# Patient Record
Sex: MALE | Race: WHITE | NOT HISPANIC OR LATINO | Employment: FULL TIME | URBAN - METROPOLITAN AREA
[De-identification: names, ages, dates, MRNs, and addresses within clinical notes are randomized per-mention and may not be internally consistent; named-entity substitution may affect disease eponyms.]

---

## 2024-07-10 ENCOUNTER — APPOINTMENT (OUTPATIENT)
Dept: NEUROLOGY | Facility: HOSPITAL | Age: 44
DRG: 215 | End: 2024-07-10
Payer: COMMERCIAL

## 2024-07-10 ENCOUNTER — HOSPITAL ENCOUNTER (INPATIENT)
Facility: HOSPITAL | Age: 44
LOS: 1 days | Discharge: HOSPICE | DRG: 215 | End: 2024-07-10
Attending: GENERAL PRACTICE | Admitting: ANESTHESIOLOGY
Payer: COMMERCIAL

## 2024-07-10 ENCOUNTER — APPOINTMENT (OUTPATIENT)
Dept: RADIOLOGY | Facility: HOSPITAL | Age: 44
DRG: 215 | End: 2024-07-10
Payer: COMMERCIAL

## 2024-07-10 ENCOUNTER — APPOINTMENT (OUTPATIENT)
Dept: CARDIOLOGY | Facility: HOSPITAL | Age: 44
DRG: 215 | End: 2024-07-10
Payer: COMMERCIAL

## 2024-07-10 ENCOUNTER — HOSPITAL ENCOUNTER (INPATIENT)
Facility: HOSPITAL | Age: 44
DRG: 276 | End: 2024-07-10
Attending: INTERNAL MEDICINE | Admitting: INTERNAL MEDICINE
Payer: COMMERCIAL

## 2024-07-10 ENCOUNTER — APPOINTMENT (OUTPATIENT)
Dept: RADIOLOGY | Facility: HOSPITAL | Age: 44
DRG: 276 | End: 2024-07-10
Payer: COMMERCIAL

## 2024-07-10 VITALS
HEIGHT: 70 IN | WEIGHT: 257.06 LBS | DIASTOLIC BLOOD PRESSURE: 54 MMHG | TEMPERATURE: 97.7 F | RESPIRATION RATE: 14 BRPM | BODY MASS INDEX: 36.8 KG/M2 | OXYGEN SATURATION: 92 % | HEART RATE: 62 BPM | SYSTOLIC BLOOD PRESSURE: 81 MMHG

## 2024-07-10 DIAGNOSIS — Z99.2 ESRD (END STAGE RENAL DISEASE) ON DIALYSIS (MULTI): ICD-10-CM

## 2024-07-10 DIAGNOSIS — N18.6 ESRD (END STAGE RENAL DISEASE) ON DIALYSIS (MULTI): ICD-10-CM

## 2024-07-10 DIAGNOSIS — I46.9 CARDIAC ARREST (MULTI): Primary | ICD-10-CM

## 2024-07-10 DIAGNOSIS — N17.9 AKI (ACUTE KIDNEY INJURY) (CMS-HCC): ICD-10-CM

## 2024-07-10 DIAGNOSIS — R40.20: ICD-10-CM

## 2024-07-10 DIAGNOSIS — I47.20 VT (VENTRICULAR TACHYCARDIA) (MULTI): ICD-10-CM

## 2024-07-10 DIAGNOSIS — I46.9 CARDIAC ARREST: Primary | ICD-10-CM

## 2024-07-10 DIAGNOSIS — I21.3 STEMI (ST ELEVATION MYOCARDIAL INFARCTION) (MULTI): ICD-10-CM

## 2024-07-10 PROBLEM — F41.9 ANXIETY: Status: ACTIVE | Noted: 2018-04-09

## 2024-07-10 PROBLEM — G47.33 OBSTRUCTIVE SLEEP APNEA SYNDROME: Status: ACTIVE | Noted: 2017-09-25

## 2024-07-10 PROBLEM — Z98.84 GASTRIC BYPASS STATUS FOR OBESITY: Status: ACTIVE | Noted: 2022-02-22

## 2024-07-10 PROBLEM — I10 ESSENTIAL HYPERTENSION: Status: ACTIVE | Noted: 2018-03-16

## 2024-07-10 PROBLEM — E78.2 MIXED HYPERLIPIDEMIA: Status: ACTIVE | Noted: 2018-03-16

## 2024-07-10 LAB
ACT BLD: 184 SEC (ref 83–199)
ALBUMIN SERPL BCP-MCNC: 3.4 G/DL (ref 3.4–5)
ALBUMIN SERPL BCP-MCNC: 4.2 G/DL (ref 3.4–5)
ALP SERPL-CCNC: 71 U/L (ref 33–120)
ALT SERPL W P-5'-P-CCNC: 116 U/L (ref 10–52)
ANION GAP BLDA CALCULATED.4IONS-SCNC: 17 MMO/L (ref 10–25)
ANION GAP BLDA CALCULATED.4IONS-SCNC: 19 MMO/L (ref 10–25)
ANION GAP BLDA CALCULATED.4IONS-SCNC: 21 MMO/L (ref 10–25)
ANION GAP SERPL CALC-SCNC: 19 MMOL/L (ref 10–20)
ANION GAP SERPL CALC-SCNC: 20 MMOL/L (ref 10–20)
AORTIC VALVE MEAN GRADIENT: 0.6 MMHG
AORTIC VALVE PEAK VELOCITY: 0.53 M/S
AST SERPL W P-5'-P-CCNC: 127 U/L (ref 9–39)
AV PEAK GRADIENT: 1.1 MMHG
BASE EXCESS BLDA CALC-SCNC: -10 MMOL/L (ref -2–3)
BASE EXCESS BLDA CALC-SCNC: -11.4 MMOL/L (ref -2–3)
BASE EXCESS BLDA CALC-SCNC: -5.9 MMOL/L (ref -2–3)
BASOPHILS # BLD AUTO: 0.07 X10*3/UL (ref 0–0.1)
BASOPHILS NFR BLD AUTO: 0.3 %
BILIRUB SERPL-MCNC: 2 MG/DL (ref 0–1.2)
BODY TEMPERATURE: 37 DEGREES CELSIUS
BUN SERPL-MCNC: 15 MG/DL (ref 6–23)
BUN SERPL-MCNC: 28 MG/DL (ref 6–23)
CA-I BLDA-SCNC: 1.05 MMOL/L (ref 1.1–1.33)
CA-I BLDA-SCNC: 1.17 MMOL/L (ref 1.1–1.33)
CA-I BLDA-SCNC: 1.17 MMOL/L (ref 1.1–1.33)
CALCIUM SERPL-MCNC: 7.9 MG/DL (ref 8.6–10.6)
CALCIUM SERPL-MCNC: 8.5 MG/DL (ref 8.6–10.3)
CARDIAC TROPONIN I PNL SERPL HS: 2496 NG/L (ref 0–20)
CHLORIDE BLDA-SCNC: 100 MMOL/L (ref 98–107)
CHLORIDE BLDA-SCNC: 103 MMOL/L (ref 98–107)
CHLORIDE BLDA-SCNC: 103 MMOL/L (ref 98–107)
CHLORIDE SERPL-SCNC: 103 MMOL/L (ref 98–107)
CHLORIDE SERPL-SCNC: 106 MMOL/L (ref 98–107)
CO2 SERPL-SCNC: 17 MMOL/L (ref 21–32)
CO2 SERPL-SCNC: 23 MMOL/L (ref 21–32)
CREAT SERPL-MCNC: 1.19 MG/DL (ref 0.5–1.3)
CREAT SERPL-MCNC: 2.09 MG/DL (ref 0.5–1.3)
EGFRCR SERPLBLD CKD-EPI 2021: 39 ML/MIN/1.73M*2
EGFRCR SERPLBLD CKD-EPI 2021: 77 ML/MIN/1.73M*2
EJECTION FRACTION APICAL 4 CHAMBER: 6.9
EJECTION FRACTION: 10 %
EOSINOPHIL # BLD AUTO: 0 X10*3/UL (ref 0–0.7)
EOSINOPHIL NFR BLD AUTO: 0 %
ERYTHROCYTE [DISTWIDTH] IN BLOOD BY AUTOMATED COUNT: 13.9 % (ref 11.5–14.5)
GLUCOSE BLD MANUAL STRIP-MCNC: 184 MG/DL (ref 74–99)
GLUCOSE BLDA-MCNC: 172 MG/DL (ref 74–99)
GLUCOSE BLDA-MCNC: 203 MG/DL (ref 74–99)
GLUCOSE BLDA-MCNC: 264 MG/DL (ref 74–99)
GLUCOSE SERPL-MCNC: 228 MG/DL (ref 74–99)
GLUCOSE SERPL-MCNC: 243 MG/DL (ref 74–99)
HCO3 BLDA-SCNC: 16.8 MMOL/L (ref 22–26)
HCO3 BLDA-SCNC: 19.5 MMOL/L (ref 22–26)
HCO3 BLDA-SCNC: 23.7 MMOL/L (ref 22–26)
HCT VFR BLD AUTO: 45.1 % (ref 41–52)
HCT VFR BLD EST: 44 % (ref 41–52)
HCT VFR BLD EST: 48 % (ref 41–52)
HCT VFR BLD EST: 49 % (ref 41–52)
HGB BLD-MCNC: 15.3 G/DL (ref 13.5–17.5)
HGB BLDA-MCNC: 14.5 G/DL (ref 13.5–17.5)
HGB BLDA-MCNC: 16 G/DL (ref 13.5–17.5)
HGB BLDA-MCNC: 16.2 G/DL (ref 13.5–17.5)
HOLD SPECIMEN: NORMAL
IMM GRANULOCYTES # BLD AUTO: 0.14 X10*3/UL (ref 0–0.7)
IMM GRANULOCYTES NFR BLD AUTO: 0.5 % (ref 0–0.9)
INHALED O2 CONCENTRATION: 100 %
LACTATE BLDA-SCNC: 4.7 MMOL/L (ref 0.4–2)
LACTATE BLDA-SCNC: 5.2 MMOL/L (ref 0.4–2)
LACTATE BLDA-SCNC: 7.1 MMOL/L (ref 0.4–2)
LACTATE SERPL-SCNC: 7.1 MMOL/L (ref 0.4–2)
LEFT VENTRICLE INTERNAL DIMENSION DIASTOLE: 4.77 CM (ref 3.5–6)
LYMPHOCYTES # BLD AUTO: 1.19 X10*3/UL (ref 1.2–4.8)
LYMPHOCYTES NFR BLD AUTO: 4.4 %
MAGNESIUM SERPL-MCNC: 1.64 MG/DL (ref 1.6–2.4)
MCH RBC QN AUTO: 29.8 PG (ref 26–34)
MCHC RBC AUTO-ENTMCNC: 33.9 G/DL (ref 32–36)
MCV RBC AUTO: 88 FL (ref 80–100)
MITRAL VALVE E/A RATIO: 0.99
MONOCYTES # BLD AUTO: 1.29 X10*3/UL (ref 0.1–1)
MONOCYTES NFR BLD AUTO: 4.7 %
NEUTROPHILS # BLD AUTO: 24.63 X10*3/UL (ref 1.2–7.7)
NEUTROPHILS NFR BLD AUTO: 90.1 %
NRBC BLD-RTO: 0 /100 WBCS (ref 0–0)
OXYHGB MFR BLDA: 90.9 % (ref 94–98)
OXYHGB MFR BLDA: 91.8 % (ref 94–98)
OXYHGB MFR BLDA: 95.5 % (ref 94–98)
PCO2 BLDA: 45 MM HG (ref 38–42)
PCO2 BLDA: 56 MM HG (ref 38–42)
PCO2 BLDA: 62 MM HG (ref 38–42)
PH BLDA: 7.15 PH (ref 7.38–7.42)
PH BLDA: 7.18 PH (ref 7.38–7.42)
PH BLDA: 7.19 PH (ref 7.38–7.42)
PHOSPHATE SERPL-MCNC: 4 MG/DL (ref 2.5–4.9)
PLATELET # BLD AUTO: 295 X10*3/UL (ref 150–450)
PO2 BLDA: 123 MM HG (ref 85–95)
PO2 BLDA: 73 MM HG (ref 85–95)
PO2 BLDA: 76 MM HG (ref 85–95)
POTASSIUM BLDA-SCNC: 3.1 MMOL/L (ref 3.5–5.3)
POTASSIUM BLDA-SCNC: 3.5 MMOL/L (ref 3.5–5.3)
POTASSIUM BLDA-SCNC: 3.5 MMOL/L (ref 3.5–5.3)
POTASSIUM SERPL-SCNC: 2.6 MMOL/L (ref 3.5–5.3)
POTASSIUM SERPL-SCNC: 3.1 MMOL/L (ref 3.5–5.3)
PROT SERPL-MCNC: 5.5 G/DL (ref 6.4–8.2)
RBC # BLD AUTO: 5.14 X10*6/UL (ref 4.5–5.9)
SAO2 % BLDA: 92 % (ref 94–100)
SAO2 % BLDA: 92 % (ref 94–100)
SAO2 % BLDA: 96 % (ref 94–100)
SODIUM BLDA-SCNC: 137 MMOL/L (ref 136–145)
SODIUM BLDA-SCNC: 138 MMOL/L (ref 136–145)
SODIUM BLDA-SCNC: 138 MMOL/L (ref 136–145)
SODIUM SERPL-SCNC: 140 MMOL/L (ref 136–145)
SODIUM SERPL-SCNC: 142 MMOL/L (ref 136–145)
SPECIMEN DRAWN FROM PATIENT: ABNORMAL
WBC # BLD AUTO: 27.3 X10*3/UL (ref 4.4–11.3)

## 2024-07-10 PROCEDURE — 83605 ASSAY OF LACTIC ACID: CPT | Performed by: ANESTHESIOLOGY

## 2024-07-10 PROCEDURE — C1889 IMPLANT/INSERT DEVICE, NOC: HCPCS | Performed by: INTERNAL MEDICINE

## 2024-07-10 PROCEDURE — B2111ZZ FLUOROSCOPY OF MULTIPLE CORONARY ARTERIES USING LOW OSMOLAR CONTRAST: ICD-10-PCS | Performed by: INTERNAL MEDICINE

## 2024-07-10 PROCEDURE — 84132 ASSAY OF SERUM POTASSIUM: CPT

## 2024-07-10 PROCEDURE — 2500000004 HC RX 250 GENERAL PHARMACY W/ HCPCS (ALT 636 FOR OP/ED): Performed by: INTERNAL MEDICINE

## 2024-07-10 PROCEDURE — 71045 X-RAY EXAM CHEST 1 VIEW: CPT | Performed by: RADIOLOGY

## 2024-07-10 PROCEDURE — 2780000003 HC OR 278 NO HCPCS: Performed by: INTERNAL MEDICINE

## 2024-07-10 PROCEDURE — 85347 COAGULATION TIME ACTIVATED: CPT

## 2024-07-10 PROCEDURE — 83010 ASSAY OF HAPTOGLOBIN QUANT: CPT | Performed by: STUDENT IN AN ORGANIZED HEALTH CARE EDUCATION/TRAINING PROGRAM

## 2024-07-10 PROCEDURE — 71275 CT ANGIOGRAPHY CHEST: CPT

## 2024-07-10 PROCEDURE — 2500000004 HC RX 250 GENERAL PHARMACY W/ HCPCS (ALT 636 FOR OP/ED)

## 2024-07-10 PROCEDURE — 71045 X-RAY EXAM CHEST 1 VIEW: CPT

## 2024-07-10 PROCEDURE — 2500000004 HC RX 250 GENERAL PHARMACY W/ HCPCS (ALT 636 FOR OP/ED): Performed by: GENERAL PRACTICE

## 2024-07-10 PROCEDURE — 82947 ASSAY GLUCOSE BLOOD QUANT: CPT

## 2024-07-10 PROCEDURE — 99153 MOD SED SAME PHYS/QHP EA: CPT | Performed by: INTERNAL MEDICINE

## 2024-07-10 PROCEDURE — 99223 1ST HOSP IP/OBS HIGH 75: CPT | Performed by: INTERNAL MEDICINE

## 2024-07-10 PROCEDURE — 94002 VENT MGMT INPAT INIT DAY: CPT

## 2024-07-10 PROCEDURE — 93460 R&L HRT ART/VENTRICLE ANGIO: CPT | Performed by: INTERNAL MEDICINE

## 2024-07-10 PROCEDURE — 99285 EMERGENCY DEPT VISIT HI MDM: CPT | Mod: 25

## 2024-07-10 PROCEDURE — C1751 CATH, INF, PER/CENT/MIDLINE: HCPCS | Performed by: INTERNAL MEDICINE

## 2024-07-10 PROCEDURE — 2550000001 HC RX 255 CONTRASTS: Performed by: INTERNAL MEDICINE

## 2024-07-10 PROCEDURE — C1769 GUIDE WIRE: HCPCS | Performed by: INTERNAL MEDICINE

## 2024-07-10 PROCEDURE — 70450 CT HEAD/BRAIN W/O DYE: CPT

## 2024-07-10 PROCEDURE — 2500000005 HC RX 250 GENERAL PHARMACY W/O HCPCS

## 2024-07-10 PROCEDURE — 31500 INSERT EMERGENCY AIRWAY: CPT | Performed by: GENERAL PRACTICE

## 2024-07-10 PROCEDURE — 84484 ASSAY OF TROPONIN QUANT: CPT | Performed by: INTERNAL MEDICINE

## 2024-07-10 PROCEDURE — 93306 TTE W/DOPPLER COMPLETE: CPT | Performed by: INTERNAL MEDICINE

## 2024-07-10 PROCEDURE — 33990 INSJ PERQ VAD L HRT ARTERIAL: CPT | Performed by: INTERNAL MEDICINE

## 2024-07-10 PROCEDURE — 2020000001 HC ICU ROOM DAILY

## 2024-07-10 PROCEDURE — 84295 ASSAY OF SERUM SODIUM: CPT | Performed by: ANESTHESIOLOGY

## 2024-07-10 PROCEDURE — 84132 ASSAY OF SERUM POTASSIUM: CPT | Performed by: STUDENT IN AN ORGANIZED HEALTH CARE EDUCATION/TRAINING PROGRAM

## 2024-07-10 PROCEDURE — 99152 MOD SED SAME PHYS/QHP 5/>YRS: CPT | Performed by: INTERNAL MEDICINE

## 2024-07-10 PROCEDURE — 2500000004 HC RX 250 GENERAL PHARMACY W/ HCPCS (ALT 636 FOR OP/ED): Performed by: NURSE PRACTITIONER

## 2024-07-10 PROCEDURE — 2550000001 HC RX 255 CONTRASTS: Performed by: ANESTHESIOLOGY

## 2024-07-10 PROCEDURE — 36556 INSERT NON-TUNNEL CV CATH: CPT | Performed by: NURSE PRACTITIONER

## 2024-07-10 PROCEDURE — 71275 CT ANGIOGRAPHY CHEST: CPT | Performed by: RADIOLOGY

## 2024-07-10 PROCEDURE — 2720000007 HC OR 272 NO HCPCS: Performed by: INTERNAL MEDICINE

## 2024-07-10 PROCEDURE — 84075 ASSAY ALKALINE PHOSPHATASE: CPT

## 2024-07-10 PROCEDURE — 36415 COLL VENOUS BLD VENIPUNCTURE: CPT | Performed by: ANESTHESIOLOGY

## 2024-07-10 PROCEDURE — 37799 UNLISTED PX VASCULAR SURGERY: CPT | Performed by: STUDENT IN AN ORGANIZED HEALTH CARE EDUCATION/TRAINING PROGRAM

## 2024-07-10 PROCEDURE — 93456 R HRT CORONARY ARTERY ANGIO: CPT | Performed by: INTERNAL MEDICINE

## 2024-07-10 PROCEDURE — 83615 LACTATE (LD) (LDH) ENZYME: CPT | Performed by: STUDENT IN AN ORGANIZED HEALTH CARE EDUCATION/TRAINING PROGRAM

## 2024-07-10 PROCEDURE — 99291 CRITICAL CARE FIRST HOUR: CPT | Performed by: GENERAL PRACTICE

## 2024-07-10 PROCEDURE — 33990 INSJ PERQ VAD L HRT ARTERIAL: CPT

## 2024-07-10 PROCEDURE — 85610 PROTHROMBIN TIME: CPT | Performed by: STUDENT IN AN ORGANIZED HEALTH CARE EDUCATION/TRAINING PROGRAM

## 2024-07-10 PROCEDURE — C1760 CLOSURE DEV, VASC: HCPCS | Performed by: INTERNAL MEDICINE

## 2024-07-10 PROCEDURE — 2500000004 HC RX 250 GENERAL PHARMACY W/ HCPCS (ALT 636 FOR OP/ED): Performed by: ANESTHESIOLOGY

## 2024-07-10 PROCEDURE — 02HV33Z INSERTION OF INFUSION DEVICE INTO SUPERIOR VENA CAVA, PERCUTANEOUS APPROACH: ICD-10-PCS | Performed by: ANESTHESIOLOGY

## 2024-07-10 PROCEDURE — 5A0221D ASSISTANCE WITH CARDIAC OUTPUT USING IMPELLER PUMP, CONTINUOUS: ICD-10-PCS | Performed by: INTERNAL MEDICINE

## 2024-07-10 PROCEDURE — 99223 1ST HOSP IP/OBS HIGH 75: CPT | Performed by: PSYCHIATRY & NEUROLOGY

## 2024-07-10 PROCEDURE — 4A023N8 MEASUREMENT OF CARDIAC SAMPLING AND PRESSURE, BILATERAL, PERCUTANEOUS APPROACH: ICD-10-PCS | Performed by: INTERNAL MEDICINE

## 2024-07-10 PROCEDURE — 99291 CRITICAL CARE FIRST HOUR: CPT | Performed by: ANESTHESIOLOGY

## 2024-07-10 PROCEDURE — 83735 ASSAY OF MAGNESIUM: CPT

## 2024-07-10 PROCEDURE — 83605 ASSAY OF LACTIC ACID: CPT | Performed by: STUDENT IN AN ORGANIZED HEALTH CARE EDUCATION/TRAINING PROGRAM

## 2024-07-10 PROCEDURE — 93010 ELECTROCARDIOGRAM REPORT: CPT | Performed by: INTERNAL MEDICINE

## 2024-07-10 PROCEDURE — 1210000006 HC SEMI PRIVATE ROOM - IP ONLY PROCEDURE WITH INTENT

## 2024-07-10 PROCEDURE — 02HQ32Z INSERTION OF MONITORING DEVICE INTO RIGHT PULMONARY ARTERY, PERCUTANEOUS APPROACH: ICD-10-PCS | Performed by: INTERNAL MEDICINE

## 2024-07-10 PROCEDURE — 02HA3RZ INSERTION OF SHORT-TERM EXTERNAL HEART ASSIST SYSTEM INTO HEART, PERCUTANEOUS APPROACH: ICD-10-PCS | Performed by: INTERNAL MEDICINE

## 2024-07-10 PROCEDURE — 85025 COMPLETE CBC W/AUTO DIFF WBC: CPT

## 2024-07-10 PROCEDURE — 82330 ASSAY OF CALCIUM: CPT | Performed by: ANESTHESIOLOGY

## 2024-07-10 PROCEDURE — C1894 INTRO/SHEATH, NON-LASER: HCPCS | Performed by: INTERNAL MEDICINE

## 2024-07-10 PROCEDURE — 99292 CRITICAL CARE ADDL 30 MIN: CPT | Performed by: ANESTHESIOLOGY

## 2024-07-10 PROCEDURE — 70450 CT HEAD/BRAIN W/O DYE: CPT | Performed by: RADIOLOGY

## 2024-07-10 PROCEDURE — 96374 THER/PROPH/DIAG INJ IV PUSH: CPT

## 2024-07-10 PROCEDURE — 93306 TTE W/DOPPLER COMPLETE: CPT

## 2024-07-10 PROCEDURE — 4A1239Z MONITORING OF CARDIAC OUTPUT, PERCUTANEOUS APPROACH: ICD-10-PCS | Performed by: INTERNAL MEDICINE

## 2024-07-10 PROCEDURE — 36600 WITHDRAWAL OF ARTERIAL BLOOD: CPT

## 2024-07-10 PROCEDURE — 4A133B3 MONITORING OF ARTERIAL PRESSURE, PULMONARY, PERCUTANEOUS APPROACH: ICD-10-PCS | Performed by: INTERNAL MEDICINE

## 2024-07-10 DEVICE — IMPELLA CP PUMP 371 SET, US ( IMPELLA CP WITH SMART ASSIST)
Type: IMPLANTABLE DEVICE | Site: AORTA | Status: FUNCTIONAL
Brand: IMPELLA

## 2024-07-10 RX ORDER — PROPOFOL 10 MG/ML
INJECTION, EMULSION INTRAVENOUS
Status: COMPLETED
Start: 2024-07-10 | End: 2024-07-10

## 2024-07-10 RX ORDER — MEPERIDINE HYDROCHLORIDE 25 MG/ML
12.5 INJECTION INTRAMUSCULAR; INTRAVENOUS; SUBCUTANEOUS
Status: DISCONTINUED | OUTPATIENT
Start: 2024-07-10 | End: 2024-07-10 | Stop reason: HOSPADM

## 2024-07-10 RX ORDER — LEVETIRACETAM 10 MG/ML
1000 INJECTION INTRAVASCULAR ONCE
Status: DISCONTINUED | OUTPATIENT
Start: 2024-07-10 | End: 2024-07-10 | Stop reason: HOSPADM

## 2024-07-10 RX ORDER — BISMUTH SUBSALICYLATE 262 MG
1 TABLET,CHEWABLE ORAL DAILY
COMMUNITY

## 2024-07-10 RX ORDER — PROPOFOL 10 MG/ML
5-50 INJECTION, EMULSION INTRAVENOUS CONTINUOUS
Status: DISCONTINUED | OUTPATIENT
Start: 2024-07-10 | End: 2024-07-14

## 2024-07-10 RX ORDER — NOREPINEPHRINE BITARTRATE/D5W 8 MG/250ML
0-.5 PLASTIC BAG, INJECTION (ML) INTRAVENOUS CONTINUOUS
Status: DISCONTINUED | OUTPATIENT
Start: 2024-07-10 | End: 2024-07-10 | Stop reason: HOSPADM

## 2024-07-10 RX ORDER — POTASSIUM CHLORIDE 14.9 MG/ML
INJECTION INTRAVENOUS
Status: COMPLETED
Start: 2024-07-10 | End: 2024-07-10

## 2024-07-10 RX ORDER — POLYETHYLENE GLYCOL 3350 17 G/17G
17 POWDER, FOR SOLUTION ORAL DAILY
Status: DISCONTINUED | OUTPATIENT
Start: 2024-07-11 | End: 2024-07-27 | Stop reason: HOSPADM

## 2024-07-10 RX ORDER — POTASSIUM CHLORIDE 14.9 MG/ML
20 INJECTION INTRAVENOUS
Status: COMPLETED | OUTPATIENT
Start: 2024-07-10 | End: 2024-07-10

## 2024-07-10 RX ORDER — POTASSIUM CHLORIDE 14.9 MG/ML
20 INJECTION INTRAVENOUS ONCE
Status: COMPLETED | OUTPATIENT
Start: 2024-07-10 | End: 2024-07-10

## 2024-07-10 RX ORDER — FENTANYL CITRATE 50 UG/ML
100 INJECTION, SOLUTION INTRAMUSCULAR; INTRAVENOUS ONCE
Status: COMPLETED | OUTPATIENT
Start: 2024-07-10 | End: 2024-07-10

## 2024-07-10 RX ORDER — NOREPINEPHRINE BITARTRATE/D5W 8 MG/250ML
PLASTIC BAG, INJECTION (ML) INTRAVENOUS
Status: COMPLETED
Start: 2024-07-10 | End: 2024-07-10

## 2024-07-10 RX ORDER — HEPARIN SODIUM 1000 [USP'U]/ML
INJECTION, SOLUTION INTRAVENOUS; SUBCUTANEOUS AS NEEDED
Status: DISCONTINUED | OUTPATIENT
Start: 2024-07-10 | End: 2024-07-10 | Stop reason: HOSPADM

## 2024-07-10 RX ORDER — MAGNESIUM SULFATE HEPTAHYDRATE 40 MG/ML
INJECTION, SOLUTION INTRAVENOUS
Status: COMPLETED
Start: 2024-07-10 | End: 2024-07-11

## 2024-07-10 RX ORDER — AMLODIPINE BESYLATE 2.5 MG/1
2.5 TABLET ORAL DAILY
COMMUNITY
End: 2024-07-27 | Stop reason: HOSPADM

## 2024-07-10 RX ORDER — POTASSIUM CHLORIDE 14.9 MG/ML
20 INJECTION INTRAVENOUS
Status: DISCONTINUED | OUTPATIENT
Start: 2024-07-10 | End: 2024-07-10 | Stop reason: HOSPADM

## 2024-07-10 RX ORDER — MIDAZOLAM HYDROCHLORIDE 1 MG/ML
INJECTION INTRAMUSCULAR; INTRAVENOUS
Status: COMPLETED
Start: 2024-07-10 | End: 2024-07-10

## 2024-07-10 RX ORDER — LEVETIRACETAM 5 MG/ML
500 INJECTION INTRAVASCULAR EVERY 12 HOURS
Status: DISCONTINUED | OUTPATIENT
Start: 2024-07-10 | End: 2024-07-10 | Stop reason: HOSPADM

## 2024-07-10 RX ORDER — PROPOFOL 10 MG/ML
5-50 INJECTION, EMULSION INTRAVENOUS CONTINUOUS
Status: DISCONTINUED | OUTPATIENT
Start: 2024-07-10 | End: 2024-07-10 | Stop reason: CLARIF

## 2024-07-10 RX ORDER — MIDAZOLAM HYDROCHLORIDE 1 MG/ML
2 INJECTION, SOLUTION INTRAMUSCULAR; INTRAVENOUS ONCE
Status: COMPLETED | OUTPATIENT
Start: 2024-07-10 | End: 2024-07-10

## 2024-07-10 RX ORDER — NOREPINEPHRINE BITARTRATE/D5W 8 MG/250ML
0-.2 PLASTIC BAG, INJECTION (ML) INTRAVENOUS CONTINUOUS
Status: DISCONTINUED | OUTPATIENT
Start: 2024-07-10 | End: 2024-07-12

## 2024-07-10 RX ORDER — VITAMIN A 3000 MCG
10000 CAPSULE ORAL DAILY
COMMUNITY

## 2024-07-10 RX ORDER — MIDAZOLAM HYDROCHLORIDE 1 MG/ML
INJECTION, SOLUTION INTRAMUSCULAR; INTRAVENOUS
Status: COMPLETED
Start: 2024-07-10 | End: 2024-07-10

## 2024-07-10 RX ORDER — ERGOCALCIFEROL 1.25 MG/1
1.25 CAPSULE ORAL
COMMUNITY
End: 2024-12-02 | Stop reason: SDUPTHER

## 2024-07-10 RX ORDER — FENTANYL CITRATE 50 UG/ML
INJECTION, SOLUTION INTRAMUSCULAR; INTRAVENOUS
Status: COMPLETED
Start: 2024-07-10 | End: 2024-07-10

## 2024-07-10 RX ORDER — HEPARIN SODIUM 10000 [USP'U]/100ML
0-2000 INJECTION, SOLUTION INTRAVENOUS CONTINUOUS
Status: DISCONTINUED | OUTPATIENT
Start: 2024-07-10 | End: 2024-07-10 | Stop reason: HOSPADM

## 2024-07-10 RX ORDER — FUROSEMIDE 10 MG/ML
40 INJECTION INTRAMUSCULAR; INTRAVENOUS ONCE
Status: COMPLETED | OUTPATIENT
Start: 2024-07-10 | End: 2024-07-10

## 2024-07-10 RX ORDER — POLYETHYLENE GLYCOL 3350 17 G/17G
17 POWDER, FOR SOLUTION ORAL DAILY
Status: DISCONTINUED | OUTPATIENT
Start: 2024-07-10 | End: 2024-07-10 | Stop reason: HOSPADM

## 2024-07-10 RX ORDER — PROPOFOL 10 MG/ML
5-50 INJECTION, EMULSION INTRAVENOUS CONTINUOUS
Status: DISCONTINUED | OUTPATIENT
Start: 2024-07-10 | End: 2024-07-10 | Stop reason: HOSPADM

## 2024-07-10 RX ORDER — LIDOCAINE HYDROCHLORIDE ANHYDROUS AND DEXTROSE MONOHYDRATE .8; 5 G/100ML; G/100ML
INJECTION, SOLUTION INTRAVENOUS
Status: COMPLETED
Start: 2024-07-10 | End: 2024-07-11

## 2024-07-10 RX ORDER — EPINEPHRINE HCL IN DEXTROSE 5% 4MG/250ML
0-.2 PLASTIC BAG, INJECTION (ML) INTRAVENOUS CONTINUOUS
Status: DISCONTINUED | OUTPATIENT
Start: 2024-07-10 | End: 2024-07-12

## 2024-07-10 RX ORDER — EPINEPHRINE HCL IN DEXTROSE 5% 4MG/250ML
0-1 PLASTIC BAG, INJECTION (ML) INTRAVENOUS CONTINUOUS
Status: DISCONTINUED | OUTPATIENT
Start: 2024-07-10 | End: 2024-07-10 | Stop reason: HOSPADM

## 2024-07-10 RX ORDER — MAGNESIUM SULFATE HEPTAHYDRATE 40 MG/ML
4 INJECTION, SOLUTION INTRAVENOUS ONCE
Status: COMPLETED | OUTPATIENT
Start: 2024-07-11 | End: 2024-07-11

## 2024-07-10 RX ADMIN — POTASSIUM CHLORIDE 20 MEQ: 14.9 INJECTION, SOLUTION INTRAVENOUS at 20:00

## 2024-07-10 RX ADMIN — FUROSEMIDE 40 MG: 10 INJECTION, SOLUTION INTRAMUSCULAR; INTRAVENOUS at 15:58

## 2024-07-10 RX ADMIN — Medication 0.1 MCG/KG/MIN: at 15:40

## 2024-07-10 RX ADMIN — PROPOFOL 50 MCG/KG/MIN: 10 INJECTION, EMULSION INTRAVENOUS at 15:28

## 2024-07-10 RX ADMIN — MIDAZOLAM HYDROCHLORIDE 2 MG: 1 INJECTION, SOLUTION INTRAMUSCULAR; INTRAVENOUS at 17:39

## 2024-07-10 RX ADMIN — NOREPINEPHRINE BITARTRATE 32 MCG: 1 INJECTION, SOLUTION, CONCENTRATE INTRAVENOUS at 17:15

## 2024-07-10 RX ADMIN — MIDAZOLAM HYDROCHLORIDE 2 MG: 1 INJECTION INTRAMUSCULAR; INTRAVENOUS at 18:00

## 2024-07-10 RX ADMIN — AMIODARONE HYDROCHLORIDE 150 MG: 1.5 INJECTION, SOLUTION INTRAVENOUS at 15:13

## 2024-07-10 RX ADMIN — FENTANYL CITRATE 100 MCG: 50 INJECTION, SOLUTION INTRAMUSCULAR; INTRAVENOUS at 15:41

## 2024-07-10 RX ADMIN — POTASSIUM CHLORIDE 20 MEQ: 14.9 INJECTION, SOLUTION INTRAVENOUS at 15:13

## 2024-07-10 RX ADMIN — MIDAZOLAM 2 MG: 1 INJECTION INTRAMUSCULAR; INTRAVENOUS at 15:25

## 2024-07-10 RX ADMIN — FENTANYL CITRATE 100 MCG: 50 INJECTION, SOLUTION INTRAMUSCULAR; INTRAVENOUS at 17:39

## 2024-07-10 RX ADMIN — POTASSIUM CHLORIDE 20 MEQ: 14.9 INJECTION, SOLUTION INTRAVENOUS at 17:40

## 2024-07-10 RX ADMIN — PROPOFOL 20 MG: 10 INJECTION, EMULSION INTRAVENOUS at 12:41

## 2024-07-10 RX ADMIN — AMIODARONE HYDROCHLORIDE 1 MG/MIN: 1.8 INJECTION, SOLUTION INTRAVENOUS at 15:09

## 2024-07-10 RX ADMIN — Medication 0.07 MCG/KG/MIN: at 16:00

## 2024-07-10 RX ADMIN — POTASSIUM CHLORIDE 20 MEQ: 14.9 INJECTION, SOLUTION INTRAVENOUS at 11:09

## 2024-07-10 RX ADMIN — IOHEXOL 75 ML: 350 INJECTION, SOLUTION INTRAVENOUS at 14:23

## 2024-07-10 RX ADMIN — MEPERIDINE HYDROCHLORIDE 12.5 MG: 25 INJECTION INTRAMUSCULAR; INTRAVENOUS; SUBCUTANEOUS at 16:20

## 2024-07-10 RX ADMIN — PERFLUTREN 10 ML OF DILUTION: 6.52 INJECTION, SUSPENSION INTRAVENOUS at 15:58

## 2024-07-10 RX ADMIN — MIDAZOLAM HYDROCHLORIDE 2 MG: 1 INJECTION, SOLUTION INTRAMUSCULAR; INTRAVENOUS at 15:25

## 2024-07-10 RX ADMIN — PROPOFOL 50 MCG/KG/MIN: 10 INJECTION, EMULSION INTRAVENOUS at 17:42

## 2024-07-10 RX ADMIN — POTASSIUM CHLORIDE 20 MEQ: 200 INJECTION, SOLUTION INTRAVENOUS at 11:09

## 2024-07-10 SDOH — SOCIAL STABILITY: SOCIAL NETWORK
DO YOU BELONG TO ANY CLUBS OR ORGANIZATIONS SUCH AS CHURCH GROUPS, UNIONS, FRATERNAL OR ATHLETIC GROUPS, OR SCHOOL GROUPS?: PATIENT UNABLE TO ANSWER

## 2024-07-10 SDOH — HEALTH STABILITY: MENTAL HEALTH
HOW OFTEN DO YOU NEED TO HAVE SOMEONE HELP YOU WHEN YOU READ INSTRUCTIONS, PAMPHLETS, OR OTHER WRITTEN MATERIAL FROM YOUR DOCTOR OR PHARMACY?: PATIENT UNABLE TO RESPOND

## 2024-07-10 SDOH — ECONOMIC STABILITY: HOUSING INSECURITY: AT ANY TIME IN THE PAST 12 MONTHS, WERE YOU HOMELESS OR LIVING IN A SHELTER (INCLUDING NOW)?: PATIENT UNABLE TO ANSWER

## 2024-07-10 SDOH — SOCIAL STABILITY: SOCIAL NETWORK: IN A TYPICAL WEEK, HOW MANY TIMES DO YOU TALK ON THE PHONE WITH FAMILY, FRIENDS, OR NEIGHBORS?: PATIENT UNABLE TO ANSWER

## 2024-07-10 SDOH — HEALTH STABILITY: MENTAL HEALTH: HOW OFTEN DO YOU HAVE A DRINK CONTAINING ALCOHOL?: PATIENT UNABLE TO ANSWER

## 2024-07-10 SDOH — HEALTH STABILITY: MENTAL HEALTH: HOW MANY DRINKS CONTAINING ALCOHOL DO YOU HAVE ON A TYPICAL DAY WHEN YOU ARE DRINKING?: PATIENT UNABLE TO ANSWER

## 2024-07-10 SDOH — SOCIAL STABILITY: SOCIAL INSECURITY: HAS ANYONE EVER THREATENED TO HURT YOUR FAMILY OR YOUR PETS?: UNABLE TO ASSESS

## 2024-07-10 SDOH — SOCIAL STABILITY: SOCIAL INSECURITY: DO YOU FEEL ANYONE HAS EXPLOITED OR TAKEN ADVANTAGE OF YOU FINANCIALLY OR OF YOUR PERSONAL PROPERTY?: UNABLE TO ASSESS

## 2024-07-10 SDOH — SOCIAL STABILITY: SOCIAL INSECURITY: ARE YOU OR HAVE YOU BEEN THREATENED OR ABUSED PHYSICALLY, EMOTIONALLY, OR SEXUALLY BY ANYONE?: UNABLE TO ASSESS

## 2024-07-10 SDOH — SOCIAL STABILITY: SOCIAL NETWORK: HOW OFTEN DO YOU ATTEND CHURCH OR RELIGIOUS SERVICES?: PATIENT UNABLE TO ANSWER

## 2024-07-10 SDOH — ECONOMIC STABILITY: HOUSING INSECURITY
IN THE LAST 12 MONTHS, WAS THERE A TIME WHEN YOU WERE NOT ABLE TO PAY THE MORTGAGE OR RENT ON TIME?: PATIENT UNABLE TO ANSWER

## 2024-07-10 SDOH — SOCIAL STABILITY: SOCIAL INSECURITY: ARE THERE ANY APPARENT SIGNS OF INJURIES/BEHAVIORS THAT COULD BE RELATED TO ABUSE/NEGLECT?: UNABLE TO ASSESS

## 2024-07-10 SDOH — ECONOMIC STABILITY: TRANSPORTATION INSECURITY
IN THE PAST 12 MONTHS, HAS LACK OF TRANSPORTATION KEPT YOU FROM MEDICAL APPOINTMENTS OR FROM GETTING MEDICATIONS?: PATIENT UNABLE TO ANSWER

## 2024-07-10 SDOH — SOCIAL STABILITY: SOCIAL INSECURITY: DO YOU FEEL UNSAFE GOING BACK TO THE PLACE WHERE YOU ARE LIVING?: UNABLE TO ASSESS

## 2024-07-10 SDOH — ECONOMIC STABILITY: TRANSPORTATION INSECURITY
IN THE PAST 12 MONTHS, HAS THE LACK OF TRANSPORTATION KEPT YOU FROM MEDICAL APPOINTMENTS OR FROM GETTING MEDICATIONS?: PATIENT UNABLE TO ANSWER

## 2024-07-10 SDOH — SOCIAL STABILITY: SOCIAL NETWORK: HOW OFTEN DO YOU ATTENT MEETINGS OF THE CLUB OR ORGANIZATION YOU BELONG TO?: PATIENT UNABLE TO ANSWER

## 2024-07-10 SDOH — HEALTH STABILITY: MENTAL HEALTH: HOW OFTEN DO YOU HAVE 6 OR MORE DRINKS ON ONE OCCASION?: PATIENT UNABLE TO ANSWER

## 2024-07-10 SDOH — SOCIAL STABILITY: SOCIAL NETWORK: ARE YOU MARRIED, WIDOWED, DIVORCED, SEPARATED, NEVER MARRIED, OR LIVING WITH A PARTNER?: PATIENT UNABLE TO ANSWER

## 2024-07-10 SDOH — SOCIAL STABILITY: SOCIAL INSECURITY
WITHIN THE LAST YEAR, HAVE YOU BEEN RAPED OR FORCED TO HAVE ANY KIND OF SEXUAL ACTIVITY BY YOUR PARTNER OR EX-PARTNER?: PATIENT UNABLE TO ANSWER

## 2024-07-10 SDOH — SOCIAL STABILITY: SOCIAL INSECURITY: DOES ANYONE TRY TO KEEP YOU FROM HAVING/CONTACTING OTHER FRIENDS OR DOING THINGS OUTSIDE YOUR HOME?: UNABLE TO ASSESS

## 2024-07-10 SDOH — ECONOMIC STABILITY: INCOME INSECURITY
IN THE PAST 12 MONTHS HAS THE ELECTRIC, GAS, OIL, OR WATER COMPANY THREATENED TO SHUT OFF SERVICES IN YOUR HOME?: PATIENT UNABLE TO ANSWER

## 2024-07-10 SDOH — HEALTH STABILITY: PHYSICAL HEALTH
ON AVERAGE, HOW MANY DAYS PER WEEK DO YOU ENGAGE IN MODERATE TO STRENUOUS EXERCISE (LIKE A BRISK WALK)?: PATIENT UNABLE TO ANSWER

## 2024-07-10 SDOH — SOCIAL STABILITY: SOCIAL INSECURITY: WITHIN THE LAST YEAR, HAVE YOU BEEN AFRAID OF YOUR PARTNER OR EX-PARTNER?: PATIENT UNABLE TO ANSWER

## 2024-07-10 SDOH — SOCIAL STABILITY: SOCIAL INSECURITY: ABUSE: ADULT

## 2024-07-10 SDOH — SOCIAL STABILITY: SOCIAL NETWORK: HOW OFTEN DO YOU GET TOGETHER WITH FRIENDS OR RELATIVES?: PATIENT UNABLE TO ANSWER

## 2024-07-10 SDOH — SOCIAL STABILITY: SOCIAL INSECURITY
WITHIN THE LAST YEAR, HAVE YOU BEEN KICKED, HIT, SLAPPED, OR OTHERWISE PHYSICALLY HURT BY YOUR PARTNER OR EX-PARTNER?: PATIENT UNABLE TO ANSWER

## 2024-07-10 SDOH — SOCIAL STABILITY: SOCIAL INSECURITY
WITHIN THE LAST YEAR, HAVE YOU BEEN HUMILIATED OR EMOTIONALLY ABUSED IN OTHER WAYS BY YOUR PARTNER OR EX-PARTNER?: PATIENT UNABLE TO ANSWER

## 2024-07-10 SDOH — ECONOMIC STABILITY: FOOD INSECURITY
HOW HARD IS IT FOR YOU TO PAY FOR THE VERY BASICS LIKE FOOD, HOUSING, MEDICAL CARE, AND HEATING?: PATIENT UNABLE TO ANSWER

## 2024-07-10 SDOH — HEALTH STABILITY: PHYSICAL HEALTH: ON AVERAGE, HOW MANY MINUTES DO YOU ENGAGE IN EXERCISE AT THIS LEVEL?: PATIENT UNABLE TO ANSWER

## 2024-07-10 SDOH — ECONOMIC STABILITY: FOOD INSECURITY
WITHIN THE PAST 12 MONTHS, THE FOOD YOU BOUGHT JUST DIDN'T LAST AND YOU DIDN'T HAVE MONEY TO GET MORE.: PATIENT UNABLE TO ANSWER

## 2024-07-10 SDOH — ECONOMIC STABILITY: FOOD INSECURITY
WITHIN THE PAST 12 MONTHS, YOU WORRIED THAT YOUR FOOD WOULD RUN OUT BEFORE YOU GOT THE MONEY TO BUY MORE.: PATIENT UNABLE TO ANSWER

## 2024-07-10 SDOH — HEALTH STABILITY: MENTAL HEALTH
DO YOU FEEL STRESS - TENSE, RESTLESS, NERVOUS, OR ANXIOUS, OR UNABLE TO SLEEP AT NIGHT BECAUSE YOUR MIND IS TROUBLED ALL THE TIME - THESE DAYS?: PATIENT UNABLE TO ANSWER

## 2024-07-10 SDOH — ECONOMIC STABILITY: TRANSPORTATION INSECURITY
IN THE PAST 12 MONTHS, HAS LACK OF TRANSPORTATION KEPT YOU FROM MEETINGS, WORK, OR FROM GETTING THINGS NEEDED FOR DAILY LIVING?: PATIENT UNABLE TO ANSWER

## 2024-07-10 SDOH — SOCIAL STABILITY: SOCIAL INSECURITY: WERE YOU ABLE TO COMPLETE ALL THE BEHAVIORAL HEALTH SCREENINGS?: NO

## 2024-07-10 SDOH — SOCIAL STABILITY: SOCIAL INSECURITY: HAVE YOU HAD THOUGHTS OF HARMING ANYONE ELSE?: UNABLE TO ASSESS

## 2024-07-10 SDOH — ECONOMIC STABILITY: INCOME INSECURITY
IN THE PAST 12 MONTHS, HAS THE ELECTRIC, GAS, OIL, OR WATER COMPANY THREATENED TO SHUT OFF SERVICE IN YOUR HOME?: PATIENT UNABLE TO ANSWER

## 2024-07-10 SDOH — SOCIAL STABILITY: SOCIAL NETWORK: HOW OFTEN DO YOU ATTEND MEETINGS OF THE CLUBS OR ORGANIZATIONS YOU BELONG TO?: PATIENT UNABLE TO ANSWER

## 2024-07-10 SDOH — SOCIAL STABILITY: SOCIAL INSECURITY: HAVE YOU HAD ANY THOUGHTS OF HARMING ANYONE ELSE?: UNABLE TO ASSESS

## 2024-07-10 SDOH — ECONOMIC STABILITY: FOOD INSECURITY
WITHIN THE PAST 12 MONTHS, YOU WORRIED THAT YOUR FOOD WOULD RUN OUT BEFORE YOU GOT MONEY TO BUY MORE.: PATIENT UNABLE TO ANSWER

## 2024-07-10 SDOH — HEALTH STABILITY: MENTAL HEALTH: HOW OFTEN DO YOU HAVE SIX OR MORE DRINKS ON ONE OCCASION?: PATIENT UNABLE TO ANSWER

## 2024-07-10 SDOH — HEALTH STABILITY: MENTAL HEALTH
STRESS IS WHEN SOMEONE FEELS TENSE, NERVOUS, ANXIOUS, OR CAN'T SLEEP AT NIGHT BECAUSE THEIR MIND IS TROUBLED. HOW STRESSED ARE YOU?: PATIENT UNABLE TO ANSWER

## 2024-07-10 SDOH — HEALTH STABILITY: MENTAL HEALTH: HOW MANY STANDARD DRINKS CONTAINING ALCOHOL DO YOU HAVE ON A TYPICAL DAY?: PATIENT UNABLE TO ANSWER

## 2024-07-10 SDOH — SOCIAL STABILITY: SOCIAL INSECURITY: ARE YOU MARRIED, WIDOWED, DIVORCED, SEPARATED, NEVER MARRIED, OR LIVING WITH A PARTNER?: PATIENT UNABLE TO ANSWER

## 2024-07-10 SDOH — SOCIAL STABILITY: SOCIAL NETWORK
DO YOU BELONG TO ANY CLUBS OR ORGANIZATIONS SUCH AS CHURCH GROUPS UNIONS, FRATERNAL OR ATHLETIC GROUPS, OR SCHOOL GROUPS?: PATIENT UNABLE TO ANSWER

## 2024-07-10 SDOH — SOCIAL STABILITY: SOCIAL INSECURITY
WITHIN THE LAST YEAR, HAVE TO BEEN RAPED OR FORCED TO HAVE ANY KIND OF SEXUAL ACTIVITY BY YOUR PARTNER OR EX-PARTNER?: PATIENT UNABLE TO ANSWER

## 2024-07-10 ASSESSMENT — LIFESTYLE VARIABLES
SKIP TO QUESTIONS 9-10: 0
AUDIT-C TOTAL SCORE: -1
HOW MANY STANDARD DRINKS CONTAINING ALCOHOL DO YOU HAVE ON A TYPICAL DAY: PATIENT UNABLE TO ANSWER
HOW OFTEN DO YOU HAVE A DRINK CONTAINING ALCOHOL: PATIENT UNABLE TO ANSWER
SKIP TO QUESTIONS 9-10: 0
HOW OFTEN DO YOU HAVE 6 OR MORE DRINKS ON ONE OCCASION: PATIENT UNABLE TO ANSWER
HOW OFTEN DO YOU HAVE A DRINK CONTAINING ALCOHOL: PATIENT UNABLE TO ANSWER
HOW MANY STANDARD DRINKS CONTAINING ALCOHOL DO YOU HAVE ON A TYPICAL DAY: PATIENT UNABLE TO ANSWER
HOW OFTEN DO YOU HAVE 6 OR MORE DRINKS ON ONE OCCASION: PATIENT UNABLE TO ANSWER
AUDIT-C TOTAL SCORE: -1
SKIP TO QUESTIONS 9-10: 0

## 2024-07-10 ASSESSMENT — COGNITIVE AND FUNCTIONAL STATUS - GENERAL
MOVING TO AND FROM BED TO CHAIR: TOTAL
DAILY ACTIVITIY SCORE: 6
TURNING FROM BACK TO SIDE WHILE IN FLAT BAD: TOTAL
MOBILITY SCORE: 6
TOILETING: TOTAL
MOVING FROM LYING ON BACK TO SITTING ON SIDE OF FLAT BED WITH BEDRAILS: TOTAL
PERSONAL GROOMING: TOTAL
PATIENT BASELINE BEDBOUND: NO
TOILETING: TOTAL
STANDING UP FROM CHAIR USING ARMS: TOTAL
DRESSING REGULAR UPPER BODY CLOTHING: TOTAL
MOBILITY SCORE: 6
EATING MEALS: TOTAL
WALKING IN HOSPITAL ROOM: TOTAL
DRESSING REGULAR LOWER BODY CLOTHING: TOTAL
CLIMB 3 TO 5 STEPS WITH RAILING: TOTAL
MOVING FROM LYING ON BACK TO SITTING ON SIDE OF FLAT BED WITH BEDRAILS: TOTAL
PATIENT BASELINE BEDBOUND: NO
MOBILITY SCORE: 24
DRESSING REGULAR UPPER BODY CLOTHING: TOTAL
TURNING FROM BACK TO SIDE WHILE IN FLAT BAD: TOTAL
WALKING IN HOSPITAL ROOM: TOTAL
DRESSING REGULAR LOWER BODY CLOTHING: TOTAL
CLIMB 3 TO 5 STEPS WITH RAILING: TOTAL
DAILY ACTIVITIY SCORE: 24
DAILY ACTIVITIY SCORE: 6
MOVING TO AND FROM BED TO CHAIR: TOTAL
STANDING UP FROM CHAIR USING ARMS: TOTAL
PERSONAL GROOMING: TOTAL
HELP NEEDED FOR BATHING: TOTAL
EATING MEALS: TOTAL
HELP NEEDED FOR BATHING: TOTAL

## 2024-07-10 ASSESSMENT — ACTIVITIES OF DAILY LIVING (ADL)
GROOMING: UNABLE TO ASSESS
HEARING - LEFT EAR: UNABLE TO ASSESS
LACK_OF_TRANSPORTATION: PATIENT UNABLE TO ANSWER
PATIENT'S MEMORY ADEQUATE TO SAFELY COMPLETE DAILY ACTIVITIES?: UNABLE TO ASSESS
PATIENT'S MEMORY ADEQUATE TO SAFELY COMPLETE DAILY ACTIVITIES?: UNABLE TO ASSESS
HEARING - RIGHT EAR: UNABLE TO ASSESS
BATHING: DEPENDENT
LACK_OF_TRANSPORTATION: PATIENT UNABLE TO ANSWER
JUDGMENT_ADEQUATE_SAFELY_COMPLETE_DAILY_ACTIVITIES: UNABLE TO ASSESS
TOILETING: UNABLE TO ASSESS
ADEQUATE_TO_COMPLETE_ADL: UNABLE TO ASSESS
WALKS IN HOME: DEPENDENT
DRESSING YOURSELF: DEPENDENT
HEARING - RIGHT EAR: UNABLE TO ASSESS
BATHING: UNABLE TO ASSESS
ADEQUATE_TO_COMPLETE_ADL: UNABLE TO ASSESS
FEEDING YOURSELF: UNABLE TO ASSESS
GROOMING: DEPENDENT
DRESSING YOURSELF: UNABLE TO ASSESS
JUDGMENT_ADEQUATE_SAFELY_COMPLETE_DAILY_ACTIVITIES: UNABLE TO ASSESS
HEARING - LEFT EAR: UNABLE TO ASSESS
FEEDING YOURSELF: DEPENDENT
TOILETING: DEPENDENT
WALKS IN HOME: UNABLE TO ASSESS

## 2024-07-10 ASSESSMENT — PATIENT HEALTH QUESTIONNAIRE - PHQ9
1. LITTLE INTEREST OR PLEASURE IN DOING THINGS: NOT AT ALL
2. FEELING DOWN, DEPRESSED OR HOPELESS: NOT AT ALL
1. LITTLE INTEREST OR PLEASURE IN DOING THINGS: NOT AT ALL
2. FEELING DOWN, DEPRESSED OR HOPELESS: NOT AT ALL
SUM OF ALL RESPONSES TO PHQ9 QUESTIONS 1 & 2: 0
SUM OF ALL RESPONSES TO PHQ9 QUESTIONS 1 & 2: 0

## 2024-07-10 ASSESSMENT — COLUMBIA-SUICIDE SEVERITY RATING SCALE - C-SSRS
2. HAVE YOU ACTUALLY HAD ANY THOUGHTS OF KILLING YOURSELF?: NO
1. IN THE PAST MONTH, HAVE YOU WISHED YOU WERE DEAD OR WISHED YOU COULD GO TO SLEEP AND NOT WAKE UP?: NO
6. HAVE YOU EVER DONE ANYTHING, STARTED TO DO ANYTHING, OR PREPARED TO DO ANYTHING TO END YOUR LIFE?: NO

## 2024-07-10 ASSESSMENT — PAIN - FUNCTIONAL ASSESSMENT
PAIN_FUNCTIONAL_ASSESSMENT: CPOT (CRITICAL CARE PAIN OBSERVATION TOOL)
PAIN_FUNCTIONAL_ASSESSMENT: CPOT (CRITICAL CARE PAIN OBSERVATION TOOL)

## 2024-07-10 NOTE — PROCEDURES
Arterial Puncture/Cannulation    Date/Time: 7/10/2024 5:30 PM    Performed by: TERRY Beckford  Authorized by: TERRY Beckford  Consent: The procedure was performed in an emergent situation. Verbal consent not obtained. Written consent not obtained.  Patient identity confirmed: anonymous protocol, patient vented/unresponsive, hospital-assigned identification number and arm band  Preparation: Patient was prepped and draped in the usual sterile fashion.  Local anesthesia used: yes  Anesthesia: local infiltration    Anesthesia:  Local anesthesia used: yes  Local Anesthetic: lidocaine 1% without epinephrine  Anesthetic total: 5 mL    Sedation:  Patient sedated: no    Comments: The area was prepped with chlorhexidine and draped in the usual sterile fashion. Anesthesia was obtained with Lidocaine 1%.  Artery was visualized utilizing ultrasound guidance and the artery was cannulated with a 20 G arterial catheter under ultrasound. Catheter was exchanged over a guidewire and bright red pulsatile blood exited catheter.  Appropriate wave form was noted and the patient tolerated the procedure well.    This procedure was performed in addition to and exclusive to any other face to face care time/initial management.

## 2024-07-10 NOTE — Clinical Note
Procedural report called to Miriam in CICU.  Patient transferring to CICU 16 via critical care transport

## 2024-07-10 NOTE — ED PROVIDER NOTES
HPI   No chief complaint on file.      HPI: 43-year-old male with a history of obesity status post gastric bypass surgery 2 years ago presents in cardiac arrest.  Reportedly he had a witnessed arrest at his job approximately 15 to 20 minutes prior to arrival.  CPR was started by his coworkers who called EMS.  He was found to be in ventricular fibrillation.  By the time he arrived to the ED he has been defibrillated several times and received epinephrine and 300 mg of amiodarone.  He presents with an LMA in place      Limitations to history: None  Independent Historians: EMS  External Records Reviewed: HIE, outpatient notes, inpatient notes  ------------------------------------------------------------------------------------------------------------------------------------------  ROS: a ten point review of systems was performed and was negative except as per HPI.  ------------------------------------------------------------------------------------------------------------------------------------------  PMH / PSH: as per HPI, otherwise reviewed in EMR  MEDS: as per HPI, otherwise reviewed in EMR  ALLERGIES: as per HPI, otherwise reviewed in EMR  SocH:  as per HPI, otherwise reviewed in EMR  FH:  as per HPI, otherwise reviewed in EMR  ------------------------------------------------------------------------------------------------------------------------------------------  Physical Exam:  VS: As documented in the triage note and EMR flowsheet from this visit was reviewed  General: Unresponsive patient   eyes:  No scleral icterus. No discharge  HEENT:  Normocephalic.  Atraumatic  Neck: Moves neck freely. No gross masses  CV: No heart sounds noted.  Patient is in ventricular fibrillation   resp: Coarse breath sounds bilaterally.  Patient is being bagged via the LMA  GI: Soft, no masses  MSK: Symmetric muscle bulk. No deformities. No lower extremity edema.    Skin: Cool, dry, intact  Neuro: GCS 3 T  Psych: Unresponsive    ------------------------------------------------------------------------------------------------------------------------------------------  Hospital Course / Medical Decision Making:  Independent Interpretations: N/A  EKG as interpreted by me: N/A    MDM: 43-year-old male with a history of obesity status post gastric bypass surgery 2 years ago presents in cardiac arrest.  He is in ventricular fibrillation on arrival.  He was promptly defibrillated without success.  Resuscitation was commenced as per ACLS protocol.  The patient was given multiple doses of epinephrine and amiodarone.  He was originally given 300 mg of amiodarone by EMS and was given another 150 of amiodarone in the ED.  The patient was defibrillated 5 times in the ED and eventually return of spontaneous circulation was achieved.  The patient is not requiring vasopressor support in the ED but he was intubated by me without difficulty.  The patient was transferred to the ICU for further management    Discussion of Management with Other Providers:   I discussed the patient/results with: Emergency medicine team    Final diagnosis and disposition as below.    No results found for this or any previous visit.  No orders to display                            No data recorded                   Patient History   No past medical history on file.  No past surgical history on file.  No family history on file.  Social History     Tobacco Use    Smoking status: Not on file    Smokeless tobacco: Not on file   Substance Use Topics    Alcohol use: Not on file    Drug use: Not on file       Physical Exam   ED Triage Vitals   Temp Pulse Resp BP   -- -- -- --      SpO2 Temp src Heart Rate Source Patient Position   -- -- -- --      BP Location FiO2 (%)     -- --       Physical Exam    ED Course & MDM   Diagnoses as of 07/11/24 1311   Cardiac arrest (Multi)       Medical Decision Making      Procedure  Intubation    Performed by: Enmanuel Henderson DO  Authorized by: Enmanuel MANZO  DO Santiago    Consent:     Consent obtained:  Emergent situation  Universal protocol:     Patient identity confirmed:  Anonymous protocol, patient vented/unresponsive  Pre-procedure details:     Indications: cardio/pulmonary arrest      Patient status:  Unresponsive    Look externally: facial hair      Mouth opening - incisor distance:  2 finger widths    Mallampati score:  II    Obstruction comment:  Pulmonary edema    Pharmacologic strategy: none      Paralytics:  Rocuronium  Procedure details:     Preoxygenation:  Bag valve mask    CPR in progress: no      Number of attempts:  1  Successful intubation attempt details:     Intubation method:  Oral    Intubation technique: video assisted      Laryngoscope blade:  Hypercurved    Tube size (mm):  7.5    Tube type:  Cuffed    Tube visualized through cords: yes    Placement assessment:     ETT at teeth/gumline (cm):  24    Tube secured with:  Adhesive tape and ETT trejo    Breath sounds:  Equal    Placement verification: chest rise, colorimetric ETCO2, CXR verification and direct visualization      CXR findings:  Appropriate position  Post-procedure details:     Procedure completion:  Tolerated well, no immediate complications  Critical Care    Performed by: Enmanuel Henderson DO  Authorized by: Enmanuel Henderson DO    Critical care provider statement:     Critical care time (minutes):  45    Critical care time was exclusive of:  Separately billable procedures and treating other patients    Critical care was necessary to treat or prevent imminent or life-threatening deterioration of the following conditions:  Cardiac failure and respiratory failure    Critical care was time spent personally by me on the following activities:  Discussions with consultants, ordering and review of radiographic studies, re-evaluation of patient's condition, evaluation of patient's response to treatment and ventilator management    Care discussed with: admitting provider         Enmanuel Henderson  DO  07/16/24 0325

## 2024-07-10 NOTE — H&P
Assessment/Plan:    Neuro/MSK: Acute metabolic encephalopathy and noted myoclonic activity on arrival to the ICU, this disappeared when sedated. Sedation then held again and purposeful (I.e. gaze attendance to voice and pulling at lines/tubes noted)  A-F bundle  Minimize mind altering medicaments    CV: CHF Acute Systolic , cardiogenic shock, NSTEMI Type 2 demand, lactic acidosis, and cardiac arrest   -    cautious volume resuscitation   -    pressers for MAP>60   -    echo with ef acutely 10% --> d/w interventional cards and shock team activation --> cath lab for impella placement then txf to [facility] for advanced MCS    Pulm: Respiratory failure Acute with hypoxemia  BPH with IS/flutter/OOB  Vent Mode: Volume control/assist control  FiO2 (%):  [40 %-60 %] 40 %  S RR:  [18] 18  S VT:  [500 mL] 500 mL  PEEP/CPAP (cm H2O):  [8 cm H20] 8 cm H20  MAP (cm H2O):  [10-14] 13  No SBT for now    Renal: Hypokalemia and presumed ANNIE 2/2 cardiac arrest - oligo/anuric  Trend UOP and renal indices; replete lytes PRN     GI: Obesity   Proph with n/a    ID/rheum: n/a  Obs off abx  Follow cx data    Endo: Stress hyperglycemia  Start ISS  BG goal <180    Heme: ABL anemia  DVT proph with scd and subcutaneous heparin    Lines/tubes/restraints:   Central line: parenteral medications (I.e. chemo, vasoactive) and access  El: critically ill patient who need accurate urinary output measurements  Restraint: needed, pulling at lines/tubes and agitation  CHG: Yes    Dispo: ICU    Exam:    A&O x 0; intubated and sedated; when sedation held appears purposeful  NSR  Adequate air-exchange  Abd soft, NT  Extremities warm     REASON FOR ADMISSION    Miguel A Bender is a 43 y.o. male with PMH of Htn and obesity s/p bariatric sx 2 years PTA who [place] [facility] [place]Multi[place] 0 days [place]. He had a witness arrest at work with immediate bystander CPR. On EMS arrival noted to have VT/VF - DCC. Received several epi and 5 DCC before ROSC. Total  "downtime 15-20min. Decompensated shortly after arriving to ICU with cardiogenic shock.    TODAY'S EVENTS    7/10: admitted to ICU     This critically ill patient continues to be at-risk for clinically significant deterioration / failure due to the above mentioned dysfunctional, unstable organ systems.  I have personally identified and managed all complex critical care issues to prevent aforementioned clinical deterioration.  Critical care time is spent at bedside and/or the immediate area and has included, but is not limited to, the review of diagnostic tests, labs, radiographs, serial assessments of hemodynamics, respiratory status, ventilatory management, and family updates.  Time spent in procedures and teaching are reported separately. CF CRITICAL CARE TIME: 125 minutes    LABS:  CMP:      No lab exists for component: \"CA\"  CBC:    COAG:     ABO: No results found for: \"ABO\"  HEME/ENDO:     CARDIAC:       No lab exists for component: \"CK\", \"CKMBP\"    "

## 2024-07-10 NOTE — CONSULTS
INITIAL NEUROLOGY CONSULT NOTE    IMPRESSION:  Anoxic encephalopathy.  He has multiple brainstem signs and some signs suggestive of semipurposeful movement.  He also has myoclonus.    RECOMMENDATIONS:  Spot EEG off propofol (was held for my visit).  Continue levetiracetam 500 mg bid.  Will follow.    Gianluca Valverde Jr., M.D., FAAN       History Of Present Illness  Miguel A Bender is a 43 y.o. male presenting with cardiac arrest.  History provided by EMR review and discussion with the ICU team.    The patient had a witnessed cardiac arrest this morning.  CPR was started in the field.  He was transported to the [facility] ED, where he had five shocks and several doses of epinephrine with ROSC.  He was admitted to the ICU and there, he had a couple of episodes of spontaneous eye opening, pulling at restraints, episthotonos, and occasional myoclonic jerking.  He was loaded with levetiracetam and is about to start an EEG at the time of my visit.    Past Medical History  No past medical history on file.  Surgical History  No past surgical history on file.  Social History       Scheduled medications  amiodarone, 150 mg, intravenous, Once  levETIRAcetam, 1,000 mg, intravenous, Once  levETIRAcetam, 500 mg, intravenous, q12h  oxygen, , inhalation, Continuous - Inhalation  polyethylene glycol, 17 g, oral, Daily  potassium chloride, 20 mEq, intravenous, q2h  propofol, , ,       Continuous medications  amiodarone, 0.5-1 mg/min  propofol, 5-20 mcg/kg/min      PRN medications  PRN medications: propofol      No family history on file.  Allergies  Patient has no allergy information on record.  Medications Prior to Admission   Medication Sig Dispense Refill Last Dose    amLODIPine (Norvasc) 2.5 mg tablet Take 1 tablet (2.5 mg) by mouth once daily.       beta carotene (vitamin A) 3,000 mcg (10,000 unit) capsule Take 1 capsule (10,000 Units) by mouth once daily.       ergocalciferol (Vitamin D-2) 1.25 MG (36295 UT) capsule Take 1 capsule  (1,250 mcg) by mouth 1 (one) time per week.       multivitamin tablet Take 1 tablet by mouth once daily.          Scheduled medications  amiodarone, 150 mg, intravenous, Once  levETIRAcetam, 1,000 mg, intravenous, Once  levETIRAcetam, 500 mg, intravenous, q12h  oxygen, , inhalation, Continuous - Inhalation  polyethylene glycol, 17 g, oral, Daily  potassium chloride, 20 mEq, intravenous, q2h  propofol, , ,       Continuous medications  amiodarone, 0.5-1 mg/min  propofol, 5-20 mcg/kg/min      PRN medications  PRN medications: propofol    Review of Systems    Last Recorded Vitals  Blood pressure (!) 165/96, pulse 110, resp. rate 25, SpO2 97%.    CONSTITUTIONAL:  Appears critically ill    CARDIOVASCULAR:  Normal pulses in the distal extremities.  No edema in either arm or either leg.    MENTAL STATUS:  Sleeping, unresponsive, cannot assess orientation or memory because of current cognitive status    SPEECH AND LANGUAGE:  Unable to perform because of current cognitive status    FUNDOSCOPIC:  No papilledema    CRANIAL NERVES:  II-Unable to perform because of current cognitive status    III/IV/VI--EOMs are present in all directions with the oculocephalic maneuver.  Pupils are symmetrically reactive in dim light from 3 mm.  No ptosis.    V/VII--No facial asymmetry, corneals absent bilaterally.    VIII--No response to loud noise.    IX/X--No gag    XI--Unable to perform because of current cognitive status    XII--Unable to perform because of current cognitive status    MOTOR:  No spontaneous movement of either arm or either leg.  Normal bulk and tone in both arms and both legs.    SENSORY:  No induced movement of either arm or either leg with nailbed pressure.    COORDINATION:  Unable to perform because of current cognitive status    REFLEXES symmetrically absent in both arms and both legs, and plantar responses are equivocal.    GAIT unable to perform because of current cognitive status     Relevant Results  Results for  orders placed or performed during the hospital encounter of 07/10/24 (from the past 24 hour(s))   Renal Function Panel   Result Value Ref Range    Glucose 228 (H) 74 - 99 mg/dL    Sodium 142 136 - 145 mmol/L    Potassium 2.6 (LL) 3.5 - 5.3 mmol/L    Chloride 103 98 - 107 mmol/L    Bicarbonate 23 21 - 32 mmol/L    Anion Gap 19 10 - 20 mmol/L    Urea Nitrogen 15 6 - 23 mg/dL    Creatinine 1.19 0.50 - 1.30 mg/dL    eGFR 77 >60 mL/min/1.73m*2    Calcium 8.5 (L) 8.6 - 10.3 mg/dL    Phosphorus 4.0 2.5 - 4.9 mg/dL    Albumin 4.2 3.4 - 5.0 g/dL   Blood Gas Arterial Full Panel   Result Value Ref Range    POCT pH, Arterial 7.19 (LL) 7.38 - 7.42 pH    POCT pCO2, Arterial 62 (H) 38 - 42 mm Hg    POCT pO2, Arterial 76 (L) 85 - 95 mm Hg    POCT SO2, Arterial 92 (L) 94 - 100 %    POCT Oxy Hemoglobin, Arterial 91.8 (L) 94.0 - 98.0 %    POCT Hematocrit Calculated, Arterial 49.0 41.0 - 52.0 %    POCT Sodium, Arterial 138 136 - 145 mmol/L    POCT Potassium, Arterial 3.1 (L) 3.5 - 5.3 mmol/L    POCT Chloride, Arterial 100 98 - 107 mmol/L    POCT Ionized Calcium, Arterial 1.17 1.10 - 1.33 mmol/L    POCT Glucose, Arterial 264 (H) 74 - 99 mg/dL    POCT Lactate, Arterial 5.2 (HH) 0.4 - 2.0 mmol/L    POCT Base Excess, Arterial -5.9 (L) -2.0 - 3.0 mmol/L    POCT HCO3 Calculated, Arterial 23.7 22.0 - 26.0 mmol/L    POCT Hemoglobin, Arterial 16.2 13.5 - 17.5 g/dL    POCT Anion Gap, Arterial 17 10 - 25 mmo/L    Patient Temperature 37.0 degrees Celsius    FiO2 100 %    Site of Arterial Puncture Brachial Left          I have personally reviewed the following imaging results:     None available.      Gianluca Valverde Jr., M.D., FAAN

## 2024-07-10 NOTE — Clinical Note
PA catheter left in place and secured by suture and transparent dressing. Catheter attached to a pressure bag. At 49

## 2024-07-10 NOTE — NURSING NOTE
1134 Pt arrived to floor via cart form ED. Pt place in bed and monitor applied. Hypothermia pads placed on pt.   1340 Pt taken to CT scan with respiratory.   1400 Pt back to room and eeg and echo tech waiting to do tests.  1415 Dr. Valverde at bedside and sedation held for a neuro exam. Dr. Alford also at bedside. Pt sitting up in bed and seems to have purposeful movement but not following commands.   1430 Pt sitting up in bed and pink frothy sputum in tube. Dr. Alford to bedside and new orders received.

## 2024-07-10 NOTE — Clinical Note
Closure device placed in the artery. Site closed by Perclose. Perclose x2 placed around impella.  Dry dressing and tegaderm placed over fem sites

## 2024-07-10 NOTE — PROGRESS NOTES
Pharmacy Medication History Review    Per pharmacy. Asked family but wasn't sure of meds or allergies.     Miguel A Bender is a 43 y.o. male admitted for Cardiac arrest (Multi). Pharmacy reviewed the patient's [id number] medications and allergies for accuracy.    The list below reflectives the updated PTA list. Please review each medication in order reconciliation for additional clarification and justification.       The list below reflectives the updated allergy list. Please review each documented allergy for additional clarification and justification.  Allergies  Indicated as Unable to Assess by Ronni Camarena RN on 7/10/2024 (Patient unable to communicate)   Not on File         Below are additional concerns with the patient's PTA list.  Prior to Admission Medications   Prescriptions Last Dose Informant   amLODIPine (Norvasc) 2.5 mg tablet     Sig: Take 1 tablet (2.5 mg) by mouth once daily.   beta carotene (vitamin A) 3,000 mcg (10,000 unit) capsule     Sig: Take 1 capsule (10,000 Units) by mouth once daily.   ergocalciferol (Vitamin D-2) 1.25 MG (25215 UT) capsule     Sig: Take 1 capsule (1,250 mcg) by mouth 1 (one) time per week.   multivitamin tablet     Sig: Take 1 tablet by mouth once daily.      Facility-Administered Medications: None        Dee Malik

## 2024-07-10 NOTE — PROCEDURES
Central Line    Date/Time: 7/10/2024 5:34 PM    Performed by: TERRY Beckford  Authorized by: TERRY Beckford    Consent:     Consent obtained:  Emergent situation  Universal protocol:     Patient identity confirmed:  Hospital-assigned identification number, anonymous protocol, patient vented/unresponsive and arm band  Pre-procedure details:     Indication(s): central venous access      Hand hygiene: Hand hygiene performed prior to insertion      Sterile barrier technique: All elements of maximal sterile technique followed      Skin preparation:  Chlorhexidine    Skin preparation agent: Skin preparation agent completely dried prior to procedure    Sedation:     Sedation type:  None  Anesthesia:     Anesthesia method:  Local infiltration    Local anesthetic:  Lidocaine 1% w/o epi  Procedure details:     Location:  R internal jugular    Patient position:  Reverse Trendelenburg    Procedural supplies:  Triple lumen    Catheter size:  7 Fr    Ultrasound guidance: yes      Ultrasound guidance timing: real time      Sterile ultrasound techniques: Sterile gel and sterile probe covers were used      Number of attempts:  1    Successful placement: yes    Post-procedure details:     Post-procedure:  Dressing applied and line sutured    Assessment:  Blood return through all ports    Procedure completion:  Tolerated    Complications:  None

## 2024-07-10 NOTE — CONSULTS
Inpatient consult to Cardiology  Consult performed by: Modesto Uriostegui DO  Consult ordered by: David Alford MD  Reason for consult: Cardiac Arrest  Assessment/Recommendations: Supportive care as you are doing   Agree with Temperature Management   Load Amiodarone -- Give 1 mg/min X 6 hours, then continue drip at 0.5 mg / min. Ok to extend time of Gtt  Echo  ( Ordered and facilitated)   Agree CT Imaging - Suggest PE Protocol for Chest as well   Trend Troponin ( Expected to elevate due to Cardiac Arrest, CPR and poor Perfusion )   Keep K > 4, Mag > 2  No cath lab at this time         History Of Present Illness:    Miguel A Bender is a 43 y.o. male with reported medical history notable for obstructive sleep apnea, essential hypertension, history of gastric surgery who presented to the emergency room as a full arrest..    History is obtained from chart review due to the critical nature of the patient.    Patient was reportedly at his place of business.  He collapsed.  Bystander CPR was initiated.  On arrival to the emergency department, he was apparently in ventricular fibrillation.  He was defibrillated.  He received at least 450 mg of IV amiodarone.  He received multiple rounds of pressors as part of ACS protocol.  ROSC was obtained.  He was intubated.  He was subsequently transferred up to the ICU.  initial CBC showing white count 12.2, hemoglobin over hematocrit 14.9/45.1, platelet count 230.  Metabolic panel showed serum glucose 138 sodium 143 potassium 3.0 chloride 106 bicarbonate 24 BUN 12 creatinine 1.01.  Initial high-sensitivity cardiac troponin elevated at 22.  Lactic acid elevated at 8.1. Initial EKG Post ROSC shows NSR with RBBB     -Complete review of systems, confirmation medical history, surgical history, medications, allergies, social history cannot be obtained due to the critical nature of the patient.         Last Recorded Vitals:  Vitals:    07/10/24 1145 07/10/24 1150 07/10/24 1200 07/10/24 1245  "  BP:       BP Location:       Patient Position:       Pulse: 107  106 110   Resp:  22  25   SpO2: (!) 87% 93% 97%        Last Labs:  CBC - No results in last year.  _ _ _    _      CMP - 7/10/2024: 12:13 PM  8.5 _ _ --- _   4.0 4.2 _ _      PTT - No results in last year.  _   _ _     No results found for: \"TROPHS\", \"BNP\", \"HGBA1C\", \"LDLCALC\", \"VLDL\"   Last I/O:  No intake/output data recorded.    Past Cardiology Tests (Last 3 Years):  EKG: Likely NSR with RBBB --> POST ROSC      Echo:  No results found for this or any previous visit from the past 1095 days.    Ejection Fractions:  No results found for: \"EF\"    Cath:  Cath in 2018 due to \"Elevated Biomarkers\" perfomred in [place]   -- No angiographic coronary artery disease.  Elevated biomarkers felt related to poorly controlled hypertension at that time.    Stress Test:  No results found for this or any previous visit from the past 1095 days.    Cardiac Imaging:  No results found for this or any previous visit from the past 1095 days.      Past Medical History:  -- CHARLEEN / HTN   He has no past medical history on file.    Past Surgical History:  -- Gastric Surgery   He has no past surgical history on file.      Social History:  He has no history on file for tobacco use, alcohol use, and drug use.    Family History:  No family history on file.     Allergies:  Patient has no allergy information on record.    Inpatient Medications:  Scheduled medications   Medication Dose Route Frequency    oxygen   inhalation Continuous - Inhalation    polyethylene glycol  17 g oral Daily    potassium chloride  20 mEq intravenous q2h     PRN medications   Medication     Continuous Medications   Medication Dose Last Rate     Outpatient Medications:  Current Outpatient Medications   Medication Instructions    amLODIPine (NORVASC) 2.5 mg, oral, Daily    beta carotene (VITAMIN A) 10,000 Units, oral, Daily    ergocalciferol (VITAMIN D-2) 1.25 mg, oral, Once Weekly    multivitamin tablet " 1 tablet, oral, Daily       Physical Exam:  PHYSICAL EXAMINATION    GENERAL APPEARANCE:   Patient seen and examined initially in the ER post resuscitation, follow-up in the ICU.  He is intubated  VITAL SIGNS: reviewed and confirmed.   SKIN: Inspection of the skin reveals no rashes, ulcerations or petechiae. Well healed Tattoos.   HEENT: The sclerae were anicteric and conjunctivae were pink and moist.   NECK: Supple and symmetric. T  CHEST: Normal AP diameter and normal contour without any kyphoscoliosis.  Symmetrical chest rise and fall with ventilator  LUNGS: Auscultation of the lungs revealed zCrackles, ventilated breath sounds   CARDIOVASCULAR: There was a regular rate and rhythm without any murmurs, gallops, rubs. The carotid pulses were normal and 2+ bilaterally without bruits. Peripheral pulses were 2+ and symmetric.  ABDOMEN: Soft and nontender with normal bowel sounds.  EXTREMITIES: No cyanosis, clubbing or edema.  NEUROLOGIC: Sedated. No withdrawal to pain      Assessment/Plan   Miguel A Bender is a 43 y.o. male with reported medical history notable for obstructive sleep apnea, essential hypertension, history of gastric surgery who presented to the emergency room as a full arrest..    Unclear and preceding events that led to his cardiac arrest.  He is not known to have an obstructive cardiomyopathy nor any significant cardiomyopathy based on available records from [facility].  He had a protracted downtime with extensive resuscitation efforts and is currently in the ICU where he is sedated off pressors.    At this time, we will refrain from activating the Cath Lab due to hemodynamic stability, no STEMI criteria on post ROSC EKG.    Supportive care as you are doing   Agree with Temperature Management   Load Amiodarone -- Give 1 mg/min X 6 hours, then continue drip at 0.5 mg / min. Ok to extend time of Gtt  Echo  ( Ordered and facilitated)   Agree CT Imaging - Suggest PE Protocol for Chest as well    Trend Troponin ( Expected to elevate due to Cardiac Arrest, CPR and poor Perfusion )   Keep K > 4, Mag > 2    Will continue to follow        ETT  7.5 mm (Active)   Secured at (cm) 25 cm 07/10/24 1150   Measured from Lips 07/10/24 1150   Secured Location Center 07/10/24 1150   Secured by Commercial tube trejo 07/10/24 1150   Site Condition Dry 07/10/24 1150   Number of days: 0       Urethral Catheter 16 Fr. (Active)   Output (mL) 225 mL 07/10/24 1142   Number of days: 0       Code Status:  Full Code      Modesto Uriostegui DO   Division of Cardiovascular Medicine  [facility]

## 2024-07-10 NOTE — ED NOTES
ED Code Notes     Pt arrived at 945 in  cardiac arrest   Pulse check @ 9:46am no pulse v-fib   Continue mechanical compressions   1 of epi   @948 pulse check  no pulse v fib   Glucose 136   Shock@ 950 vfib   Resume compressions   Epi  150 sandra statred @951  952pulse check no pulse shock v-fib  resume compressions   Bicarb in @953  954 pulse check , v fib shock   Resume compressions   Epi1mg @956  Pulse check 957 no rhythm  resume compression   Pulse check at 959   Shock 1of epi   Rocuronium in at 1006 tubed 24@teeth 7 1/2 tube   Pulse check at @1010 pt has palpable pulse   Amio done @1014                     Sudha Pinto RN  07/10/24 1055

## 2024-07-10 NOTE — Clinical Note
Patient Clipped and Prepped: left chest. Prepped with ChloraPrep, a minimum of 3 minute dry time, longer if needed, no pooling noted, patient draped in sterile fashion and duraprep.

## 2024-07-11 ENCOUNTER — APPOINTMENT (OUTPATIENT)
Dept: CARDIOLOGY | Facility: HOSPITAL | Age: 44
DRG: 276 | End: 2024-07-11
Payer: COMMERCIAL

## 2024-07-11 ENCOUNTER — APPOINTMENT (OUTPATIENT)
Dept: NEUROLOGY | Facility: HOSPITAL | Age: 44
DRG: 276 | End: 2024-07-11
Payer: COMMERCIAL

## 2024-07-11 LAB
ACT BLD: 157 SEC (ref 83–199)
ACT BLD: 158 SEC (ref 83–199)
ACT BLD: 161 SEC (ref 83–199)
ACT BLD: 163 SEC (ref 83–199)
ACT BLD: 165 SEC (ref 83–199)
ACT BLD: 171 SEC (ref 83–199)
ACT BLD: 171 SEC (ref 83–199)
ACT BLD: 176 SEC (ref 83–199)
ACT BLD: 182 SEC (ref 83–199)
ALBUMIN SERPL BCP-MCNC: 3.2 G/DL (ref 3.4–5)
ALBUMIN SERPL BCP-MCNC: 3.3 G/DL (ref 3.4–5)
ALBUMIN SERPL BCP-MCNC: 3.3 G/DL (ref 3.4–5)
ANION GAP BLDA CALCULATED.4IONS-SCNC: 12 MMO/L (ref 10–25)
ANION GAP BLDA CALCULATED.4IONS-SCNC: 15 MMO/L (ref 10–25)
ANION GAP BLDA CALCULATED.4IONS-SCNC: 16 MMO/L (ref 10–25)
ANION GAP BLDA CALCULATED.4IONS-SCNC: 19 MMO/L (ref 10–25)
ANION GAP BLDA CALCULATED.4IONS-SCNC: 20 MMO/L (ref 10–25)
ANION GAP BLDMV CALCULATED.4IONS-SCNC: 14 MMO/L (ref 10–25)
ANION GAP BLDMV CALCULATED.4IONS-SCNC: 16 MMO/L (ref 10–25)
ANION GAP BLDMV CALCULATED.4IONS-SCNC: 16 MMO/L (ref 10–25)
ANION GAP BLDMV CALCULATED.4IONS-SCNC: 22 MMO/L (ref 10–25)
ANION GAP SERPL CALC-SCNC: 14 MMOL/L (ref 10–20)
ANION GAP SERPL CALC-SCNC: 17 MMOL/L (ref 10–20)
ANION GAP SERPL CALC-SCNC: 20 MMOL/L (ref 10–20)
APTT PPP: 37 SECONDS (ref 27–38)
APTT PPP: 88 SECONDS (ref 27–38)
ATRIAL RATE: 60 BPM
ATRIAL RATE: 62 BPM
ATRIAL RATE: 64 BPM
ATRIAL RATE: 65 BPM
ATRIAL RATE: 65 BPM
ATRIAL RATE: 68 BPM
ATRIAL RATE: 71 BPM
ATRIAL RATE: 75 BPM
ATRIAL RATE: 77 BPM
BACTERIA SPEC RESP CULT: ABNORMAL
BASE EXCESS BLDA CALC-SCNC: -10.3 MMOL/L (ref -2–3)
BASE EXCESS BLDA CALC-SCNC: -5.8 MMOL/L (ref -2–3)
BASE EXCESS BLDA CALC-SCNC: -6 MMOL/L (ref -2–3)
BASE EXCESS BLDA CALC-SCNC: -9 MMOL/L (ref -2–3)
BASE EXCESS BLDA CALC-SCNC: -9.4 MMOL/L (ref -2–3)
BASE EXCESS BLDMV CALC-SCNC: -10.5 MMOL/L (ref -2–3)
BASE EXCESS BLDMV CALC-SCNC: -5.8 MMOL/L (ref -2–3)
BASE EXCESS BLDMV CALC-SCNC: -6 MMOL/L (ref -2–3)
BASE EXCESS BLDMV CALC-SCNC: -9 MMOL/L (ref -2–3)
BASOPHILS # BLD AUTO: 0.05 X10*3/UL (ref 0–0.1)
BASOPHILS NFR BLD AUTO: 0.2 %
BODY TEMPERATURE: 37 DEGREES CELSIUS
BUN SERPL-MCNC: 31 MG/DL (ref 6–23)
BUN SERPL-MCNC: 39 MG/DL (ref 6–23)
BUN SERPL-MCNC: 43 MG/DL (ref 6–23)
CA-I BLDA-SCNC: 1.12 MMOL/L (ref 1.1–1.33)
CA-I BLDA-SCNC: 1.13 MMOL/L (ref 1.1–1.33)
CA-I BLDA-SCNC: 1.13 MMOL/L (ref 1.1–1.33)
CA-I BLDA-SCNC: 1.15 MMOL/L (ref 1.1–1.33)
CA-I BLDA-SCNC: 1.16 MMOL/L (ref 1.1–1.33)
CA-I BLDMV-SCNC: 1.11 MMOL/L (ref 1.1–1.33)
CA-I BLDMV-SCNC: 1.12 MMOL/L (ref 1.1–1.33)
CA-I BLDMV-SCNC: 1.14 MMOL/L (ref 1.1–1.33)
CA-I BLDMV-SCNC: 1.21 MMOL/L (ref 1.1–1.33)
CALCIUM SERPL-MCNC: 7.7 MG/DL (ref 8.6–10.6)
CALCIUM SERPL-MCNC: 7.9 MG/DL (ref 8.6–10.6)
CALCIUM SERPL-MCNC: 8.3 MG/DL (ref 8.6–10.6)
CHLORIDE BLD-SCNC: 100 MMOL/L (ref 98–107)
CHLORIDE BLD-SCNC: 101 MMOL/L (ref 98–107)
CHLORIDE BLD-SCNC: 101 MMOL/L (ref 98–107)
CHLORIDE BLD-SCNC: 103 MMOL/L (ref 98–107)
CHLORIDE BLDA-SCNC: 102 MMOL/L (ref 98–107)
CHLORIDE BLDA-SCNC: 103 MMOL/L (ref 98–107)
CHLORIDE BLDA-SCNC: 103 MMOL/L (ref 98–107)
CHLORIDE SERPL-SCNC: 103 MMOL/L (ref 98–107)
CHLORIDE SERPL-SCNC: 105 MMOL/L (ref 98–107)
CHLORIDE SERPL-SCNC: 105 MMOL/L (ref 98–107)
CO2 SERPL-SCNC: 15 MMOL/L (ref 21–32)
CO2 SERPL-SCNC: 18 MMOL/L (ref 21–32)
CO2 SERPL-SCNC: 20 MMOL/L (ref 21–32)
CREAT SERPL-MCNC: 2.45 MG/DL (ref 0.5–1.3)
CREAT SERPL-MCNC: 3 MG/DL (ref 0.5–1.3)
CREAT SERPL-MCNC: 3.58 MG/DL (ref 0.5–1.3)
EGFRCR SERPLBLD CKD-EPI 2021: 20 ML/MIN/1.73M*2
EGFRCR SERPLBLD CKD-EPI 2021: 25 ML/MIN/1.73M*2
EGFRCR SERPLBLD CKD-EPI 2021: 32 ML/MIN/1.73M*2
EOSINOPHIL # BLD AUTO: 0 X10*3/UL (ref 0–0.7)
EOSINOPHIL NFR BLD AUTO: 0 %
ERYTHROCYTE [DISTWIDTH] IN BLOOD BY AUTOMATED COUNT: 13.6 % (ref 11.5–14.5)
GLUCOSE BLD MANUAL STRIP-MCNC: 61 MG/DL (ref 74–99)
GLUCOSE BLD MANUAL STRIP-MCNC: 72 MG/DL (ref 74–99)
GLUCOSE BLD MANUAL STRIP-MCNC: 75 MG/DL (ref 74–99)
GLUCOSE BLD MANUAL STRIP-MCNC: 86 MG/DL (ref 74–99)
GLUCOSE BLD MANUAL STRIP-MCNC: 92 MG/DL (ref 74–99)
GLUCOSE BLD-MCNC: 201 MG/DL (ref 74–99)
GLUCOSE BLD-MCNC: 268 MG/DL (ref 74–99)
GLUCOSE BLD-MCNC: 71 MG/DL (ref 74–99)
GLUCOSE BLD-MCNC: 89 MG/DL (ref 74–99)
GLUCOSE BLDA-MCNC: 228 MG/DL (ref 74–99)
GLUCOSE BLDA-MCNC: 246 MG/DL (ref 74–99)
GLUCOSE BLDA-MCNC: 267 MG/DL (ref 74–99)
GLUCOSE BLDA-MCNC: 72 MG/DL (ref 74–99)
GLUCOSE BLDA-MCNC: 94 MG/DL (ref 74–99)
GLUCOSE SERPL-MCNC: 231 MG/DL (ref 74–99)
GLUCOSE SERPL-MCNC: 77 MG/DL (ref 74–99)
GLUCOSE SERPL-MCNC: 85 MG/DL (ref 74–99)
GRAM STN SPEC: ABNORMAL
GRAM STN SPEC: ABNORMAL
HAPTOGLOB SERPL-MCNC: <10 MG/DL (ref 37–246)
HCO3 BLDA-SCNC: 17.6 MMOL/L (ref 22–26)
HCO3 BLDA-SCNC: 17.6 MMOL/L (ref 22–26)
HCO3 BLDA-SCNC: 18.4 MMOL/L (ref 22–26)
HCO3 BLDA-SCNC: 18.8 MMOL/L (ref 22–26)
HCO3 BLDA-SCNC: 20.1 MMOL/L (ref 22–26)
HCO3 BLDMV-SCNC: 18.5 MMOL/L (ref 22–26)
HCO3 BLDMV-SCNC: 19.2 MMOL/L (ref 22–26)
HCO3 BLDMV-SCNC: 20.1 MMOL/L (ref 22–26)
HCO3 BLDMV-SCNC: 20.7 MMOL/L (ref 22–26)
HCT VFR BLD AUTO: 39.9 % (ref 41–52)
HCT VFR BLD EST: 40 % (ref 41–52)
HCT VFR BLD EST: 41 % (ref 41–52)
HCT VFR BLD EST: 42 % (ref 41–52)
HCT VFR BLD EST: 43 % (ref 41–52)
HCT VFR BLD EST: 44 % (ref 41–52)
HCT VFR BLD EST: 45 % (ref 41–52)
HCT VFR BLD EST: 46 % (ref 41–52)
HGB BLD-MCNC: 14 G/DL (ref 13.5–17.5)
HGB BLDA-MCNC: 13.6 G/DL (ref 13.5–17.5)
HGB BLDA-MCNC: 14.1 G/DL (ref 13.5–17.5)
HGB BLDA-MCNC: 14.2 G/DL (ref 13.5–17.5)
HGB BLDA-MCNC: 14.8 G/DL (ref 13.5–17.5)
HGB BLDA-MCNC: 15.3 G/DL (ref 13.5–17.5)
HGB BLDMV-MCNC: 13.4 G/DL (ref 13.5–17.5)
HGB BLDMV-MCNC: 14 G/DL (ref 13.5–17.5)
HGB BLDMV-MCNC: 14 G/DL (ref 13.5–17.5)
HGB BLDMV-MCNC: 15.1 G/DL (ref 13.5–17.5)
IMM GRANULOCYTES # BLD AUTO: 0.16 X10*3/UL (ref 0–0.7)
IMM GRANULOCYTES NFR BLD AUTO: 0.7 % (ref 0–0.9)
INHALED O2 CONCENTRATION: 100 %
INHALED O2 CONCENTRATION: 100 %
INHALED O2 CONCENTRATION: 40 %
INHALED O2 CONCENTRATION: 50 %
INHALED O2 CONCENTRATION: 70 %
INR PPP: 1.4 (ref 0.9–1.1)
INR PPP: 1.5 (ref 0.9–1.1)
LACTATE BLDA-SCNC: 0.8 MMOL/L (ref 0.4–2)
LACTATE BLDA-SCNC: 1 MMOL/L (ref 0.4–2)
LACTATE BLDA-SCNC: 3.9 MMOL/L (ref 0.4–2)
LACTATE BLDA-SCNC: 5 MMOL/L (ref 0.4–2)
LACTATE BLDA-SCNC: 7.3 MMOL/L (ref 0.4–2)
LACTATE BLDMV-SCNC: 0.8 MMOL/L (ref 0.4–2)
LACTATE BLDMV-SCNC: 0.9 MMOL/L (ref 0.4–2)
LACTATE BLDMV-SCNC: 4.7 MMOL/L (ref 0.4–2)
LACTATE BLDMV-SCNC: 6.9 MMOL/L (ref 0.4–2)
LACTATE SERPL-SCNC: 3.8 MMOL/L (ref 0.4–2)
LDH SERPL L TO P-CCNC: 606 U/L (ref 84–246)
LDH SERPL L TO P-CCNC: 699 U/L (ref 84–246)
LIDOCAIN SERPL-MCNC: 1.3 UG/ML (ref 1–5)
LYMPHOCYTES # BLD AUTO: 1.57 X10*3/UL (ref 1.2–4.8)
LYMPHOCYTES NFR BLD AUTO: 7.1 %
MAGNESIUM SERPL-MCNC: 2.3 MG/DL (ref 1.6–2.4)
MAGNESIUM SERPL-MCNC: 2.36 MG/DL (ref 1.6–2.4)
MCH RBC QN AUTO: 29.7 PG (ref 26–34)
MCHC RBC AUTO-ENTMCNC: 35.1 G/DL (ref 32–36)
MCV RBC AUTO: 85 FL (ref 80–100)
MONOCYTES # BLD AUTO: 0.96 X10*3/UL (ref 0.1–1)
MONOCYTES NFR BLD AUTO: 4.3 %
MRSA DNA SPEC QL NAA+PROBE: NOT DETECTED
NEUTROPHILS # BLD AUTO: 19.46 X10*3/UL (ref 1.2–7.7)
NEUTROPHILS NFR BLD AUTO: 87.7 %
NRBC BLD-RTO: 0 /100 WBCS (ref 0–0)
OXYHGB MFR BLDA: 94.7 % (ref 94–98)
OXYHGB MFR BLDA: 95.7 % (ref 94–98)
OXYHGB MFR BLDA: 95.7 % (ref 94–98)
OXYHGB MFR BLDA: 95.9 % (ref 94–98)
OXYHGB MFR BLDA: 96 % (ref 94–98)
OXYHGB MFR BLDMV: 64.7 % (ref 45–75)
OXYHGB MFR BLDMV: 67.1 % (ref 45–75)
OXYHGB MFR BLDMV: 77.4 % (ref 45–75)
OXYHGB MFR BLDMV: 79.7 % (ref 45–75)
P AXIS: 22 DEGREES
P AXIS: 23 DEGREES
P AXIS: 27 DEGREES
P AXIS: 28 DEGREES
P AXIS: 30 DEGREES
P AXIS: 32 DEGREES
P AXIS: 32 DEGREES
P AXIS: 33 DEGREES
P AXIS: 54 DEGREES
P OFFSET: 205 MS
P OFFSET: 206 MS
P OFFSET: 207 MS
P OFFSET: 208 MS
P OFFSET: 209 MS
P OFFSET: 209 MS
P OFFSET: 210 MS
P ONSET: 143 MS
P ONSET: 149 MS
P ONSET: 152 MS
P ONSET: 153 MS
P ONSET: 154 MS
P ONSET: 154 MS
P ONSET: 156 MS
PCO2 BLDA: 34 MM HG (ref 38–42)
PCO2 BLDA: 40 MM HG (ref 38–42)
PCO2 BLDA: 41 MM HG (ref 38–42)
PCO2 BLDA: 44 MM HG (ref 38–42)
PCO2 BLDA: 45 MM HG (ref 38–42)
PCO2 BLDMV: 40 MM HG (ref 41–51)
PCO2 BLDMV: 44 MM HG (ref 41–51)
PCO2 BLDMV: 49 MM HG (ref 41–51)
PCO2 BLDMV: 52 MM HG (ref 41–51)
PH BLDA: 7.2 PH (ref 7.38–7.42)
PH BLDA: 7.23 PH (ref 7.38–7.42)
PH BLDA: 7.24 PH (ref 7.38–7.42)
PH BLDA: 7.31 PH (ref 7.38–7.42)
PH BLDA: 7.35 PH (ref 7.38–7.42)
PH BLDMV: 7.16 PH (ref 7.33–7.43)
PH BLDMV: 7.2 PH (ref 7.33–7.43)
PH BLDMV: 7.28 PH (ref 7.33–7.43)
PH BLDMV: 7.31 PH (ref 7.33–7.43)
PHOSPHATE SERPL-MCNC: 3.4 MG/DL (ref 2.5–4.9)
PHOSPHATE SERPL-MCNC: 4.1 MG/DL (ref 2.5–4.9)
PHOSPHATE SERPL-MCNC: 4.3 MG/DL (ref 2.5–4.9)
PLATELET # BLD AUTO: 229 X10*3/UL (ref 150–450)
PO2 BLDA: 105 MM HG (ref 85–95)
PO2 BLDA: 114 MM HG (ref 85–95)
PO2 BLDA: 116 MM HG (ref 85–95)
PO2 BLDA: 121 MM HG (ref 85–95)
PO2 BLDA: 79 MM HG (ref 85–95)
PO2 BLDMV: 36 MM HG (ref 35–45)
PO2 BLDMV: 39 MM HG (ref 35–45)
PO2 BLDMV: 46 MM HG (ref 35–45)
PO2 BLDMV: 49 MM HG (ref 35–45)
POTASSIUM BLDA-SCNC: 3.1 MMOL/L (ref 3.5–5.3)
POTASSIUM BLDA-SCNC: 3.9 MMOL/L (ref 3.5–5.3)
POTASSIUM BLDA-SCNC: 4.2 MMOL/L (ref 3.5–5.3)
POTASSIUM BLDA-SCNC: 5.2 MMOL/L (ref 3.5–5.3)
POTASSIUM BLDA-SCNC: 5.5 MMOL/L (ref 3.5–5.3)
POTASSIUM BLDMV-SCNC: 3.1 MMOL/L (ref 3.5–5.3)
POTASSIUM BLDMV-SCNC: 4.2 MMOL/L (ref 3.5–5.3)
POTASSIUM BLDMV-SCNC: 5.2 MMOL/L (ref 3.5–5.3)
POTASSIUM BLDMV-SCNC: 5.3 MMOL/L (ref 3.5–5.3)
POTASSIUM SERPL-SCNC: 4.3 MMOL/L (ref 3.5–5.3)
POTASSIUM SERPL-SCNC: 5.1 MMOL/L (ref 3.5–5.3)
POTASSIUM SERPL-SCNC: 5.2 MMOL/L (ref 3.5–5.3)
PR INTERVAL: 122 MS
PR INTERVAL: 126 MS
PR INTERVAL: 128 MS
PR INTERVAL: 128 MS
PR INTERVAL: 130 MS
PR INTERVAL: 134 MS
PR INTERVAL: 150 MS
PROTHROMBIN TIME: 15.7 SECONDS (ref 9.8–12.8)
PROTHROMBIN TIME: 16.4 SECONDS (ref 9.8–12.8)
Q ONSET: 216 MS
Q ONSET: 217 MS
Q ONSET: 218 MS
Q ONSET: 218 MS
QRS COUNT: 10 BEATS
QRS COUNT: 11 BEATS
QRS COUNT: 12 BEATS
QRS COUNT: 13 BEATS
QRS DURATION: 102 MS
QRS DURATION: 104 MS
QRS DURATION: 92 MS
QRS DURATION: 94 MS
QRS DURATION: 94 MS
QRS DURATION: 96 MS
QRS DURATION: 98 MS
QT INTERVAL: 370 MS
QT INTERVAL: 370 MS
QT INTERVAL: 382 MS
QT INTERVAL: 394 MS
QT INTERVAL: 402 MS
QT INTERVAL: 410 MS
QT INTERVAL: 424 MS
QT INTERVAL: 486 MS
QT INTERVAL: 498 MS
QTC CALCULATION(BAZETT): 393 MS
QTC CALCULATION(BAZETT): 394 MS
QTC CALCULATION(BAZETT): 413 MS
QTC CALCULATION(BAZETT): 426 MS
QTC CALCULATION(BAZETT): 430 MS
QTC CALCULATION(BAZETT): 432 MS
QTC CALCULATION(BAZETT): 436 MS
QTC CALCULATION(BAZETT): 505 MS
QTC CALCULATION(BAZETT): 513 MS
QTC FREDERICIA: 386 MS
QTC FREDERICIA: 394 MS
QTC FREDERICIA: 398 MS
QTC FREDERICIA: 415 MS
QTC FREDERICIA: 420 MS
QTC FREDERICIA: 425 MS
QTC FREDERICIA: 428 MS
QTC FREDERICIA: 498 MS
QTC FREDERICIA: 508 MS
R AXIS: 37 DEGREES
R AXIS: 37 DEGREES
R AXIS: 42 DEGREES
R AXIS: 43 DEGREES
R AXIS: 50 DEGREES
R AXIS: 52 DEGREES
R AXIS: 52 DEGREES
R AXIS: 54 DEGREES
R AXIS: 57 DEGREES
RBC # BLD AUTO: 4.71 X10*6/UL (ref 4.5–5.9)
SAO2 % BLDA: 95 % (ref 94–100)
SAO2 % BLDA: 96 % (ref 94–100)
SAO2 % BLDMV: 65 % (ref 45–75)
SAO2 % BLDMV: 68 % (ref 45–75)
SAO2 % BLDMV: 78 % (ref 45–75)
SAO2 % BLDMV: 80 % (ref 45–75)
SODIUM BLDA-SCNC: 130 MMOL/L (ref 136–145)
SODIUM BLDA-SCNC: 130 MMOL/L (ref 136–145)
SODIUM BLDA-SCNC: 131 MMOL/L (ref 136–145)
SODIUM BLDA-SCNC: 136 MMOL/L (ref 136–145)
SODIUM BLDA-SCNC: 136 MMOL/L (ref 136–145)
SODIUM BLDMV-SCNC: 129 MMOL/L (ref 136–145)
SODIUM BLDMV-SCNC: 132 MMOL/L (ref 136–145)
SODIUM BLDMV-SCNC: 134 MMOL/L (ref 136–145)
SODIUM BLDMV-SCNC: 138 MMOL/L (ref 136–145)
SODIUM SERPL-SCNC: 133 MMOL/L (ref 136–145)
SODIUM SERPL-SCNC: 134 MMOL/L (ref 136–145)
SODIUM SERPL-SCNC: 136 MMOL/L (ref 136–145)
T AXIS: 18 DEGREES
T AXIS: 18 DEGREES
T AXIS: 21 DEGREES
T AXIS: 22 DEGREES
T AXIS: 26 DEGREES
T AXIS: 28 DEGREES
T AXIS: 3 DEGREES
T AXIS: 39 DEGREES
T AXIS: 42 DEGREES
T OFFSET: 402 MS
T OFFSET: 403 MS
T OFFSET: 408 MS
T OFFSET: 413 MS
T OFFSET: 419 MS
T OFFSET: 422 MS
T OFFSET: 428 MS
T OFFSET: 460 MS
T OFFSET: 465 MS
VENTRICULAR RATE: 60 BPM
VENTRICULAR RATE: 62 BPM
VENTRICULAR RATE: 64 BPM
VENTRICULAR RATE: 65 BPM
VENTRICULAR RATE: 65 BPM
VENTRICULAR RATE: 68 BPM
VENTRICULAR RATE: 71 BPM
VENTRICULAR RATE: 75 BPM
VENTRICULAR RATE: 77 BPM
WBC # BLD AUTO: 22.2 X10*3/UL (ref 4.4–11.3)

## 2024-07-11 PROCEDURE — 93005 ELECTROCARDIOGRAM TRACING: CPT

## 2024-07-11 PROCEDURE — 95700 EEG CONT REC W/VID EEG TECH: CPT

## 2024-07-11 PROCEDURE — 95720 EEG PHY/QHP EA INCR W/VEEG: CPT | Performed by: STUDENT IN AN ORGANIZED HEALTH CARE EDUCATION/TRAINING PROGRAM

## 2024-07-11 PROCEDURE — 2500000005 HC RX 250 GENERAL PHARMACY W/O HCPCS

## 2024-07-11 PROCEDURE — 2500000004 HC RX 250 GENERAL PHARMACY W/ HCPCS (ALT 636 FOR OP/ED)

## 2024-07-11 PROCEDURE — 95715 VEEG EA 12-26HR INTMT MNTR: CPT

## 2024-07-11 PROCEDURE — C9113 INJ PANTOPRAZOLE SODIUM, VIA: HCPCS

## 2024-07-11 PROCEDURE — 2020000001 HC ICU ROOM DAILY

## 2024-07-11 PROCEDURE — 83735 ASSAY OF MAGNESIUM: CPT

## 2024-07-11 PROCEDURE — 85347 COAGULATION TIME ACTIVATED: CPT

## 2024-07-11 PROCEDURE — 99291 CRITICAL CARE FIRST HOUR: CPT

## 2024-07-11 PROCEDURE — 83735 ASSAY OF MAGNESIUM: CPT | Performed by: INTERNAL MEDICINE

## 2024-07-11 PROCEDURE — 85610 PROTHROMBIN TIME: CPT | Performed by: STUDENT IN AN ORGANIZED HEALTH CARE EDUCATION/TRAINING PROGRAM

## 2024-07-11 PROCEDURE — 82947 ASSAY GLUCOSE BLOOD QUANT: CPT

## 2024-07-11 PROCEDURE — 93010 ELECTROCARDIOGRAM REPORT: CPT | Performed by: INTERNAL MEDICINE

## 2024-07-11 PROCEDURE — 84132 ASSAY OF SERUM POTASSIUM: CPT

## 2024-07-11 PROCEDURE — 80176 ASSAY OF LIDOCAINE: CPT

## 2024-07-11 PROCEDURE — 99221 1ST HOSP IP/OBS SF/LOW 40: CPT | Performed by: INTERNAL MEDICINE

## 2024-07-11 PROCEDURE — 4A00X4Z MEASUREMENT OF CENTRAL NERVOUS ELECTRICAL ACTIVITY, EXTERNAL APPROACH: ICD-10-PCS | Performed by: STUDENT IN AN ORGANIZED HEALTH CARE EDUCATION/TRAINING PROGRAM

## 2024-07-11 PROCEDURE — 87205 SMEAR GRAM STAIN: CPT

## 2024-07-11 PROCEDURE — 87641 MR-STAPH DNA AMP PROBE: CPT

## 2024-07-11 PROCEDURE — 84132 ASSAY OF SERUM POTASSIUM: CPT | Performed by: INTERNAL MEDICINE

## 2024-07-11 PROCEDURE — 94003 VENT MGMT INPAT SUBQ DAY: CPT

## 2024-07-11 PROCEDURE — 83615 LACTATE (LD) (LDH) ENZYME: CPT | Performed by: STUDENT IN AN ORGANIZED HEALTH CARE EDUCATION/TRAINING PROGRAM

## 2024-07-11 PROCEDURE — 84132 ASSAY OF SERUM POTASSIUM: CPT | Performed by: STUDENT IN AN ORGANIZED HEALTH CARE EDUCATION/TRAINING PROGRAM

## 2024-07-11 PROCEDURE — 37799 UNLISTED PX VASCULAR SURGERY: CPT | Performed by: STUDENT IN AN ORGANIZED HEALTH CARE EDUCATION/TRAINING PROGRAM

## 2024-07-11 PROCEDURE — 87040 BLOOD CULTURE FOR BACTERIA: CPT

## 2024-07-11 PROCEDURE — 83605 ASSAY OF LACTIC ACID: CPT | Performed by: STUDENT IN AN ORGANIZED HEALTH CARE EDUCATION/TRAINING PROGRAM

## 2024-07-11 PROCEDURE — 36415 COLL VENOUS BLD VENIPUNCTURE: CPT

## 2024-07-11 PROCEDURE — 2500000001 HC RX 250 WO HCPCS SELF ADMINISTERED DRUGS (ALT 637 FOR MEDICARE OP): Performed by: STUDENT IN AN ORGANIZED HEALTH CARE EDUCATION/TRAINING PROGRAM

## 2024-07-11 PROCEDURE — 85025 COMPLETE CBC W/AUTO DIFF WBC: CPT | Performed by: STUDENT IN AN ORGANIZED HEALTH CARE EDUCATION/TRAINING PROGRAM

## 2024-07-11 RX ORDER — LIDOCAINE HYDROCHLORIDE ANHYDROUS AND DEXTROSE MONOHYDRATE .4; 5 G/100ML; G/100ML
1 INJECTION, SOLUTION INTRAVENOUS ONCE
Status: COMPLETED | OUTPATIENT
Start: 2024-07-11 | End: 2024-07-11

## 2024-07-11 RX ORDER — POTASSIUM CHLORIDE 1.5 G/1.58G
40 POWDER, FOR SOLUTION ORAL ONCE
Status: COMPLETED | OUTPATIENT
Start: 2024-07-11 | End: 2024-07-11

## 2024-07-11 RX ORDER — LEVETIRACETAM 5 MG/ML
500 INJECTION INTRAVASCULAR EVERY 12 HOURS
Status: DISCONTINUED | OUTPATIENT
Start: 2024-07-11 | End: 2024-07-15

## 2024-07-11 RX ORDER — DEXTROSE 50 % IN WATER (D50W) INTRAVENOUS SYRINGE
25
Status: DISCONTINUED | OUTPATIENT
Start: 2024-07-11 | End: 2024-07-27 | Stop reason: HOSPADM

## 2024-07-11 RX ORDER — FENTANYL CITRATE-0.9 % NACL/PF 10 MCG/ML
0-300 PLASTIC BAG, INJECTION (ML) INTRAVENOUS CONTINUOUS
Status: DISCONTINUED | OUTPATIENT
Start: 2024-07-11 | End: 2024-07-14

## 2024-07-11 RX ORDER — PANTOPRAZOLE SODIUM 40 MG/10ML
40 INJECTION, POWDER, LYOPHILIZED, FOR SOLUTION INTRAVENOUS 2 TIMES DAILY
Status: DISCONTINUED | OUTPATIENT
Start: 2024-07-11 | End: 2024-07-15

## 2024-07-11 RX ORDER — LEVETIRACETAM 500 MG/1
500 TABLET ORAL 2 TIMES DAILY
Status: DISCONTINUED | OUTPATIENT
Start: 2024-07-11 | End: 2024-07-11

## 2024-07-11 RX ORDER — POTASSIUM CHLORIDE 20 MEQ/1
40 TABLET, EXTENDED RELEASE ORAL ONCE
Status: DISCONTINUED | OUTPATIENT
Start: 2024-07-11 | End: 2024-07-11

## 2024-07-11 RX ORDER — POTASSIUM CHLORIDE 29.8 MG/ML
40 INJECTION INTRAVENOUS ONCE
Status: COMPLETED | OUTPATIENT
Start: 2024-07-11 | End: 2024-07-11

## 2024-07-11 RX ORDER — VANCOMYCIN HYDROCHLORIDE 1 G/20ML
INJECTION, POWDER, LYOPHILIZED, FOR SOLUTION INTRAVENOUS DAILY PRN
Status: DISCONTINUED | OUTPATIENT
Start: 2024-07-11 | End: 2024-07-13

## 2024-07-11 RX ORDER — LIDOCAINE HYDROCHLORIDE ANHYDROUS AND DEXTROSE MONOHYDRATE .8; 5 G/100ML; G/100ML
1 INJECTION, SOLUTION INTRAVENOUS CONTINUOUS
Status: DISCONTINUED | OUTPATIENT
Start: 2024-07-11 | End: 2024-07-19

## 2024-07-11 RX ORDER — DEXTROSE 50 % IN WATER (D50W) INTRAVENOUS SYRINGE
12.5
Status: DISCONTINUED | OUTPATIENT
Start: 2024-07-11 | End: 2024-07-27 | Stop reason: HOSPADM

## 2024-07-11 SDOH — SOCIAL STABILITY: SOCIAL NETWORK: HOW OFTEN DO YOU ATTENT MEETINGS OF THE CLUB OR ORGANIZATION YOU BELONG TO?: PATIENT UNABLE TO ANSWER

## 2024-07-11 SDOH — SOCIAL STABILITY: SOCIAL NETWORK: ARE YOU MARRIED, WIDOWED, DIVORCED, SEPARATED, NEVER MARRIED, OR LIVING WITH A PARTNER?: MARRIED

## 2024-07-11 SDOH — HEALTH STABILITY: MENTAL HEALTH: HOW OFTEN DO YOU HAVE 6 OR MORE DRINKS ON ONE OCCASION?: PATIENT UNABLE TO ANSWER

## 2024-07-11 SDOH — HEALTH STABILITY: MENTAL HEALTH: HOW OFTEN DO YOU HAVE A DRINK CONTAINING ALCOHOL?: PATIENT UNABLE TO ANSWER

## 2024-07-11 SDOH — ECONOMIC STABILITY: HOUSING INSECURITY: AT ANY TIME IN THE PAST 12 MONTHS, WERE YOU HOMELESS OR LIVING IN A SHELTER (INCLUDING NOW)?: PATIENT UNABLE TO ANSWER

## 2024-07-11 SDOH — HEALTH STABILITY: MENTAL HEALTH: HOW MANY STANDARD DRINKS CONTAINING ALCOHOL DO YOU HAVE ON A TYPICAL DAY?: PATIENT UNABLE TO ANSWER

## 2024-07-11 SDOH — SOCIAL STABILITY: SOCIAL INSECURITY: WITHIN THE LAST YEAR, HAVE YOU BEEN AFRAID OF YOUR PARTNER OR EX-PARTNER?: PATIENT UNABLE TO ANSWER

## 2024-07-11 SDOH — SOCIAL STABILITY: SOCIAL NETWORK: IN A TYPICAL WEEK, HOW MANY TIMES DO YOU TALK ON THE PHONE WITH FAMILY, FRIENDS, OR NEIGHBORS?: PATIENT UNABLE TO ANSWER

## 2024-07-11 SDOH — SOCIAL STABILITY: SOCIAL NETWORK: HOW OFTEN DO YOU ATTEND CHURCH OR RELIGIOUS SERVICES?: PATIENT UNABLE TO ANSWER

## 2024-07-11 SDOH — ECONOMIC STABILITY: HOUSING INSECURITY: IN THE PAST 12 MONTHS, HOW MANY TIMES HAVE YOU MOVED WHERE YOU WERE LIVING?: 1

## 2024-07-11 SDOH — SOCIAL STABILITY: SOCIAL NETWORK: ARE YOU MARRIED, WIDOWED, DIVORCED, SEPARATED, NEVER MARRIED, OR LIVING WITH A PARTNER?: PATIENT UNABLE TO ANSWER

## 2024-07-11 SDOH — SOCIAL STABILITY: SOCIAL NETWORK: HOW OFTEN DO YOU GET TOGETHER WITH FRIENDS OR RELATIVES?: PATIENT UNABLE TO ANSWER

## 2024-07-11 ASSESSMENT — PAIN - FUNCTIONAL ASSESSMENT
PAIN_FUNCTIONAL_ASSESSMENT: CPOT (CRITICAL CARE PAIN OBSERVATION TOOL)

## 2024-07-11 ASSESSMENT — PAIN SCALES - GENERAL
PAINLEVEL_OUTOF10: 0 - NO PAIN

## 2024-07-11 ASSESSMENT — LIFESTYLE VARIABLES
AUDIT-C TOTAL SCORE: -1
SKIP TO QUESTIONS 9-10: 0

## 2024-07-11 NOTE — CONSULTS
"Inpatient consult to Electrophysiology  Consult performed by: Bria Malhotra MD  Consult ordered by: Bhavya Restrepo MD        History Of Present Illness:    Miguel A Bender is a 43 y.o. male with a PMHx of obesity status post gastric bypass surgery 2 years ago, CHARLEEN, HTN transferred for cardiogenic shock s/p Impella after witnessed sudden cardiac arrest.    History was taken from the wife, she reports that the patient was in his usual state of health and went to work and she was told as he was talking on the phone he stopped responding.  His coworkers noticed that he collapsed and proceeded to provide CPR for him.  She denies any prior history of syncope or collapse.  She also denies any prior history of cardiac diseases or family history of sudden cardiac death.    As per notes:  \"Patient presented after a witnessed cardiac arrest, reportedly witnessed arrest at work approximately 15-20 minutes prior to arrival. CPR started by coworkers. Was found to be in ventricular fibrillation on EMS arrival, defibrillated several times and received epinephrine and amio bolus and intubated by the time he arrived to ED. nitial CBC showing white count 12.2, hemoglobin over hematocrit 14.9/45.1, platelet count 230. Metabolic panel showed serum glucose 138 sodium 143 potassium 3.0 chloride 106 bicarbonate 24 BUN 12 creatinine 1.01. Initial high-sensitivity cardiac troponin elevated at 22. Lactic acid elevated at 8.1. Initial EKG Post ROSC shows NSR with RBBB. Cardiology was consulted and loaded with Amiodarone, [facility] with normal coronary arteries, CI 1.9, Impella placed. Neurology was consulted, patient having episodes of spontaneous eye opening, pulling at restraints, episthotonos, and occasional myoclonic jerking. He was loaded with levetiracetam and started on EEG. Prior to transfer, lactate still high at 7.1, pH 7.18.\"    Upon admission to the ICU, the patient was on Impella at P8 along with Nor epi 0.07 and epi 0.03 along with " amnio drip.  Telemetry showed multiple episodes of polymorphic VT for 1 to 2 seconds, these episodes were preceded by PVCs and the calculated QTc was above 500. (592 on the first episode), repeat labs showed potassium of 3.1 there was apparently hemolyzed along with magnesium of 1.6.  Electrolytes were repleted and amiodarone was weaned off along with starting lidocaine bolus and infusion.  Currently the patient is without further episodes while on lidocaine.    Neurology has been consulted, the patient has brainstem reflexes and semipurposeful movements.        Last Recorded Vitals:  Vitals:    07/11/24 1110 07/11/24 1200 07/11/24 1300 07/11/24 1400   BP:       Pulse: 66 65 65 61   Resp:       Temp:  35.9 °C (96.6 °F)     TempSrc:  Esophageal     SpO2:  99% 97% 98%   Weight:       Height:           Last Labs:  CBC - 7/11/2024:  4:17 AM  22.2 14.0 229    39.9      CMP - 7/11/2024: 11:01 AM  7.9 5.5 127 --- 2.0   4.1 3.3 116 71      PTT - 7/11/2024:  4:17 AM  1.4   15.7 37     Troponin I, High Sensitivity   Date/Time Value Ref Range Status   07/10/2024 12:13 PM 2,496 (HH) 0 - 20 ng/L Final      Last I/O:  I/O last 3 completed shifts:  In: 2491.9 (21.3 mL/kg) [I.V.:691.9 (5.9 mL/kg); NG/GT:150; IV Piggyback:1650]  Out: 675 (5.8 mL/kg) [Urine:675 (0.2 mL/kg/hr)]  Weight: 117 kg     Past Cardiology Tests (Last 3 Years):  EKG:  ECG 12 lead 07/11/2024 (Preliminary)      ECG 12 lead 07/11/2024 (Preliminary)      ECG 12 lead 07/11/2024 (Preliminary)      ECG 12 lead 07/11/2024 (Preliminary)      ECG 12 lead 07/11/2024 (Preliminary)      ECG 12 lead 07/11/2024 (Preliminary)      ECG 12 lead 07/11/2024 (Preliminary)      ECG 12 lead 07/11/2024 (Preliminary)      ECG 12 lead 07/11/2024 (Preliminary)    Echo:  Transthoracic Echo (TTE) Complete 07/10/2024    Ejection Fractions:  EF   Date/Time Value Ref Range Status   07/10/2024 04:44 PM 10 %      Cath:  Cardiac Catheterization Procedure 07/10/2024    Stress Test:  No results  found for this or any previous visit from the past 1095 days.    Cardiac Imaging:  No results found for this or any previous visit from the past 1095 days.      Past Medical History:  He has no past medical history on file.    Past Surgical History:  He has a past surgical history that includes Cardiac catheterization (N/A, 7/10/2024) and Cardiac catheterization (N/A, 7/10/2024).      Social History:  He has no history on file for tobacco use, alcohol use, and drug use.    Family History:  No family history on file.     Allergies:  Iodine and Shellfish containing products    Inpatient Medications:  Scheduled medications   Medication Dose Route Frequency    levETIRAcetam  500 mg intravenous q12h    oxygen   inhalation Continuous - Inhalation    pantoprazole  40 mg intravenous BID    piperacillin-tazobactam  3.375 g intravenous q6h    polyethylene glycol  17 g oral Daily     PRN medications   Medication    dextrose    dextrose    glucagon    glucagon    vancomycin     Continuous Medications   Medication Dose Last Rate    EPINEPHrine  0-0.2 mcg/kg/min Stopped (07/11/24 0545)    heparin Impella Purge 50 units/mL in 500 mL D5W  10 mL/hr 10 mL/hr (07/11/24 0316)    lidocaine  1 mg/min 1 mg/min (07/11/24 1400)    norepinephrine  0-0.2 mcg/kg/min Stopped (07/11/24 1015)    propofol  5-50 mcg/kg/min 25 mcg/kg/min (07/11/24 1400)     Outpatient Medications:  Current Outpatient Medications   Medication Instructions    amLODIPine (NORVASC) 2.5 mg, oral, Daily    beta carotene (VITAMIN A) 10,000 Units, oral, Daily    ergocalciferol (VITAMIN D-2) 1.25 mg, oral, Once Weekly    multivitamin tablet 1 tablet, oral, Daily       Physical Exam:  General Appearance:  Intubated and sedated.  Lungs:   Mechanical vent sounds.  Heart:    Regular rate and rhythm, S1 and S2 normal.    Cardiac work up:   TTE 7/10/24:  CONCLUSIONS:   1. The left ventricular systolic function is severely decreased, with a Hartmann's biplane calculated ejection  fraction of 10%.   2. There is global hypokinesis of the left ventricle with minor regional variations.   3. No left ventricular thrombus visualized.   4. Left ventricular diastolic filling was indeterminate.   5. Unable to determine right ventricular systolic function.   6. Non-Invasive Hemodynamics show Cardiac Output 3.4L/min ; Cardiac Index 1.41 L/Min/M2. Findings are consistent with cardiogenic shock.    EKG 7/11/24: NSR with qtc of 436     Tele: multiple episodes of polymorphic VT, proceeded by PVCs and qtc of the underlying rhythm was 592.    Coronary angiogram 7/10/24: Normal coronaries.    Assessment/Plan   Miguel A Bender is a 43 y.o. male with a PMHx of obesity status post gastric bypass surgery 2 years ago, CHARLEEN, HTN transferred for cardiogenic shock s/p Impella after witnessed sudden V-fib arrest cardiac arrest.    #V-fib arrest  #C/F Torsades de pointes  The patient presented to the hospital after V-fib arrest, requiring multiple DCCV.  Coronary angiogram on 7/10/2024 showed normal coronaries.  During ICU stay, the patient developed multiple episodes of polymorphic VT, these episodes were preceded by PVCs along with underlying rhythm showing prolonged Qtc (590)  which raises concern for torsades de pointes however that was while the patient was on Amiodarone which can be a trigger for prolonged qtc. Other ddx includes ARVD, however on echo there was limited visualization of the right ventricle.   -Would recommend aggressive repletion of electrolytes (K >4, Mag >2.5).  -Daily EKG.  -The patient suffered from a V-fib arrest and was found to be nonischemic in origin with recent normal coronary angiogram on 7/10/24. Would benefit from CMR once off the impella.   -Will need an ICD before discharge.    CVC 07/10/24 Triple lumen Right Internal jugular (Active)   Line Necessity Intravenous medication therapy 07/11/24 0800   Line Necessity Reviewed With CICU team 07/11/24 0800   Site Assessment Clean;Dry;Intact  07/11/24 0800   Proximal Lumen Status Flushed 07/11/24 0800   Medial 1 Lumen Status Flushed 07/11/24 0800   Distal Lumen Status Flushed 07/11/24 0800   Dressing Type Antimicrobial patch;Transparent 07/11/24 0800   Dressing Status Clean;Dry;Occlusive 07/11/24 0800   Number of days: 1       Peripheral IV 07/10/24 16 G Distal;Right;Upper;Anterior Arm (Active)   Site Assessment Clean;Dry;Intact 07/11/24 0800   Dressing Type Transparent 07/11/24 0800   Line Status Flushed 07/11/24 0800   Dressing Status Clean;Dry;Occlusive 07/11/24 0800   Number of days: 1       Peripheral IV 07/10/24 18 G Anterior;Right Wrist (Active)   Site Assessment Clean;Dry;Intact 07/11/24 0800   Dressing Type Transparent 07/11/24 0800   Line Status Flushed 07/11/24 0800   Dressing Status Clean;Dry;Occlusive 07/11/24 0800   Number of days: 1       ETT  7.5 mm (Active)   Measured from Lips 07/11/24 1200   Secured by Commercial tube trejo 07/11/24 1200   Site Condition Cool;Dry 07/11/24 1200   Number of days: 1       Arterial Line 07/10/24 Left Radial (Active)   Site Assessment Clean;Dry;Intact 07/11/24 0800   Line Status Pulsatile blood flow 07/11/24 0800   Art Line Waveform Appropriate 07/11/24 0800   Art Line Interventions Zeroed and calibrated;Leveled;Connections checked and tightened;Flushed per protocol 07/11/24 0800   Color/Movement/Sensation Capillary refill less than 3 sec;Distal pulses palpable 07/11/24 0800   Dressing Type Antimicrobial patch;Transparent 07/11/24 0800   Dressing Status Clean;Dry;Occlusive 07/11/24 0800   Number of days: 1       Introducer 07/10/24 Internal jugular Left (Active)   Dressing Status Clean;Dry;Occlusive 07/11/24 0900   Number of days: 1       Arterial Sheath Other (Comment) Right Femoral (Active)   Site Assessment Clean;Dry;Intact 07/11/24 1200   Line Status Intact and in place 07/11/24 1200   Dressing Occlusive 07/11/24 1200   Dressing Status Clean;Dry;Intact 07/11/24 1200   Color/Movement/Sensation Capillary  refill less than 3 sec;Distal pulses palpable 07/11/24 1200   Number of days: 1       Venous Sheath Other (Comment) Right Femoral (Active)   Site Assessment Clean;Dry;Intact 07/11/24 1200   Line Status Intact and in place 07/11/24 1200   Dressing Occlusive 07/11/24 1200   Dressing Status Clean;Dry;Intact 07/11/24 1200   Color/Movement/Sensation Capillary refill less than 3 sec;Distal pulses palpable 07/11/24 1200   Number of days: 1       Pulmonary Artery Catheter Internal jugular (Active)   Line Necessity Central venous pressure monitoring 07/11/24 0800   Line Necessity Reviewed With CICU team 07/11/24 0800   Site Assessment Clean;Dry;Intact 07/11/24 0800   Proximal Lumen Status Blood return noted;Flushed 07/11/24 0800   Medial 1 Lumen Status Blood return noted;Flushed 07/11/24 0800   Distal Lumen Status Blood return noted;Flushed 07/11/24 0800   PAC Waveform Appropriate waveforms;Rezeroed 07/11/24 0800   PAC Interventions Zeroed and calibrated 07/11/24 0800   External Length (cm) - compare to insertion 49 cm 07/11/24 0800   Dressing Status Clean;Dry;Occlusive 07/11/24 0800   Number of days: 1       NG/OG/Feeding Tube NG - [place] sump 18 Fr Center mouth (Active)   Site Assessment Clean;Dry;Intact 07/11/24 0943   Number of days: 1       Urethral Catheter 16 Fr. (Active)   Output (mL) 5 mL 07/11/24 1300   Number of days: 1       Code Status:  Full Code    This case was discussed with Dr. Macario and > 30 minutes was spent in the professional and overall care of this patient.    Bria Malhotra MD  Cardiology Fellow PGY5

## 2024-07-11 NOTE — POST-PROCEDURE NOTE
- Dr. Restrepo  Fellow - Dr Fernandez    Out of hospital VF arrest at 9:45am    Previous history - HTN, obestiy, gastric bypass,   Coronary angiogram in 2018 - angiographically normal arteries    Procedure   Access internal jugular  (Left)  RHC- RA- 9/7mmHg  RV- 37/0, Mean 8mmHg  PA 42/11, mean 21mmHg  PCWP- 13/15, 8mmHg    CO - thermodilution ~11.32, Chase - 4.45  CI- Thermodilution 4.87, [place] 1.91    Coronary angiogram   - RCF - diagnostic catheters, JL4,JR4  -Angiographically normal arteries    Blood lactate still 7.1, Ph 7.18. Despite fluid loading and weaning of pressors    Decision to proceed with impella     Closure - Per close X2    Nil complications     Plan : transfer to  CICU

## 2024-07-11 NOTE — CARE PLAN
Problem: Arrythmia/Dysrhythmia  Goal: Lab values return to normal range  Outcome: Progressing  Goal: No evidence of post procedure complications  Outcome: Progressing  Goal: Promote self management  Outcome: Progressing  Goal: Serial ECG will return to baseline  Outcome: Progressing  Goal: Verbalize understanding of procedures/devices  Outcome: Progressing  Goal: Vital signs return to baseline  Outcome: Progressing  Goal: Care and maintenance of device (specify)  Outcome: Progressing     Problem: Cardiac catheterization  Goal: Free from dysrhythmias  Outcome: Progressing  Goal: Free from pain  Outcome: Progressing  Goal: No evidence of post procedure complications  Outcome: Progressing  Goal: Promote self management  Outcome: Progressing  Goal: Verbalize understanding of procedure  Outcome: Progressing  Goal: Care and maintenance of device (specify)  Outcome: Progressing     Problem: Skin  Goal: Decreased wound size/increased tissue granulation at next dressing change  Outcome: Progressing  Flowsheets (Taken 7/11/2024 0358)  Decreased wound size/increased tissue granulation at next dressing change: Protective dressings over bony prominences  Goal: Participates in plan/prevention/treatment measures  Outcome: Progressing  Flowsheets (Taken 7/11/2024 0358)  Participates in plan/prevention/treatment measures:   Elevate heels   Discuss with provider PT/OT consult  Goal: Prevent/manage excess moisture  Outcome: Progressing  Flowsheets (Taken 7/11/2024 0358)  Prevent/manage excess moisture: Monitor for/manage infection if present  Goal: Prevent/minimize sheer/friction injuries  Outcome: Progressing  Flowsheets (Taken 7/11/2024 0358)  Prevent/minimize sheer/friction injuries:   Turn/reposition every 2 hours/use positioning/transfer devices   Use pull sheet  Goal: Promote/optimize nutrition  Outcome: Progressing  Goal: Promote skin healing  Outcome: Progressing   The patient's goals for the shift include      The clinical  goals for the shift include Patient will remain hemodynamically stable throughout shift.

## 2024-07-11 NOTE — H&P
HPI  Miguel A Bender is a 43 y.o. male with PMHx of obesity status post gastric bypass surgery 2 years ago, CHARLEEN, HTN transferred for cardiogenic shock s/p Impella after witnessed sudden cardiac arrest.    Patient presented after a witnessed cardiac arrest, reportedly witnessed arrest at work approximately 15-20 minutes prior to arrival. CPR started by coworkers. Was found to be in ventricular fibrillation on EMS arrival, defibrillated several times and received epinephrine and amio bolus and intubated by the time he arrived to ED. nitial CBC showing white count 12.2, hemoglobin over hematocrit 14.9/45.1, platelet count 230.  Metabolic panel showed serum glucose 138 sodium 143 potassium 3.0 chloride 106 bicarbonate 24 BUN 12 creatinine 1.01.  Initial high-sensitivity cardiac troponin elevated at 22.  Lactic acid elevated at 8.1. Initial EKG Post ROSC shows NSR with RBBB. Cardiology was consulted and loaded with Amiodarone, [facility] with normal coronary arteries, CI 1.9, Impella placed. Neurology was consulted, patient having episodes of spontaneous eye opening, pulling at restraints, episthotonos, and occasional myoclonic jerking. He was loaded with levetiracetam and started on EEG. Prior to transfer, lactate still high at 7.1, pH 7.18.    Upon admission, patient was intubated with Impella on P8, on norepinephrine 0.07 and epi 0.03, on amio and heparin drip. On propofol 50. Telemetry showing multiple episodes of polymorphic VT episodes few seconds each and EKG concerning for prolonged QTc. Decision was to start lidocaine bolus and infusion with plans to eventually wean off amiodarone. Started patient on Vanc/Zosyn. Gave a liter of LR. Repleted electrolytes (K 3.1 hemolyzed and Mg 1.6). Lactate 7.3 on ABG on admission improved to 5.0. PA catheter numbers: CVP 9, PAP 39/20 (27), CO 7.7, CI 3.3, , SVO2 80% on P8 Impella.     No family history of sudden cardiac death or genetic cardiomyopathies per patients wife. Patient  "was at his normal state of health and active before this sudden arrest.     Current Vitals:  /68 (BP Location: Left arm, Patient Position: Lying)   Pulse 66   Temp 36.6 °C (97.9 °F) (Esophageal)   Resp 17   Ht 1.78 m (5' 10.08\")   Wt 117 kg (257 lb 15 oz)   SpO2 99%   BMI 36.93 kg/m²    Labs:   CBC: WBC 22.2 , HGB 14.0,   BMP: , K 3.1, Cl 106, HCO3 17, BUN 28, CR 2.09, Glu 243  LFTS:  , , ALKPHOS 71 , TBILI 2.0 , DBILI No results found for requested labs within last 365 days.  TROP: 2,496  BNP: No results found for requested labs within last 365 days.  COAGS: PT 15.7 , PTT 37  , INR 1.4  UA:     ABG:   Results from last 7 days   Lab Units 07/11/24  0417 07/11/24  0110 07/10/24  2303   POCT PH, ARTERIAL pH 7.24* 7.23* 7.20*   POCT PCO2, ARTERIAL mm Hg 41 44* 45*   POCT PO2, ARTERIAL mm Hg 105* 116* 114*   POCT HCO3 CALCULATED, ARTERIAL mmol/L 17.6* 18.4* 17.6*   POCT BASE EXCESS, ARTERIAL mmol/L -9.4* -9.0* -10.3*    CALCIUM 7.9 MAG No results found for requested labs within last 365 days. ALB 3.4 LACTATE 7.1 PHOS No results found for requested labs within last 365 days. COVIDNo results found for requested labs within last 365 days.    - Imaging:  CXR:  IMPRESSION:  1.  Prominent perihilar and interstitial lung markings with patchy  airspace opacities bilaterally. Findings consistent with pulmonary  edema. An infectious process can have a similar appearance.  2. Cardiomegaly versus pericardial effusion.  3. Medical devices/lines as noted above.    CT angio:  IMPRESSION:  No CT evidence of pulmonary embolism in the current exam.      Prominent areas of dense consolidation posteriorly throughout both  lungs. More mild areas of ground-glass infiltrative density more  anteriorly in the upper lobes and right middle lobe. Pneumonia versus  edema versus pulmonary hemorrhage.      Cardiomegaly.      ET tube and NG tube in place as described.      Mild nonspecific bilateral hilar " adenopathy. This could be reactive  or infectious/inflammatory.      Thoracic spine DJD as described.      Nonspecific gallbladder dilatation.      CT head:  IMPRESSION:  Negative exam.      - EKG:  Normal sinus rhythm     - TTE post arrest:  PHYSICIAN INTERPRETATION:  Left Ventricle: The left ventricular systolic function is severely decreased, with a Hartmann's biplane calculated ejection fraction of 10%. There is global hypokinesis of the left ventricle with minor regional variations. The left ventricular cavity size is normal. The left ventricular septal wall thickness is mildly increased. There is normal left ventricular posterior wall thickness. Left ventricular diastolic filling was indeterminate. There is no definite left ventricular thrombus visualized. Non-Invasive Hemodynamics show Cardiac Output 3.4L/min ; Cardiac Index 1.41 L/Min/M2. Findings are consistent with cardiogenic shock.  Left Atrium: The left atrium is normal in size.  Right Ventricle: The right ventricle was not well visualized. Unable to determine right ventricular systolic function.  Right Atrium: The right atrium is normal in size.  Aortic Valve: The aortic valve is trileaflet. The aortic valve dimensionless index is 0.82. There is no evidence of aortic valve regurgitation. The peak instantaneous gradient of the aortic valve is 1.1 mmHg. The mean gradient of the aortic valve is 0.6 mmHg.  Mitral Valve: The mitral valve is normal in structure. There is trace mitral valve regurgitation.  Tricuspid Valve: The tricuspid valve is structurally normal. No evidence of tricuspid regurgitation. The right ventricular systolic pressure is unable to be estimated.  Pulmonic Valve: The pulmonic valve is structurally normal. There is physiologic pulmonic valve regurgitation.  Pericardium: There is no pericardial effusion noted.  Aorta: The aortic root is normal.  Systemic Veins: The inferior vena cava appears to be of normal size.  In comparison to the  previous echocardiogram(s): There are no prior studies on this patient for comparison purposes.        CONCLUSIONS:   1. The left ventricular systolic function is severely decreased, with a Hartmann's biplane calculated ejection fraction of 10%.   2. There is global hypokinesis of the left ventricle with minor regional variations.   3. No left ventricular thrombus visualized.   4. Left ventricular diastolic filling was indeterminate.   5. Unable to determine right ventricular systolic function.   6. Non-Invasive Hemodynamics show Cardiac Output 3.4L/min ; Cardiac Index 1.41 L/Min/M2. Findings are consistent with cardiogenic shock.   7. Findings communicated to ICU team by [employer] Secure Chat with Conversation at 17:00.        Home Medications   Amlodipine 2.5 mg  Vitamin A  Vitamin D    Constitutional: intubated and sedated   CV: RRR, no murmurs noted  Pulm: intubated and sedated, CTAB  GI: abd soft, ND  Skin: warm and dry  Ext: bilateral LE without pitting edema, right groin Impella site intact, Right triple lumen intact, left PA catheter intact  Neuro: intubated and sedated, purposeful movements   Psych: intubated and sedated    Assessment & Plan  43-year-old male with a history of obesity status post gastric bypass surgery 2 years ago, CHARLEEN, HTN transferred for further management of cardiogenic shock s/p Impella placement. Patient cause of cardiac arrest in unclear at this time, however patient has normal coronary arteries and thus less likely ischemic. Non-ischemic causes could potentially be an arrhythmia such as long QT syndrome precipitating polymorphic VT. Myocarditis also possible with normal coronaries. No suggestive findings of obstructive cardiomyopathy on TTE. Negative for PE. CT head negative for intracranial etiology. Patient is now still in cardiogenic shock with lactates downtrending, leukocytosis elevated which could reactive to arrest and resuscitation but infectious workup is warranted.        NEUROLOGIC  #Acute encephalopathy  ::Concern for anoxic brain injury i/s/o cardiac arrest  ::Intuabted and sedated on propofol  ::Reported episthotonos at [facility]  -Video EEG  -Need to wean down propofol to assess mental status  -If not responding when off propofol, consider brain MRI  -Neurology consult   -Continue Keppra (started at [facility])       CARDIOVASCULAR  #Cardiogenic shock  #Cardiac arrest of unclear etiology  ::C/f arrhythmia such as prolonged QT  ::TTE post arrest EF 10%, global hypokinesis  ::Was on amdiodarone on admission  ::PA catheter numbers on admission: CVP 9, PAP 39/20 (27), CO 7.7, CI 3.3, , SVO2 80% on P8 Impella.  ::Repleted electrolytes for Mg 1.6 and K 3.1 (hemolyzed) on admission   -Transition to lidocaine infusion given prolonged QT to take off Amio  -EP consult in AM  -Wean Impella as tolerated  -Wean pressors as tolerated  -PA catheter guidance for afterload reduction  -Monitor QTc prolongation with EKG q2h until more stable  -Optimize electrolytes K > 4 and Mg > 2  -Monitor on telemetry  -Consider cardiac MRI when patient stable  -Continue to trend lactate on ABG   -Monitor for Impella complications (limb ischemia, bleeding, infection, hemolysis)    PULMONARY  #Acute hypoxic respiratory failure  ::Secondary to cardiac arrest  ::CT PE negative  ::CXR not concerning for pneumonia  ::Intubated and sedation of propofol  -Wean sedation as tolerated  -Daily SBT        RENAL/  #ANNIE  ::Likely pre-renal i/s/o cardiogenic shock   ::Making urine  -Continue to monitor  -Optimize volume status via PA catheter  -Avoid nephrotoxic medications  -Renally dose medications     GASTROINTESTINAL  #Elevated liver enzymes  ::Secondary to cardiogenic shock   ::At risk for shock liver   -Continue to monitor     ENDOCRINE  DUSTIN    HEME/ONC  #At risk for hemolysis and DIC  -Monitor with LDH, haptoglobin, fibrinogen     INFECTIOUS DISEASE  #Leukocytosis  ::More likely reactive to cardiac arrest  ::Less  likely infectious etiologies   -Will cover with broad spectrum antibiotics for now given critical illness  -Follow up blood cultures and sputum cultures    MSK/DERM  PT/OT      N: NPO  A: PIV, Right IJ triple lumen, Left PA catheter, Right Impella     DVT ppx: SCD  GI ppx: Pantoprazole 40 IV BID  Oxygen: Mechanical Ventilation   Drips Amio, Lido, Norepi, Epi  Abx Vanc/Zosyn  Code Status: Full Code (confirmed on Admission)  Surrogate Medical Decision-maker: Jazmin Bender (wife) [phone]        Alec Hall MD  Internal Medicine, PGY-3

## 2024-07-11 NOTE — PROGRESS NOTES
Subjective   Currently intubated and sedated. Not responsive to stimulation. He has had no other episodes of polymorphic VT overnight since he was switched to lidocaine.     In the evening, mental status has improved dramatically. Can nod his head appropriately in response to yes or no questions and follow simple and complex commands.     Meds  Scheduled medications  levETIRAcetam, 500 mg, intravenous, q12h  oxygen, , inhalation, Continuous - Inhalation  pantoprazole, 40 mg, intravenous, BID  piperacillin-tazobactam, 3.375 g, intravenous, q6h  polyethylene glycol, 17 g, oral, Daily      Continuous medications  EPINEPHrine, 0-0.2 mcg/kg/min, Last Rate: Stopped (07/11/24 0545)  heparin Impella Purge 50 units/mL in 500 mL D5W, 10 mL/hr, Last Rate: 10 mL/hr (07/11/24 0316)  lidocaine, 1 mg/min, Last Rate: 1 mg/min (07/11/24 1600)  norepinephrine, 0-0.2 mcg/kg/min, Last Rate: Stopped (07/11/24 1015)  propofol, 5-50 mcg/kg/min, Last Rate: 30 mcg/kg/min (07/11/24 1732)      PRN medications  PRN medications: dextrose, dextrose, glucagon, glucagon, vancomycin      Objective   Vital signs in last 24 hours:  Temp:  [34.5 °C (94.1 °F)-37.7 °C (99.9 °F)] 36 °C (96.8 °F)  Heart Rate:  [61-91] 66  Resp:  [14-28] 23  BP: ()/(54-68) 121/68  Arterial Line BP 1: ()/() 119/92  FiO2 (%):  [50 %-70 %] 50 %    Intake/Output this shift:    Intake/Output Summary (Last 24 hours) at 7/11/2024 1739  Last data filed at 7/11/2024 1600  Gross per 24 hour   Intake 3258.78 ml   Output 745 ml   Net 2513.78 ml       Physical  General: Patient is sedated, non-toxic appearing, normal body habitus  Pulm: Normal WOB at rest, no crackles or rhonchi  Cardiac: Regular rate and rhythm, normal S1/S2  Abdomen: Non-tender to palpation, non-distended  Extremities: No peripheral edema  Neuro: Pinpoint pupils, not responsive to voice or physical stimulation    Results  Results for orders placed or performed during the hospital encounter of  07/10/24 (from the past 24 hour(s))   Blood Gas Arterial Full Panel   Result Value Ref Range    POCT pH, Arterial 7.20 (LL) 7.38 - 7.42 pH    POCT pCO2, Arterial 45 (H) 38 - 42 mm Hg    POCT pO2, Arterial 114 (H) 85 - 95 mm Hg    POCT SO2, Arterial 96 94 - 100 %    POCT Oxy Hemoglobin, Arterial 96.0 94.0 - 98.0 %    POCT Hematocrit Calculated, Arterial 46.0 41.0 - 52.0 %    POCT Sodium, Arterial 136 136 - 145 mmol/L    POCT Potassium, Arterial 3.1 (L) 3.5 - 5.3 mmol/L    POCT Chloride, Arterial 102 98 - 107 mmol/L    POCT Ionized Calcium, Arterial 1.13 1.10 - 1.33 mmol/L    POCT Glucose, Arterial 267 (H) 74 - 99 mg/dL    POCT Lactate, Arterial 7.3 (HH) 0.4 - 2.0 mmol/L    POCT Base Excess, Arterial -10.3 (L) -2.0 - 3.0 mmol/L    POCT HCO3 Calculated, Arterial 17.6 (L) 22.0 - 26.0 mmol/L    POCT Hemoglobin, Arterial 15.3 13.5 - 17.5 g/dL    POCT Anion Gap, Arterial 20 10 - 25 mmo/L    Patient Temperature 37.0 degrees Celsius    FiO2 100 %   POCT GLUCOSE   Result Value Ref Range    POCT Glucose 184 (H) 74 - 99 mg/dL   CBC and Auto Differential   Result Value Ref Range    WBC 27.3 (H) 4.4 - 11.3 x10*3/uL    nRBC 0.0 0.0 - 0.0 /100 WBCs    RBC 5.14 4.50 - 5.90 x10*6/uL    Hemoglobin 15.3 13.5 - 17.5 g/dL    Hematocrit 45.1 41.0 - 52.0 %    MCV 88 80 - 100 fL    MCH 29.8 26.0 - 34.0 pg    MCHC 33.9 32.0 - 36.0 g/dL    RDW 13.9 11.5 - 14.5 %    Platelets 295 150 - 450 x10*3/uL    Neutrophils % 90.1 40.0 - 80.0 %    Immature Granulocytes %, Automated 0.5 0.0 - 0.9 %    Lymphocytes % 4.4 13.0 - 44.0 %    Monocytes % 4.7 2.0 - 10.0 %    Eosinophils % 0.0 0.0 - 6.0 %    Basophils % 0.3 0.0 - 2.0 %    Neutrophils Absolute 24.63 (H) 1.20 - 7.70 x10*3/uL    Immature Granulocytes Absolute, Automated 0.14 0.00 - 0.70 x10*3/uL    Lymphocytes Absolute 1.19 (L) 1.20 - 4.80 x10*3/uL    Monocytes Absolute 1.29 (H) 0.10 - 1.00 x10*3/uL    Eosinophils Absolute 0.00 0.00 - 0.70 x10*3/uL    Basophils Absolute 0.07 0.00 - 0.10 x10*3/uL    Comprehensive metabolic panel   Result Value Ref Range    Glucose 243 (H) 74 - 99 mg/dL    Sodium 140 136 - 145 mmol/L    Potassium 3.1 (L) 3.5 - 5.3 mmol/L    Chloride 106 98 - 107 mmol/L    Bicarbonate 17 (L) 21 - 32 mmol/L    Anion Gap 20 10 - 20 mmol/L    Urea Nitrogen 28 (H) 6 - 23 mg/dL    Creatinine 2.09 (H) 0.50 - 1.30 mg/dL    eGFR 39 (L) >60 mL/min/1.73m*2    Calcium 7.9 (L) 8.6 - 10.6 mg/dL    Albumin 3.4 3.4 - 5.0 g/dL    Alkaline Phosphatase 71 33 - 120 U/L    Total Protein 5.5 (L) 6.4 - 8.2 g/dL     (H) 9 - 39 U/L    Bilirubin, Total 2.0 (H) 0.0 - 1.2 mg/dL     (H) 10 - 52 U/L   Magnesium   Result Value Ref Range    Magnesium 1.64 1.60 - 2.40 mg/dL   Lactate   Result Value Ref Range    Lactate 7.1 (HH) 0.4 - 2.0 mmol/L   BLOOD GAS MIXED VENOUS FULL PANEL   Result Value Ref Range    POCT pH, Mixed 7.16 (LL) 7.33 - 7.43 pH    POCT pCO2, Mixed 52 (H) 41 - 51 mm Hg    POCT pO2, Mixed 49 (H) 35 - 45 mm Hg    POCT SO2, Mixed 80 (H) 45 - 75 %    POCT Oxy Hemoglobin, Mixed 79.7 (H) 45.0 - 75.0 %    POCT Hematocrit Calculated, Mixed 45.0 41.0 - 52.0 %    POCT Sodium, Mixed 138 136 - 145 mmol/L    POCT Potassium, Mixed 3.1 (L) 3.5 - 5.3 mmol/L    POCT Chloride, Mixed 101 98 - 107 mmol/L    POCT Ionized Calcium, Mixed 1.14 1.10 - 1.33 mmol/L    POCT Glucose, Mixed 268 (H) 74 - 99 mg/dL    POCT Lactate, Mixed 6.9 (HH) 0.4 - 2.0 mmol/L    POCT Base Excess, Mixed -10.5 (L) -2.0 - 3.0 mmol/L    POCT HCO3 Calculated, Mixed 18.5 (L) 22.0 - 26.0 mmol/L    POCT Hemoglobin, Mixed 15.1 13.5 - 17.5 g/dL    POCT Anion Gap, Mixed 22 10 - 25 mmo/L    Patient Temperature 37.0 degrees Celsius    FiO2 100 %   Lactate Dehydrogenase   Result Value Ref Range     (H) 84 - 246 U/L   Haptoglobin   Result Value Ref Range    Haptoglobin <10 (L) 37 - 246 mg/dL   Coagulation Screen   Result Value Ref Range    Protime 16.4 (H) 9.8 - 12.8 seconds    INR 1.5 (H) 0.9 - 1.1    aPTT 88 (H) 27 - 38 seconds   ACTIVATED  CLOTTING TIME LOW   Result Value Ref Range    POCT Activated Clotting Time Low Range 184 83 - 199 sec   Blood Culture    Specimen: Arterial Line; Blood culture   Result Value Ref Range    Blood Culture Loaded on Instrument - Culture in progress    Blood Culture    Specimen: Peripheral Venipuncture; Blood culture   Result Value Ref Range    Blood Culture Loaded on Instrument - Culture in progress    ECG 12 lead   Result Value Ref Range    Ventricular Rate 60 BPM    Atrial Rate 60 BPM    DE Interval 134 ms    QRS Duration 104 ms    QT Interval 394 ms    QTC Calculation(Bazett) 394 ms    P Axis 27 degrees    R Axis 54 degrees    T Axis 21 degrees    QRS Count 10 beats    Q Onset 216 ms    P Onset 149 ms    P Offset 207 ms    T Offset 413 ms    QTC Fredericia 394 ms   Respiratory Culture/Smear    Specimen: SPUTUM; Fluid   Result Value Ref Range    Respiratory Culture/Smear See stain comment     Gram Stain (A)      Gram stain indicates specimen contains significant salivary contamination.    Gram Stain (A)      A specimen of better quality should be submitted if clinically indicated.   ECG 12 lead   Result Value Ref Range    Ventricular Rate 64 BPM    Atrial Rate 64 BPM    DE Interval 134 ms    QRS Duration 98 ms    QT Interval 498 ms    QTC Calculation(Bazett) 513 ms    P Axis 32 degrees    R Axis 52 degrees    T Axis 26 degrees    QRS Count 10 beats    Q Onset 216 ms    P Onset 149 ms    P Offset 208 ms    T Offset 465 ms    QTC Fredericia 508 ms   MRSA Surveillance for Vancomycin De-escalation, PCR    Specimen: Anterior Nares; Swab   Result Value Ref Range    MRSA PCR Not Detected Not Detected   ECG 12 lead   Result Value Ref Range    Ventricular Rate 62 BPM    Atrial Rate 62 BPM    DE Interval 134 ms    QRS Duration 102 ms    QT Interval 424 ms    QTC Calculation(Bazett) 430 ms    P Axis 32 degrees    R Axis 52 degrees    T Axis 18 degrees    QRS Count 10 beats    Q Onset 216 ms    P Onset 149 ms    P Offset 205 ms     T Offset 428 ms    QTC Fredericia 428 ms   Blood Gas Arterial Full Panel   Result Value Ref Range    POCT pH, Arterial 7.23 (LL) 7.38 - 7.42 pH    POCT pCO2, Arterial 44 (H) 38 - 42 mm Hg    POCT pO2, Arterial 116 (H) 85 - 95 mm Hg    POCT SO2, Arterial 96 94 - 100 %    POCT Oxy Hemoglobin, Arterial 95.7 94.0 - 98.0 %    POCT Hematocrit Calculated, Arterial 44.0 41.0 - 52.0 %    POCT Sodium, Arterial 136 136 - 145 mmol/L    POCT Potassium, Arterial 3.9 3.5 - 5.3 mmol/L    POCT Chloride, Arterial 103 98 - 107 mmol/L    POCT Ionized Calcium, Arterial 1.12 1.10 - 1.33 mmol/L    POCT Glucose, Arterial 228 (H) 74 - 99 mg/dL    POCT Lactate, Arterial 5.0 (HH) 0.4 - 2.0 mmol/L    POCT Base Excess, Arterial -9.0 (L) -2.0 - 3.0 mmol/L    POCT HCO3 Calculated, Arterial 18.4 (L) 22.0 - 26.0 mmol/L    POCT Hemoglobin, Arterial 14.8 13.5 - 17.5 g/dL    POCT Anion Gap, Arterial 19 10 - 25 mmo/L    Patient Temperature 37.0 degrees Celsius    FiO2 70 %   ACTIVATED CLOTTING TIME LOW   Result Value Ref Range    POCT Activated Clotting Time Low Range 158 83 - 199 sec   BLOOD GAS MIXED VENOUS FULL PANEL   Result Value Ref Range    POCT pH, Mixed 7.20 (LL) 7.33 - 7.43 pH    POCT pCO2, Mixed 49 41 - 51 mm Hg    POCT pO2, Mixed 46 (H) 35 - 45 mm Hg    POCT SO2, Mixed 78 (H) 45 - 75 %    POCT Oxy Hemoglobin, Mixed 77.4 (H) 45.0 - 75.0 %    POCT Hematocrit Calculated, Mixed 42.0 41.0 - 52.0 %    POCT Sodium, Mixed 134 (L) 136 - 145 mmol/L    POCT Potassium, Mixed 4.2 3.5 - 5.3 mmol/L    POCT Chloride, Mixed 103 98 - 107 mmol/L    POCT Ionized Calcium, Mixed 1.12 1.10 - 1.33 mmol/L    POCT Glucose, Mixed 201 (H) 74 - 99 mg/dL    POCT Lactate, Mixed 4.7 (HH) 0.4 - 2.0 mmol/L    POCT Base Excess, Mixed -9.0 (L) -2.0 - 3.0 mmol/L    POCT HCO3 Calculated, Mixed 19.2 (L) 22.0 - 26.0 mmol/L    POCT Hemoglobin, Mixed 14.0 13.5 - 17.5 g/dL    POCT Anion Gap, Mixed 16 10 - 25 mmo/L    Patient Temperature 37.0 degrees Celsius    FiO2 50 %   ECG 12  lead   Result Value Ref Range    Ventricular Rate 65 BPM    Atrial Rate 65 BPM    VA Interval 130 ms    QRS Duration 98 ms    QT Interval 410 ms    QTC Calculation(Bazett) 426 ms    P Axis 28 degrees    R Axis 50 degrees    T Axis 39 degrees    QRS Count 11 beats    Q Onset 217 ms    P Onset 152 ms    P Offset 207 ms    T Offset 422 ms    QTC Fredericia 420 ms   ACTIVATED CLOTTING TIME LOW   Result Value Ref Range    POCT Activated Clotting Time Low Range 161 83 - 199 sec   ECG 12 lead   Result Value Ref Range    Ventricular Rate 65 BPM    Atrial Rate 65 BPM    VA Interval 128 ms    QRS Duration 98 ms    QT Interval 486 ms    QTC Calculation(Bazett) 505 ms    P Axis 33 degrees    R Axis 43 degrees    T Axis 28 degrees    QRS Count 11 beats    Q Onset 217 ms    P Onset 153 ms    P Offset 206 ms    T Offset 460 ms    QTC Fredericia 498 ms   Renal function panel   Result Value Ref Range    Glucose 231 (H) 74 - 99 mg/dL    Sodium 136 136 - 145 mmol/L    Potassium 4.3 3.5 - 5.3 mmol/L    Chloride 105 98 - 107 mmol/L    Bicarbonate 15 (L) 21 - 32 mmol/L    Anion Gap 20 10 - 20 mmol/L    Urea Nitrogen 31 (H) 6 - 23 mg/dL    Creatinine 2.45 (H) 0.50 - 1.30 mg/dL    eGFR 32 (L) >60 mL/min/1.73m*2    Calcium 7.7 (L) 8.6 - 10.6 mg/dL    Phosphorus 3.4 2.5 - 4.9 mg/dL    Albumin 3.2 (L) 3.4 - 5.0 g/dL   Magnesium   Result Value Ref Range    Magnesium 2.36 1.60 - 2.40 mg/dL   Blood Gas Arterial Full Panel   Result Value Ref Range    POCT pH, Arterial 7.24 (LL) 7.38 - 7.42 pH    POCT pCO2, Arterial 41 38 - 42 mm Hg    POCT pO2, Arterial 105 (H) 85 - 95 mm Hg    POCT SO2, Arterial 96 94 - 100 %    POCT Oxy Hemoglobin, Arterial 95.7 94.0 - 98.0 %    POCT Hematocrit Calculated, Arterial 42.0 41.0 - 52.0 %    POCT Sodium, Arterial 131 (L) 136 - 145 mmol/L    POCT Potassium, Arterial 4.2 3.5 - 5.3 mmol/L    POCT Chloride, Arterial 102 98 - 107 mmol/L    POCT Ionized Calcium, Arterial 1.15 1.10 - 1.33 mmol/L    POCT Glucose, Arterial  246 (H) 74 - 99 mg/dL    POCT Lactate, Arterial 3.9 (H) 0.4 - 2.0 mmol/L    POCT Base Excess, Arterial -9.4 (L) -2.0 - 3.0 mmol/L    POCT HCO3 Calculated, Arterial 17.6 (L) 22.0 - 26.0 mmol/L    POCT Hemoglobin, Arterial 14.1 13.5 - 17.5 g/dL    POCT Anion Gap, Arterial 16 10 - 25 mmo/L    Patient Temperature 37.0 degrees Celsius    FiO2 50 %   CBC and Auto Differential   Result Value Ref Range    WBC 22.2 (H) 4.4 - 11.3 x10*3/uL    nRBC 0.0 0.0 - 0.0 /100 WBCs    RBC 4.71 4.50 - 5.90 x10*6/uL    Hemoglobin 14.0 13.5 - 17.5 g/dL    Hematocrit 39.9 (L) 41.0 - 52.0 %    MCV 85 80 - 100 fL    MCH 29.7 26.0 - 34.0 pg    MCHC 35.1 32.0 - 36.0 g/dL    RDW 13.6 11.5 - 14.5 %    Platelets 229 150 - 450 x10*3/uL    Neutrophils % 87.7 40.0 - 80.0 %    Immature Granulocytes %, Automated 0.7 0.0 - 0.9 %    Lymphocytes % 7.1 13.0 - 44.0 %    Monocytes % 4.3 2.0 - 10.0 %    Eosinophils % 0.0 0.0 - 6.0 %    Basophils % 0.2 0.0 - 2.0 %    Neutrophils Absolute 19.46 (H) 1.20 - 7.70 x10*3/uL    Immature Granulocytes Absolute, Automated 0.16 0.00 - 0.70 x10*3/uL    Lymphocytes Absolute 1.57 1.20 - 4.80 x10*3/uL    Monocytes Absolute 0.96 0.10 - 1.00 x10*3/uL    Eosinophils Absolute 0.00 0.00 - 0.70 x10*3/uL    Basophils Absolute 0.05 0.00 - 0.10 x10*3/uL   Lactate Dehydrogenase   Result Value Ref Range     (H) 84 - 246 U/L   Lactate   Result Value Ref Range    Lactate 3.8 (H) 0.4 - 2.0 mmol/L   Coagulation Screen   Result Value Ref Range    Protime 15.7 (H) 9.8 - 12.8 seconds    INR 1.4 (H) 0.9 - 1.1    aPTT 37 27 - 38 seconds   Lidocaine   Result Value Ref Range    Lidocaine  1.3 1.0 - 5.0 ug/mL   ACTIVATED CLOTTING TIME LOW   Result Value Ref Range    POCT Activated Clotting Time Low Range 157 83 - 199 sec   ACTIVATED CLOTTING TIME LOW   Result Value Ref Range    POCT Activated Clotting Time Low Range 171 83 - 199 sec   ECG 12 lead   Result Value Ref Range    Ventricular Rate 77 BPM    Atrial Rate 77 BPM    HI Interval 126 ms     QRS Duration 94 ms    QT Interval 382 ms    QTC Calculation(Bazett) 432 ms    P Axis 30 degrees    R Axis 37 degrees    T Axis 18 degrees    QRS Count 13 beats    Q Onset 217 ms    P Onset 154 ms    P Offset 207 ms    T Offset 408 ms    QTC Fredericia 415 ms   ECG 12 lead   Result Value Ref Range    Ventricular Rate 75 BPM    Atrial Rate 75 BPM    AR Interval 122 ms    QRS Duration 94 ms    QT Interval 370 ms    QTC Calculation(Bazett) 413 ms    P Axis 22 degrees    R Axis 37 degrees    T Axis 22 degrees    QRS Count 12 beats    Q Onset 217 ms    P Onset 156 ms    P Offset 209 ms    T Offset 402 ms    QTC Fredericia 398 ms   ACTIVATED CLOTTING TIME LOW   Result Value Ref Range    POCT Activated Clotting Time Low Range 165 83 - 199 sec   POCT GLUCOSE   Result Value Ref Range    POCT Glucose 86 74 - 99 mg/dL   ECG 12 lead   Result Value Ref Range    Ventricular Rate 71 BPM    Atrial Rate 71 BPM    AR Interval 128 ms    QRS Duration 92 ms    QT Interval 402 ms    QTC Calculation(Bazett) 436 ms    P Axis 23 degrees    R Axis 42 degrees    T Axis 42 degrees    QRS Count 11 beats    Q Onset 218 ms    P Onset 154 ms    P Offset 209 ms    T Offset 419 ms    QTC Fredericia 425 ms   ECG 12 lead   Result Value Ref Range    Ventricular Rate 68 BPM    Atrial Rate 68 BPM    AR Interval 150 ms    QRS Duration 96 ms    QT Interval 370 ms    QTC Calculation(Bazett) 393 ms    P Axis 54 degrees    R Axis 57 degrees    T Axis 3 degrees    QRS Count 11 beats    Q Onset 218 ms    P Onset 143 ms    P Offset 210 ms    T Offset 403 ms    QTC Fredericia 386 ms   Blood Gas Arterial Full Panel   Result Value Ref Range    POCT pH, Arterial 7.31 (L) 7.38 - 7.42 pH    POCT pCO2, Arterial 40 38 - 42 mm Hg    POCT pO2, Arterial 121 (H) 85 - 95 mm Hg    POCT SO2, Arterial 96 94 - 100 %    POCT Oxy Hemoglobin, Arterial 95.9 94.0 - 98.0 %    POCT Hematocrit Calculated, Arterial 43.0 41.0 - 52.0 %    POCT Sodium, Arterial 130 (L) 136 - 145 mmol/L     POCT Potassium, Arterial 5.2 3.5 - 5.3 mmol/L    POCT Chloride, Arterial 103 98 - 107 mmol/L    POCT Ionized Calcium, Arterial 1.13 1.10 - 1.33 mmol/L    POCT Glucose, Arterial 72 (L) 74 - 99 mg/dL    POCT Lactate, Arterial 0.8 0.4 - 2.0 mmol/L    POCT Base Excess, Arterial -5.8 (L) -2.0 - 3.0 mmol/L    POCT HCO3 Calculated, Arterial 20.1 (L) 22.0 - 26.0 mmol/L    POCT Hemoglobin, Arterial 14.2 13.5 - 17.5 g/dL    POCT Anion Gap, Arterial 12 10 - 25 mmo/L    Patient Temperature 37.0 degrees Celsius    FiO2 50 %   BLOOD GAS MIXED VENOUS FULL PANEL   Result Value Ref Range    POCT pH, Mixed 7.28 (L) 7.33 - 7.43 pH    POCT pCO2, Mixed 44 41 - 51 mm Hg    POCT pO2, Mixed 36 35 - 45 mm Hg    POCT SO2, Mixed 65 45 - 75 %    POCT Oxy Hemoglobin, Mixed 64.7 45.0 - 75.0 %    POCT Hematocrit Calculated, Mixed 42.0 41.0 - 52.0 %    POCT Sodium, Mixed 132 (L) 136 - 145 mmol/L    POCT Potassium, Mixed 5.3 3.5 - 5.3 mmol/L    POCT Chloride, Mixed 101 98 - 107 mmol/L    POCT Ionized Calcium, Mixed 1.11 1.10 - 1.33 mmol/L    POCT Glucose, Mixed 71 (L) 74 - 99 mg/dL    POCT Lactate, Mixed 0.8 0.4 - 2.0 mmol/L    POCT Base Excess, Mixed -6.0 (L) -2.0 - 3.0 mmol/L    POCT HCO3 Calculated, Mixed 20.7 (L) 22.0 - 26.0 mmol/L    POCT Hemoglobin, Mixed 14.0 13.5 - 17.5 g/dL    POCT Anion Gap, Mixed 16 10 - 25 mmo/L    Patient Temperature 37.0 degrees Celsius    FiO2 40 %   Renal function panel   Result Value Ref Range    Glucose 77 74 - 99 mg/dL    Sodium 134 (L) 136 - 145 mmol/L    Potassium 5.2 3.5 - 5.3 mmol/L    Chloride 105 98 - 107 mmol/L    Bicarbonate 20 (L) 21 - 32 mmol/L    Anion Gap 14 10 - 20 mmol/L    Urea Nitrogen 39 (H) 6 - 23 mg/dL    Creatinine 3.00 (H) 0.50 - 1.30 mg/dL    eGFR 25 (L) >60 mL/min/1.73m*2    Calcium 7.9 (L) 8.6 - 10.6 mg/dL    Phosphorus 4.1 2.5 - 4.9 mg/dL    Albumin 3.3 (L) 3.4 - 5.0 g/dL   Magnesium   Result Value Ref Range    Magnesium 2.30 1.60 - 2.40 mg/dL   ACTIVATED CLOTTING TIME LOW   Result Value  Ref Range    POCT Activated Clotting Time Low Range 182 83 - 199 sec   POCT GLUCOSE   Result Value Ref Range    POCT Glucose 61 (L) 74 - 99 mg/dL   POCT GLUCOSE   Result Value Ref Range    POCT Glucose 92 74 - 99 mg/dL   ACTIVATED CLOTTING TIME LOW   Result Value Ref Range    POCT Activated Clotting Time Low Range 176 83 - 199 sec       Imaging  XR chest 1 view    Result Date: 7/11/2024  Interpreted By:  Pavan Leggett  and Maria Phillips STUDY: XR CHEST 1 VIEW;  7/10/2024 11:41 pm   INDICATION: Signs/Symptoms:post intubation and Impella.   COMPARISON: Chest radiograph 07/10/2024 5:31 p.m.   ACCESSION NUMBER(S): [id number]   ORDERING CLINICIAN: ANTOINE SINGER   FINDINGS: AP radiograph of the chest was provided.   Endotracheal tube with tip approximately 3.8 cm above the van. Enteric tube is visualized coursing beneath the diaphragm with the tip beyond the field of view. Right IJ central venous catheter with tip overlying the mid SVC. Right IJ Lund-Monty catheter with the tip overlying the main pulmonary artery. Impella device is visualized.   CARDIOMEDIASTINAL SILHOUETTE: Cardiomediastinal silhouette is enlarged but stable in size and configuration.   LUNGS: Prominent perihilar and interstitial lung markings. Patchy airspace opacities bilaterally. No pleural effusions or pneumothorax.   ABDOMEN: No remarkable upper abdominal findings.   BONES: No acute osseous changes.       1.  Prominent perihilar and interstitial lung markings with patchy airspace opacities bilaterally. Findings consistent with pulmonary edema. An infectious process can have a similar appearance. 2. Cardiomegaly versus pericardial effusion. 3. Medical devices/lines as noted above.   I personally reviewed the images/study and I agree with the findings as stated by Alan Sifuentes MD. This study was interpreted at [place], [place].   MACRO: None.   Signed by: Pavan Leggett 7/11/2024 12:09 PM  Dictation workstation:   [id number]    ECG 12 lead    Result Date: 7/11/2024  Normal sinus rhythm Normal ECG When compared with ECG of 10-JUL-2024 23:54, No significant change was found    ECG 12 lead    Result Date: 7/11/2024  Normal sinus rhythm Septal infarct , age undetermined Abnormal ECG No previous ECGs available    ECG 12 lead    Result Date: 7/11/2024  Normal sinus rhythm Prolonged QT Abnormal ECG When compared with ECG of 11-JUL-2024 00:16, QT has lengthened    ECG 12 lead    Result Date: 7/11/2024  Normal sinus rhythm Normal ECG When compared with ECG of 10-JUL-2024 23:55, No significant change was found    ECG 12 lead    Result Date: 7/11/2024  Normal sinus rhythm Nonspecific T wave abnormality Abnormal ECG When compared with ECG of 11-JUL-2024 00:59, QT has shortened    ECG 12 lead    Result Date: 7/11/2024  Normal sinus rhythm Prolonged QT Abnormal ECG When compared with ECG of 11-JUL-2024 03:41, QT has lengthened    ECG 12 lead    Result Date: 7/11/2024  Normal sinus rhythm Normal ECG When compared with ECG of 11-JUL-2024 04:13, QT has shortened    ECG 12 lead    Result Date: 7/11/2024  Normal sinus rhythm Nonspecific T wave abnormality Abnormal ECG When compared with ECG of 11-JUL-2024 06:14, No significant change was found    ECG 12 lead    Result Date: 7/11/2024  Normal sinus rhythm Normal ECG When compared with ECG of 11-JUL-2024 06:25, No significant change was found    Cardiac Catheterization Procedure    Result Date: 7/11/2024   [facility], Cath Lab, [address] [place] [address] Cardiovascular Catheterization Report Patient Name:      YAMIL FELICIANO         Performing Physician:  [id number] Bhavya Restrepo MD Study Date:        7/10/2024             Verifying Physician:   [id number] Restrepo MD MRN/PID:           [id number]               Cardiologist/Co-Scrub: Accession#:        [id number]          Ordering Provider:     [id number] ANTOINE SESAY                                                                 [place] Date of Birth/Age: 1980 / 43 years Cardiologist: Gender:            M                     Fellow:                [id number] Ervin Fernandez MD Encounter#:        [id number]            Surgeon:  Study:            Left Heart Cath Additional Study: Right Heart Cath Additional Study: Impella Insertion  Indications: YAMIL FELICIANO is a 44 year old male who presents with hypertension. Cardiomyopathy, resuscitated cardiac arrest and left ventricular dysfunction. Cardiomyopathy. Cardiogenic shock. Cardiac arrest. Medical History: Cardiac arrest: Yes. Patient unresponsive following cardiac arrest. Cardiac surgical consult: No. Cardiovascular instability: Yes. Cardiogenic shock.  Procedure Description: After infiltration with 2% Lidocaine, the right femoral artery was cannulated with a modified Seldinger technique. Subsequently a 7 British sheath was placed antegrade in the right femoral artery. The arterial sheath was sized up to 14. Selective coronary catheterization was performed using a 5 Fr catheter(s) exchanged over a guide wire to cannulate the coronary arteries. Additional catheter(s) used to visualize the coronary arteries were: pigtail. Multiple injections of contrast were made into the left and right coronary arteries with angiograms recorded in multiple projections. A balloon tipped catheter was advanced through the right heart to record pressures. Cardiac output was calculated via the Chase method and measured via thermodilution. After completion of the procedure, femoral artery angiography was performed. This demonstrated a common femoral artery puncture appropriate for closure. A Perclose ProGlide 6F (Abbott) vascular closure device was placed per protocol.  Procedure Description  Comments:  Mr Miguel A Bender is a 44 year old man with a history of hypertension, morbid obesity s/p gastric bypass with ~100Ib weight loss who had two witnessed out of hospital cardiac arrest, bystander CPR. Unclear down time. TTE shoiwed LVEF ~ 10-15%, global hypokinesis. He had worsening hemnodynamics and rising lactate in the ICU at [facility]. Accordingly he is here for ascertainmenet of hemodynamics, coronary angiograpohy and mechanical circulatory support. He was currently on norepinephrine gtt at 0.07mcg/kg/min and epinephrine at 0.1mcg/kg/min. Left internal jugular vein access was obtained and a 9F sheath placed. Jackson gracie catheter was inserted and secured at 49cm. RHC showed RA 5 PA 42/11 mean 21 PCWP 8 CO/CI (Chase) 4.5/1.9 on epi and norepi PA sat 59 Intravcenous normal saline was started RCFA access was obtained and 5F sheath placed. Coronary angiography was performed with 4F JR4 and 5F JL3.5. This showed no angiographic evidence of coronary artery disease. LVEDP 10mmHg. There ws no gradient on pullback. After IVF 1L, ABG demonstrated serum lactate of 7, given these we proceeded with Impella. The RCFA site was preclosed x 2 and 14F speel away sheath placed. A 5F pigtail was used to cross the aortic valve. The Impella CP was then inserted using standard techniques and secured 80cm at the hub after funtion and position was optimized. With this the PA sat increased to 67, the epinephrine was weaned to 0.05 and norepinephrine to 0.03 All catheters and wires were removed. The patient was to be transferred to [facility] Cardiac Intensive care unit.  Impella: There is good positioning of the Impella CP via the right femoral artery and the total flow is 3.2 L/min.  Coronary Angiography: The coronary circulation is co-dominant.  Left Main Coronary Artery: The left main coronary artery is a normal caliber vessel. The left main arises normally from the left coronary sinus of Valsalva. The left main  coronary artery showed a normal vessel.  Left Anterior Descending Coronary Artery Distribution: The left anterior descending coronary artery is a normal caliber vessel. The LAD arises normally from the left main coronary artery. The LAD demonstrated a normal vessel. The 1st diagonal branch is a normal caliber vessel.  Circumflex Coronary Artery Distribution: The circumflex coronary artery is a normal caliber vessel. The circumflex arises normally from the left main coronary artery. The circumflex revealed no significant disease or stenosis greater than 30%. The 1st obtuse marginal branch is a medium-sized caliber vessel. The 1st obtuse marginal branch showed no significant disease or stenosis greater than 30%. The left posterolateral branch is a medium-sized caliber vessel. The left posterolateral branch showed no significant disease or stenosis greater than 30%.  Right Heart Catheterization: A balloon tipped catheter was advanced through the right heart to record pressures. Cardiac output was calculated via the Chase method and measured via thermodilution. Normal filling pressures. Cardiac output is mildly decreased. Reduced cardiac output at rest. Elevated pulmonary vascular resistance. RHC- RA - mean 7mmHg RV- 37/ RVEDP 8 PA 42/11, mean 21mmHg PCWP- mean 8, v 15 CO/ CI (thermodilution) ~11.32/ 1.91, Chase - 4.45 CO/CI- Chase 4.87/ 1.91 Hgb 16 With rising lactate to 7, the thermodilution output was deemed inaccurate.  Right Coronary Artery Distribution: The right coronary artery is a normal caliber vessel. The RCA arises normally from the right sinus of Valsalva. The RCA showed no significant disease or stenosis greater than 30%. The acute marginal branch is a medium-sized caliber vessel. The acute marginal branch showed no significant disease or stenosis greater than 30%. The right posterolateral branch is a medium-sized caliber vessel. The right posterolateral branch showed no significant disease or stenosis  greater than 30%.  Hemo Personnel: +-------------------+---------+ Name               Duty      +-------------------+---------+ Bhavya Restrepo MD 1 +-------------------+---------+  Hemodynamic Pressures:  +----+----------+---------+-------------+-------------+---+----+-------+-------+ SiteDate Time   Phase  Systolic mmHg  Diastolic  ED MeanA-Wave V-Wave                  Name                    mmHg     mmHmmHg mmHg   mmHg                                                     g                    +----+----------+---------+-------------+-------------+---+----+-------+-------+   AO 7/10/2024 [facility] REST            2            0      1                   6:29:27 PM                                                         +----+----------+---------+-------------+-------------+---+----+-------+-------+  art 7/10/2024 [facility] REST          127           54     69                   6:29:27 PM                                                         +----+----------+---------+-------------+-------------+---+----+-------+-------+   RA 7/10/2024 [facility] REST                                5      9      7     6:38:38 PM                                                         +----+----------+---------+-------------+-------------+---+----+-------+-------+  art 7/10/2024 [facility] REST          125           60     77                   6:38:38 PM                                                         +----+----------+---------+-------------+-------------+---+----+-------+-------+   RV 7/10/2024 [facility]           37            0  8                       6:38:58 PM                                                         +----+----------+---------+-------------+-------------+---+----+-------+-------+  art 7/10/2024 [facility] REST          123           57     74                   6:38:58 PM                                                          +----+----------+---------+-------------+-------------+---+----+-------+-------+   PA 7/10/2024 [facility]           42           11     21                   6:39:35 PM                                                         +----+----------+---------+-------------+-------------+---+----+-------+-------+  art 7/10/2024 [facility]          118           55     71                   6:39:35 PM                                                         +----+----------+---------+-------------+-------------+---+----+-------+-------+   PW 7/10/2024 [facility]                                8     13     15     6:39:52 PM                                                         +----+----------+---------+-------------+-------------+---+----+-------+-------+  art 7/10/2024 [facility]          122           57     74                   6:39:52 PM                                                         +----+----------+---------+-------------+-------------+---+----+-------+-------+   PA 7/10/2024 [facility]           39            2     24                   6:50:12 PM                                                         +----+----------+---------+-------------+-------------+---+----+-------+-------+  art 7/10/2024 [facility]          112           54     69                   6:50:12 PM                                                         +----+----------+---------+-------------+-------------+---+----+-------+-------+   AO 7/10/2024 [facility]           95           62     75                   7:00:51 PM                                                         +----+----------+---------+-------------+-------------+---+----+-------+-------+  Art 7/10/2024 [facility]          121           60     79                   7:00:51 PM                                                          +----+----------+---------+-------------+-------------+---+----+-------+-------+   LV 7/10/2024 [facility]          119           -1 14                       7:07:51 PM                                                         +----+----------+---------+-------------+-------------+---+----+-------+-------+  Art 7/10/2024 [facility]            0           67     84                   7:07:51 PM                                                         +----+----------+---------+-------------+-------------+---+----+-------+-------+   LV 7/10/2024 [facility]          116           -2 10                       7:08:03 PM                                                         +----+----------+---------+-------------+-------------+---+----+-------+-------+  Art 7/10/2024 [facility]           60           46     53                   7:08:03 PM                                                         +----+----------+---------+-------------+-------------+---+----+-------+-------+   LV 7/10/2024 [facility]          115           -4  8                       7:08:38 PM                                                         +----+----------+---------+-------------+-------------+---+----+-------+-------+  Art 7/10/2024 [facility]          133           61     82                   7:08:38 PM                                                         +----+----------+---------+-------------+-------------+---+----+-------+-------+   LV 7/10/2024 [facility]          118           -4 11                       7:08:59 PM                                                         +----+----------+---------+-------------+-------------+---+----+-------+-------+  Art 7/10/2024 [facility]          134           61     82                   7:08:59 PM                                                          +----+----------+---------+-------------+-------------+---+----+-------+-------+   LV 7/10/2024 [facility]          114           -5 11                       7:09:11 PM                                                         +----+----------+---------+-------------+-------------+---+----+-------+-------+  Art 7/10/2024 [facility]          129           61     81                   7:09:11 PM                                                         +----+----------+---------+-------------+-------------+---+----+-------+-------+   LV 7/10/2024 [facility]          116           -1 10                       7:11:37 PM                                                         +----+----------+---------+-------------+-------------+---+----+-------+-------+  Art 7/10/2024 [facility]          130           63     84                   7:11:37 PM                                                         +----+----------+---------+-------------+-------------+---+----+-------+-------+  LVp 7/10/2024 [facility]          108            9 26                       7:11:50 PM                                                         +----+----------+---------+-------------+-------------+---+----+-------+-------+  Art 7/10/2024 [facility]          129           64     83                   7:11:50 PM                                                         +----+----------+---------+-------------+-------------+---+----+-------+-------+  AOp 7/10/2024 [facility] REST          113           58     78                   7:11:58 PM                                                         +----+----------+---------+-------------+-------------+---+----+-------+-------+  Art 7/10/2024 [facility]          125           60     79                   7:11:58 PM                                                          +----+----------+---------+-------------+-------------+---+----+-------+-------+  AOp 7/10/2024 [facility] REST          112           55     77                   7:12:01 PM                                                         +----+----------+---------+-------------+-------------+---+----+-------+-------+  Art 7/10/2024 [facility]          127           60     78                   7:12:01 PM                                                         +----+----------+---------+-------------+-------------+---+----+-------+-------+  Oxygen Saturation %: +-----------+----------+------------+ Sample SiteO2 Sat (%)HB (g/100ml) +-----------+----------+------------+          FA        91        16.0 +-----------+----------+------------+          PA        67        16.0 +-----------+----------+------------+          FA        91        16.0 +-----------+----------+------------+          PA        67        16.0 +-----------+----------+------------+  Cardiac Outputs: +----------------+---------------+--------+--------------+-------------+-------+        Thermo CO      Thermo CI  Thermo       SILVANA CO      SILVANA CIFICK SV          (l/min)     (l/min/m2)      SV       (l/min)   (l/min/m2)        +----------------+---------------+--------+--------------+-------------+-------+             11.3            4.9   157.3           5.9          2.6   82.3 +----------------+---------------+--------+--------------+-------------+-------+  Vascular Resistance Calculated Values (Wood Units): +-----+---+----+----+----+---+----+---+----+-------+ PhasePVRPVRISVR SVRITPRTPRITVRTVRITPR/TVR +-----+---+----+----+----+---+----+---+----+-------+ 0    2.25.1 -0.7-1.63.58.2 0.20.4 21      +-----+---+----+----+----+---+----+---+----+-------+  Complications: No in-lab complications observed.  Cardiac Cath Post Procedure Notes:  Post Procedure Diagnosis: Cardiogenic shock                           Out of hospital cardiac arrest                           Hypovolemia                           No angiographic evidence of coronary artery disease. Blood Loss:               Estimated blood loss during the procedure was 20cc                           mls. Specimens Removed:        Number of specimen(s) removed: none.  Recommendations: Maximize medical therapy. Tranfer to [facility]. Continue fluids. ____________________________________________________________________________________ CONCLUSIONS:  1. Normal coronary arteries. ICD 10 Codes: Cardiac arrest, cause unspecified-I46.9  CPT Codes: Insertion of ventricular assist device, percutaneous S&I left heart arterial access only-27575; Coronary Angiography S&I only (RHC)([facility])-31536; Ultrasound guidance for vascular access-58464; Complicated/Unusual Procedure-MOD22  [id number] Bhavya Restrepo MD Performing Physician Electronically signed by [id number] Bhavya Restrepo MD on 7/11/2024 at 8:13:27 AM  ** Final **     XR chest 1 view    Result Date: 7/10/2024  Interpreted By:  Clint Jacobo, STUDY: XR CHEST 1 VIEW; ;  7/10/2024 5:31 pm   INDICATION: Signs/Symptoms:CVC Placement Verification.   COMPARISON: 07/10/2024   ACCESSION NUMBER(S): [id number]   ORDERING CLINICIAN: CELIA PACKER   FINDINGS: ET tube and NG tube remain in satisfactory position. There is a right IJ central line with tip distal SVC.   Some patchy bilateral airspace opacities may reflect edema. No pneumothorax, significant effusion or consolidation.   AP radiograph of the chest was provided.       Satisfactory position right IJ central line.     MACRO: None   Signed by: Clint Jacobo 7/10/2024 6:20 PM Dictation workstation:   [id number]    Transthoracic Echo (TTE) Complete    Result Date: 7/10/2024   [facility]– [place], [address] [place] [address]              Tel [phone] and Fax [phone] TRANSTHORACIC ECHOCARDIOGRAM REPORT   Patient Name:      YAMIL FELICIANO        Kisha Physician:    [id number] [place] Wilburn DO Study Date:        7/10/2024            Ordering Provider:    [id number] MODESTO WILBURN MRN/PID:           [id number]             Fellow: Accession#:        [id number]         Nurse: Date of Birth/Age: 1980 / 43      Sonographer:          Edward Keane RDCS                    years Gender:            M                    Additional Staff: Height:            177.80 cm            Admit Date:           7/10/2024 Weight:            116.60 kg            Admission Status:     Inpatient - STAT BSA / BMI:         2.32 m2 / 36.88      Encounter#:           [id number]                    kg/m2 Blood Pressure:    81/70 mmHg           Department Location:  [facility] Study Type:    TRANSTHORACIC ECHO (TTE) COMPLETE Diagnosis/ICD: Cardiac arrest, cause unspecified-I46.9 Indication:    Cardiac arrest CPT Code:      Echo Complete w Full Doppler-85461 Patient History: BMI: Obese >30 Study Detail: The following Echo studies were performed: 2D, Doppler and M-Mode.               Technically challenging study due to body habitus, patient lying               in supine position, prominent lung artifact and intubated.               Definity used as a contrast agent for endocardial border               definition. Total contrast used for this procedure was 2 mL via IV               push.  PHYSICIAN INTERPRETATION: Left Ventricle: The left ventricular systolic function is severely decreased, with a Hartmann's biplane calculated ejection fraction of 10%. There is global hypokinesis of the left ventricle with minor regional variations. The left ventricular cavity size is normal. The left ventricular septal wall thickness is mildly increased. There is normal left ventricular posterior wall thickness. Left ventricular diastolic filling  was indeterminate. There is no definite left ventricular thrombus visualized. Non-Invasive Hemodynamics show Cardiac Output 3.4L/min ; Cardiac Index 1.41 L/Min/M2. Findings are consistent with cardiogenic shock. Left Atrium: The left atrium is normal in size. Right Ventricle: The right ventricle was not well visualized. Unable to determine right ventricular systolic function. Right Atrium: The right atrium is normal in size. Aortic Valve: The aortic valve is trileaflet. The aortic valve dimensionless index is 0.82. There is no evidence of aortic valve regurgitation. The peak instantaneous gradient of the aortic valve is 1.1 mmHg. The mean gradient of the aortic valve is 0.6 mmHg. Mitral Valve: The mitral valve is normal in structure. There is trace mitral valve regurgitation. Tricuspid Valve: The tricuspid valve is structurally normal. No evidence of tricuspid regurgitation. The right ventricular systolic pressure is unable to be estimated. Pulmonic Valve: The pulmonic valve is structurally normal. There is physiologic pulmonic valve regurgitation. Pericardium: There is no pericardial effusion noted. Aorta: The aortic root is normal. Systemic Veins: The inferior vena cava appears to be of normal size. In comparison to the previous echocardiogram(s): There are no prior studies on this patient for comparison purposes.  CONCLUSIONS:  1. The left ventricular systolic function is severely decreased, with a Hartmann's biplane calculated ejection fraction of 10%.  2. There is global hypokinesis of the left ventricle with minor regional variations.  3. No left ventricular thrombus visualized.  4. Left ventricular diastolic filling was indeterminate.  5. Unable to determine right ventricular systolic function.  6. Non-Invasive Hemodynamics show Cardiac Output 3.4L/min ; Cardiac Index 1.41 L/Min/M2. Findings are consistent with cardiogenic shock.  7. Findings communicated to ICU team by [employer] Secure Chat with Conversation at  17:00. QUANTITATIVE DATA SUMMARY: 2D MEASUREMENTS:                          Normal Ranges: IVSd:          1.36 cm   (0.6-1.1cm) LVPWd:         1.13 cm   (0.6-1.1cm) LVIDd:         4.77 cm   (3.9-5.9cm) LVIDs:         4.24 cm LV Mass Index: 98.4 g/m2 LV % FS        11.1 % LA VOLUME:                              Normal Ranges: LA Vol A4C:        31.6 ml   (22+/-6mL/m2) LA Vol Index A4C:  13.6ml/m2 LA Area A4C:       15.3 cm2 LA Major Axis A4C: 6.3 cm LA Vol A4C:        31.8 ml RA VOLUME BY A/L METHOD:                               Normal Ranges: RA Vol A4C:        25.2 ml    (8.3-19.5ml) RA Vol Index A4C:  10.9 ml/m2 RA Area A4C:       11.3 cm2 RA Major Axis A4C: 4.3 cm AORTA MEASUREMENTS:                      Normal Ranges: Ao Sinus, d: 3.10 cm (2.1-3.5cm) LV SYSTOLIC FUNCTION BY 2D PLANIMETRY (MOD):                      Normal Ranges: EF-A4C View:    7 %  (>=55%) EF-A2C View:    13 % EF-Biplane:     10 % LV EF Reported: 10 % LV DIASTOLIC FUNCTION:                         Normal Ranges: MV Peak E:    0.29 m/s  (0.7-1.2 m/s) MV Peak A:    0.30 m/s  (0.42-0.7 m/s) E/A Ratio:    0.99      (1.0-2.2) MV e'         0.025 m/s (>8.0) MV lateral e' 0.03 m/s MV medial e'  0.02 m/s E/e' Ratio:   11.74     (<8.0) MITRAL VALVE:                 Normal Ranges: MV DT: 224 msec (150-240msec) AORTIC VALVE:                                   Normal Ranges: AoV Vmax:                0.53 m/s (<=1.7m/s) AoV Peak P.1 mmHg (<20mmHg) AoV Mean P.6 mmHg (1.7-11.5mmHg) LVOT Max Stalin:            0.41 m/s (<=1.1m/s) AoV VTI:                 6.89 cm  (18-25cm) LVOT VTI:                5.63 cm AoV Dimensionless Index: 0.82 TRICUSPID VALVE/RVSP:                   Normal Ranges: IVC Diam: 2.20 cm  [id number] Modesto Uriostegui DO Electronically signed on 7/10/2024 at 5:00:53 PM  ** Final **     CT angio chest for pulmonary embolism    Result Date: 7/10/2024  Interpreted By:  Vaibhav Brunner, STUDY: CT ANGIO CHEST FOR PULMONARY  EMBOLISM;  7/10/2024 2:40 pm   INDICATION: 42 y/o   M with  Signs/Symptoms:cardiac arrest.   LIMITATIONS: None.   ACCESSION NUMBER(S): [id number]   ORDERING CLINICIAN: MELANI NERI   TECHNIQUE: After the administration of intravenous nonionic contrast, thin-section arterial phase images were obtained  from the thoracic inlet down through the iliac crest. Sagittal and coronal reconstruction images were generated. Axial and coronal MIP vascular reconstruction images were also generated.   Mediastinal, lung, bone, and liver windows were reviewed. 75 ML Omnipaque 350     COMPARISON: Chest x-ray from earlier today.   FINDINGS: CHEST WALL/BASE OF THE NECK: The chest wall was grossly intact. No thyromegaly or thyroid mass.   MEDIASTINUM/MARILIN: Mild bilateral hilar adenopathy. No mediastinal or axillary adenopathy. Mild cardiomegaly. No pericardial effusion. No thoracic aortic aneurysm or dissection. No pulmonary artery filling defect.   LUNGS/ PLEURA/ AND TRACHEA: There are prominent areas of dense consolidation posteriorly throughout both lungs, more pronounced inferiorly. There are also infiltrative alveolar ground-glass densities more anteriorly in both upper lobes, right middle lobe, and left lingula. No  pleural effusion or pneumothorax. There is an endotracheal tube in place with distal tip ending approximately 3 cm above the van. There is also an NG tube in place with distal tip in the distal gastric antrum.   BONES: No destructive lytic or blastic bone lesion. Mild-to-moderate endplate osteophytosis in the mid and distal thoracic spine.   UPPER ABDOMEN: Mild nonspecific gallbladder dilatation. The gallbladder measures approximately 49 mm in diameter. No adjacent edema. There is a surgical anastomosis in the region of the proximal duodenum. The remainder of the imaged upper abdomen was grossly intact.       No CT evidence of pulmonary embolism in the current exam.   Prominent areas of dense consolidation  posteriorly throughout both lungs. More mild areas of ground-glass infiltrative density more anteriorly in the upper lobes and right middle lobe. Pneumonia versus edema versus pulmonary hemorrhage.   Cardiomegaly.   ET tube and NG tube in place as described.   Mild nonspecific bilateral hilar adenopathy. This could be reactive or infectious/inflammatory.   Thoracic spine DJD as described.   Nonspecific gallbladder dilatation.   MACRO: None   Signed by: Vaibhav Brunner 7/10/2024 3:40 PM Dictation workstation:   [id number]    CT head wo IV contrast    Result Date: 7/10/2024  Interpreted By:  Vaibhav Brunner, STUDY: CT HEAD WO IV CONTRAST;  7/10/2024 2:40 pm   INDICATION: Signs/Symptoms:Altered mental status, s/p cardiac arrest.   COMPARISON: None.   ACCESSION NUMBER(S): [id number]   ORDERING CLINICIAN: ALFA GRIFFIN   TECHNIQUE: Routine axial images were obtained from the skull base through the vertex. Sagittal and coronal reconstruction images were generated. Brain, subdural, and bone windows were reviewed. N/A Unavailable   FINDINGS: INTRACRANIAL: The ventricles, sulci and basal cisterns were within normal limits. No acute intracranial bleed, midline shift, or focal mass effect. No destructive bone lesion. No depressed skull fracture. No abnormal skull base arterial calcifications.   EXTRACRANIAL: Visualized paranasal sinuses were clear. Visualized mastoid air cells were clear.       Negative exam.   MACRO: None   Signed by: Vaibhav Brunner 7/10/2024 3:33 PM Dictation workstation:   [id number]    XR chest 1 view    Result Date: 7/10/2024  Interpreted By:  Stefano Torrez, STUDY: XR CHEST 1 VIEW;  7/10/2024 12:31 pm   INDICATION: Signs/Symptoms:adjustment of ET tube.   COMPARISON: None.   ACCESSION NUMBER(S): [id number]   ORDERING CLINICIAN: MELANI NERI   FINDINGS: CARDIOMEDIASTINAL SILHOUETTE AND VASCULATURE:   Cardiac size:  Within normal limits. Aortic shadow:  Within normal limits.   Mediastinal contours: Within  normal limits.   Pulmonary vasculature:  The central vasculature is unremarkable   LUNGS: There is right central lung opacity, probably due to pneumonia or congestion. The lungs otherwise are clear.   ABDOMEN AND OTHER FINDINGS: An endotracheal tube is in satisfactory position, the tip approximately 3.7 cm above the van. An NG catheter descends beneath the hemidiaphragm.   BONES: No acute osseous changes.       1.  Central right lung infiltrate.   Signed by: Stefano Torrez 7/10/2024 1:15 PM Dictation workstation:   [id number]        Assessment/Plan   Principal Problem:    Cardiac arrest (Multi)    43-year-old male with a history of obesity status post gastric bypass surgery 2 years ago, CHARLEEN, HTN transferred for further management of cardiogenic shock s/p Impella placement.     Patient continues to improve hemodynamically and neurologically. Kidney function worsening, but nephrology on board. Will need a cardiac MRI for additional workup. Will continue lidocaine drip.       NEUROLOGIC  #Acute metabolic encephalopathy  ::Concern for anoxic brain injury i/s/o cardiac arrest  ::Intuabted and sedated on propofol  ::Reported episthotonos at [facility]  -Video EEG has preliminary result of severe diffuse encephalopathy with no epileptiform activity at baseline. Tomorrow will have final read.  -By evening, responding via nodding to yes or no questions, can follow complex commands    Plan  - Currently on propofol 25. Continue to wean as tolerated  - In maintenance phase of targeted temperature management. Can continue for 72 hrs total (Day 2/3)  - Brain MRI once off propofol if mental status changes persist  - Keppra 500 mg BID while awaiting result of EEG      CARDIOVASCULAR  #Cardiogenic shock  -On admission, was on norepi 0.07, epi 0.03 and impella P8 for pressure support  -PA catheter numbers on admission: CVP 9, PAP 39/20 (27), CO 7.7, CI 3.3, , SVO2 80% on P8 Impella.  -By morning rounds, was off drips: PA numbers  with a CVP of 14, PAP 49/28 (27), CO 4.85, CI of 2.08, SvO2 of 65%, SVRI of 3457  - By evening, PA numbers: CVP of 13, PAP of 44/26 (24), CO of 5.46, CI of 2.34, SvO2 of 68%, SVRI of 2027  - Morning lactate of 0.9    Plan  - Central venous pressure near normal (8-12) so not as concerned about elevated filling pressures (correlate of volume overload)  - Rising PA pressures, but improved cardiac index over day  - SvO2 is 68% which shows appropriate cardiac output  - Consider weaning Impella tomorrow    #Ventricular Fibrillation and Polymorphic Vtach  #Non-ischemic cardiomyopathy  #Cardiac arrest of unclear etiology  -::TTE post arrest EF 10%, global hypokinesis  ::C/f arrhythmia such as prolonged QT in 500s  ::Was on amdiodarone on admission. Had polymorphic SVT with sinus pauses on night of 7/10, so amiodarone weaned off and switched to lidocaine.  - EP consulted, appreciate recs  - Last Qtc around [phone]    Plan  -Optimize electrolytes K > 4 and Mg > 2  -Monitor on telemetry  - Cardiac MRI when stable  - Continue lidocaine drip  -Continue Impella P8  -Monitor for Impella complications (limb ischemia, bleeding, infection, hemolysis)     PULMONARY  #Acute hypoxic respiratory failure  #Concern for aspiration in setting of cardiac arrest  ::Secondary to cardiac arrest  ::CT PE negative  ::CXR not concerning for pneumonia. Diffuse pulmonary interstitial opacities more consistent with pulmonary edema or aspiration pneumonitis rather than pneumonia.    Plan  -Daily SBT   - Vancomycin and Zosyn (7/11-) to cover for potential aspiration pneumonia      RENAL/  #ANNIE Oliguric   #Concern for acute tubular necrosis  ::Likely pre-renal i/s/o cardiogenic shock   - Meager urine output of 90 mL in the past day  - Nephrology consulted, appreciate recs  -Worsening Hyponatremia, hyperkalemia (136 -> 134 -> 133, 4.3 ->5.2 ->5.1)  -Some acidosis: 15-> 20-?18  -Phos is stable at 4.3  -Creatinine of 2.45 -> 3.00 ->  3.58    Plan  -Continue to monitor urine output and BID RFPs  -Avoid nephrotoxic medications  -Renally dose medications   - Hold off on dialysis for now    GASTROINTESTINAL  #Elevated liver enzymes  ::Secondary to cardiogenic shock   ::At risk for shock liver   -Continue to monitor with daily hepatic function panel    #GI Prophylaxis  -Pantoprazole BID     ENDOCRINE  DUSTIN     HEME/ONC  #At risk for hemolysis and DIC  -Monitor with LDH, haptoglobin, fibrinogen        MSK/DERM  PT/OT        N: NPO  A: PIV, Right IJ triple lumen, Left PA catheter, Right Impella      DVT ppx: SCD  GI ppx: Pantoprazole 40 IV BID  Oxygen: Mechanical Ventilation   Drips Lido 1  Abx Vanc/Zosyn (7/11-)  Code Status: Full Code (confirmed on Admission)  Surrogate Medical Decision-maker: Jazmin Bender (wife) [phone]       LOS: 1 day     Wing Barker MD/PhD   PGY-2

## 2024-07-11 NOTE — PROGRESS NOTES
Child Life Assessment:   Reason for Consult  Discipline: Child Life Specialist  Reason for Consult: Family support (support for children of adult patient)  Total Time Spent (min): 15 minutes    Support Provided to Family  Support Provided to Family: Individual family session (without patient's presence) (patient intubated and sedated)  Family Present for Individual Family Session: Other(s)  Other(s) Relationship: wife and son, Guillermo  Other(s) Intervention(s): Healing environment interventions  Healing Environment Intervention(s) for Other(s): Assessment, Resources provided, Orientation to services    Evaluation  Evaluation/Plan of Care: Patient/family receptive, Provide ongoing support    Session Details: Certified Child Life Specialist (CCLS) introduced child life services to patient's wife and 16-year-old son, Guillermo, to provide support to children of adult patient regarding patient's hospitalization and health condition.  Patient's wife shared that they also have a 3-year-old daughter, Sheila.  CCLS and Guillermo discussed how son is processing and coping with patient's health condition.  Patient's wife shared that both children are maintaining routines.  CCLS provided resources on how to talk with daughter about patient's admission and gave a Children and Teens Worry too booklet with information on common behaviors and reactions when a loved one is hospitalized.  Patient's wife appreciative of support.  Child life will continue to provide support to children of patient as needed throughout admission.    BREANNE Ramos, CCLS  Epic Secure Chat

## 2024-07-11 NOTE — PROGRESS NOTES
1/1 CICU (DAY-1)    TRANSFER  F:  Griselda 7/10 to ED for cardiogenic shock     SIGNIFICANT EVENTS  07/10 C: Neuro - c/f anoxic brain injury i/s/o cardiac arrest  07/10 cardiogenic shock s/p Impella after witnessed sudden cardiac arrest. Intubated. Pressors    NAIFOR   Dorothy LOVEP     PT/OT   ( ) awaiting when medically able for intervention/assessments    COMPLETED  (X) 07/11 - (X) Daily ongoing review of patient via chart and/or (M-F) IDT rounds  (X) 07/11 - No family bedside   (X) 07/11 - EPIC reviewed.      Sylvia Bailey (LSW, MSW)

## 2024-07-11 NOTE — CONSULTS
"NEPHROLOGY NEW CONSULT NOTE   Miguel A Bender   43 y.o.    @WT@  MRN/Room: [id number]/16/16-A    Reason for consult: acute kidney injury, anuric    HPI:  Miguel A Bender is a 43 y.o. male with a past medical hx of HTN, CHARLEEN, NSTEMI, and gastric bypass surgery admitted to [facility] on 7/10 with cardiac arrest. Pt was found down at work, in V fib in ED. ROSC obtained after 450 mg IV amio, protracted downtime. Intubated on arrival to the ED. Transferred to the CICU. Post ROSC ECG showed NSR with RBBB. Underwent left heart cath with Impella placement. Pt started on vanc/zosyn. Given 1L LR and K/Mg repletion.   Lactate 7.3 on admission, improved to 5.0.    Neurology was consulted for spontaneous eye opening and myoclonic jerking. Pt started on keppra and placed on video EEG monitoring.   Patient on cooling protocol, EEG video monitor. Intubated with Fi40/PEEP 8. Minimal urine output in wright. On epi, lidocaine, levo drips. Sedated on propofol.    Nephrology consulted for ANNIE, possible HD.    Baseline creatinine is 0.87 (11/23).  Creatinine at the time of admission was 1.19 and started trending up from 2.09 and now up to 3.0.  Blood pressure labile with SBP . On epi and levo drips. Currently HD stable with pressors and Impella.   Urine output was 675 mL last 24hrs. 55 mL since 0700 on 7/11.    By chart review, pt had NSTEMI and underwent cardiac cath in 2018 w/o evidence of CAD.       In The ER: /68 (BP Location: Left arm, Patient Position: Lying)   Pulse 61   Temp 35.9 °C (96.6 °F) (Esophageal)   Resp 18   Ht 1.78 m (5' 10.08\")   Wt 117 kg (257 lb 15 oz)   SpO2 98%   BMI 36.93 kg/m²      No past medical history on file.   Past Surgical History:   Procedure Laterality Date    CARDIAC CATHETERIZATION N/A 7/10/2024    Procedure: Left Heart Cath, No LV;  Surgeon: Bhavya Restrepo MD;  Location: [facility] Cardiac Cath Lab;  Service: Cardiovascular;  Laterality: N/A;    CARDIAC CATHETERIZATION N/A 7/10/2024    Procedure: " PCI;  Surgeon: Bhavya Restrepo MD;  Location: [facility] Cardiac Cath Lab;  Service: Cardiovascular;  Laterality: N/A;      No family history on file.  Social History     Socioeconomic History    Marital status:      Spouse name: Not on file    Number of children: Not on file    Years of education: Not on file    Highest education level: Not on file   Occupational History    Not on file   Tobacco Use    Smoking status: Not on file    Smokeless tobacco: Not on file   Substance and Sexual Activity    Alcohol use: Not on file    Drug use: Not on file    Sexual activity: Not on file   Other Topics Concern    Not on file   Social History Narrative    Not on file     Social Determinants of Health     Financial Resource Strain: Patient Unable To Answer (7/11/2024)    Overall Financial Resource Strain (CARDIA)     Difficulty of Paying Living Expenses: Patient unable to answer   Food Insecurity: Patient Unable To Answer (7/11/2024)    Hunger Vital Sign     Worried About Running Out of Food in the Last Year: Patient unable to answer     Ran Out of Food in the Last Year: Patient unable to answer   Transportation Needs: Patient Unable To Answer (7/11/2024)    PRAPARE - Transportation     Lack of Transportation (Medical): Patient unable to answer     Lack of Transportation (Non-Medical): Patient unable to answer   Physical Activity: Patient Unable To Answer (7/11/2024)    Exercise Vital Sign     Days of Exercise per Week: Patient unable to answer     Minutes of Exercise per Session: Patient unable to answer   Stress: Patient Unable To Answer (7/11/2024)    [employer] [employer] - Occupational Stress Questionnaire     Feeling of Stress : Patient unable to answer   Social Connections: Unknown (7/11/2024)    Social Connection and Isolation Panel [NHANES]     Frequency of Communication with Friends and Family: Patient unable to answer     Frequency of Social Gatherings with Friends and Family: Patient unable to  answer     Attends Nondenominational Services: Patient unable to answer     Active Member of Clubs or Organizations: Patient unable to answer     Attends Club or Organization Meetings: Patient unable to answer     Marital Status:    Intimate Partner Violence: Patient Unable To Answer (7/11/2024)    Humiliation, Afraid, Rape, and Kick questionnaire     Fear of Current or Ex-Partner: Patient unable to answer     Emotionally Abused: Patient unable to answer     Physically Abused: Patient unable to answer     Sexually Abused: Patient unable to answer   Housing Stability: Patient Unable To Answer (7/11/2024)    Housing Stability Vital Sign     Unable to Pay for Housing in the Last Year: Patient unable to answer     Number of Times Moved in the Last Year: 1     Homeless in the Last Year: Patient unable to answer       Allergies   Allergen Reactions    Iodine Rash    Shellfish Containing Products Rash        Medications Prior to Admission   Medication Sig Dispense Refill Last Dose    amLODIPine (Norvasc) 2.5 mg tablet Take 1 tablet (2.5 mg) by mouth once daily.       beta carotene (vitamin A) 3,000 mcg (10,000 unit) capsule Take 1 capsule (10,000 Units) by mouth once daily.       ergocalciferol (Vitamin D-2) 1.25 MG (26671 UT) capsule Take 1 capsule (1,250 mcg) by mouth 1 (one) time per week.       multivitamin tablet Take 1 tablet by mouth once daily.           Meds:   levETIRAcetam, 500 mg, q12h  oxygen, , Continuous - Inhalation  pantoprazole, 40 mg, BID  piperacillin-tazobactam, 3.375 g, q6h  polyethylene glycol, 17 g, Daily      EPINEPHrine, Last Rate: Stopped (07/11/24 0545)  heparin Impella Purge 50 units/mL in 500 mL D5W, Last Rate: 10 mL/hr (07/11/24 0316)  lidocaine, Last Rate: 1 mg/min (07/11/24 1400)  norepinephrine, Last Rate: Stopped (07/11/24 1015)  propofol, Last Rate: 25 mcg/kg/min (07/11/24 1400)      dextrose, 12.5 g, q15 min PRN  dextrose, 25 g, q15 min PRN  glucagon, 1 mg, q15 min PRN  glucagon, 1 mg,  q15 min PRN  vancomycin, , Daily PRN        Vitals:    07/11/24 1400   BP:    Pulse: 61   Resp:    Temp:    SpO2: 98%        07/09 1900 - 07/11 0659  In: 2491.9 [I.V.:691.9]  Out: 675 [Urine:675]   Weight change:      General appearance: Pt sedated and intubated. On cooling. EEG leads in place.   Eyes: not observed  Skin: no apparent rash  Heart: rhythm regular. No murmur or rub  Lungs: CTA bilat.  no wheezing/crackles  Abdomen: soft, nt/nd  Extremities: 1+ edema bilat  :  Wright with minimal flow  Neuro: Pt sedated, unresponsive on exam  ACCESS: pIV, A line, CVC, swan gracie, wright      Blood Labs:  Results for orders placed or performed during the hospital encounter of 07/10/24 (from the past 24 hour(s))   Blood Gas Arterial Full Panel   Result Value Ref Range    POCT pH, Arterial 7.20 (LL) 7.38 - 7.42 pH    POCT pCO2, Arterial 45 (H) 38 - 42 mm Hg    POCT pO2, Arterial 114 (H) 85 - 95 mm Hg    POCT SO2, Arterial 96 94 - 100 %    POCT Oxy Hemoglobin, Arterial 96.0 94.0 - 98.0 %    POCT Hematocrit Calculated, Arterial 46.0 41.0 - 52.0 %    POCT Sodium, Arterial 136 136 - 145 mmol/L    POCT Potassium, Arterial 3.1 (L) 3.5 - 5.3 mmol/L    POCT Chloride, Arterial 102 98 - 107 mmol/L    POCT Ionized Calcium, Arterial 1.13 1.10 - 1.33 mmol/L    POCT Glucose, Arterial 267 (H) 74 - 99 mg/dL    POCT Lactate, Arterial 7.3 (HH) 0.4 - 2.0 mmol/L    POCT Base Excess, Arterial -10.3 (L) -2.0 - 3.0 mmol/L    POCT HCO3 Calculated, Arterial 17.6 (L) 22.0 - 26.0 mmol/L    POCT Hemoglobin, Arterial 15.3 13.5 - 17.5 g/dL    POCT Anion Gap, Arterial 20 10 - 25 mmo/L    Patient Temperature 37.0 degrees Celsius    FiO2 100 %   POCT GLUCOSE   Result Value Ref Range    POCT Glucose 184 (H) 74 - 99 mg/dL   CBC and Auto Differential   Result Value Ref Range    WBC 27.3 (H) 4.4 - 11.3 x10*3/uL    nRBC 0.0 0.0 - 0.0 /100 WBCs    RBC 5.14 4.50 - 5.90 x10*6/uL    Hemoglobin 15.3 13.5 - 17.5 g/dL    Hematocrit 45.1 41.0 - 52.0 %    MCV 88 80 -  100 fL    MCH 29.8 26.0 - 34.0 pg    MCHC 33.9 32.0 - 36.0 g/dL    RDW 13.9 11.5 - 14.5 %    Platelets 295 150 - 450 x10*3/uL    Neutrophils % 90.1 40.0 - 80.0 %    Immature Granulocytes %, Automated 0.5 0.0 - 0.9 %    Lymphocytes % 4.4 13.0 - 44.0 %    Monocytes % 4.7 2.0 - 10.0 %    Eosinophils % 0.0 0.0 - 6.0 %    Basophils % 0.3 0.0 - 2.0 %    Neutrophils Absolute 24.63 (H) 1.20 - 7.70 x10*3/uL    Immature Granulocytes Absolute, Automated 0.14 0.00 - 0.70 x10*3/uL    Lymphocytes Absolute 1.19 (L) 1.20 - 4.80 x10*3/uL    Monocytes Absolute 1.29 (H) 0.10 - 1.00 x10*3/uL    Eosinophils Absolute 0.00 0.00 - 0.70 x10*3/uL    Basophils Absolute 0.07 0.00 - 0.10 x10*3/uL   Comprehensive metabolic panel   Result Value Ref Range    Glucose 243 (H) 74 - 99 mg/dL    Sodium 140 136 - 145 mmol/L    Potassium 3.1 (L) 3.5 - 5.3 mmol/L    Chloride 106 98 - 107 mmol/L    Bicarbonate 17 (L) 21 - 32 mmol/L    Anion Gap 20 10 - 20 mmol/L    Urea Nitrogen 28 (H) 6 - 23 mg/dL    Creatinine 2.09 (H) 0.50 - 1.30 mg/dL    eGFR 39 (L) >60 mL/min/1.73m*2    Calcium 7.9 (L) 8.6 - 10.6 mg/dL    Albumin 3.4 3.4 - 5.0 g/dL    Alkaline Phosphatase 71 33 - 120 U/L    Total Protein 5.5 (L) 6.4 - 8.2 g/dL     (H) 9 - 39 U/L    Bilirubin, Total 2.0 (H) 0.0 - 1.2 mg/dL     (H) 10 - 52 U/L   Magnesium   Result Value Ref Range    Magnesium 1.64 1.60 - 2.40 mg/dL   Lactate   Result Value Ref Range    Lactate 7.1 (HH) 0.4 - 2.0 mmol/L   BLOOD GAS MIXED VENOUS FULL PANEL   Result Value Ref Range    POCT pH, Mixed 7.16 (LL) 7.33 - 7.43 pH    POCT pCO2, Mixed 52 (H) 41 - 51 mm Hg    POCT pO2, Mixed 49 (H) 35 - 45 mm Hg    POCT SO2, Mixed 80 (H) 45 - 75 %    POCT Oxy Hemoglobin, Mixed 79.7 (H) 45.0 - 75.0 %    POCT Hematocrit Calculated, Mixed 45.0 41.0 - 52.0 %    POCT Sodium, Mixed 138 136 - 145 mmol/L    POCT Potassium, Mixed 3.1 (L) 3.5 - 5.3 mmol/L    POCT Chloride, Mixed 101 98 - 107 mmol/L    POCT Ionized Calcium, Mixed 1.14 1.10 -  1.33 mmol/L    POCT Glucose, Mixed 268 (H) 74 - 99 mg/dL    POCT Lactate, Mixed 6.9 (HH) 0.4 - 2.0 mmol/L    POCT Base Excess, Mixed -10.5 (L) -2.0 - 3.0 mmol/L    POCT HCO3 Calculated, Mixed 18.5 (L) 22.0 - 26.0 mmol/L    POCT Hemoglobin, Mixed 15.1 13.5 - 17.5 g/dL    POCT Anion Gap, Mixed 22 10 - 25 mmo/L    Patient Temperature 37.0 degrees Celsius    FiO2 100 %   Lactate Dehydrogenase   Result Value Ref Range     (H) 84 - 246 U/L   Haptoglobin   Result Value Ref Range    Haptoglobin <10 (L) 37 - 246 mg/dL   Coagulation Screen   Result Value Ref Range    Protime 16.4 (H) 9.8 - 12.8 seconds    INR 1.5 (H) 0.9 - 1.1    aPTT 88 (H) 27 - 38 seconds   ACTIVATED CLOTTING TIME LOW   Result Value Ref Range    POCT Activated Clotting Time Low Range 184 83 - 199 sec   Blood Culture    Specimen: Arterial Line; Blood culture   Result Value Ref Range    Blood Culture Loaded on Instrument - Culture in progress    Blood Culture    Specimen: Peripheral Venipuncture; Blood culture   Result Value Ref Range    Blood Culture Loaded on Instrument - Culture in progress    ECG 12 lead   Result Value Ref Range    Ventricular Rate 60 BPM    Atrial Rate 60 BPM    NC Interval 134 ms    QRS Duration 104 ms    QT Interval 394 ms    QTC Calculation(Bazett) 394 ms    P Axis 27 degrees    R Axis 54 degrees    T Axis 21 degrees    QRS Count 10 beats    Q Onset 216 ms    P Onset 149 ms    P Offset 207 ms    T Offset 413 ms    QTC Fredericia 394 ms   ECG 12 lead   Result Value Ref Range    Ventricular Rate 64 BPM    Atrial Rate 64 BPM    NC Interval 134 ms    QRS Duration 98 ms    QT Interval 498 ms    QTC Calculation(Bazett) 513 ms    P Axis 32 degrees    R Axis 52 degrees    T Axis 26 degrees    QRS Count 10 beats    Q Onset 216 ms    P Onset 149 ms    P Offset 208 ms    T Offset 465 ms    QTC Fredericia 508 ms   MRSA Surveillance for Vancomycin De-escalation, PCR    Specimen: Anterior Nares; Swab   Result Value Ref Range    MRSA PCR Not  Detected Not Detected   ECG 12 lead   Result Value Ref Range    Ventricular Rate 62 BPM    Atrial Rate 62 BPM    NJ Interval 134 ms    QRS Duration 102 ms    QT Interval 424 ms    QTC Calculation(Bazett) 430 ms    P Axis 32 degrees    R Axis 52 degrees    T Axis 18 degrees    QRS Count 10 beats    Q Onset 216 ms    P Onset 149 ms    P Offset 205 ms    T Offset 428 ms    QTC Fredericia 428 ms   Blood Gas Arterial Full Panel   Result Value Ref Range    POCT pH, Arterial 7.23 (LL) 7.38 - 7.42 pH    POCT pCO2, Arterial 44 (H) 38 - 42 mm Hg    POCT pO2, Arterial 116 (H) 85 - 95 mm Hg    POCT SO2, Arterial 96 94 - 100 %    POCT Oxy Hemoglobin, Arterial 95.7 94.0 - 98.0 %    POCT Hematocrit Calculated, Arterial 44.0 41.0 - 52.0 %    POCT Sodium, Arterial 136 136 - 145 mmol/L    POCT Potassium, Arterial 3.9 3.5 - 5.3 mmol/L    POCT Chloride, Arterial 103 98 - 107 mmol/L    POCT Ionized Calcium, Arterial 1.12 1.10 - 1.33 mmol/L    POCT Glucose, Arterial 228 (H) 74 - 99 mg/dL    POCT Lactate, Arterial 5.0 (HH) 0.4 - 2.0 mmol/L    POCT Base Excess, Arterial -9.0 (L) -2.0 - 3.0 mmol/L    POCT HCO3 Calculated, Arterial 18.4 (L) 22.0 - 26.0 mmol/L    POCT Hemoglobin, Arterial 14.8 13.5 - 17.5 g/dL    POCT Anion Gap, Arterial 19 10 - 25 mmo/L    Patient Temperature 37.0 degrees Celsius    FiO2 70 %   ACTIVATED CLOTTING TIME LOW   Result Value Ref Range    POCT Activated Clotting Time Low Range 158 83 - 199 sec   BLOOD GAS MIXED VENOUS FULL PANEL   Result Value Ref Range    POCT pH, Mixed 7.20 (LL) 7.33 - 7.43 pH    POCT pCO2, Mixed 49 41 - 51 mm Hg    POCT pO2, Mixed 46 (H) 35 - 45 mm Hg    POCT SO2, Mixed 78 (H) 45 - 75 %    POCT Oxy Hemoglobin, Mixed 77.4 (H) 45.0 - 75.0 %    POCT Hematocrit Calculated, Mixed 42.0 41.0 - 52.0 %    POCT Sodium, Mixed 134 (L) 136 - 145 mmol/L    POCT Potassium, Mixed 4.2 3.5 - 5.3 mmol/L    POCT Chloride, Mixed 103 98 - 107 mmol/L    POCT Ionized Calcium, Mixed 1.12 1.10 - 1.33 mmol/L    POCT  Glucose, Mixed 201 (H) 74 - 99 mg/dL    POCT Lactate, Mixed 4.7 (HH) 0.4 - 2.0 mmol/L    POCT Base Excess, Mixed -9.0 (L) -2.0 - 3.0 mmol/L    POCT HCO3 Calculated, Mixed 19.2 (L) 22.0 - 26.0 mmol/L    POCT Hemoglobin, Mixed 14.0 13.5 - 17.5 g/dL    POCT Anion Gap, Mixed 16 10 - 25 mmo/L    Patient Temperature 37.0 degrees Celsius    FiO2 50 %   ECG 12 lead   Result Value Ref Range    Ventricular Rate 65 BPM    Atrial Rate 65 BPM    NH Interval 130 ms    QRS Duration 98 ms    QT Interval 410 ms    QTC Calculation(Bazett) 426 ms    P Axis 28 degrees    R Axis 50 degrees    T Axis 39 degrees    QRS Count 11 beats    Q Onset 217 ms    P Onset 152 ms    P Offset 207 ms    T Offset 422 ms    QTC Fredericia 420 ms   ACTIVATED CLOTTING TIME LOW   Result Value Ref Range    POCT Activated Clotting Time Low Range 161 83 - 199 sec   ECG 12 lead   Result Value Ref Range    Ventricular Rate 65 BPM    Atrial Rate 65 BPM    NH Interval 128 ms    QRS Duration 98 ms    QT Interval 486 ms    QTC Calculation(Bazett) 505 ms    P Axis 33 degrees    R Axis 43 degrees    T Axis 28 degrees    QRS Count 11 beats    Q Onset 217 ms    P Onset 153 ms    P Offset 206 ms    T Offset 460 ms    QTC Fredericia 498 ms   Renal function panel   Result Value Ref Range    Glucose 231 (H) 74 - 99 mg/dL    Sodium 136 136 - 145 mmol/L    Potassium 4.3 3.5 - 5.3 mmol/L    Chloride 105 98 - 107 mmol/L    Bicarbonate 15 (L) 21 - 32 mmol/L    Anion Gap 20 10 - 20 mmol/L    Urea Nitrogen 31 (H) 6 - 23 mg/dL    Creatinine 2.45 (H) 0.50 - 1.30 mg/dL    eGFR 32 (L) >60 mL/min/1.73m*2    Calcium 7.7 (L) 8.6 - 10.6 mg/dL    Phosphorus 3.4 2.5 - 4.9 mg/dL    Albumin 3.2 (L) 3.4 - 5.0 g/dL   Magnesium   Result Value Ref Range    Magnesium 2.36 1.60 - 2.40 mg/dL   Blood Gas Arterial Full Panel   Result Value Ref Range    POCT pH, Arterial 7.24 (LL) 7.38 - 7.42 pH    POCT pCO2, Arterial 41 38 - 42 mm Hg    POCT pO2, Arterial 105 (H) 85 - 95 mm Hg    POCT SO2, Arterial  96 94 - 100 %    POCT Oxy Hemoglobin, Arterial 95.7 94.0 - 98.0 %    POCT Hematocrit Calculated, Arterial 42.0 41.0 - 52.0 %    POCT Sodium, Arterial 131 (L) 136 - 145 mmol/L    POCT Potassium, Arterial 4.2 3.5 - 5.3 mmol/L    POCT Chloride, Arterial 102 98 - 107 mmol/L    POCT Ionized Calcium, Arterial 1.15 1.10 - 1.33 mmol/L    POCT Glucose, Arterial 246 (H) 74 - 99 mg/dL    POCT Lactate, Arterial 3.9 (H) 0.4 - 2.0 mmol/L    POCT Base Excess, Arterial -9.4 (L) -2.0 - 3.0 mmol/L    POCT HCO3 Calculated, Arterial 17.6 (L) 22.0 - 26.0 mmol/L    POCT Hemoglobin, Arterial 14.1 13.5 - 17.5 g/dL    POCT Anion Gap, Arterial 16 10 - 25 mmo/L    Patient Temperature 37.0 degrees Celsius    FiO2 50 %   CBC and Auto Differential   Result Value Ref Range    WBC 22.2 (H) 4.4 - 11.3 x10*3/uL    nRBC 0.0 0.0 - 0.0 /100 WBCs    RBC 4.71 4.50 - 5.90 x10*6/uL    Hemoglobin 14.0 13.5 - 17.5 g/dL    Hematocrit 39.9 (L) 41.0 - 52.0 %    MCV 85 80 - 100 fL    MCH 29.7 26.0 - 34.0 pg    MCHC 35.1 32.0 - 36.0 g/dL    RDW 13.6 11.5 - 14.5 %    Platelets 229 150 - 450 x10*3/uL    Neutrophils % 87.7 40.0 - 80.0 %    Immature Granulocytes %, Automated 0.7 0.0 - 0.9 %    Lymphocytes % 7.1 13.0 - 44.0 %    Monocytes % 4.3 2.0 - 10.0 %    Eosinophils % 0.0 0.0 - 6.0 %    Basophils % 0.2 0.0 - 2.0 %    Neutrophils Absolute 19.46 (H) 1.20 - 7.70 x10*3/uL    Immature Granulocytes Absolute, Automated 0.16 0.00 - 0.70 x10*3/uL    Lymphocytes Absolute 1.57 1.20 - 4.80 x10*3/uL    Monocytes Absolute 0.96 0.10 - 1.00 x10*3/uL    Eosinophils Absolute 0.00 0.00 - 0.70 x10*3/uL    Basophils Absolute 0.05 0.00 - 0.10 x10*3/uL   Lactate Dehydrogenase   Result Value Ref Range     (H) 84 - 246 U/L   Lactate   Result Value Ref Range    Lactate 3.8 (H) 0.4 - 2.0 mmol/L   Coagulation Screen   Result Value Ref Range    Protime 15.7 (H) 9.8 - 12.8 seconds    INR 1.4 (H) 0.9 - 1.1    aPTT 37 27 - 38 seconds   Lidocaine   Result Value Ref Range    Lidocaine   1.3 1.0 - 5.0 ug/mL   ACTIVATED CLOTTING TIME LOW   Result Value Ref Range    POCT Activated Clotting Time Low Range 157 83 - 199 sec   ACTIVATED CLOTTING TIME LOW   Result Value Ref Range    POCT Activated Clotting Time Low Range 171 83 - 199 sec   ECG 12 lead   Result Value Ref Range    Ventricular Rate 77 BPM    Atrial Rate 77 BPM    AR Interval 126 ms    QRS Duration 94 ms    QT Interval 382 ms    QTC Calculation(Bazett) 432 ms    P Axis 30 degrees    R Axis 37 degrees    T Axis 18 degrees    QRS Count 13 beats    Q Onset 217 ms    P Onset 154 ms    P Offset 207 ms    T Offset 408 ms    QTC Fredericia 415 ms   ECG 12 lead   Result Value Ref Range    Ventricular Rate 75 BPM    Atrial Rate 75 BPM    AR Interval 122 ms    QRS Duration 94 ms    QT Interval 370 ms    QTC Calculation(Bazett) 413 ms    P Axis 22 degrees    R Axis 37 degrees    T Axis 22 degrees    QRS Count 12 beats    Q Onset 217 ms    P Onset 156 ms    P Offset 209 ms    T Offset 402 ms    QTC Fredericia 398 ms   ACTIVATED CLOTTING TIME LOW   Result Value Ref Range    POCT Activated Clotting Time Low Range 165 83 - 199 sec   POCT GLUCOSE   Result Value Ref Range    POCT Glucose 86 74 - 99 mg/dL   ECG 12 lead   Result Value Ref Range    Ventricular Rate 71 BPM    Atrial Rate 71 BPM    AR Interval 128 ms    QRS Duration 92 ms    QT Interval 402 ms    QTC Calculation(Bazett) 436 ms    P Axis 23 degrees    R Axis 42 degrees    T Axis 42 degrees    QRS Count 11 beats    Q Onset 218 ms    P Onset 154 ms    P Offset 209 ms    T Offset 419 ms    QTC Fredericia 425 ms   ECG 12 lead   Result Value Ref Range    Ventricular Rate 68 BPM    Atrial Rate 68 BPM    AR Interval 150 ms    QRS Duration 96 ms    QT Interval 370 ms    QTC Calculation(Bazett) 393 ms    P Axis 54 degrees    R Axis 57 degrees    T Axis 3 degrees    QRS Count 11 beats    Q Onset 218 ms    P Onset 143 ms    P Offset 210 ms    T Offset 403 ms    QTC Fredericia 386 ms   Blood Gas Arterial Full  Panel   Result Value Ref Range    POCT pH, Arterial 7.31 (L) 7.38 - 7.42 pH    POCT pCO2, Arterial 40 38 - 42 mm Hg    POCT pO2, Arterial 121 (H) 85 - 95 mm Hg    POCT SO2, Arterial 96 94 - 100 %    POCT Oxy Hemoglobin, Arterial 95.9 94.0 - 98.0 %    POCT Hematocrit Calculated, Arterial 43.0 41.0 - 52.0 %    POCT Sodium, Arterial 130 (L) 136 - 145 mmol/L    POCT Potassium, Arterial 5.2 3.5 - 5.3 mmol/L    POCT Chloride, Arterial 103 98 - 107 mmol/L    POCT Ionized Calcium, Arterial 1.13 1.10 - 1.33 mmol/L    POCT Glucose, Arterial 72 (L) 74 - 99 mg/dL    POCT Lactate, Arterial 0.8 0.4 - 2.0 mmol/L    POCT Base Excess, Arterial -5.8 (L) -2.0 - 3.0 mmol/L    POCT HCO3 Calculated, Arterial 20.1 (L) 22.0 - 26.0 mmol/L    POCT Hemoglobin, Arterial 14.2 13.5 - 17.5 g/dL    POCT Anion Gap, Arterial 12 10 - 25 mmo/L    Patient Temperature 37.0 degrees Celsius    FiO2 50 %   BLOOD GAS MIXED VENOUS FULL PANEL   Result Value Ref Range    POCT pH, Mixed 7.28 (L) 7.33 - 7.43 pH    POCT pCO2, Mixed 44 41 - 51 mm Hg    POCT pO2, Mixed 36 35 - 45 mm Hg    POCT SO2, Mixed 65 45 - 75 %    POCT Oxy Hemoglobin, Mixed 64.7 45.0 - 75.0 %    POCT Hematocrit Calculated, Mixed 42.0 41.0 - 52.0 %    POCT Sodium, Mixed 132 (L) 136 - 145 mmol/L    POCT Potassium, Mixed 5.3 3.5 - 5.3 mmol/L    POCT Chloride, Mixed 101 98 - 107 mmol/L    POCT Ionized Calcium, Mixed 1.11 1.10 - 1.33 mmol/L    POCT Glucose, Mixed 71 (L) 74 - 99 mg/dL    POCT Lactate, Mixed 0.8 0.4 - 2.0 mmol/L    POCT Base Excess, Mixed -6.0 (L) -2.0 - 3.0 mmol/L    POCT HCO3 Calculated, Mixed 20.7 (L) 22.0 - 26.0 mmol/L    POCT Hemoglobin, Mixed 14.0 13.5 - 17.5 g/dL    POCT Anion Gap, Mixed 16 10 - 25 mmo/L    Patient Temperature 37.0 degrees Celsius    FiO2 40 %   Renal function panel   Result Value Ref Range    Glucose 77 74 - 99 mg/dL    Sodium 134 (L) 136 - 145 mmol/L    Potassium 5.2 3.5 - 5.3 mmol/L    Chloride 105 98 - 107 mmol/L    Bicarbonate 20 (L) 21 - 32 mmol/L     Anion Gap 14 10 - 20 mmol/L    Urea Nitrogen 39 (H) 6 - 23 mg/dL    Creatinine 3.00 (H) 0.50 - 1.30 mg/dL    eGFR 25 (L) >60 mL/min/1.73m*2    Calcium 7.9 (L) 8.6 - 10.6 mg/dL    Phosphorus 4.1 2.5 - 4.9 mg/dL    Albumin 3.3 (L) 3.4 - 5.0 g/dL   Magnesium   Result Value Ref Range    Magnesium 2.30 1.60 - 2.40 mg/dL   ACTIVATED CLOTTING TIME LOW   Result Value Ref Range    POCT Activated Clotting Time Low Range 182 83 - 199 sec   POCT GLUCOSE   Result Value Ref Range    POCT Glucose 61 (L) 74 - 99 mg/dL         ASSESSMENT:  Miguel A Bender is a  43 y.o.  Year old male a past medical hx of HTN, CHARLEEN, NSTEMI, and gastric bypass surgery admitted to [facility] on 7/10 with cardiac arrest. Pt had witness arrest at work, CPR on scene and intubated en route to ED. V fib in ED s/p protracted resuscitation. Transferred to the CICU. S/p left heart cath with Impella placement. Pressors weaned off today. Nephrology consulted for ANNIE/possible HD iso worsening UOP and renal function.    #Acute kidney injury  - ANNIE , oliguric borderline anuric, Stage III  - Baseline creatinine: 0.87  - 55 mL UOP today. Cr 3.0 (2.45)  - Etiology:  acute tubular injury s/t cardiogenic shock  - Clinical volume status: hypervolemic  - Electrolytes: hyperkalemia, hypocalcemia  - Acid base status: NAGMA      Recommendations:  - Would limit daily volume intake due to pulmonary edema  - No current indication for hemodialysis, however given tenuous hemodynamics and anuria, will monitor HD and metabolic parameters closely.  - Okay to try diuretics  - renal US  - Would hold further K repletion given pt is anuric  - strict Is/Os  - Renal dosing for medications for latest eGFR, follow medication trough as appropriate  - Avoid hypotension/rapid fluctuations in BPs    Zhao Dhaliwal MD PGY-1  Nephrology Resident  24 hour Renal Pager - [phone]

## 2024-07-11 NOTE — SIGNIFICANT EVENT
"Preliminary EEG Report     This vEEG is consistent with severe diffuse encephalopathy. No epileptiform activity is seen in this baseline file.     This EEG was read from 10:09 AM to 10:33 AM on 07/11/24 .     The final impression will be available tomorrow under Chart Review in the Media tab. If the plan is to discontinue the video EEG, please place a \"Discontinue Continuous VEEG\" order in the EMR; otherwise the EEG tech will not receive the notification.      Roque Faust, DO  Adult Epilepsy Fellow  [facility] [place]     "

## 2024-07-11 NOTE — CONSULTS
Vancomycin Dosing by Pharmacy- INITIAL    Miguel A Bender is a 43 y.o. year old male who Pharmacy has been consulted for vancomycin dosing for pneumonia. Based on the patient's indication and renal status this patient will be dosed based on a goal trough/random level of 15-20.     Renal function is currently declining-- Scr doubled, will dose vancomycin per level    Visit Vitals  Pulse 68   Temp 36.1 °C (97 °F) (Temporal)   Resp 22        Lab Results   Component Value Date    CREATININE 2.09 (H) 07/10/2024    CREATININE 1.19 07/10/2024        Patient weight is as follows: There were no vitals filed for this visit.    Cultures:  No results found for the encounter in last 14 days.        No intake/output data recorded.  I/O during current shift:  No intake/output data recorded.    Temp (24hrs), Av.8 °C (96.4 °F), Min:34.5 °C (94.1 °F), Max:36.5 °C (97.7 °F)         Assessment/Plan     Patient will be given a loading dose of 2000 mg x1    Will initiate vancomycin maintenance, dose per level.    Follow-up level will be ordered on --24 hours after the LD is administered, unless clinically indicated sooner.  Will continue to monitor renal function daily while on vancomycin and order serum creatinine at least every 48 hours if not already ordered.  Follow for continued vancomycin needs, clinical response, and signs/symptoms of toxicity.       Ceci Gayle, PharmD

## 2024-07-12 ENCOUNTER — APPOINTMENT (OUTPATIENT)
Dept: CARDIOLOGY | Facility: HOSPITAL | Age: 44
DRG: 276 | End: 2024-07-12
Payer: COMMERCIAL

## 2024-07-12 LAB
ACT BLD: 153 SEC (ref 83–199)
ACT BLD: 170 SEC (ref 83–199)
ACT BLD: 173 SEC (ref 83–199)
ACT BLD: 173 SEC (ref 83–199)
ACT BLD: 179 SEC (ref 83–199)
ALBUMIN SERPL BCP-MCNC: 2.7 G/DL (ref 3.4–5)
ALBUMIN SERPL BCP-MCNC: 2.7 G/DL (ref 3.4–5)
ALBUMIN SERPL BCP-MCNC: 2.8 G/DL (ref 3.4–5)
ALBUMIN SERPL BCP-MCNC: 2.9 G/DL (ref 3.4–5)
ALBUMIN SERPL BCP-MCNC: 3 G/DL (ref 3.4–5)
ALBUMIN SERPL BCP-MCNC: 3.1 G/DL (ref 3.4–5)
ALBUMIN SERPL BCP-MCNC: 3.2 G/DL (ref 3.4–5)
ALP SERPL-CCNC: 49 U/L (ref 33–120)
ALT SERPL W P-5'-P-CCNC: 70 U/L (ref 10–52)
ANION GAP BLDA CALCULATED.4IONS-SCNC: 15 MMO/L (ref 10–25)
ANION GAP BLDA CALCULATED.4IONS-SCNC: 17 MMO/L (ref 10–25)
ANION GAP BLDA CALCULATED.4IONS-SCNC: 18 MMO/L (ref 10–25)
ANION GAP BLDMV CALCULATED.4IONS-SCNC: 14 MMO/L (ref 10–25)
ANION GAP BLDMV CALCULATED.4IONS-SCNC: 15 MMO/L (ref 10–25)
ANION GAP BLDMV CALCULATED.4IONS-SCNC: 16 MMO/L (ref 10–25)
ANION GAP BLDMV CALCULATED.4IONS-SCNC: 16 MMO/L (ref 10–25)
ANION GAP BLDV CALCULATED.4IONS-SCNC: 14 MMOL/L (ref 10–25)
ANION GAP SERPL CALC-SCNC: 16 MMOL/L (ref 10–20)
ANION GAP SERPL CALC-SCNC: 17 MMOL/L (ref 10–20)
ANION GAP SERPL CALC-SCNC: 17 MMOL/L (ref 10–20)
ANION GAP SERPL CALC-SCNC: 18 MMOL/L (ref 10–20)
ANION GAP SERPL CALC-SCNC: 18 MMOL/L (ref 10–20)
ANION GAP SERPL CALC-SCNC: 20 MMOL/L (ref 10–20)
AST SERPL W P-5'-P-CCNC: 84 U/L (ref 9–39)
ATRIAL RATE: 83 BPM
BASE EXCESS BLDA CALC-SCNC: -6.7 MMOL/L (ref -2–3)
BASE EXCESS BLDA CALC-SCNC: -7.1 MMOL/L (ref -2–3)
BASE EXCESS BLDA CALC-SCNC: -7.9 MMOL/L (ref -2–3)
BASE EXCESS BLDMV CALC-SCNC: -5.4 MMOL/L (ref -2–3)
BASE EXCESS BLDMV CALC-SCNC: -5.8 MMOL/L (ref -2–3)
BASE EXCESS BLDMV CALC-SCNC: -8.2 MMOL/L (ref -2–3)
BASE EXCESS BLDMV CALC-SCNC: -9.7 MMOL/L (ref -2–3)
BASE EXCESS BLDV CALC-SCNC: -7.5 MMOL/L (ref -2–3)
BASOPHILS # BLD AUTO: 0.07 X10*3/UL (ref 0–0.1)
BASOPHILS NFR BLD AUTO: 0.5 %
BILIRUB DIRECT SERPL-MCNC: 0.5 MG/DL (ref 0–0.3)
BILIRUB SERPL-MCNC: 2.1 MG/DL (ref 0–1.2)
BODY TEMPERATURE: 37 DEGREES CELSIUS
BUN SERPL-MCNC: 48 MG/DL (ref 6–23)
BUN SERPL-MCNC: 51 MG/DL (ref 6–23)
BUN SERPL-MCNC: 55 MG/DL (ref 6–23)
BUN SERPL-MCNC: 59 MG/DL (ref 6–23)
BUN SERPL-MCNC: 61 MG/DL (ref 6–23)
BUN SERPL-MCNC: 63 MG/DL (ref 6–23)
CA-I BLDA-SCNC: 1.13 MMOL/L (ref 1.1–1.33)
CA-I BLDA-SCNC: 1.14 MMOL/L (ref 1.1–1.33)
CA-I BLDA-SCNC: 1.19 MMOL/L (ref 1.1–1.33)
CA-I BLDMV-SCNC: 1.14 MMOL/L (ref 1.1–1.33)
CA-I BLDMV-SCNC: 1.16 MMOL/L (ref 1.1–1.33)
CA-I BLDMV-SCNC: 1.16 MMOL/L (ref 1.1–1.33)
CA-I BLDMV-SCNC: 1.17 MMOL/L (ref 1.1–1.33)
CA-I BLDV-SCNC: 1.15 MMOL/L (ref 1.1–1.33)
CALCIUM SERPL-MCNC: 5.4 MG/DL (ref 8.6–10.6)
CALCIUM SERPL-MCNC: 7.6 MG/DL (ref 8.6–10.6)
CALCIUM SERPL-MCNC: 7.7 MG/DL (ref 8.6–10.6)
CALCIUM SERPL-MCNC: 7.8 MG/DL (ref 8.6–10.6)
CALCIUM SERPL-MCNC: 7.9 MG/DL (ref 8.6–10.6)
CALCIUM SERPL-MCNC: 8.3 MG/DL (ref 8.6–10.6)
CHLORIDE BLD-SCNC: 100 MMOL/L (ref 98–107)
CHLORIDE BLD-SCNC: 100 MMOL/L (ref 98–107)
CHLORIDE BLD-SCNC: 101 MMOL/L (ref 98–107)
CHLORIDE BLD-SCNC: 101 MMOL/L (ref 98–107)
CHLORIDE BLDA-SCNC: 100 MMOL/L (ref 98–107)
CHLORIDE BLDA-SCNC: 101 MMOL/L (ref 98–107)
CHLORIDE BLDA-SCNC: 101 MMOL/L (ref 98–107)
CHLORIDE BLDV-SCNC: 100 MMOL/L (ref 98–107)
CHLORIDE SERPL-SCNC: 101 MMOL/L (ref 98–107)
CHLORIDE SERPL-SCNC: 102 MMOL/L (ref 98–107)
CHLORIDE SERPL-SCNC: 103 MMOL/L (ref 98–107)
CHLORIDE SERPL-SCNC: 99 MMOL/L (ref 98–107)
CK SERPL-CCNC: 4553 U/L (ref 0–325)
CO2 SERPL-SCNC: 18 MMOL/L (ref 21–32)
CO2 SERPL-SCNC: 19 MMOL/L (ref 21–32)
CO2 SERPL-SCNC: 19 MMOL/L (ref 21–32)
CO2 SERPL-SCNC: 20 MMOL/L (ref 21–32)
CREAT SERPL-MCNC: 4.32 MG/DL (ref 0.5–1.3)
CREAT SERPL-MCNC: 4.4 MG/DL (ref 0.5–1.3)
CREAT SERPL-MCNC: 4.94 MG/DL (ref 0.5–1.3)
CREAT SERPL-MCNC: 5.61 MG/DL (ref 0.5–1.3)
CREAT SERPL-MCNC: 5.84 MG/DL (ref 0.5–1.3)
CREAT SERPL-MCNC: 6.21 MG/DL (ref 0.5–1.3)
EGFRCR SERPLBLD CKD-EPI 2021: 11 ML/MIN/1.73M*2
EGFRCR SERPLBLD CKD-EPI 2021: 11 ML/MIN/1.73M*2
EGFRCR SERPLBLD CKD-EPI 2021: 12 ML/MIN/1.73M*2
EGFRCR SERPLBLD CKD-EPI 2021: 14 ML/MIN/1.73M*2
EGFRCR SERPLBLD CKD-EPI 2021: 16 ML/MIN/1.73M*2
EGFRCR SERPLBLD CKD-EPI 2021: 16 ML/MIN/1.73M*2
EJECTION FRACTION: 30 %
EOSINOPHIL # BLD AUTO: 0.01 X10*3/UL (ref 0–0.7)
EOSINOPHIL NFR BLD AUTO: 0.1 %
ERYTHROCYTE [DISTWIDTH] IN BLOOD BY AUTOMATED COUNT: 13.9 % (ref 11.5–14.5)
GLUCOSE BLD MANUAL STRIP-MCNC: 123 MG/DL (ref 74–99)
GLUCOSE BLD MANUAL STRIP-MCNC: 74 MG/DL (ref 74–99)
GLUCOSE BLD MANUAL STRIP-MCNC: 77 MG/DL (ref 74–99)
GLUCOSE BLD MANUAL STRIP-MCNC: 85 MG/DL (ref 74–99)
GLUCOSE BLD MANUAL STRIP-MCNC: 85 MG/DL (ref 74–99)
GLUCOSE BLD MANUAL STRIP-MCNC: 92 MG/DL (ref 74–99)
GLUCOSE BLD-MCNC: 101 MG/DL (ref 74–99)
GLUCOSE BLD-MCNC: 104 MG/DL (ref 74–99)
GLUCOSE BLD-MCNC: 94 MG/DL (ref 74–99)
GLUCOSE BLD-MCNC: 95 MG/DL (ref 74–99)
GLUCOSE BLDA-MCNC: 86 MG/DL (ref 74–99)
GLUCOSE BLDA-MCNC: 93 MG/DL (ref 74–99)
GLUCOSE BLDA-MCNC: 94 MG/DL (ref 74–99)
GLUCOSE BLDV-MCNC: 122 MG/DL (ref 74–99)
GLUCOSE SERPL-MCNC: 82 MG/DL (ref 74–99)
GLUCOSE SERPL-MCNC: 88 MG/DL (ref 74–99)
GLUCOSE SERPL-MCNC: 91 MG/DL (ref 74–99)
GLUCOSE SERPL-MCNC: 93 MG/DL (ref 74–99)
GLUCOSE SERPL-MCNC: 93 MG/DL (ref 74–99)
GLUCOSE SERPL-MCNC: 96 MG/DL (ref 74–99)
HCO3 BLDA-SCNC: 17.6 MMOL/L (ref 22–26)
HCO3 BLDA-SCNC: 18.5 MMOL/L (ref 22–26)
HCO3 BLDA-SCNC: 18.7 MMOL/L (ref 22–26)
HCO3 BLDMV-SCNC: 17.6 MMOL/L (ref 22–26)
HCO3 BLDMV-SCNC: 18.4 MMOL/L (ref 22–26)
HCO3 BLDMV-SCNC: 20.1 MMOL/L (ref 22–26)
HCO3 BLDMV-SCNC: 20.6 MMOL/L (ref 22–26)
HCO3 BLDV-SCNC: 19.3 MMOL/L (ref 22–26)
HCT VFR BLD AUTO: 36.6 % (ref 41–52)
HCT VFR BLD EST: 34 % (ref 41–52)
HCT VFR BLD EST: 35 % (ref 41–52)
HCT VFR BLD EST: 35 % (ref 41–52)
HCT VFR BLD EST: 36 % (ref 41–52)
HCT VFR BLD EST: 37 % (ref 41–52)
HCT VFR BLD EST: 37 % (ref 41–52)
HCT VFR BLD EST: 38 % (ref 41–52)
HCT VFR BLD EST: 38 % (ref 41–52)
HGB BLD-MCNC: 12.6 G/DL (ref 13.5–17.5)
HGB BLDA-MCNC: 11.7 G/DL (ref 13.5–17.5)
HGB BLDA-MCNC: 12.4 G/DL (ref 13.5–17.5)
HGB BLDA-MCNC: 12.8 G/DL (ref 13.5–17.5)
HGB BLDMV-MCNC: 11.2 G/DL (ref 13.5–17.5)
HGB BLDMV-MCNC: 12.1 G/DL (ref 13.5–17.5)
HGB BLDMV-MCNC: 12.2 G/DL (ref 13.5–17.5)
HGB BLDMV-MCNC: 12.7 G/DL (ref 13.5–17.5)
HGB BLDV-MCNC: 11.6 G/DL (ref 13.5–17.5)
IMM GRANULOCYTES # BLD AUTO: 0.14 X10*3/UL (ref 0–0.7)
IMM GRANULOCYTES NFR BLD AUTO: 1 % (ref 0–0.9)
INHALED O2 CONCENTRATION: 40 %
INHALED O2 CONCENTRATION: 50 %
INHALED O2 CONCENTRATION: 60 %
INHALED O2 CONCENTRATION: 60 %
LACTATE BLDA-SCNC: 0.7 MMOL/L (ref 0.4–2)
LACTATE BLDA-SCNC: 0.7 MMOL/L (ref 0.4–2)
LACTATE BLDA-SCNC: 0.9 MMOL/L (ref 0.4–2)
LACTATE BLDMV-SCNC: 0.7 MMOL/L (ref 0.4–2)
LACTATE BLDMV-SCNC: 1 MMOL/L (ref 0.4–2)
LACTATE BLDMV-SCNC: 1.2 MMOL/L (ref 0.4–2)
LACTATE BLDMV-SCNC: 1.3 MMOL/L (ref 0.4–2)
LACTATE BLDV-SCNC: 0.8 MMOL/L (ref 0.4–2)
LEFT VENTRICLE INTERNAL DIMENSION DIASTOLE: 5.07 CM (ref 3.5–6)
LIDOCAIN SERPL-MCNC: 3.4 UG/ML (ref 1–5)
LYMPHOCYTES # BLD AUTO: 1.47 X10*3/UL (ref 1.2–4.8)
LYMPHOCYTES NFR BLD AUTO: 10.5 %
MAGNESIUM SERPL-MCNC: 1.87 MG/DL (ref 1.6–2.4)
MCH RBC QN AUTO: 29.4 PG (ref 26–34)
MCHC RBC AUTO-ENTMCNC: 34.4 G/DL (ref 32–36)
MCV RBC AUTO: 86 FL (ref 80–100)
MONOCYTES # BLD AUTO: 0.81 X10*3/UL (ref 0.1–1)
MONOCYTES NFR BLD AUTO: 5.8 %
NEUTROPHILS # BLD AUTO: 11.5 X10*3/UL (ref 1.2–7.7)
NEUTROPHILS NFR BLD AUTO: 82.1 %
NRBC BLD-RTO: 0 /100 WBCS (ref 0–0)
OXYHGB MFR BLDA: 95.3 % (ref 94–98)
OXYHGB MFR BLDA: 95.8 % (ref 94–98)
OXYHGB MFR BLDA: 95.9 % (ref 94–98)
OXYHGB MFR BLDMV: 73.9 % (ref 45–75)
OXYHGB MFR BLDMV: 78.4 % (ref 45–75)
OXYHGB MFR BLDMV: 79.4 % (ref 45–75)
OXYHGB MFR BLDMV: 80 % (ref 45–75)
OXYHGB MFR BLDV: 84.2 % (ref 45–75)
P AXIS: 31 DEGREES
P OFFSET: 216 MS
P ONSET: 163 MS
PCO2 BLDA: 35 MM HG (ref 38–42)
PCO2 BLDA: 35 MM HG (ref 38–42)
PCO2 BLDA: 38 MM HG (ref 38–42)
PCO2 BLDMV: 40 MM HG (ref 41–51)
PCO2 BLDMV: 41 MM HG (ref 41–51)
PCO2 BLDMV: 41 MM HG (ref 41–51)
PCO2 BLDMV: 43 MM HG (ref 41–51)
PCO2 BLDV: 43 MM HG (ref 41–51)
PH BLDA: 7.3 PH (ref 7.38–7.42)
PH BLDA: 7.31 PH (ref 7.38–7.42)
PH BLDA: 7.33 PH (ref 7.38–7.42)
PH BLDMV: 7.22 PH (ref 7.33–7.43)
PH BLDMV: 7.26 PH (ref 7.33–7.43)
PH BLDMV: 7.31 PH (ref 7.33–7.43)
PH BLDMV: 7.31 PH (ref 7.33–7.43)
PH BLDV: 7.26 PH (ref 7.33–7.43)
PHOSPHATE SERPL-MCNC: 5 MG/DL (ref 2.5–4.9)
PHOSPHATE SERPL-MCNC: 5.2 MG/DL (ref 2.5–4.9)
PHOSPHATE SERPL-MCNC: 5.8 MG/DL (ref 2.5–4.9)
PHOSPHATE SERPL-MCNC: 6.6 MG/DL (ref 2.5–4.9)
PHOSPHATE SERPL-MCNC: 6.8 MG/DL (ref 2.5–4.9)
PHOSPHATE SERPL-MCNC: 7 MG/DL (ref 2.5–4.9)
PLATELET # BLD AUTO: 133 X10*3/UL (ref 150–450)
PO2 BLDA: 118 MM HG (ref 85–95)
PO2 BLDA: 143 MM HG (ref 85–95)
PO2 BLDA: 87 MM HG (ref 85–95)
PO2 BLDMV: 45 MM HG (ref 35–45)
PO2 BLDMV: 46 MM HG (ref 35–45)
PO2 BLDMV: 48 MM HG (ref 35–45)
PO2 BLDMV: 49 MM HG (ref 35–45)
PO2 BLDV: 52 MM HG (ref 35–45)
POTASSIUM BLDA-SCNC: 5.2 MMOL/L (ref 3.5–5.3)
POTASSIUM BLDA-SCNC: 5.3 MMOL/L (ref 3.5–5.3)
POTASSIUM BLDA-SCNC: 5.4 MMOL/L (ref 3.5–5.3)
POTASSIUM BLDMV-SCNC: 5.3 MMOL/L (ref 3.5–5.3)
POTASSIUM BLDMV-SCNC: 5.5 MMOL/L (ref 3.5–5.3)
POTASSIUM BLDMV-SCNC: 5.6 MMOL/L (ref 3.5–5.3)
POTASSIUM BLDMV-SCNC: 6 MMOL/L (ref 3.5–5.3)
POTASSIUM BLDV-SCNC: 5.5 MMOL/L (ref 3.5–5.3)
POTASSIUM SERPL-SCNC: 5.2 MMOL/L (ref 3.5–5.3)
POTASSIUM SERPL-SCNC: 5.3 MMOL/L (ref 3.5–5.3)
POTASSIUM SERPL-SCNC: 5.3 MMOL/L (ref 3.5–5.3)
POTASSIUM SERPL-SCNC: 5.4 MMOL/L (ref 3.5–5.3)
POTASSIUM SERPL-SCNC: 5.9 MMOL/L (ref 3.5–5.3)
POTASSIUM SERPL-SCNC: 7.1 MMOL/L (ref 3.5–5.3)
PR INTERVAL: 126 MS
PROT SERPL-MCNC: 4.6 G/DL (ref 6.4–8.2)
Q ONSET: 226 MS
QRS COUNT: 14 BEATS
QRS DURATION: 86 MS
QT INTERVAL: 390 MS
QTC CALCULATION(BAZETT): 458 MS
QTC FREDERICIA: 434 MS
R AXIS: 74 DEGREES
RBC # BLD AUTO: 4.28 X10*6/UL (ref 4.5–5.9)
SAO2 % BLDA: 95 % (ref 94–100)
SAO2 % BLDA: 96 % (ref 94–100)
SAO2 % BLDA: 96 % (ref 94–100)
SAO2 % BLDMV: 75 % (ref 45–75)
SAO2 % BLDMV: 79 % (ref 45–75)
SAO2 % BLDMV: 80 % (ref 45–75)
SAO2 % BLDMV: 80 % (ref 45–75)
SAO2 % BLDV: 85 % (ref 45–75)
SODIUM BLDA-SCNC: 129 MMOL/L (ref 136–145)
SODIUM BLDA-SCNC: 130 MMOL/L (ref 136–145)
SODIUM BLDA-SCNC: 131 MMOL/L (ref 136–145)
SODIUM BLDMV-SCNC: 129 MMOL/L (ref 136–145)
SODIUM BLDMV-SCNC: 131 MMOL/L (ref 136–145)
SODIUM BLDV-SCNC: 128 MMOL/L (ref 136–145)
SODIUM SERPL-SCNC: 131 MMOL/L (ref 136–145)
SODIUM SERPL-SCNC: 132 MMOL/L (ref 136–145)
SODIUM SERPL-SCNC: 133 MMOL/L (ref 136–145)
T AXIS: 9 DEGREES
T OFFSET: 421 MS
VANCOMYCIN TROUGH SERPL-MCNC: 21.6 UG/ML (ref 5–20)
VENTRICULAR RATE: 83 BPM
WBC # BLD AUTO: 14 X10*3/UL (ref 4.4–11.3)

## 2024-07-12 PROCEDURE — 82550 ASSAY OF CK (CPK): CPT

## 2024-07-12 PROCEDURE — 82040 ASSAY OF SERUM ALBUMIN: CPT

## 2024-07-12 PROCEDURE — 80176 ASSAY OF LIDOCAINE: CPT | Performed by: INTERNAL MEDICINE

## 2024-07-12 PROCEDURE — 95720 EEG PHY/QHP EA INCR W/VEEG: CPT | Performed by: STUDENT IN AN ORGANIZED HEALTH CARE EDUCATION/TRAINING PROGRAM

## 2024-07-12 PROCEDURE — 94003 VENT MGMT INPAT SUBQ DAY: CPT

## 2024-07-12 PROCEDURE — 80069 RENAL FUNCTION PANEL: CPT

## 2024-07-12 PROCEDURE — 84132 ASSAY OF SERUM POTASSIUM: CPT

## 2024-07-12 PROCEDURE — 2500000004 HC RX 250 GENERAL PHARMACY W/ HCPCS (ALT 636 FOR OP/ED)

## 2024-07-12 PROCEDURE — 37799 UNLISTED PX VASCULAR SURGERY: CPT

## 2024-07-12 PROCEDURE — 93005 ELECTROCARDIOGRAM TRACING: CPT

## 2024-07-12 PROCEDURE — 99291 CRITICAL CARE FIRST HOUR: CPT | Performed by: INTERNAL MEDICINE

## 2024-07-12 PROCEDURE — 2500000004 HC RX 250 GENERAL PHARMACY W/ HCPCS (ALT 636 FOR OP/ED): Performed by: STUDENT IN AN ORGANIZED HEALTH CARE EDUCATION/TRAINING PROGRAM

## 2024-07-12 PROCEDURE — 2500000004 HC RX 250 GENERAL PHARMACY W/ HCPCS (ALT 636 FOR OP/ED): Mod: JZ

## 2024-07-12 PROCEDURE — 2500000005 HC RX 250 GENERAL PHARMACY W/O HCPCS

## 2024-07-12 PROCEDURE — 93010 ELECTROCARDIOGRAM REPORT: CPT | Performed by: INTERNAL MEDICINE

## 2024-07-12 PROCEDURE — 84132 ASSAY OF SERUM POTASSIUM: CPT | Performed by: INTERNAL MEDICINE

## 2024-07-12 PROCEDURE — 84520 ASSAY OF UREA NITROGEN: CPT | Performed by: INTERNAL MEDICINE

## 2024-07-12 PROCEDURE — 83605 ASSAY OF LACTIC ACID: CPT | Performed by: INTERNAL MEDICINE

## 2024-07-12 PROCEDURE — 99222 1ST HOSP IP/OBS MODERATE 55: CPT

## 2024-07-12 PROCEDURE — 85025 COMPLETE CBC W/AUTO DIFF WBC: CPT

## 2024-07-12 PROCEDURE — 82947 ASSAY GLUCOSE BLOOD QUANT: CPT

## 2024-07-12 PROCEDURE — 2500000002 HC RX 250 W HCPCS SELF ADMINISTERED DRUGS (ALT 637 FOR MEDICARE OP, ALT 636 FOR OP/ED)

## 2024-07-12 PROCEDURE — 80202 ASSAY OF VANCOMYCIN: CPT

## 2024-07-12 PROCEDURE — 83735 ASSAY OF MAGNESIUM: CPT

## 2024-07-12 PROCEDURE — 37799 UNLISTED PX VASCULAR SURGERY: CPT | Performed by: INTERNAL MEDICINE

## 2024-07-12 PROCEDURE — 95715 VEEG EA 12-26HR INTMT MNTR: CPT

## 2024-07-12 PROCEDURE — 93325 DOPPLER ECHO COLOR FLOW MAPG: CPT | Performed by: STUDENT IN AN ORGANIZED HEALTH CARE EDUCATION/TRAINING PROGRAM

## 2024-07-12 PROCEDURE — 2020000001 HC ICU ROOM DAILY

## 2024-07-12 PROCEDURE — 93325 DOPPLER ECHO COLOR FLOW MAPG: CPT

## 2024-07-12 PROCEDURE — C9113 INJ PANTOPRAZOLE SODIUM, VIA: HCPCS

## 2024-07-12 PROCEDURE — 93308 TTE F-UP OR LMTD: CPT | Performed by: STUDENT IN AN ORGANIZED HEALTH CARE EDUCATION/TRAINING PROGRAM

## 2024-07-12 PROCEDURE — 85347 COAGULATION TIME ACTIVATED: CPT

## 2024-07-12 RX ORDER — CALCIUM GLUCONATE 20 MG/ML
1 INJECTION, SOLUTION INTRAVENOUS EVERY 4 HOURS
Status: COMPLETED | OUTPATIENT
Start: 2024-07-12 | End: 2024-07-12

## 2024-07-12 RX ORDER — DEXTROSE MONOHYDRATE 100 MG/ML
50 INJECTION, SOLUTION INTRAVENOUS CONTINUOUS
Status: DISPENSED | OUTPATIENT
Start: 2024-07-12 | End: 2024-07-12

## 2024-07-12 RX ORDER — DEXTROSE 50 % IN WATER (D50W) INTRAVENOUS SYRINGE
25 ONCE
Status: COMPLETED | OUTPATIENT
Start: 2024-07-12 | End: 2024-07-12

## 2024-07-12 RX ORDER — ATROPINE SULFATE 0.1 MG/ML
INJECTION INTRAVENOUS
Status: COMPLETED
Start: 2024-07-12 | End: 2024-07-12

## 2024-07-12 RX ORDER — PHENYLEPHRINE HCL IN 0.9% NACL 0.4MG/10ML
SYRINGE (ML) INTRAVENOUS
Status: COMPLETED
Start: 2024-07-12 | End: 2024-07-12

## 2024-07-12 RX ORDER — DEXTROSE MONOHYDRATE 100 MG/ML
50 INJECTION, SOLUTION INTRAVENOUS CONTINUOUS
Status: DISPENSED | OUTPATIENT
Start: 2024-07-12 | End: 2024-07-13

## 2024-07-12 RX ORDER — SODIUM BICARBONATE 650 MG/1
1300 TABLET ORAL 2 TIMES DAILY
Status: DISCONTINUED | OUTPATIENT
Start: 2024-07-12 | End: 2024-07-16

## 2024-07-12 SDOH — HEALTH STABILITY: MENTAL HEALTH
STRESS IS WHEN SOMEONE FEELS TENSE, NERVOUS, ANXIOUS, OR CAN'T SLEEP AT NIGHT BECAUSE THEIR MIND IS TROUBLED. HOW STRESSED ARE YOU?: TO SOME EXTENT

## 2024-07-12 SDOH — HEALTH STABILITY: MENTAL HEALTH: HOW MANY STANDARD DRINKS CONTAINING ALCOHOL DO YOU HAVE ON A TYPICAL DAY?: PATIENT DOES NOT DRINK

## 2024-07-12 SDOH — HEALTH STABILITY: PHYSICAL HEALTH: ON AVERAGE, HOW MANY MINUTES DO YOU ENGAGE IN EXERCISE AT THIS LEVEL?: 50 MIN

## 2024-07-12 SDOH — SOCIAL STABILITY: SOCIAL INSECURITY: WITHIN THE LAST YEAR, HAVE YOU BEEN AFRAID OF YOUR PARTNER OR EX-PARTNER?: NO

## 2024-07-12 SDOH — SOCIAL STABILITY: SOCIAL INSECURITY: WITHIN THE LAST YEAR, HAVE YOU BEEN HUMILIATED OR EMOTIONALLY ABUSED IN OTHER WAYS BY YOUR PARTNER OR EX-PARTNER?: NO

## 2024-07-12 SDOH — SOCIAL STABILITY: SOCIAL INSECURITY
WITHIN THE LAST YEAR, HAVE TO BEEN RAPED OR FORCED TO HAVE ANY KIND OF SEXUAL ACTIVITY BY YOUR PARTNER OR EX-PARTNER?: NO

## 2024-07-12 SDOH — HEALTH STABILITY: MENTAL HEALTH
HOW OFTEN DO YOU NEED TO HAVE SOMEONE HELP YOU WHEN YOU READ INSTRUCTIONS, PAMPHLETS, OR OTHER WRITTEN MATERIAL FROM YOUR DOCTOR OR PHARMACY?: NEVER

## 2024-07-12 SDOH — SOCIAL STABILITY: SOCIAL NETWORK: HOW OFTEN DO YOU ATTENT MEETINGS OF THE CLUB OR ORGANIZATION YOU BELONG TO?: NEVER

## 2024-07-12 SDOH — SOCIAL STABILITY: SOCIAL NETWORK
IN A TYPICAL WEEK, HOW MANY TIMES DO YOU TALK ON THE PHONE WITH FAMILY, FRIENDS, OR NEIGHBORS?: MORE THAN THREE TIMES A WEEK

## 2024-07-12 SDOH — SOCIAL STABILITY: SOCIAL NETWORK
DO YOU BELONG TO ANY CLUBS OR ORGANIZATIONS SUCH AS CHURCH GROUPS UNIONS, FRATERNAL OR ATHLETIC GROUPS, OR SCHOOL GROUPS?: NO

## 2024-07-12 SDOH — SOCIAL STABILITY: SOCIAL NETWORK: HOW OFTEN DO YOU ATTEND CHURCH OR RELIGIOUS SERVICES?: NEVER

## 2024-07-12 SDOH — ECONOMIC STABILITY: HOUSING INSECURITY: AT ANY TIME IN THE PAST 12 MONTHS, WERE YOU HOMELESS OR LIVING IN A SHELTER (INCLUDING NOW)?: NO

## 2024-07-12 SDOH — ECONOMIC STABILITY: FOOD INSECURITY: WITHIN THE PAST 12 MONTHS, YOU WORRIED THAT YOUR FOOD WOULD RUN OUT BEFORE YOU GOT MONEY TO BUY MORE.: NEVER TRUE

## 2024-07-12 SDOH — ECONOMIC STABILITY: INCOME INSECURITY: IN THE PAST 12 MONTHS, HAS THE ELECTRIC, GAS, OIL, OR WATER COMPANY THREATENED TO SHUT OFF SERVICE IN YOUR HOME?: NO

## 2024-07-12 SDOH — ECONOMIC STABILITY: FOOD INSECURITY: WITHIN THE PAST 12 MONTHS, THE FOOD YOU BOUGHT JUST DIDN'T LAST AND YOU DIDN'T HAVE MONEY TO GET MORE.: NEVER TRUE

## 2024-07-12 SDOH — HEALTH STABILITY: MENTAL HEALTH: HOW OFTEN DO YOU HAVE 6 OR MORE DRINKS ON ONE OCCASION?: NEVER

## 2024-07-12 SDOH — ECONOMIC STABILITY: INCOME INSECURITY: HOW HARD IS IT FOR YOU TO PAY FOR THE VERY BASICS LIKE FOOD, HOUSING, MEDICAL CARE, AND HEATING?: VERY HARD

## 2024-07-12 SDOH — SOCIAL STABILITY: SOCIAL INSECURITY
WITHIN THE LAST YEAR, HAVE YOU BEEN KICKED, HIT, SLAPPED, OR OTHERWISE PHYSICALLY HURT BY YOUR PARTNER OR EX-PARTNER?: NO

## 2024-07-12 SDOH — HEALTH STABILITY: MENTAL HEALTH: HOW OFTEN DO YOU HAVE A DRINK CONTAINING ALCOHOL?: NEVER

## 2024-07-12 SDOH — SOCIAL STABILITY: SOCIAL NETWORK: HOW OFTEN DO YOU GET TOGETHER WITH FRIENDS OR RELATIVES?: MORE THAN THREE TIMES A WEEK

## 2024-07-12 SDOH — ECONOMIC STABILITY: INCOME INSECURITY: IN THE LAST 12 MONTHS, WAS THERE A TIME WHEN YOU WERE NOT ABLE TO PAY THE MORTGAGE OR RENT ON TIME?: NO

## 2024-07-12 SDOH — HEALTH STABILITY: PHYSICAL HEALTH: ON AVERAGE, HOW MANY DAYS PER WEEK DO YOU ENGAGE IN MODERATE TO STRENUOUS EXERCISE (LIKE A BRISK WALK)?: 3 DAYS

## 2024-07-12 SDOH — SOCIAL STABILITY: SOCIAL NETWORK: ARE YOU MARRIED, WIDOWED, DIVORCED, SEPARATED, NEVER MARRIED, OR LIVING WITH A PARTNER?: MARRIED

## 2024-07-12 SDOH — ECONOMIC STABILITY: HOUSING INSECURITY: IN THE PAST 12 MONTHS, HOW MANY TIMES HAVE YOU MOVED WHERE YOU WERE LIVING?: 1

## 2024-07-12 SDOH — ECONOMIC STABILITY: TRANSPORTATION INSECURITY
IN THE PAST 12 MONTHS, HAS THE LACK OF TRANSPORTATION KEPT YOU FROM MEDICAL APPOINTMENTS OR FROM GETTING MEDICATIONS?: NO

## 2024-07-12 SDOH — ECONOMIC STABILITY: TRANSPORTATION INSECURITY
IN THE PAST 12 MONTHS, HAS LACK OF TRANSPORTATION KEPT YOU FROM MEETINGS, WORK, OR FROM GETTING THINGS NEEDED FOR DAILY LIVING?: NO

## 2024-07-12 ASSESSMENT — PAIN SCALES - GENERAL
PAINLEVEL_OUTOF10: 0 - NO PAIN
PAINLEVEL_OUTOF10: 0 - NO PAIN

## 2024-07-12 ASSESSMENT — PAIN - FUNCTIONAL ASSESSMENT
PAIN_FUNCTIONAL_ASSESSMENT: CPOT (CRITICAL CARE PAIN OBSERVATION TOOL)

## 2024-07-12 ASSESSMENT — LIFESTYLE VARIABLES
AUDIT-C TOTAL SCORE: 0
SKIP TO QUESTIONS 9-10: 1

## 2024-07-12 NOTE — PROGRESS NOTES
Miguel A Bender   43 yRacheleoRachele    @@  N/Room: [id number]/16/16-A    Subjective: Currently intubated and sedated. Not responsive to stimulation. He is on lidocaine for polymorphic VT.     Objective:     Meds:   fentaNYL, 25 mcg, Once  levETIRAcetam, 500 mg, q12h  oxygen, , Continuous - Inhalation  pantoprazole, 40 mg, BID  piperacillin-tazobactam, 2.25 g, q6h  polyethylene glycol, 17 g, Daily  sodium zirconium cyclosilicate, 10 g, Once      EPINEPHrine, Last Rate: Stopped (07/11/24 0545)  fentaNYL, Last Rate: 50 mcg/hr (07/12/24 1100)  heparin Impella Purge 50 units/mL in 500 mL D5W, Last Rate: 10 mL/hr (07/11/24 0316)  lidocaine, Last Rate: 1 mg/min (07/12/24 1100)  norepinephrine, Last Rate: Stopped (07/11/24 1015)  propofol, Last Rate: 30 mcg/kg/min (07/12/24 1100)      dextrose, 12.5 g, q15 min PRN  dextrose, 25 g, q15 min PRN  fentaNYL, 25 mcg, q10 min PRN  glucagon, 1 mg, q15 min PRN  glucagon, 1 mg, q15 min PRN  vancomycin, , Daily PRN        Vitals:    07/12/24 1119   BP:    Pulse:    Resp:    Temp: 36 °C (96.8 °F)   SpO2:           Intake/Output Summary (Last 24 hours) at 7/12/2024 1215  Last data filed at 7/12/2024 1100  Gross per 24 hour   Intake 2260.29 ml   Output 190 ml   Net 2070.29 ml       General appearance: no distress  Eyes: non-icteric  Skin: no apparent rash  Heart: HR regular  Lungs: CTA bilat no wheezing/crackles  Abdomen: soft, nt/nd  Extremities: mild edema bilat  El  Neuro: sedated  Access :no HD access    Blood Labs:  Results for orders placed or performed during the hospital encounter of 07/10/24 (from the past 24 hour(s))   POCT GLUCOSE   Result Value Ref Range    POCT Glucose 92 74 - 99 mg/dL   ACTIVATED CLOTTING TIME LOW   Result Value Ref Range    POCT Activated Clotting Time Low Range 176 83 - 199 sec   Renal function panel   Result Value Ref Range    Glucose 85 74 - 99 mg/dL    Sodium 133 (L) 136 - 145 mmol/L    Potassium 5.1 3.5 - 5.3 mmol/L    Chloride 103 98 - 107 mmol/L     Bicarbonate 18 (L) 21 - 32 mmol/L    Anion Gap 17 10 - 20 mmol/L    Urea Nitrogen 43 (H) 6 - 23 mg/dL    Creatinine 3.58 (H) 0.50 - 1.30 mg/dL    eGFR 20 (L) >60 mL/min/1.73m*2    Calcium 8.3 (L) 8.6 - 10.6 mg/dL    Phosphorus 4.3 2.5 - 4.9 mg/dL    Albumin 3.3 (L) 3.4 - 5.0 g/dL   Blood Gas Arterial Full Panel   Result Value Ref Range    POCT pH, Arterial 7.35 (L) 7.38 - 7.42 pH    POCT pCO2, Arterial 34 (L) 38 - 42 mm Hg    POCT pO2, Arterial 79 (L) 85 - 95 mm Hg    POCT SO2, Arterial 95 94 - 100 %    POCT Oxy Hemoglobin, Arterial 94.7 94.0 - 98.0 %    POCT Hematocrit Calculated, Arterial 41.0 41.0 - 52.0 %    POCT Sodium, Arterial 130 (L) 136 - 145 mmol/L    POCT Potassium, Arterial 5.5 (H) 3.5 - 5.3 mmol/L    POCT Chloride, Arterial 102 98 - 107 mmol/L    POCT Ionized Calcium, Arterial 1.16 1.10 - 1.33 mmol/L    POCT Glucose, Arterial 94 74 - 99 mg/dL    POCT Lactate, Arterial 1.0 0.4 - 2.0 mmol/L    POCT Base Excess, Arterial -6.0 (L) -2.0 - 3.0 mmol/L    POCT HCO3 Calculated, Arterial 18.8 (L) 22.0 - 26.0 mmol/L    POCT Hemoglobin, Arterial 13.6 13.5 - 17.5 g/dL    POCT Anion Gap, Arterial 15 10 - 25 mmo/L    Patient Temperature 37.0 degrees Celsius    FiO2 40 %   BLOOD GAS MIXED VENOUS FULL PANEL   Result Value Ref Range    POCT pH, Mixed 7.31 (L) 7.33 - 7.43 pH    POCT pCO2, Mixed 40 (L) 41 - 51 mm Hg    POCT pO2, Mixed 39 35 - 45 mm Hg    POCT SO2, Mixed 68 45 - 75 %    POCT Oxy Hemoglobin, Mixed 67.1 45.0 - 75.0 %    POCT Hematocrit Calculated, Mixed 40.0 (L) 41.0 - 52.0 %    POCT Sodium, Mixed 129 (L) 136 - 145 mmol/L    POCT Potassium, Mixed 5.2 3.5 - 5.3 mmol/L    POCT Chloride, Mixed 100 98 - 107 mmol/L    POCT Ionized Calcium, Mixed 1.21 1.10 - 1.33 mmol/L    POCT Glucose, Mixed 89 74 - 99 mg/dL    POCT Lactate, Mixed 0.9 0.4 - 2.0 mmol/L    POCT Base Excess, Mixed -5.8 (L) -2.0 - 3.0 mmol/L    POCT HCO3 Calculated, Mixed 20.1 (L) 22.0 - 26.0 mmol/L    POCT Hemoglobin, Mixed 13.4 (L) 13.5 - 17.5 g/dL     POCT Anion Gap, Mixed 14 10 - 25 mmo/L    Patient Temperature 37.0 degrees Celsius    FiO2 40 %   POCT GLUCOSE   Result Value Ref Range    POCT Glucose 72 (L) 74 - 99 mg/dL   POCT GLUCOSE   Result Value Ref Range    POCT Glucose 75 74 - 99 mg/dL   ACTIVATED CLOTTING TIME LOW   Result Value Ref Range    POCT Activated Clotting Time Low Range 171 83 - 199 sec   BLOOD GAS MIXED VENOUS FULL PANEL   Result Value Ref Range    POCT pH, Mixed 7.31 (L) 7.33 - 7.43 pH    POCT pCO2, Mixed 40 (L) 41 - 51 mm Hg    POCT pO2, Mixed 45 35 - 45 mm Hg    POCT SO2, Mixed 75 45 - 75 %    POCT Oxy Hemoglobin, Mixed 73.9 45.0 - 75.0 %    POCT Hematocrit Calculated, Mixed 38.0 (L) 41.0 - 52.0 %    POCT Sodium, Mixed 131 (L) 136 - 145 mmol/L    POCT Potassium, Mixed 5.3 3.5 - 5.3 mmol/L    POCT Chloride, Mixed 101 98 - 107 mmol/L    POCT Ionized Calcium, Mixed 1.17 1.10 - 1.33 mmol/L    POCT Glucose, Mixed 94 74 - 99 mg/dL    POCT Lactate, Mixed 1.0 0.4 - 2.0 mmol/L    POCT Base Excess, Mixed -5.8 (L) -2.0 - 3.0 mmol/L    POCT HCO3 Calculated, Mixed 20.1 (L) 22.0 - 26.0 mmol/L    POCT Hemoglobin, Mixed 12.7 (L) 13.5 - 17.5 g/dL    POCT Anion Gap, Mixed 15 10 - 25 mmo/L    Patient Temperature 37.0 degrees Celsius    FiO2 60 %   ACTIVATED CLOTTING TIME LOW   Result Value Ref Range    POCT Activated Clotting Time Low Range 163 83 - 199 sec   Blood Gas Arterial Full Panel   Result Value Ref Range    POCT pH, Arterial 7.30 (L) 7.38 - 7.42 pH    POCT pCO2, Arterial 38 38 - 42 mm Hg    POCT pO2, Arterial 118 (H) 85 - 95 mm Hg    POCT SO2, Arterial 96 94 - 100 %    POCT Oxy Hemoglobin, Arterial 95.8 94.0 - 98.0 %    POCT Hematocrit Calculated, Arterial 38.0 (L) 41.0 - 52.0 %    POCT Sodium, Arterial 131 (L) 136 - 145 mmol/L    POCT Potassium, Arterial 5.4 (H) 3.5 - 5.3 mmol/L    POCT Chloride, Arterial 100 98 - 107 mmol/L    POCT Ionized Calcium, Arterial 1.19 1.10 - 1.33 mmol/L    POCT Glucose, Arterial 93 74 - 99 mg/dL    POCT Lactate,  Arterial 0.9 0.4 - 2.0 mmol/L    POCT Base Excess, Arterial -7.1 (L) -2.0 - 3.0 mmol/L    POCT HCO3 Calculated, Arterial 18.7 (L) 22.0 - 26.0 mmol/L    POCT Hemoglobin, Arterial 12.8 (L) 13.5 - 17.5 g/dL    POCT Anion Gap, Arterial 18 10 - 25 mmo/L    Patient Temperature 37.0 degrees Celsius    FiO2 60 %   Vancomycin, Trough   Result Value Ref Range    Vancomycin, Trough 21.6 (HH) 5.0 - 20.0 ug/mL   CBC and Auto Differential   Result Value Ref Range    WBC 14.0 (H) 4.4 - 11.3 x10*3/uL    nRBC 0.0 0.0 - 0.0 /100 WBCs    RBC 4.28 (L) 4.50 - 5.90 x10*6/uL    Hemoglobin 12.6 (L) 13.5 - 17.5 g/dL    Hematocrit 36.6 (L) 41.0 - 52.0 %    MCV 86 80 - 100 fL    MCH 29.4 26.0 - 34.0 pg    MCHC 34.4 32.0 - 36.0 g/dL    RDW 13.9 11.5 - 14.5 %    Platelets 133 (L) 150 - 450 x10*3/uL    Neutrophils % 82.1 40.0 - 80.0 %    Immature Granulocytes %, Automated 1.0 (H) 0.0 - 0.9 %    Lymphocytes % 10.5 13.0 - 44.0 %    Monocytes % 5.8 2.0 - 10.0 %    Eosinophils % 0.1 0.0 - 6.0 %    Basophils % 0.5 0.0 - 2.0 %    Neutrophils Absolute 11.50 (H) 1.20 - 7.70 x10*3/uL    Immature Granulocytes Absolute, Automated 0.14 0.00 - 0.70 x10*3/uL    Lymphocytes Absolute 1.47 1.20 - 4.80 x10*3/uL    Monocytes Absolute 0.81 0.10 - 1.00 x10*3/uL    Eosinophils Absolute 0.01 0.00 - 0.70 x10*3/uL    Basophils Absolute 0.07 0.00 - 0.10 x10*3/uL   Renal Function Panel   Result Value Ref Range    Glucose 82 74 - 99 mg/dL    Sodium 132 (L) 136 - 145 mmol/L    Potassium 7.1 (HH) 3.5 - 5.3 mmol/L    Chloride 101 98 - 107 mmol/L    Bicarbonate 18 (L) 21 - 32 mmol/L    Anion Gap 20 10 - 20 mmol/L    Urea Nitrogen 48 (H) 6 - 23 mg/dL    Creatinine 4.32 (H) 0.50 - 1.30 mg/dL    eGFR 16 (L) >60 mL/min/1.73m*2    Calcium 5.4 (LL) 8.6 - 10.6 mg/dL    Phosphorus 5.0 (H) 2.5 - 4.9 mg/dL    Albumin 3.2 (L) 3.4 - 5.0 g/dL   Magnesium   Result Value Ref Range    Magnesium 1.87 1.60 - 2.40 mg/dL   Creatine Kinase   Result Value Ref Range    Creatine Kinase 4,553 (H) 0 -  325 U/L   ECG 12 lead   Result Value Ref Range    Ventricular Rate 83 BPM    Atrial Rate 83 BPM    SD Interval 126 ms    QRS Duration 86 ms    QT Interval 390 ms    QTC Calculation(Bazett) 458 ms    P Axis 31 degrees    R Axis 74 degrees    T Axis 9 degrees    QRS Count 14 beats    Q Onset 226 ms    P Onset 163 ms    P Offset 216 ms    T Offset 421 ms    QTC Fredericia 434 ms   Blood Gas Arterial Full Panel   Result Value Ref Range    POCT pH, Arterial 7.33 (L) 7.38 - 7.42 pH    POCT pCO2, Arterial 35 (L) 38 - 42 mm Hg    POCT pO2, Arterial 143 (H) 85 - 95 mm Hg    POCT SO2, Arterial 96 94 - 100 %    POCT Oxy Hemoglobin, Arterial 95.9 94.0 - 98.0 %    POCT Hematocrit Calculated, Arterial 37.0 (L) 41.0 - 52.0 %    POCT Sodium, Arterial 129 (L) 136 - 145 mmol/L    POCT Potassium, Arterial 5.2 3.5 - 5.3 mmol/L    POCT Chloride, Arterial 101 98 - 107 mmol/L    POCT Ionized Calcium, Arterial 1.14 1.10 - 1.33 mmol/L    POCT Glucose, Arterial 94 74 - 99 mg/dL    POCT Lactate, Arterial 0.7 0.4 - 2.0 mmol/L    POCT Base Excess, Arterial -6.7 (L) -2.0 - 3.0 mmol/L    POCT HCO3 Calculated, Arterial 18.5 (L) 22.0 - 26.0 mmol/L    POCT Hemoglobin, Arterial 12.4 (L) 13.5 - 17.5 g/dL    POCT Anion Gap, Arterial 15 10 - 25 mmo/L    Patient Temperature 37.0 degrees Celsius    FiO2 50 %   Renal Function Panel   Result Value Ref Range    Glucose 91 74 - 99 mg/dL    Sodium 131 (L) 136 - 145 mmol/L    Potassium 5.2 3.5 - 5.3 mmol/L    Chloride 101 98 - 107 mmol/L    Bicarbonate 19 (L) 21 - 32 mmol/L    Anion Gap 16 10 - 20 mmol/L    Urea Nitrogen 51 (H) 6 - 23 mg/dL    Creatinine 4.40 (H) 0.50 - 1.30 mg/dL    eGFR 16 (L) >60 mL/min/1.73m*2    Calcium 8.3 (L) 8.6 - 10.6 mg/dL    Phosphorus 5.2 (H) 2.5 - 4.9 mg/dL    Albumin 3.1 (L) 3.4 - 5.0 g/dL   Lidocaine   Result Value Ref Range    Lidocaine  3.4 1.0 - 5.0 ug/mL   POCT GLUCOSE   Result Value Ref Range    POCT Glucose 92 74 - 99 mg/dL   BLOOD GAS MIXED VENOUS FULL PANEL   Result Value  Ref Range    POCT pH, Mixed 7.31 (L) 7.33 - 7.43 pH    POCT pCO2, Mixed 41 41 - 51 mm Hg    POCT pO2, Mixed 46 (H) 35 - 45 mm Hg    POCT SO2, Mixed 79 (H) 45 - 75 %    POCT Oxy Hemoglobin, Mixed 78.4 (H) 45.0 - 75.0 %    POCT Hematocrit Calculated, Mixed 36.0 (L) 41.0 - 52.0 %    POCT Sodium, Mixed 129 (L) 136 - 145 mmol/L    POCT Potassium, Mixed 5.6 (H) 3.5 - 5.3 mmol/L    POCT Chloride, Mixed 100 98 - 107 mmol/L    POCT Ionized Calcium, Mixed 1.16 1.10 - 1.33 mmol/L    POCT Glucose, Mixed 95 74 - 99 mg/dL    POCT Lactate, Mixed 0.7 0.4 - 2.0 mmol/L    POCT Base Excess, Mixed -5.4 (L) -2.0 - 3.0 mmol/L    POCT HCO3 Calculated, Mixed 20.6 (L) 22.0 - 26.0 mmol/L    POCT Hemoglobin, Mixed 12.1 (L) 13.5 - 17.5 g/dL    POCT Anion Gap, Mixed 14 10 - 25 mmo/L    Patient Temperature 37.0 degrees Celsius    FiO2 40 %   ACTIVATED CLOTTING TIME LOW   Result Value Ref Range    POCT Activated Clotting Time Low Range 153 83 - 199 sec   Blood Gas Venous Full Panel   Result Value Ref Range    POCT pH, Venous 7.26 (L) 7.33 - 7.43 pH    POCT pCO2, Venous 43 41 - 51 mm Hg    POCT pO2, Venous 52 (H) 35 - 45 mm Hg    POCT SO2, Venous 85 (H) 45 - 75 %    POCT Oxy Hemoglobin, Venous 84.2 (H) 45.0 - 75.0 %    POCT Hematocrit Calculated, Venous 35.0 (L) 41.0 - 52.0 %    POCT Sodium, Venous 128 (L) 136 - 145 mmol/L    POCT Potassium, Venous 5.5 (H) 3.5 - 5.3 mmol/L    POCT Chloride, Venous 100 98 - 107 mmol/L    POCT Ionized Calicum, Venous 1.15 1.10 - 1.33 mmol/L    POCT Glucose, Venous 122 (H) 74 - 99 mg/dL    POCT Lactate, Venous 0.8 0.4 - 2.0 mmol/L    POCT Base Excess, Venous -7.5 (L) -2.0 - 3.0 mmol/L    POCT HCO3 Calculated, Venous 19.3 (L) 22.0 - 26.0 mmol/L    POCT Hemoglobin, Venous 11.6 (L) 13.5 - 17.5 g/dL    POCT Anion Gap, Venous 14.0 10.0 - 25.0 mmol/L    Patient Temperature 37.0 degrees Celsius    FiO2 40 %   Transthoracic Echo (TTE) Limited   Result Value Ref Range    LV EF 30 %    LVIDd 5.07 cm   POCT GLUCOSE   Result  Value Ref Range    POCT Glucose 85 74 - 99 mg/dL   Blood Gas Arterial Full Panel   Result Value Ref Range    POCT pH, Arterial 7.31 (L) 7.38 - 7.42 pH    POCT pCO2, Arterial 35 (L) 38 - 42 mm Hg    POCT pO2, Arterial 87 85 - 95 mm Hg    POCT SO2, Arterial 95 94 - 100 %    POCT Oxy Hemoglobin, Arterial 95.3 94.0 - 98.0 %    POCT Hematocrit Calculated, Arterial 35.0 (L) 41.0 - 52.0 %    POCT Sodium, Arterial 130 (L) 136 - 145 mmol/L    POCT Potassium, Arterial 5.3 3.5 - 5.3 mmol/L    POCT Chloride, Arterial 101 98 - 107 mmol/L    POCT Ionized Calcium, Arterial 1.13 1.10 - 1.33 mmol/L    POCT Glucose, Arterial 86 74 - 99 mg/dL    POCT Lactate, Arterial 0.7 0.4 - 2.0 mmol/L    POCT Base Excess, Arterial -7.9 (L) -2.0 - 3.0 mmol/L    POCT HCO3 Calculated, Arterial 17.6 (L) 22.0 - 26.0 mmol/L    POCT Hemoglobin, Arterial 11.7 (L) 13.5 - 17.5 g/dL    POCT Anion Gap, Arterial 17 10 - 25 mmo/L    Patient Temperature 37.0 degrees Celsius    FiO2 40 %   ACTIVATED CLOTTING TIME LOW   Result Value Ref Range    POCT Activated Clotting Time Low Range 173 83 - 199 sec   Renal function panel   Result Value Ref Range    Glucose 88 74 - 99 mg/dL    Sodium 133 (L) 136 - 145 mmol/L    Potassium 5.3 3.5 - 5.3 mmol/L    Chloride 103 98 - 107 mmol/L    Bicarbonate 18 (L) 21 - 32 mmol/L    Anion Gap 17 10 - 20 mmol/L    Urea Nitrogen 55 (H) 6 - 23 mg/dL    Creatinine 4.94 (H) 0.50 - 1.30 mg/dL    eGFR 14 (L) >60 mL/min/1.73m*2    Calcium 7.6 (L) 8.6 - 10.6 mg/dL    Phosphorus 5.8 (H) 2.5 - 4.9 mg/dL    Albumin 2.7 (L) 3.4 - 5.0 g/dL   Hepatic function panel   Result Value Ref Range    Albumin 2.7 (L) 3.4 - 5.0 g/dL    Bilirubin, Total 2.1 (H) 0.0 - 1.2 mg/dL    Bilirubin, Direct 0.5 (H) 0.0 - 0.3 mg/dL    Alkaline Phosphatase 49 33 - 120 U/L    ALT 70 (H) 10 - 52 U/L    AST 84 (H) 9 - 39 U/L    Total Protein 4.6 (L) 6.4 - 8.2 g/dL   POCT GLUCOSE   Result Value Ref Range    POCT Glucose 74 74 - 99 mg/dL              ASSESSMENT:  Miguel A  Yulia is a  43 y.o.  Year old male a past medical hx of HTN, CHARLEEN, NSTEMI, and gastric bypass surgery admitted to [facility] on 7/10 with cardiac arrest. S/p left heart cath with Impella placement. Nephrology consulted for ANNIE/possible HD iso worsening UOP and renal function.     #ANNIE   - HE is oliguric  -Baseline Cr 1.19  -Etiology : Post cardiac arrest.  -Electrolytes stable , mild AGMA and NAGMA  -He is positive 2.1L  -His CVP was 13.  -Not on pressors anymore    RECOMMENDATIONS:  - Continue supportive management .  -No acute need RRT.  -Strict I/Os .  -Avoid IV contrast      Danna Nickerson MD  Nephrology Fellow   Daytime / Weekend Renal Pager [phone]  After 7 pm Emergencies [phone] Pager [phone]

## 2024-07-12 NOTE — CARE PLAN
Problem: Arrythmia/Dysrhythmia  Goal: Lab values return to normal range  Outcome: Progressing  Goal: No evidence of post procedure complications  Outcome: Progressing  Goal: Promote self management  Outcome: Progressing  Goal: Serial ECG will return to baseline  Outcome: Progressing  Goal: Verbalize understanding of procedures/devices  Outcome: Progressing  Goal: Vital signs return to baseline  Outcome: Progressing     Problem: Cardiac catheterization  Goal: Free from dysrhythmias  Flowsheets (Taken 7/11/2024 2233)  Free from dysrhythmias: Monitor vitals/rhythm/ECG  Goal: No evidence of post procedure complications  Outcome: Progressing     Problem: Skin  Goal: Decreased wound size/increased tissue granulation at next dressing change  Outcome: Progressing  Goal: Participates in plan/prevention/treatment measures  Outcome: Progressing  Goal: Prevent/manage excess moisture  Outcome: Progressing  Goal: Prevent/minimize sheer/friction injuries  Outcome: Progressing  Flowsheets (Taken 7/11/2024 2233)  Prevent/minimize sheer/friction injuries: Use pull sheet  Goal: Promote/optimize nutrition  Flowsheets (Taken 7/11/2024 2233)  Promote/optimize nutrition: Discuss with provider if NPO > 2 days  Goal: Promote skin healing  Flowsheets (Taken 7/11/2024 2233)  Promote skin healing:   Protective dressings over bony prominences   Assess skin/pad under line(s)/device(s)

## 2024-07-12 NOTE — CONSULTS
"Nutrition Initial Assessment:   Nutrition Assessment    The patient is a 43 y.o. male who is hospital day #2.  Pt admitted post cardiac arrest for management of cardiogenic shock s/p Impella placement. Pt intubated on 07/10. Currently pt improving hemodynamically and neurologically per progress notes. Nephro consulted for worsening kidney function.     PMHx:  gastric bypass surgery 2 years ago, CHARLEEN, HTN    Reason for Assessment: Admission nursing screening      Nutrition History:  Food and Nutrient History: Plan to start TF today. Currently has been NPO for about 2 days.  At time of visit pt with enteral access through OGT. Propofol running at 21.1 ml/hr = 557 kcal/d provided. Fent, lido, and IV fluids running as well. MVe = 8.4. Temp control at 36.4C. Family present and able to provide nutr hx. Reports pt had gastric bypass about 2-3 years ago and lost 170lb since then however, no recent significant wt changes. Pt eating at his baseline PTA. Allergic to shellfish.      Anthropometrics:  Start of admission anthropometrics:  Height: 178 cm (5' 10.08\")  Weight: 117 kg (257 lb 15 oz)  BMI (Calculated): 36.93    IBW/kg (Dietitian Calculated): 75.5 kg          Wt Readings from Last 10 Encounters:   07/12/24 128 kg (282 lb)   07/10/24 117 kg (257 lb 0.9 oz)       Weight         7/10/2024  2300 7/11/2024  0300 7/12/2024  0537       Weight: 117 kg (257 lb 15 oz) 117 kg (257 lb 15 oz) 128 kg (282 lb)             Weight Change %:  Weight History / % Weight Change: No wt hx available in EMR. Per family, pt's UBW is around 225-230#. Weights during admission higher likely d/t fluids. Family notes, pt with a total of 170# wt loss since gastric bypass 2-3 years ago but no recent wt changes.    Nutrition Focused Physical Exam Findings:  --> Hard to do full NFPE given pt's lines, ETT, and temp management device. Visually appears well nourished.     Muscle Wasting:  Temporalis: Well nourished (well-defined muscle)  Pectoralis " (Clavicular Region): Well nourished (clavicle not visible)  Interosseous: Well nourished (muscle bulges)  Edema:  Edema: +1 trace  Edema Location: generalized  Physical Findings:  Skin: Negative    Objective Data:    Last BM Date: 07/10/24    Nutrition Significant Labs:    Results from last 7 days   Lab Units 07/12/24  0913 07/12/24  0351 07/12/24  0203 07/11/24  1101 07/11/24  0417 07/10/24  2306   HEMOGLOBIN g/dL  --   --  12.6*  --  14.0 15.3   MCV fL  --   --  86  --  85 88   GLUCOSE mg/dL 88   < > 82   < > 231* 243*   POTASSIUM mmol/L 5.3   < > 7.1*   < > 4.3 3.1*   SODIUM mmol/L 133*   < > 132*   < > 136 140   PHOSPHORUS mg/dL 5.8*   < > 5.0*   < > 3.4  --    MAGNESIUM mg/dL  --   --  1.87   < > 2.36 1.64   CREATININE mg/dL 4.94*   < > 4.32*   < > 2.45* 2.09*   BUN mg/dL 55*   < > 48*   < > 31* 28*   ALT U/L 70*  --   --   --   --  116*   AST U/L 84*  --   --   --   --  127*   ALK PHOS U/L 49  --   --   --   --  71    < > = values in this interval not displayed.       Nutrition Specific Medications:  fentaNYL, 25 mcg, intravenous, Once  pantoprazole, 40 mg, intravenous, BID  piperacillin-tazobactam, 3.375 g, intravenous, q6h  polyethylene glycol, 17 g, oral, Daily  EPINEPHrine, 0-0.2 mcg/kg/min, Last Rate: Stopped (07/11/24 0545)  fentaNYL, 0-300 mcg/hr, Last Rate: 50 mcg/hr (07/12/24 1100)  heparin Impella Purge 50 units/mL in 500 mL D5W, 10 mL/hr, Last Rate: 10 mL/hr (07/11/24 0316)  lidocaine, 1 mg/min, Last Rate: 1 mg/min (07/12/24 1100)  norepinephrine, 0-0.2 mcg/kg/min, Last Rate: Stopped (07/11/24 1015)  propofol, 5-50 mcg/kg/min, Last Rate: 30 mcg/kg/min (07/12/24 1100)        I/O:   I/O last 2 completed shifts:  In: 2371.5 (18.5 mL/kg) [I.V.:781.5 (6.1 mL/kg); NG/GT:240; IV Piggyback:1350]  Out: 180 (1.4 mL/kg) [Urine:180 (0.1 mL/kg/hr)]  Weight: 127.9 kg     Dietary Orders (From admission, onward)       Start     Ordered    07/10/24 2258  NPO Diet; Effective now  Diet effective now         07/10/24  2256                     Estimated Needs:   Total Energy Estimated Needs (kCal):  (1300 - 1650)  Method for Estimating Needs: 11-14 kcal/kg * 117 kg    Total Protein Estimated Needs (g):  (110-150)  Method for Estimating Needs: 1.5 - 2 g/kg IBW    Method for Estimating Needs: per ICU team          Nutrition Diagnosis        Nutrition Diagnosis  Patient has Nutrition Diagnosis: Yes  Diagnosis Status (1): New  Nutrition Diagnosis 1: Increased energy expenditure  Related to (1): critical illness  As Evidenced by (1): cardiogenic shock, hypocaloric feeding while intubated       Nutrition Interventions/Recommendations   Individualized Nutrition Prescription Provided for : 1300 - 1650 kcal and 110-150g protein via TF    While pt remains intubated and on propofol:  Start Nepro at 15 ml/hr and advanced once, after 12 hrs,  to goal rate of 25 ml/hr  Order Prostat BID   OK to flush Prostat through OGT   FWF per team   This regimen provides: 600 ml, 1262 kcal , 82 g protein, 436 ml free water     Once pt is OFF propofol can:  Increase rate to 30 ml/hr  Adjust Prostat order to TID   Provides: 720 ml, 1574 kcal, 109g protein, 523 ml free water     Nutrition Education:   Discussed with family basics of EN      Nutrition Monitoring and Evaluation   Monitoring and Evaluation Plan: Enteral and parenteral nutrition intake  Enteral and Parenteral Nutrition Intake: Enteral nutrition formula/solution, Enteral nutrition intake  Criteria: Recommended regimen    Monitoring and Evaluation Plan: Weight  Weight: Weight change  Criteria: no wt change    Monitoring and Evaluation Plan: Electrolyte/renal panel  Electrolyte and Renal Panel: Phosphorus, Magnesium, Potassium, Sodium  Criteria: lytes wnl              Time Spent (min): 60 minutes

## 2024-07-12 NOTE — PROGRESS NOTES
Physical Therapy                 Therapy Communication Note    Patient Name: Miguel A Bender  MRN: [id number]  Today's Date: 7/12/2024     Discipline: Physical Therapy    Missed Visit Reason: Missed Visit Reason: Patient placed on medical hold (Currently intubated and with fem Impella. No PT eval at this time.)    Missed Time: Attempt    Comment:

## 2024-07-12 NOTE — PROGRESS NOTES
Subjective   Overnight, had ventilator dyssynchrony and was started on fentanyl drip. Had soft pressures of 90/60 so got 2 L overnight. Impella decreased to P6 overnight. This morning was sedated and not responsive. CI of 4.86 on P6 impella pump.    Meds  Scheduled medications  calcium gluconate, 1 g, intravenous, q4h  fentaNYL, 25 mcg, intravenous, Once  levETIRAcetam, 500 mg, intravenous, q12h  oxygen, , inhalation, Continuous - Inhalation  pantoprazole, 40 mg, intravenous, BID  piperacillin-tazobactam, 2.25 g, intravenous, q6h  polyethylene glycol, 17 g, oral, Daily  sodium bicarbonate, 1,300 mg, orogastric tube, BID  sodium zirconium cyclosilicate, 10 g, oral, Once  sodium zirconium cyclosilicate, 10 g, oral, q8h      Continuous medications  dextrose 10 % in water (D10W), 50 mL/hr, Last Rate: 50 mL/hr (07/12/24 1600)  dextrose 10 % in water (D10W), 50 mL/hr  fentaNYL, 0-300 mcg/hr, Last Rate: 50 mcg/hr (07/12/24 1800)  heparin Impella Purge 50 units/mL in 500 mL D5W, 10 mL/hr, Last Rate: 10 mL/hr (07/11/24 0316)  lidocaine, 1 mg/min, Last Rate: 1 mg/min (07/12/24 1800)  propofol, 5-50 mcg/kg/min, Last Rate: 30 mcg/kg/min (07/12/24 1800)      PRN medications  PRN medications: dextrose, dextrose, fentaNYL, glucagon, glucagon, vancomycin      Objective   Vital signs in last 24 hours:  Temp:  [36 °C (96.8 °F)-37.5 °C (99.5 °F)] 36.4 °C (97.5 °F)  Heart Rate:  [65-92] 83  Resp:  [0-24] 19  Arterial Line BP 1: ()/() 106/59  FiO2 (%):  [40 %-60 %] 40 %    [place] Numbers:  - CVP =12, PA of 44/23, CI of 4.86, SVRI of 937, SvO2 of 79    Intake/Output this shift:    Intake/Output Summary (Last 24 hours) at 7/12/2024 1844  Last data filed at 7/12/2024 1800  Gross per 24 hour   Intake 2804.37 ml   Output 235 ml   Net 2569.37 ml       Physical  General: Patient is sedated, large body habitus  Pulm: Normal WOB at rest and when sitting up, no crackles or rhonchi  Cardiac: Regular rate and rhythm, normal  S1/S2  Abdomen: Non-tender to palpation, non-distended  Extremities: No peripheral edema, peripheral extremities warm and well perfuses, distal pulses 2+  Neuro: Patient sedated, pinpoint pupils, not responsive    Results  Results for orders placed or performed during the hospital encounter of 07/10/24 (from the past 24 hour(s))   POCT GLUCOSE   Result Value Ref Range    POCT Glucose 72 (L) 74 - 99 mg/dL   POCT GLUCOSE   Result Value Ref Range    POCT Glucose 75 74 - 99 mg/dL   ACTIVATED CLOTTING TIME LOW   Result Value Ref Range    POCT Activated Clotting Time Low Range 171 83 - 199 sec   BLOOD GAS MIXED VENOUS FULL PANEL   Result Value Ref Range    POCT pH, Mixed 7.31 (L) 7.33 - 7.43 pH    POCT pCO2, Mixed 40 (L) 41 - 51 mm Hg    POCT pO2, Mixed 45 35 - 45 mm Hg    POCT SO2, Mixed 75 45 - 75 %    POCT Oxy Hemoglobin, Mixed 73.9 45.0 - 75.0 %    POCT Hematocrit Calculated, Mixed 38.0 (L) 41.0 - 52.0 %    POCT Sodium, Mixed 131 (L) 136 - 145 mmol/L    POCT Potassium, Mixed 5.3 3.5 - 5.3 mmol/L    POCT Chloride, Mixed 101 98 - 107 mmol/L    POCT Ionized Calcium, Mixed 1.17 1.10 - 1.33 mmol/L    POCT Glucose, Mixed 94 74 - 99 mg/dL    POCT Lactate, Mixed 1.0 0.4 - 2.0 mmol/L    POCT Base Excess, Mixed -5.8 (L) -2.0 - 3.0 mmol/L    POCT HCO3 Calculated, Mixed 20.1 (L) 22.0 - 26.0 mmol/L    POCT Hemoglobin, Mixed 12.7 (L) 13.5 - 17.5 g/dL    POCT Anion Gap, Mixed 15 10 - 25 mmo/L    Patient Temperature 37.0 degrees Celsius    FiO2 60 %   POCT GLUCOSE   Result Value Ref Range    POCT Glucose 77 74 - 99 mg/dL   ACTIVATED CLOTTING TIME LOW   Result Value Ref Range    POCT Activated Clotting Time Low Range 163 83 - 199 sec   Blood Gas Arterial Full Panel   Result Value Ref Range    POCT pH, Arterial 7.30 (L) 7.38 - 7.42 pH    POCT pCO2, Arterial 38 38 - 42 mm Hg    POCT pO2, Arterial 118 (H) 85 - 95 mm Hg    POCT SO2, Arterial 96 94 - 100 %    POCT Oxy Hemoglobin, Arterial 95.8 94.0 - 98.0 %    POCT Hematocrit Calculated,  Arterial 38.0 (L) 41.0 - 52.0 %    POCT Sodium, Arterial 131 (L) 136 - 145 mmol/L    POCT Potassium, Arterial 5.4 (H) 3.5 - 5.3 mmol/L    POCT Chloride, Arterial 100 98 - 107 mmol/L    POCT Ionized Calcium, Arterial 1.19 1.10 - 1.33 mmol/L    POCT Glucose, Arterial 93 74 - 99 mg/dL    POCT Lactate, Arterial 0.9 0.4 - 2.0 mmol/L    POCT Base Excess, Arterial -7.1 (L) -2.0 - 3.0 mmol/L    POCT HCO3 Calculated, Arterial 18.7 (L) 22.0 - 26.0 mmol/L    POCT Hemoglobin, Arterial 12.8 (L) 13.5 - 17.5 g/dL    POCT Anion Gap, Arterial 18 10 - 25 mmo/L    Patient Temperature 37.0 degrees Celsius    FiO2 60 %   Vancomycin, Trough   Result Value Ref Range    Vancomycin, Trough 21.6 (HH) 5.0 - 20.0 ug/mL   CBC and Auto Differential   Result Value Ref Range    WBC 14.0 (H) 4.4 - 11.3 x10*3/uL    nRBC 0.0 0.0 - 0.0 /100 WBCs    RBC 4.28 (L) 4.50 - 5.90 x10*6/uL    Hemoglobin 12.6 (L) 13.5 - 17.5 g/dL    Hematocrit 36.6 (L) 41.0 - 52.0 %    MCV 86 80 - 100 fL    MCH 29.4 26.0 - 34.0 pg    MCHC 34.4 32.0 - 36.0 g/dL    RDW 13.9 11.5 - 14.5 %    Platelets 133 (L) 150 - 450 x10*3/uL    Neutrophils % 82.1 40.0 - 80.0 %    Immature Granulocytes %, Automated 1.0 (H) 0.0 - 0.9 %    Lymphocytes % 10.5 13.0 - 44.0 %    Monocytes % 5.8 2.0 - 10.0 %    Eosinophils % 0.1 0.0 - 6.0 %    Basophils % 0.5 0.0 - 2.0 %    Neutrophils Absolute 11.50 (H) 1.20 - 7.70 x10*3/uL    Immature Granulocytes Absolute, Automated 0.14 0.00 - 0.70 x10*3/uL    Lymphocytes Absolute 1.47 1.20 - 4.80 x10*3/uL    Monocytes Absolute 0.81 0.10 - 1.00 x10*3/uL    Eosinophils Absolute 0.01 0.00 - 0.70 x10*3/uL    Basophils Absolute 0.07 0.00 - 0.10 x10*3/uL   Renal Function Panel   Result Value Ref Range    Glucose 82 74 - 99 mg/dL    Sodium 132 (L) 136 - 145 mmol/L    Potassium 7.1 (HH) 3.5 - 5.3 mmol/L    Chloride 101 98 - 107 mmol/L    Bicarbonate 18 (L) 21 - 32 mmol/L    Anion Gap 20 10 - 20 mmol/L    Urea Nitrogen 48 (H) 6 - 23 mg/dL    Creatinine 4.32 (H) 0.50 -  1.30 mg/dL    eGFR 16 (L) >60 mL/min/1.73m*2    Calcium 5.4 (LL) 8.6 - 10.6 mg/dL    Phosphorus 5.0 (H) 2.5 - 4.9 mg/dL    Albumin 3.2 (L) 3.4 - 5.0 g/dL   Magnesium   Result Value Ref Range    Magnesium 1.87 1.60 - 2.40 mg/dL   Creatine Kinase   Result Value Ref Range    Creatine Kinase 4,553 (H) 0 - 325 U/L   ECG 12 lead   Result Value Ref Range    Ventricular Rate 83 BPM    Atrial Rate 83 BPM    VA Interval 126 ms    QRS Duration 86 ms    QT Interval 390 ms    QTC Calculation(Bazett) 458 ms    P Axis 31 degrees    R Axis 74 degrees    T Axis 9 degrees    QRS Count 14 beats    Q Onset 226 ms    P Onset 163 ms    P Offset 216 ms    T Offset 421 ms    QTC Fredericia 434 ms   Blood Gas Arterial Full Panel   Result Value Ref Range    POCT pH, Arterial 7.33 (L) 7.38 - 7.42 pH    POCT pCO2, Arterial 35 (L) 38 - 42 mm Hg    POCT pO2, Arterial 143 (H) 85 - 95 mm Hg    POCT SO2, Arterial 96 94 - 100 %    POCT Oxy Hemoglobin, Arterial 95.9 94.0 - 98.0 %    POCT Hematocrit Calculated, Arterial 37.0 (L) 41.0 - 52.0 %    POCT Sodium, Arterial 129 (L) 136 - 145 mmol/L    POCT Potassium, Arterial 5.2 3.5 - 5.3 mmol/L    POCT Chloride, Arterial 101 98 - 107 mmol/L    POCT Ionized Calcium, Arterial 1.14 1.10 - 1.33 mmol/L    POCT Glucose, Arterial 94 74 - 99 mg/dL    POCT Lactate, Arterial 0.7 0.4 - 2.0 mmol/L    POCT Base Excess, Arterial -6.7 (L) -2.0 - 3.0 mmol/L    POCT HCO3 Calculated, Arterial 18.5 (L) 22.0 - 26.0 mmol/L    POCT Hemoglobin, Arterial 12.4 (L) 13.5 - 17.5 g/dL    POCT Anion Gap, Arterial 15 10 - 25 mmo/L    Patient Temperature 37.0 degrees Celsius    FiO2 50 %   Renal Function Panel   Result Value Ref Range    Glucose 91 74 - 99 mg/dL    Sodium 131 (L) 136 - 145 mmol/L    Potassium 5.2 3.5 - 5.3 mmol/L    Chloride 101 98 - 107 mmol/L    Bicarbonate 19 (L) 21 - 32 mmol/L    Anion Gap 16 10 - 20 mmol/L    Urea Nitrogen 51 (H) 6 - 23 mg/dL    Creatinine 4.40 (H) 0.50 - 1.30 mg/dL    eGFR 16 (L) >60 mL/min/1.73m*2     Calcium 8.3 (L) 8.6 - 10.6 mg/dL    Phosphorus 5.2 (H) 2.5 - 4.9 mg/dL    Albumin 3.1 (L) 3.4 - 5.0 g/dL   Lidocaine   Result Value Ref Range    Lidocaine  3.4 1.0 - 5.0 ug/mL   POCT GLUCOSE   Result Value Ref Range    POCT Glucose 92 74 - 99 mg/dL   BLOOD GAS MIXED VENOUS FULL PANEL   Result Value Ref Range    POCT pH, Mixed 7.31 (L) 7.33 - 7.43 pH    POCT pCO2, Mixed 41 41 - 51 mm Hg    POCT pO2, Mixed 46 (H) 35 - 45 mm Hg    POCT SO2, Mixed 79 (H) 45 - 75 %    POCT Oxy Hemoglobin, Mixed 78.4 (H) 45.0 - 75.0 %    POCT Hematocrit Calculated, Mixed 36.0 (L) 41.0 - 52.0 %    POCT Sodium, Mixed 129 (L) 136 - 145 mmol/L    POCT Potassium, Mixed 5.6 (H) 3.5 - 5.3 mmol/L    POCT Chloride, Mixed 100 98 - 107 mmol/L    POCT Ionized Calcium, Mixed 1.16 1.10 - 1.33 mmol/L    POCT Glucose, Mixed 95 74 - 99 mg/dL    POCT Lactate, Mixed 0.7 0.4 - 2.0 mmol/L    POCT Base Excess, Mixed -5.4 (L) -2.0 - 3.0 mmol/L    POCT HCO3 Calculated, Mixed 20.6 (L) 22.0 - 26.0 mmol/L    POCT Hemoglobin, Mixed 12.1 (L) 13.5 - 17.5 g/dL    POCT Anion Gap, Mixed 14 10 - 25 mmo/L    Patient Temperature 37.0 degrees Celsius    FiO2 40 %   ACTIVATED CLOTTING TIME LOW   Result Value Ref Range    POCT Activated Clotting Time Low Range 153 83 - 199 sec   Blood Gas Venous Full Panel   Result Value Ref Range    POCT pH, Venous 7.26 (L) 7.33 - 7.43 pH    POCT pCO2, Venous 43 41 - 51 mm Hg    POCT pO2, Venous 52 (H) 35 - 45 mm Hg    POCT SO2, Venous 85 (H) 45 - 75 %    POCT Oxy Hemoglobin, Venous 84.2 (H) 45.0 - 75.0 %    POCT Hematocrit Calculated, Venous 35.0 (L) 41.0 - 52.0 %    POCT Sodium, Venous 128 (L) 136 - 145 mmol/L    POCT Potassium, Venous 5.5 (H) 3.5 - 5.3 mmol/L    POCT Chloride, Venous 100 98 - 107 mmol/L    POCT Ionized Calicum, Venous 1.15 1.10 - 1.33 mmol/L    POCT Glucose, Venous 122 (H) 74 - 99 mg/dL    POCT Lactate, Venous 0.8 0.4 - 2.0 mmol/L    POCT Base Excess, Venous -7.5 (L) -2.0 - 3.0 mmol/L    POCT HCO3 Calculated, Venous  19.3 (L) 22.0 - 26.0 mmol/L    POCT Hemoglobin, Venous 11.6 (L) 13.5 - 17.5 g/dL    POCT Anion Gap, Venous 14.0 10.0 - 25.0 mmol/L    Patient Temperature 37.0 degrees Celsius    FiO2 40 %   Transthoracic Echo (TTE) Limited   Result Value Ref Range    LV EF 30 %    LVIDd 5.07 cm   POCT GLUCOSE   Result Value Ref Range    POCT Glucose 85 74 - 99 mg/dL   Blood Gas Arterial Full Panel   Result Value Ref Range    POCT pH, Arterial 7.31 (L) 7.38 - 7.42 pH    POCT pCO2, Arterial 35 (L) 38 - 42 mm Hg    POCT pO2, Arterial 87 85 - 95 mm Hg    POCT SO2, Arterial 95 94 - 100 %    POCT Oxy Hemoglobin, Arterial 95.3 94.0 - 98.0 %    POCT Hematocrit Calculated, Arterial 35.0 (L) 41.0 - 52.0 %    POCT Sodium, Arterial 130 (L) 136 - 145 mmol/L    POCT Potassium, Arterial 5.3 3.5 - 5.3 mmol/L    POCT Chloride, Arterial 101 98 - 107 mmol/L    POCT Ionized Calcium, Arterial 1.13 1.10 - 1.33 mmol/L    POCT Glucose, Arterial 86 74 - 99 mg/dL    POCT Lactate, Arterial 0.7 0.4 - 2.0 mmol/L    POCT Base Excess, Arterial -7.9 (L) -2.0 - 3.0 mmol/L    POCT HCO3 Calculated, Arterial 17.6 (L) 22.0 - 26.0 mmol/L    POCT Hemoglobin, Arterial 11.7 (L) 13.5 - 17.5 g/dL    POCT Anion Gap, Arterial 17 10 - 25 mmo/L    Patient Temperature 37.0 degrees Celsius    FiO2 40 %   ACTIVATED CLOTTING TIME LOW   Result Value Ref Range    POCT Activated Clotting Time Low Range 173 83 - 199 sec   Renal function panel   Result Value Ref Range    Glucose 88 74 - 99 mg/dL    Sodium 133 (L) 136 - 145 mmol/L    Potassium 5.3 3.5 - 5.3 mmol/L    Chloride 103 98 - 107 mmol/L    Bicarbonate 18 (L) 21 - 32 mmol/L    Anion Gap 17 10 - 20 mmol/L    Urea Nitrogen 55 (H) 6 - 23 mg/dL    Creatinine 4.94 (H) 0.50 - 1.30 mg/dL    eGFR 14 (L) >60 mL/min/1.73m*2    Calcium 7.6 (L) 8.6 - 10.6 mg/dL    Phosphorus 5.8 (H) 2.5 - 4.9 mg/dL    Albumin 2.7 (L) 3.4 - 5.0 g/dL   Hepatic function panel   Result Value Ref Range    Albumin 2.7 (L) 3.4 - 5.0 g/dL    Bilirubin, Total 2.1 (H)  0.0 - 1.2 mg/dL    Bilirubin, Direct 0.5 (H) 0.0 - 0.3 mg/dL    Alkaline Phosphatase 49 33 - 120 U/L    ALT 70 (H) 10 - 52 U/L    AST 84 (H) 9 - 39 U/L    Total Protein 4.6 (L) 6.4 - 8.2 g/dL   POCT GLUCOSE   Result Value Ref Range    POCT Glucose 74 74 - 99 mg/dL   ACTIVATED CLOTTING TIME LOW   Result Value Ref Range    POCT Activated Clotting Time Low Range 173 83 - 199 sec   BLOOD GAS MIXED VENOUS FULL PANEL   Result Value Ref Range    POCT pH, Mixed 7.22 (LL) 7.33 - 7.43 pH    POCT pCO2, Mixed 43 41 - 51 mm Hg    POCT pO2, Mixed 49 (H) 35 - 45 mm Hg    POCT SO2, Mixed 80 (H) 45 - 75 %    POCT Oxy Hemoglobin, Mixed 79.4 (H) 45.0 - 75.0 %    POCT Hematocrit Calculated, Mixed 37.0 (L) 41.0 - 52.0 %    POCT Sodium, Mixed 129 (L) 136 - 145 mmol/L    POCT Potassium, Mixed 6.0 (H) 3.5 - 5.3 mmol/L    POCT Chloride, Mixed 101 98 - 107 mmol/L    POCT Ionized Calcium, Mixed 1.16 1.10 - 1.33 mmol/L    POCT Glucose, Mixed 104 (H) 74 - 99 mg/dL    POCT Lactate, Mixed 1.2 0.4 - 2.0 mmol/L    POCT Base Excess, Mixed -9.7 (L) -2.0 - 3.0 mmol/L    POCT HCO3 Calculated, Mixed 17.6 (L) 22.0 - 26.0 mmol/L    POCT Hemoglobin, Mixed 12.2 (L) 13.5 - 17.5 g/dL    POCT Anion Gap, Mixed 16 10 - 25 mmo/L    Patient Temperature 37.0 degrees Celsius    FiO2 40 %   Renal function panel   Result Value Ref Range    Glucose 93 74 - 99 mg/dL    Sodium 132 (L) 136 - 145 mmol/L    Potassium 5.9 (H) 3.5 - 5.3 mmol/L    Chloride 101 98 - 107 mmol/L    Bicarbonate 19 (L) 21 - 32 mmol/L    Anion Gap 18 10 - 20 mmol/L    Urea Nitrogen 59 (H) 6 - 23 mg/dL    Creatinine 5.84 (H) 0.50 - 1.30 mg/dL    eGFR 11 (L) >60 mL/min/1.73m*2    Calcium 7.9 (L) 8.6 - 10.6 mg/dL    Phosphorus 6.6 (H) 2.5 - 4.9 mg/dL    Albumin 3.0 (L) 3.4 - 5.0 g/dL   POCT GLUCOSE   Result Value Ref Range    POCT Glucose 123 (H) 74 - 99 mg/dL   Renal function panel   Result Value Ref Range    Glucose 93 74 - 99 mg/dL    Sodium 132 (L) 136 - 145 mmol/L    Potassium 5.4 (H) 3.5 - 5.3  mmol/L    Chloride 102 98 - 107 mmol/L    Bicarbonate 18 (L) 21 - 32 mmol/L    Anion Gap 17 10 - 20 mmol/L    Urea Nitrogen 61 (H) 6 - 23 mg/dL    Creatinine 5.61 (H) 0.50 - 1.30 mg/dL    eGFR 12 (L) >60 mL/min/1.73m*2    Calcium 7.7 (L) 8.6 - 10.6 mg/dL    Phosphorus 6.8 (H) 2.5 - 4.9 mg/dL    Albumin 2.8 (L) 3.4 - 5.0 g/dL   BLOOD GAS MIXED VENOUS FULL PANEL   Result Value Ref Range    POCT pH, Mixed 7.26 (L) 7.33 - 7.43 pH    POCT pCO2, Mixed 41 41 - 51 mm Hg    POCT pO2, Mixed 48 (H) 35 - 45 mm Hg    POCT SO2, Mixed 80 (H) 45 - 75 %    POCT Oxy Hemoglobin, Mixed 80.0 (H) 45.0 - 75.0 %    POCT Hematocrit Calculated, Mixed 34.0 (L) 41.0 - 52.0 %    POCT Sodium, Mixed 129 (L) 136 - 145 mmol/L    POCT Potassium, Mixed 5.5 (H) 3.5 - 5.3 mmol/L    POCT Chloride, Mixed 100 98 - 107 mmol/L    POCT Ionized Calcium, Mixed 1.14 1.10 - 1.33 mmol/L    POCT Glucose, Mixed 101 (H) 74 - 99 mg/dL    POCT Lactate, Mixed 1.3 0.4 - 2.0 mmol/L    POCT Base Excess, Mixed -8.2 (L) -2.0 - 3.0 mmol/L    POCT HCO3 Calculated, Mixed 18.4 (L) 22.0 - 26.0 mmol/L    POCT Hemoglobin, Mixed 11.2 (L) 13.5 - 17.5 g/dL    POCT Anion Gap, Mixed 16 10 - 25 mmo/L    Patient Temperature 37.0 degrees Celsius    FiO2 40 %   ACTIVATED CLOTTING TIME LOW   Result Value Ref Range    POCT Activated Clotting Time Low Range 170 83 - 199 sec       Imaging  EEG    Result Date: 7/12/2024  IMPRESSION This vEEG is indicative of severe diffuse encephalopathy. No epileptiform discharges or lateralizing signs are seen. A full report will be scanned into the patient's chart at a later time. This report has been interpreted and electronically signed by    [facility] 12 lead    Result Date: 7/12/2024  Normal sinus rhythm Low voltage QRS Nonspecific T wave abnormality Abnormal ECG When compared with ECG of 11-JUL-2024 07:55, No significant change was found    Transthoracic Echo (TTE) Limited    Result Date: 7/12/2024   [facility], [address] [place] [address],  [place] [address]                Tel [phone] and Fax [phone] TRANSTHORACIC ECHOCARDIOGRAM REPORT  Patient Name:     YAMIL FELICIANO        Reading           [id number] Donovan Alexandra                                        Physician:        MD Study Date:       7/12/2024            Ordering          [id number] ANTOINE SESAY UKAIGWE                                        Provider: MRN/PID:          [id number]             Fellow:           [id number] Sheri Cherry MD Accession#:       [id number]         Nurse: Date of           1980 / 43      Sonographer:      Fellow Birth/Age:        years Gender:           M                    Additional Staff: Height:                                Admit Date:       7/10/2024 Weight:                                Admission Status: Inpatient - STAT BSA / BMI:        m2 / kg/m2           Encounter#:       [id number] Study Type:    TRANSTHORACIC ECHO (TTE) LIMITED Diagnosis/ICD: Cardiac arrest, cause unspecified-I46.9  Study Detail: The following Echo studies were performed: 2D and color flow.  PHYSICIAN INTERPRETATION: Left Ventricle: Left ventricular ejection fraction is moderately decreased, by visual estimate at 30%. The left ventricular cavity size is normal. Left ventricular diastolic filling was not assessed. Left Atrium: The left atrium was not well visualized. Right Ventricle: The right ventricle was not well visualized. There is normal right ventricular global systolic function. Right Atrium: The right atrium was not well visualized. Aortic Valve: The aortic valve was not well visualized. There is trivial aortic valve regurgitation. Mitral Valve: The mitral valve is normal in structure. There is trace to mild mitral valve regurgitation. Tricuspid Valve: The tricuspid valve is structurally normal. Tricuspid regurgitation was not assessed. Pulmonic Valve: The pulmonic valve was not assessed. Pulmonic valve regurgitation was not assessed. Pericardium: There is a trivial pericardial  effusion. Aorta: The aortic root was not assessed. Systemic Veins: The inferior vena cava appears dilated. The inspiratory change of the IVC cannot be assessed due to MV. In comparison to the previous echocardiogram(s): Compared with study dated 7/10/2024, the EF has improved from 10% to 30%. There is now an Impella device visualized in the LV.  LEFT VENTRICULAR ASSIST DEVICE: LVAD: The patient has a(n) Impella LVAD device present. LVAD Comments: The Impella inflow cannula tip is visualized approximately at 5.7 cm from the aortic valve plane.  CONCLUSIONS:  1. Left ventricular ejection fraction is moderately decreased, by visual estimate at 30%.  2. There is normal right ventricular global systolic function.  3. Compared with study dated 7/10/2024, the EF has improved from 10% to 30%. There is now an Impella device visualized in the LV. QUANTITATIVE DATA SUMMARY: 2D MEASUREMENTS:                Normal Ranges: IVSd:  0.95 cm (0.6-1.1cm) LVPWd: 0.95 cm (0.6-1.1cm) LVIDd: 5.07 cm (3.9-5.9cm) LV SYSTOLIC FUNCTION BY 2D PLANIMETRY (MOD):                      Normal Ranges: EF-Visual:      30 % LV EF Reported: 30 %  [id number] Donovan Alexandra MD Electronically signed on 7/12/2024 at 8:46:23 AM  ** Final **     XR chest 1 view    Result Date: 7/11/2024  Interpreted By:  Pavan Leggett and Tippareddy Charit STUDY: XR CHEST 1 VIEW;  7/10/2024 11:41 pm   INDICATION: Signs/Symptoms:post intubation and Impella.   COMPARISON: Chest radiograph 07/10/2024 5:31 p.m.   ACCESSION NUMBER(S): [id number]   ORDERING CLINICIAN: ANTOINE SINGER   FINDINGS: AP radiograph of the chest was provided.   Endotracheal tube with tip approximately 3.8 cm above the van. Enteric tube is visualized coursing beneath the diaphragm with the tip beyond the field of view. Right IJ central venous catheter with tip overlying the mid SVC. Right IJ Marion-Monty catheter with the tip overlying the main pulmonary artery. Impella device is visualized.    CARDIOMEDIASTINAL SILHOUETTE: Cardiomediastinal silhouette is enlarged but stable in size and configuration.   LUNGS: Prominent perihilar and interstitial lung markings. Patchy airspace opacities bilaterally. No pleural effusions or pneumothorax.   ABDOMEN: No remarkable upper abdominal findings.   BONES: No acute osseous changes.       1.  Prominent perihilar and interstitial lung markings with patchy airspace opacities bilaterally. Findings consistent with pulmonary edema. An infectious process can have a similar appearance. 2. Cardiomegaly versus pericardial effusion. 3. Medical devices/lines as noted above.   I personally reviewed the images/study and I agree with the findings as stated by Alan Sifuentes MD. This study was interpreted at [place], [place].   MACRO: None.   Signed by: Pavan Leggett 7/11/2024 12:09 PM Dictation workstation:   [id number]    ECG 12 lead    Result Date: 7/11/2024  Normal sinus rhythm Normal ECG When compared with ECG of 10-JUL-2024 23:54, No significant change was found    ECG 12 lead    Result Date: 7/11/2024  Normal sinus rhythm Septal infarct , age undetermined Abnormal ECG No previous ECGs available    ECG 12 lead    Result Date: 7/11/2024  Normal sinus rhythm Prolonged QT Abnormal ECG When compared with ECG of 11-JUL-2024 00:16, QT has lengthened    ECG 12 lead    Result Date: 7/11/2024  Normal sinus rhythm Normal ECG When compared with ECG of 10-JUL-2024 23:55, No significant change was found    ECG 12 lead    Result Date: 7/11/2024  Normal sinus rhythm Nonspecific T wave abnormality Abnormal ECG When compared with ECG of 11-JUL-2024 00:59, QT has shortened    ECG 12 lead    Result Date: 7/11/2024  Normal sinus rhythm Prolonged QT Abnormal ECG When compared with ECG of 11-JUL-2024 03:41, QT has lengthened    ECG 12 lead    Result Date: 7/11/2024  Normal sinus rhythm Normal ECG When compared with ECG of 11-JUL-2024 04:13, QT has  shortened    ECG 12 lead    Result Date: 7/11/2024  Normal sinus rhythm Nonspecific T wave abnormality Abnormal ECG When compared with ECG of 11-JUL-2024 06:14, No significant change was found    ECG 12 lead    Result Date: 7/11/2024  Normal sinus rhythm Normal ECG When compared with ECG of 11-JUL-2024 06:25, No significant change was found    Cardiac Catheterization Procedure    Result Date: 7/11/2024   [facility], Cath Lab, [address] [place] [address] Cardiovascular Catheterization Report Patient Name:      YAMIL FELICIANO         Performing Physician:  Rashawn Singer MD Study Date:        7/10/2024             Verifying Physician:   Rashawn Singer MD MRN/PID:           [id number]              Cardiologist/Co-Scrub: Accession#:        [id number]          Ordering Provider:     Rashawn SINGER Date of Birth/Age: 1980 / 43 years Cardiologist: Gender:            M                     Fellow:                [id number] Ervin Fernandez MD Encounter#:        [id number]            Surgeon:  Study:            Left Heart Cath Additional Study: Right Heart Cath Additional Study: Impella Insertion  Indications: YAMIL FELICIANO is a 44 year old male who presents with hypertension. Cardiomyopathy, resuscitated cardiac arrest and left ventricular dysfunction. Cardiomyopathy. Cardiogenic shock. Cardiac arrest. Medical History: Cardiac arrest: Yes. Patient unresponsive following cardiac arrest. Cardiac surgical consult: No. Cardiovascular instability: Yes. Cardiogenic shock.  Procedure Description: After infiltration with 2% Lidocaine, the right femoral artery was cannulated with a modified Seldinger technique. Subsequently a 7 Canadian sheath was  placed antegrade in the right femoral artery. The arterial sheath was sized up to 14. Selective coronary catheterization was performed using a 5 Fr catheter(s) exchanged over a guide wire to cannulate the coronary arteries. Additional catheter(s) used to visualize the coronary arteries were: pigtail. Multiple injections of contrast were made into the left and right coronary arteries with angiograms recorded in multiple projections. A balloon tipped catheter was advanced through the right heart to record pressures. Cardiac output was calculated via the Chase method and measured via thermodilution. After completion of the procedure, femoral artery angiography was performed. This demonstrated a common femoral artery puncture appropriate for closure. A Perclose ProGlide 6F (Abbott) vascular closure device was placed per protocol.  Procedure Description Comments:  Mr Miguel A Bender is a 44 year old man with a history of hypertension, morbid obesity s/p gastric bypass with ~100Ib weight loss who had two witnessed out of hospital cardiac arrest, bystander CPR. Unclear down time. TTE shoiwed LVEF ~ 10-15%, global hypokinesis. He had worsening hemnodynamics and rising lactate in the ICU at [facility]. Accordingly he is here for ascertainmenet of hemodynamics, coronary angiograpohy and mechanical circulatory support. He was currently on norepinephrine gtt at 0.07mcg/kg/min and epinephrine at 0.1mcg/kg/min. Left internal jugular vein access was obtained and a 9F sheath placed. [place] gracie catheter was inserted and secured at 49cm. RHC showed RA 5 PA 42/11 mean 21 PCWP 8 CO/CI (Chase) 4.5/1.9 on epi and norepi PA sat 59 Intravcenous normal saline was started RCFA access was obtained and 5F sheath placed. Coronary angiography was performed with 4F JR4 and 5F JL3.5. This showed no angiographic evidence of coronary artery disease. LVEDP 10mmHg. There ws no gradient on pullback. After IVF 1L, ABG demonstrated serum lactate of 7, given these  we proceeded with Impella. The [facility] site was preclosed x 2 and 14F speel away sheath placed. A 5F pigtail was used to cross the aortic valve. The Impella CP was then inserted using standard techniques and secured 80cm at the hub after funtion and position was optimized. With this the PA sat increased to 67, the epinephrine was weaned to 0.05 and norepinephrine to 0.03 All catheters and wires were removed. The patient was to be transferred to [facility] Cardiac Intensive care unit.  Impella: There is good positioning of the Impella CP via the right femoral artery and the total flow is 3.2 L/min.  Coronary Angiography: The coronary circulation is co-dominant.  Left Main Coronary Artery: The left main coronary artery is a normal caliber vessel. The left main arises normally from the left coronary sinus of Valsalva. The left main coronary artery showed a normal vessel.  Left Anterior Descending Coronary Artery Distribution: The left anterior descending coronary artery is a normal caliber vessel. The LAD arises normally from the left main coronary artery. The LAD demonstrated a normal vessel. The 1st diagonal branch is a normal caliber vessel.  Circumflex Coronary Artery Distribution: The circumflex coronary artery is a normal caliber vessel. The circumflex arises normally from the left main coronary artery. The circumflex revealed no significant disease or stenosis greater than 30%. The 1st obtuse marginal branch is a medium-sized caliber vessel. The 1st obtuse marginal branch showed no significant disease or stenosis greater than 30%. The left posterolateral branch is a medium-sized caliber vessel. The left posterolateral branch showed no significant disease or stenosis greater than 30%.  Right Heart Catheterization: A balloon tipped catheter was advanced through the right heart to record pressures. Cardiac output was calculated via the Chase method and measured via thermodilution. Normal filling  pressures. Cardiac output is mildly decreased. Reduced cardiac output at rest. Elevated pulmonary vascular resistance. RHC- RA - mean 7mmHg RV- 37/ RVEDP 8 PA 42/11, mean 21mmHg PCWP- mean 8, v 15 CO/ CI (thermodilution) ~11.32/ 1.91, Chase - 4.45 CO/CI- Chase 4.87/ 1.91 Hgb 16 With rising lactate to 7, the thermodilution output was deemed inaccurate.  Right Coronary Artery Distribution: The right coronary artery is a normal caliber vessel. The RCA arises normally from the right sinus of Valsalva. The RCA showed no significant disease or stenosis greater than 30%. The acute marginal branch is a medium-sized caliber vessel. The acute marginal branch showed no significant disease or stenosis greater than 30%. The right posterolateral branch is a medium-sized caliber vessel. The right posterolateral branch showed no significant disease or stenosis greater than 30%.  Hemo Personnel: +-------------------+---------+ Name               Duty      +-------------------+---------+ Bhavya Restrepo MDPOVIDIO SEGOVIA 1 +-------------------+---------+  Hemodynamic Pressures:  +----+----------+---------+-------------+-------------+---+----+-------+-------+ SiteDate Time   Phase  Systolic mmHg  Diastolic  ED MeanA-Wave V-Wave                  Name                    mmHg     mmHmmHg mmHg   mmHg                                                     g                    +----+----------+---------+-------------+-------------+---+----+-------+-------+   AO 7/10/2024 AIR REST            2            0      1                   6:29:27 PM                                                         +----+----------+---------+-------------+-------------+---+----+-------+-------+  art 7/10/2024 AIR REST          127           54     69                   6:29:27 PM                                                          +----+----------+---------+-------------+-------------+---+----+-------+-------+   RA 7/10/2024 AIR REST                                5      9      7     6:38:38 PM                                                         +----+----------+---------+-------------+-------------+---+----+-------+-------+  art 7/10/2024 AIR REST          125           60     77                   6:38:38 PM                                                         +----+----------+---------+-------------+-------------+---+----+-------+-------+   RV 7/10/2024 AIR REST           37            0  8                       6:38:58 PM                                                         +----+----------+---------+-------------+-------------+---+----+-------+-------+  art 7/10/2024 AIR REST          123           57     74                   6:38:58 PM                                                         +----+----------+---------+-------------+-------------+---+----+-------+-------+   PA 7/10/2024 AIR REST           42           11     21                   6:39:35 PM                                                         +----+----------+---------+-------------+-------------+---+----+-------+-------+  art 7/10/2024 AIR REST          118           55     71                   6:39:35 PM                                                         +----+----------+---------+-------------+-------------+---+----+-------+-------+   PW 7/10/2024 AIR REST                                8     13     15     6:39:52 PM                                                         +----+----------+---------+-------------+-------------+---+----+-------+-------+  art 7/10/2024 AIR REST          122           57     74                   6:39:52 PM                                                          +----+----------+---------+-------------+-------------+---+----+-------+-------+   PA 7/10/2024 AIR REST           39            2     24                   6:50:12 PM                                                         +----+----------+---------+-------------+-------------+---+----+-------+-------+  art 7/10/2024 AIR REST          112           54     69                   6:50:12 PM                                                         +----+----------+---------+-------------+-------------+---+----+-------+-------+   AO 7/10/2024 AIR REST           95           62     75                   7:00:51 PM                                                         +----+----------+---------+-------------+-------------+---+----+-------+-------+  Art 7/10/2024 AIR REST          121           60     79                   7:00:51 PM                                                         +----+----------+---------+-------------+-------------+---+----+-------+-------+   LV 7/10/2024 AIR REST          119           -1 14                       7:07:51 PM                                                         +----+----------+---------+-------------+-------------+---+----+-------+-------+  Art 7/10/2024 AIR REST            0           67     84                   7:07:51 PM                                                         +----+----------+---------+-------------+-------------+---+----+-------+-------+   LV 7/10/2024 AIR REST          116           -2 10                       7:08:03 PM                                                         +----+----------+---------+-------------+-------------+---+----+-------+-------+  Art 7/10/2024 AIR REST           60           46     53                   7:08:03 PM                                                          +----+----------+---------+-------------+-------------+---+----+-------+-------+   LV 7/10/2024 AIR REST          115           -4  8                       7:08:38 PM                                                         +----+----------+---------+-------------+-------------+---+----+-------+-------+  Art 7/10/2024 AIR REST          133           61     82                   7:08:38 PM                                                         +----+----------+---------+-------------+-------------+---+----+-------+-------+   LV 7/10/2024 AIR REST          118           -4 11                       7:08:59 PM                                                         +----+----------+---------+-------------+-------------+---+----+-------+-------+  Art 7/10/2024 AIR REST          134           61     82                   7:08:59 PM                                                         +----+----------+---------+-------------+-------------+---+----+-------+-------+   LV 7/10/2024 AIR REST          114           -5 11                       7:09:11 PM                                                         +----+----------+---------+-------------+-------------+---+----+-------+-------+  Art 7/10/2024 AIR REST          129           61     81                   7:09:11 PM                                                         +----+----------+---------+-------------+-------------+---+----+-------+-------+   LV 7/10/2024 AIR REST          116           -1 10                       7:11:37 PM                                                         +----+----------+---------+-------------+-------------+---+----+-------+-------+  Art 7/10/2024 AIR REST          130           63     84                   7:11:37 PM                                                          +----+----------+---------+-------------+-------------+---+----+-------+-------+  LVp 7/10/2024 AIR REST          108            9 26                       7:11:50 PM                                                         +----+----------+---------+-------------+-------------+---+----+-------+-------+  Art 7/10/2024 AIR REST          129           64     83                   7:11:50 PM                                                         +----+----------+---------+-------------+-------------+---+----+-------+-------+  AOp 7/10/2024 AIR REST          113           58     78                   7:11:58 PM                                                         +----+----------+---------+-------------+-------------+---+----+-------+-------+  Art 7/10/2024 AIR REST          125           60     79                   7:11:58 PM                                                         +----+----------+---------+-------------+-------------+---+----+-------+-------+  AOp 7/10/2024 AIR REST          112           55     77                   7:12:01 PM                                                         +----+----------+---------+-------------+-------------+---+----+-------+-------+  Art 7/10/2024 AIR REST          127           60     78                   7:12:01 PM                                                         +----+----------+---------+-------------+-------------+---+----+-------+-------+  Oxygen Saturation %: +-----------+----------+------------+ Sample SiteO2 Sat (%)HB (g/100ml) +-----------+----------+------------+          FA        91        16.0 +-----------+----------+------------+          PA        67        16.0 +-----------+----------+------------+          FA        91        16.0 +-----------+----------+------------+          PA        67        16.0  +-----------+----------+------------+  Cardiac Outputs: +----------------+---------------+--------+--------------+-------------+-------+        Thermo CO      Thermo CI  Thermo       SILVANA CO      SILVANA CIFICK SV          (l/min)     (l/min/m2)      SV       (l/min)   (l/min/m2)        +----------------+---------------+--------+--------------+-------------+-------+             11.3            4.9   157.3           5.9          2.6   82.3 +----------------+---------------+--------+--------------+-------------+-------+  Vascular Resistance Calculated Values (Wood Units): +-----+---+----+----+----+---+----+---+----+-------+ PhasePVRPVRISVR SVRITPRTPRITVRTVRITPR/TVR +-----+---+----+----+----+---+----+---+----+-------+ 0    2.25.1 -0.7-1.63.58.2 0.20.4 21      +-----+---+----+----+----+---+----+---+----+-------+  Complications: No in-lab complications observed.  Cardiac Cath Post Procedure Notes: Post Procedure Diagnosis: Cardiogenic shock                           Out of hospital cardiac arrest                           Hypovolemia                           No angiographic evidence of coronary artery disease. Blood Loss:               Estimated blood loss during the procedure was 20cc                           mls. Specimens Removed:        Number of specimen(s) removed: none.  Recommendations: Maximize medical therapy. Tranfer to [facility]. Continue fluids. ____________________________________________________________________________________ CONCLUSIONS:  1. Normal coronary arteries. ICD 10 Codes: Cardiac arrest, cause unspecified-I46.9  CPT Codes: Insertion of ventricular assist device, percutaneous S&I left heart arterial access only-27043; Coronary Angiography S&I only (RHC)([facility])-98179; Ultrasound guidance for vascular access-47373; Complicated/Unusual Procedure-MOD22  [id number] Bhavya Restrepo MD Performing Physician Electronically signed by [id number] Bhavya Restrepo MD on 7/11/2024 at  8:13:27 AM  ** Final **         Assessment/Plan   Principal Problem:    Cardiac arrest (Multi)    43-year-old male with a history of obesity status post gastric bypass surgery 2 years ago, CHARLEEN, HTN transferred for further management of cardiogenic shock s/p Impella placement.      Today patient weaned down to P2 on impella pump. Will consider pulling pump tomorrow. EEG showed severe diffuse encephalopathy but no epileptiform foci. Will consider stopping Keppra tomorrow. He has had no further episodes of polymorphic VT in the past day.      NEUROLOGIC  #Acute metabolic encephalopathy, improving  ::Concern for anoxic brain injury i/s/o cardiac arrest  ::Intuabted and sedated on propofol  ::Reported episthotonos at [facility]  -Video EEG has preliminary result of severe diffuse encephalopathy with no epileptiform activity  -By evening, responding via nodding to yes or no questions, can follow complex commands     Plan  - Currently propofol 30 mcg, Lidocaine and Fentanyl 50 for sedation  - In maintenance phase of targeted temperature management. Can continue for 72 hrs total (Day 3/3). Will stop TTM tomorrow  - Brain MRI once off propofol if mental status changes persist  - Keppra 500 mg BID. Will consider stopping tomorrow  - Will probably discontinue cvEEG tomorrow      CARDIOVASCULAR  #Cardiogenic shock  -On admission, was on norepi 0.07, epi 0.03 and impella P8 for pressure support  -PA catheter numbers on admission: CVP 9, PAP 39/20 (27), CO 7.7, CI 3.3, , SVO2 80% on P8 Impella.  -By morning rounds, on impella P6: PA numbers with a CVP of 12, PAP 44/23 (21),  CI of 4.86, SvO2 of 79%, SVRI of 937   - By evening, on impella P2 : CVP of 14, PAP of 47/26 (24),  CI of 3.84, SvO2 of 80%, SVRI of 1417     Plan  - Cardiac index improving with decreasing impella support  -Filling pressures appropriate for impella    #Ventricular Fibrillation and Polymorphic Vtach  #Non-ischemic cardiomyopathy  #Cardiac arrest of unclear  etiology  -::TTE post arrest EF 10%, global hypokinesis  ::C/f arrhythmia such as prolonged QT in 500s  ::Was on amdiodarone on admission. Had polymorphic SVT with sinus pauses on night of 7/10, so amiodarone weaned off and switched to lidocaine.  - EP consulted, appreciate recs  - Last Qtc around [phone]     Plan  -Optimize electrolytes K > 4 and Mg > 2  -Monitor on telemetry  - Cardiac MRI when stable  - Continue lidocaine drip     PULMONARY  #Acute hypoxic respiratory failure  #Concern for aspiration in setting of cardiac arrest  ::Secondary to cardiac arrest  ::CT PE negative  ::CXR not concerning for pneumonia. Diffuse pulmonary interstitial opacities more consistent with pulmonary edema or aspiration pneumonitis rather than pneumonia.     Plan  - Plan for SBT when off impella  - Vancomycin and Zosyn (-) to cover for potential aspiration pneumonia      RENAL/  #ANNIE Oliguric   #Concern for acute tubular necrosis  ::Likely pre-renal i/s/o cardiogenic shock   -Urine output of 145 mL over past day  - Nephrology consulted, appreciate recs  -Worsening Hyponatremia, hyperkalemia (136 -> 134 -> 133->132, 4.3 ->5.2 ->5.1 -> 5.9 -> 5.4). EKG with non-peaked T-waves  -Stable acidosis: 15-> 20->18  -Phosphorus increasin.3 -> 5.2 ->6.8  -Creatinine of 2.45 -> 3.00 -> 3.58 -> 5.84 -> 5.61     Plan  -Continue to monitor urine output and BID RFPs  - Treating with Lokelma and Insulin/Dextrose for hyperkalemia. Improving now as well as creatinine, but renal on board for dialysis if needed  -Avoid nephrotoxic medications  -Renally dose medications      GASTROINTESTINAL  #Elevated liver enzymes  ::Secondary to cardiogenic shock   ::At risk for shock liver   - AST of 127, ALT of 116, Tbili of 2 on 7/10  - AST of 84, ALT of 70, Tbili of 2.1 today  -Continue to monitor with daily hepatic function panel     #GI Prophylaxis  -Pantoprazole BID while on vent     ENDOCRINE  DUSTIN     HEME/ONC  #At risk for hemolysis and  DIC  -Monitor with LDH, haptoglobin, fibrinogen daily     MSK/DERM  PT/OT        N: NPO  A: PIV, Right IJ triple lumen, Left PA catheter, Right Impella      DVT ppx: SCD  GI ppx: Pantoprazole 40 IV BID  Oxygen: Mechanical Ventilation   Drips Lido 1  Abx Vanc/Zosyn (7/11-)  Code Status: Full Code (confirmed on Admission)  Surrogate Medical Decision-maker: Jazmin Bender (wife) [phone]       LOS: 2 days     Wing Barker MD/PhD   PGY-2

## 2024-07-12 NOTE — PROGRESS NOTES
1/1 CICU (DAY-1)     TRANSFER  F:  Griselda 7/10 to ED for cardiogenic shock      SIGNIFICANT EVENTS  07/10 C: Neuro - c/f anoxic brain injury i/s/o cardiac arrest  07/10 cardiogenic shock s/p Impella after witnessed sudden cardiac arrest. Intubated.     CHRISTINA LOVEP      PT/OT   ( ) awaiting when medically able for intervention/assessments     COMPLETED  (X) 07/12 - DC/SDOH assessments with wife/son  (X) 07/12 EPIC info verified for address, PCP, Pharmacy    NOTE  [facility]  has already seen wife. Son adolescence and 2yo at home. Explained that if need FMLA done, the team usually does not complete till dc when have a more comprehensive idea of how many days off from work required.  Prompted wife to contact patient's wife when able.      Sylvia Bailey (LSW, MSW)

## 2024-07-12 NOTE — PROGRESS NOTES
Occupational Therapy                 Therapy Communication Note    Patient Name: Miguel A Bender  MRN: [id number]  Today's Date: 7/12/2024     Discipline: Occupational Therapy    Missed Visit Reason: Missed Visit Reason: Patient placed on medical hold (Currently intubated and with fem Impella. No OT eval at this time.)    Missed Time: Attempt 0815

## 2024-07-12 NOTE — PROGRESS NOTES
Pharmacy Medication History Review    Miguel A Bender is a 43 y.o. male admitted for Cardiac arrest (Multi). Pharmacy reviewed the patient's hrwmq-[id number] medications and allergies for accuracy.    Medications ADDED:  N/A  Medications CHANGED:  N/A  Medications REMOVED:   N/A     The list below reflects the updated PTA list. Comments regarding how patient may be taking medications differently can be found in the Admit Orders Activity  Prior to Admission Medications   Prescriptions Last Dose Informant Patient Reported?   amLODIPine (Norvasc) 2.5 mg tablet Past Week Self Yes   Sig: Take 1 tablet (2.5 mg) by mouth once daily.   beta carotene (vitamin A) 3,000 mcg (10,000 unit) capsule Past Week Self Yes   Sig: Take 1 capsule (10,000 Units) by mouth once daily.   ergocalciferol (Vitamin D-2) 1.25 MG (11389 UT) capsule Past Week Self Yes   Sig: Take 1 capsule (1,250 mcg) by mouth 1 (one) time per week.   multivitamin tablet Past Week Self Yes   Sig: Take 1 tablet by mouth once daily.      Facility-Administered Medications: None        The list below reflects the updated allergy list. Please review each documented allergy for additional clarification and justification.  Allergies  Reviewed by Mirtha Tsang RN on 7/11/2024        Severity Reactions Comments    Iodine Low Rash     Shellfish Containing Products Low Rash             Patient accepts M2B at discharge.     Sources used to complete the med history include out patient fill history, OARRS, and patient interview with wife Jazmin along with 11/13/2023 office visit cardiology Dr. Nela Regalado.       Below are additional concerns with the patient's PTA list.    Daniel Li [facility]  Transitions of Care Clinical Pharmacist  Please reach out via Epic Chat for questions, if no response call  [employer] or [facility]

## 2024-07-13 ENCOUNTER — APPOINTMENT (OUTPATIENT)
Dept: CARDIOLOGY | Facility: HOSPITAL | Age: 44
DRG: 276 | End: 2024-07-13
Payer: COMMERCIAL

## 2024-07-13 PROBLEM — R40.20: Status: ACTIVE | Noted: 2024-07-13

## 2024-07-13 PROBLEM — R57.0 CARDIOGENIC SHOCK (MULTI): Status: ACTIVE | Noted: 2024-07-13

## 2024-07-13 PROBLEM — J96.01 ACUTE HYPOXEMIC RESPIRATORY FAILURE (MULTI): Status: ACTIVE | Noted: 2024-07-13

## 2024-07-13 PROBLEM — N17.9: Status: ACTIVE | Noted: 2024-07-13

## 2024-07-13 PROBLEM — R34: Status: ACTIVE | Noted: 2024-07-13

## 2024-07-13 LAB
ACT BLD: 145 SEC (ref 83–199)
ACT BLD: 160 SEC (ref 83–199)
ACT BLD: 166 SEC (ref 83–199)
ACT BLD: 182 SEC (ref 83–199)
ACT BLD: 207 SEC (ref 83–199)
ACT BLD: NORMAL S
ALBUMIN SERPL BCP-MCNC: 2.6 G/DL (ref 3.4–5)
ALBUMIN SERPL BCP-MCNC: 2.7 G/DL (ref 3.4–5)
ALBUMIN SERPL BCP-MCNC: 2.8 G/DL (ref 3.4–5)
ALP SERPL-CCNC: 52 U/L (ref 33–120)
ALT SERPL W P-5'-P-CCNC: 59 U/L (ref 10–52)
ANION GAP BLDMV CALCULATED.4IONS-SCNC: 16 MMO/L (ref 10–25)
ANION GAP SERPL CALC-SCNC: 17 MMOL/L
ANION GAP SERPL CALC-SCNC: 19 MMOL/L (ref 10–20)
ANION GAP SERPL CALC-SCNC: 20 MMOL/L (ref 10–20)
AST SERPL W P-5'-P-CCNC: 40 U/L (ref 9–39)
ATRIAL RATE: 67 BPM
ATRIAL RATE: 86 BPM
BASE EXCESS BLDMV CALC-SCNC: -8.6 MMOL/L (ref -2–3)
BASOPHILS # BLD AUTO: 0.09 X10*3/UL (ref 0–0.1)
BASOPHILS NFR BLD AUTO: 0.9 %
BILIRUB DIRECT SERPL-MCNC: 0.4 MG/DL (ref 0–0.3)
BILIRUB SERPL-MCNC: 1.1 MG/DL (ref 0–1.2)
BODY TEMPERATURE: 37 DEGREES CELSIUS
BUN SERPL-MCNC: 62 MG/DL (ref 6–23)
BUN SERPL-MCNC: 65 MG/DL (ref 6–23)
BUN SERPL-MCNC: 71 MG/DL (ref 6–23)
CA-I BLDMV-SCNC: 1.07 MMOL/L (ref 1.1–1.33)
CALCIUM SERPL-MCNC: 7.2 MG/DL (ref 8.6–10.6)
CALCIUM SERPL-MCNC: 7.5 MG/DL (ref 8.6–10.6)
CALCIUM SERPL-MCNC: 7.6 MG/DL (ref 8.6–10.6)
CHLORIDE BLD-SCNC: 98 MMOL/L (ref 98–107)
CHLORIDE SERPL-SCNC: 100 MMOL/L (ref 98–107)
CHLORIDE SERPL-SCNC: 96 MMOL/L (ref 98–107)
CHLORIDE SERPL-SCNC: 98 MMOL/L (ref 98–107)
CO2 SERPL-SCNC: 17 MMOL/L (ref 21–32)
CO2 SERPL-SCNC: 18 MMOL/L (ref 21–32)
CO2 SERPL-SCNC: 18 MMOL/L (ref 21–32)
CREAT SERPL-MCNC: 6.06 MG/DL (ref 0.5–1.3)
CREAT SERPL-MCNC: 6.43 MG/DL (ref 0.5–1.3)
CREAT SERPL-MCNC: 7.09 MG/DL (ref 0.5–1.3)
EGFRCR SERPLBLD CKD-EPI 2021: 10 ML/MIN/1.73M*2
EGFRCR SERPLBLD CKD-EPI 2021: 11 ML/MIN/1.73M*2
EGFRCR SERPLBLD CKD-EPI 2021: 9 ML/MIN/1.73M*2
EOSINOPHIL # BLD AUTO: 0.05 X10*3/UL (ref 0–0.7)
EOSINOPHIL NFR BLD AUTO: 0.5 %
ERYTHROCYTE [DISTWIDTH] IN BLOOD BY AUTOMATED COUNT: 14.2 % (ref 11.5–14.5)
FIBRINOGEN PPP-MCNC: 624 MG/DL (ref 200–400)
GLUCOSE BLD MANUAL STRIP-MCNC: 74 MG/DL (ref 74–99)
GLUCOSE BLD MANUAL STRIP-MCNC: 90 MG/DL (ref 74–99)
GLUCOSE BLD MANUAL STRIP-MCNC: 93 MG/DL (ref 74–99)
GLUCOSE BLD MANUAL STRIP-MCNC: 96 MG/DL (ref 74–99)
GLUCOSE BLD MANUAL STRIP-MCNC: 99 MG/DL (ref 74–99)
GLUCOSE BLD-MCNC: 91 MG/DL (ref 74–99)
GLUCOSE SERPL-MCNC: 89 MG/DL (ref 74–99)
GLUCOSE SERPL-MCNC: 90 MG/DL (ref 74–99)
GLUCOSE SERPL-MCNC: 91 MG/DL (ref 74–99)
HCO3 BLDMV-SCNC: 17.4 MMOL/L (ref 22–26)
HCT VFR BLD AUTO: 29.6 % (ref 41–52)
HCT VFR BLD EST: 30 % (ref 41–52)
HGB BLD-MCNC: 10.4 G/DL (ref 13.5–17.5)
HGB BLDMV-MCNC: 10.1 G/DL (ref 13.5–17.5)
IMM GRANULOCYTES # BLD AUTO: 0.04 X10*3/UL (ref 0–0.7)
IMM GRANULOCYTES NFR BLD AUTO: 0.4 % (ref 0–0.9)
INHALED O2 CONCENTRATION: 40 %
LACTATE BLDMV-SCNC: 0.4 MMOL/L (ref 0.4–2)
LDH SERPL L TO P-CCNC: 620 U/L (ref 84–246)
LYMPHOCYTES # BLD AUTO: 1.22 X10*3/UL (ref 1.2–4.8)
LYMPHOCYTES NFR BLD AUTO: 11.6 %
MAGNESIUM SERPL-MCNC: 1.87 MG/DL (ref 1.6–2.4)
MAGNESIUM SERPL-MCNC: 2.23 MG/DL (ref 1.6–2.4)
MCH RBC QN AUTO: 29.1 PG (ref 26–34)
MCHC RBC AUTO-ENTMCNC: 35.1 G/DL (ref 32–36)
MCV RBC AUTO: 83 FL (ref 80–100)
MONOCYTES # BLD AUTO: 0.76 X10*3/UL (ref 0.1–1)
MONOCYTES NFR BLD AUTO: 7.2 %
NEUTROPHILS # BLD AUTO: 8.33 X10*3/UL (ref 1.2–7.7)
NEUTROPHILS NFR BLD AUTO: 79.4 %
NRBC BLD-RTO: 0 /100 WBCS (ref 0–0)
OXYHGB MFR BLDMV: 79.3 % (ref 45–75)
P AXIS: 27 DEGREES
P AXIS: 40 DEGREES
P OFFSET: 211 MS
P OFFSET: 215 MS
P ONSET: 154 MS
P ONSET: 159 MS
PCO2 BLDMV: 37 MM HG (ref 41–51)
PH BLDMV: 7.28 PH (ref 7.33–7.43)
PHOSPHATE SERPL-MCNC: 6.3 MG/DL (ref 2.5–4.9)
PHOSPHATE SERPL-MCNC: 6.3 MG/DL (ref 2.5–4.9)
PHOSPHATE SERPL-MCNC: 6.7 MG/DL (ref 2.5–4.9)
PLATELET # BLD AUTO: 110 X10*3/UL (ref 150–450)
PO2 BLDMV: 48 MM HG (ref 35–45)
POTASSIUM BLDMV-SCNC: 5.1 MMOL/L (ref 3.5–5.3)
POTASSIUM SERPL-SCNC: 5.1 MMOL/L (ref 3.5–5.3)
POTASSIUM SERPL-SCNC: 5.2 MMOL/L (ref 3.5–5.3)
POTASSIUM SERPL-SCNC: 5.3 MMOL/L (ref 3.5–5.3)
PR INTERVAL: 126 MS
PR INTERVAL: 134 MS
PROT SERPL-MCNC: 4.6 G/DL (ref 6.4–8.2)
Q ONSET: 221 MS
Q ONSET: 222 MS
QRS COUNT: 11 BEATS
QRS COUNT: 14 BEATS
QRS DURATION: 94 MS
QRS DURATION: 96 MS
QT INTERVAL: 422 MS
QT INTERVAL: 444 MS
QTC CALCULATION(BAZETT): 469 MS
QTC CALCULATION(BAZETT): 504 MS
QTC FREDERICIA: 461 MS
QTC FREDERICIA: 476 MS
R AXIS: 46 DEGREES
R AXIS: 78 DEGREES
RBC # BLD AUTO: 3.57 X10*6/UL (ref 4.5–5.9)
SAO2 % BLDMV: 80 % (ref 45–75)
SODIUM BLDMV-SCNC: 126 MMOL/L (ref 136–145)
SODIUM SERPL-SCNC: 128 MMOL/L (ref 136–145)
SODIUM SERPL-SCNC: 130 MMOL/L (ref 136–145)
SODIUM SERPL-SCNC: 130 MMOL/L (ref 136–145)
T AXIS: 12 DEGREES
T AXIS: 23 DEGREES
T OFFSET: 433 MS
T OFFSET: 443 MS
VANCOMYCIN SERPL-MCNC: 16.6 UG/ML (ref 5–20)
VENTRICULAR RATE: 67 BPM
VENTRICULAR RATE: 86 BPM
WBC # BLD AUTO: 10.5 X10*3/UL (ref 4.4–11.3)

## 2024-07-13 PROCEDURE — 2500000004 HC RX 250 GENERAL PHARMACY W/ HCPCS (ALT 636 FOR OP/ED)

## 2024-07-13 PROCEDURE — 83735 ASSAY OF MAGNESIUM: CPT | Performed by: INTERNAL MEDICINE

## 2024-07-13 PROCEDURE — 99291 CRITICAL CARE FIRST HOUR: CPT

## 2024-07-13 PROCEDURE — 84132 ASSAY OF SERUM POTASSIUM: CPT | Performed by: STUDENT IN AN ORGANIZED HEALTH CARE EDUCATION/TRAINING PROGRAM

## 2024-07-13 PROCEDURE — 84132 ASSAY OF SERUM POTASSIUM: CPT | Performed by: INTERNAL MEDICINE

## 2024-07-13 PROCEDURE — 83615 LACTATE (LD) (LDH) ENZYME: CPT

## 2024-07-13 PROCEDURE — 95715 VEEG EA 12-26HR INTMT MNTR: CPT

## 2024-07-13 PROCEDURE — 85347 COAGULATION TIME ACTIVATED: CPT

## 2024-07-13 PROCEDURE — 95720 EEG PHY/QHP EA INCR W/VEEG: CPT | Performed by: STUDENT IN AN ORGANIZED HEALTH CARE EDUCATION/TRAINING PROGRAM

## 2024-07-13 PROCEDURE — 37799 UNLISTED PX VASCULAR SURGERY: CPT | Performed by: INTERNAL MEDICINE

## 2024-07-13 PROCEDURE — 93010 ELECTROCARDIOGRAM REPORT: CPT | Performed by: INTERNAL MEDICINE

## 2024-07-13 PROCEDURE — 80048 BASIC METABOLIC PNL TOTAL CA: CPT

## 2024-07-13 PROCEDURE — 2020000001 HC ICU ROOM DAILY

## 2024-07-13 PROCEDURE — 93005 ELECTROCARDIOGRAM TRACING: CPT

## 2024-07-13 PROCEDURE — 80202 ASSAY OF VANCOMYCIN: CPT

## 2024-07-13 PROCEDURE — 82248 BILIRUBIN DIRECT: CPT

## 2024-07-13 PROCEDURE — C9113 INJ PANTOPRAZOLE SODIUM, VIA: HCPCS

## 2024-07-13 PROCEDURE — 02PA3RZ REMOVAL OF SHORT-TERM EXTERNAL HEART ASSIST SYSTEM FROM HEART, PERCUTANEOUS APPROACH: ICD-10-PCS | Performed by: INTERNAL MEDICINE

## 2024-07-13 PROCEDURE — 2500000002 HC RX 250 W HCPCS SELF ADMINISTERED DRUGS (ALT 637 FOR MEDICARE OP, ALT 636 FOR OP/ED)

## 2024-07-13 PROCEDURE — 99232 SBSQ HOSP IP/OBS MODERATE 35: CPT

## 2024-07-13 PROCEDURE — 82947 ASSAY GLUCOSE BLOOD QUANT: CPT

## 2024-07-13 PROCEDURE — 85025 COMPLETE CBC W/AUTO DIFF WBC: CPT

## 2024-07-13 PROCEDURE — 83735 ASSAY OF MAGNESIUM: CPT

## 2024-07-13 PROCEDURE — 85384 FIBRINOGEN ACTIVITY: CPT

## 2024-07-13 PROCEDURE — 80069 RENAL FUNCTION PANEL: CPT | Mod: CCI | Performed by: INTERNAL MEDICINE

## 2024-07-13 PROCEDURE — 2500000005 HC RX 250 GENERAL PHARMACY W/O HCPCS

## 2024-07-13 PROCEDURE — 94003 VENT MGMT INPAT SUBQ DAY: CPT

## 2024-07-13 PROCEDURE — 84100 ASSAY OF PHOSPHORUS: CPT

## 2024-07-13 PROCEDURE — 37799 UNLISTED PX VASCULAR SURGERY: CPT

## 2024-07-13 RX ORDER — MAGNESIUM SULFATE HEPTAHYDRATE 40 MG/ML
2 INJECTION, SOLUTION INTRAVENOUS ONCE
Status: COMPLETED | OUTPATIENT
Start: 2024-07-13 | End: 2024-07-13

## 2024-07-13 ASSESSMENT — PAIN - FUNCTIONAL ASSESSMENT
PAIN_FUNCTIONAL_ASSESSMENT: CPOT (CRITICAL CARE PAIN OBSERVATION TOOL)

## 2024-07-13 ASSESSMENT — PAIN SCALES - GENERAL: PAINLEVEL_OUTOF10: 0 - NO PAIN

## 2024-07-13 NOTE — PROGRESS NOTES
Subjective   Had low central venous pressures overnight (8) so got two boluses of 500 mL saline. This morning is sedated and not responsive. Later on rounds, able to give a thumbs up, open eyes to voice and follow simple commands.  No ventilator dyssynchrony observed.  No arrhythmias on telemetry overnight.      Meds  Scheduled medications  fentaNYL, 25 mcg, intravenous, Once  levETIRAcetam, 500 mg, intravenous, q12h  magnesium sulfate, 2 g, intravenous, Once  oxygen, , inhalation, Continuous - Inhalation  pantoprazole, 40 mg, intravenous, BID  piperacillin-tazobactam, 2.25 g, intravenous, q6h  polyethylene glycol, 17 g, oral, Daily  sodium bicarbonate, 1,300 mg, orogastric tube, BID  sodium zirconium cyclosilicate, 10 g, oral, Once  sodium zirconium cyclosilicate, 10 g, oral, q8h      Continuous medications  fentaNYL, 0-300 mcg/hr, Last Rate: 50 mcg/hr (07/13/24 0700)  heparin Impella Purge 50 units/mL in 500 mL D5W, 10 mL/hr, Last Rate: 10 mL/hr (07/13/24 0640)  lidocaine, 1 mg/min, Last Rate: 1 mg/min (07/13/24 0700)  propofol, 5-50 mcg/kg/min, Last Rate: 30 mcg/kg/min (07/13/24 0700)      PRN medications  PRN medications: dextrose, dextrose, fentaNYL, glucagon, glucagon      Objective   Vital signs in last 24 hours:  Temp:  [36 °C (96.8 °F)-37.6 °C (99.7 °F)] 37 °C (98.6 °F)  Heart Rate:  [65-92] 82  Resp:  [0-19] 16  Arterial Line BP 1: ()/(59-77) 132/70  FiO2 (%):  [40 %] 40 %    Intake/Output this shift:    Intake/Output Summary (Last 24 hours) at 7/13/2024 0942  Last data filed at 7/13/2024 0900  Gross per 24 hour   Intake 4303.23 ml   Output 377.5 ml   Net 3925.73 ml       Physical  General: Patient is sedated, large body habitus  Pulm: Normal WOB at rest, no crackles or rhonchi  Cardiac: Regular rate and rhythm, normal S1/S2  Abdomen: Non-tender to palpation, non-distended  Extremities: No peripheral edema  Neuro: Patient sedated, pinpoint pupils    CI of 3.84, PAP : 47/26,     Results  Results for  orders placed or performed during the hospital encounter of 07/10/24 (from the past 24 hour(s))   POCT GLUCOSE   Result Value Ref Range    POCT Glucose 74 74 - 99 mg/dL   ACTIVATED CLOTTING TIME LOW   Result Value Ref Range    POCT Activated Clotting Time Low Range 173 83 - 199 sec   BLOOD GAS MIXED VENOUS FULL PANEL   Result Value Ref Range    POCT pH, Mixed 7.22 (LL) 7.33 - 7.43 pH    POCT pCO2, Mixed 43 41 - 51 mm Hg    POCT pO2, Mixed 49 (H) 35 - 45 mm Hg    POCT SO2, Mixed 80 (H) 45 - 75 %    POCT Oxy Hemoglobin, Mixed 79.4 (H) 45.0 - 75.0 %    POCT Hematocrit Calculated, Mixed 37.0 (L) 41.0 - 52.0 %    POCT Sodium, Mixed 129 (L) 136 - 145 mmol/L    POCT Potassium, Mixed 6.0 (H) 3.5 - 5.3 mmol/L    POCT Chloride, Mixed 101 98 - 107 mmol/L    POCT Ionized Calcium, Mixed 1.16 1.10 - 1.33 mmol/L    POCT Glucose, Mixed 104 (H) 74 - 99 mg/dL    POCT Lactate, Mixed 1.2 0.4 - 2.0 mmol/L    POCT Base Excess, Mixed -9.7 (L) -2.0 - 3.0 mmol/L    POCT HCO3 Calculated, Mixed 17.6 (L) 22.0 - 26.0 mmol/L    POCT Hemoglobin, Mixed 12.2 (L) 13.5 - 17.5 g/dL    POCT Anion Gap, Mixed 16 10 - 25 mmo/L    Patient Temperature 37.0 degrees Celsius    FiO2 40 %   Renal function panel   Result Value Ref Range    Glucose 93 74 - 99 mg/dL    Sodium 132 (L) 136 - 145 mmol/L    Potassium 5.9 (H) 3.5 - 5.3 mmol/L    Chloride 101 98 - 107 mmol/L    Bicarbonate 19 (L) 21 - 32 mmol/L    Anion Gap 18 10 - 20 mmol/L    Urea Nitrogen 59 (H) 6 - 23 mg/dL    Creatinine 5.84 (H) 0.50 - 1.30 mg/dL    eGFR 11 (L) >60 mL/min/1.73m*2    Calcium 7.9 (L) 8.6 - 10.6 mg/dL    Phosphorus 6.6 (H) 2.5 - 4.9 mg/dL    Albumin 3.0 (L) 3.4 - 5.0 g/dL   POCT GLUCOSE   Result Value Ref Range    POCT Glucose 123 (H) 74 - 99 mg/dL   Renal function panel   Result Value Ref Range    Glucose 93 74 - 99 mg/dL    Sodium 132 (L) 136 - 145 mmol/L    Potassium 5.4 (H) 3.5 - 5.3 mmol/L    Chloride 102 98 - 107 mmol/L    Bicarbonate 18 (L) 21 - 32 mmol/L    Anion Gap 17 10 -  20 mmol/L    Urea Nitrogen 61 (H) 6 - 23 mg/dL    Creatinine 5.61 (H) 0.50 - 1.30 mg/dL    eGFR 12 (L) >60 mL/min/1.73m*2    Calcium 7.7 (L) 8.6 - 10.6 mg/dL    Phosphorus 6.8 (H) 2.5 - 4.9 mg/dL    Albumin 2.8 (L) 3.4 - 5.0 g/dL   BLOOD GAS MIXED VENOUS FULL PANEL   Result Value Ref Range    POCT pH, Mixed 7.26 (L) 7.33 - 7.43 pH    POCT pCO2, Mixed 41 41 - 51 mm Hg    POCT pO2, Mixed 48 (H) 35 - 45 mm Hg    POCT SO2, Mixed 80 (H) 45 - 75 %    POCT Oxy Hemoglobin, Mixed 80.0 (H) 45.0 - 75.0 %    POCT Hematocrit Calculated, Mixed 34.0 (L) 41.0 - 52.0 %    POCT Sodium, Mixed 129 (L) 136 - 145 mmol/L    POCT Potassium, Mixed 5.5 (H) 3.5 - 5.3 mmol/L    POCT Chloride, Mixed 100 98 - 107 mmol/L    POCT Ionized Calcium, Mixed 1.14 1.10 - 1.33 mmol/L    POCT Glucose, Mixed 101 (H) 74 - 99 mg/dL    POCT Lactate, Mixed 1.3 0.4 - 2.0 mmol/L    POCT Base Excess, Mixed -8.2 (L) -2.0 - 3.0 mmol/L    POCT HCO3 Calculated, Mixed 18.4 (L) 22.0 - 26.0 mmol/L    POCT Hemoglobin, Mixed 11.2 (L) 13.5 - 17.5 g/dL    POCT Anion Gap, Mixed 16 10 - 25 mmo/L    Patient Temperature 37.0 degrees Celsius    FiO2 40 %   ACTIVATED CLOTTING TIME LOW   Result Value Ref Range    POCT Activated Clotting Time Low Range 170 83 - 199 sec   POCT GLUCOSE   Result Value Ref Range    POCT Glucose 85 74 - 99 mg/dL   ACTIVATED CLOTTING TIME LOW   Result Value Ref Range    POCT Activated Clotting Time Low Range 179 83 - 199 sec   Renal function panel   Result Value Ref Range    Glucose 96 74 - 99 mg/dL    Sodium 132 (L) 136 - 145 mmol/L    Potassium 5.3 3.5 - 5.3 mmol/L    Chloride 99 98 - 107 mmol/L    Bicarbonate 20 (L) 21 - 32 mmol/L    Anion Gap 18 10 - 20 mmol/L    Urea Nitrogen 63 (H) 6 - 23 mg/dL    Creatinine 6.21 (H) 0.50 - 1.30 mg/dL    eGFR 11 (L) >60 mL/min/1.73m*2    Calcium 7.8 (L) 8.6 - 10.6 mg/dL    Phosphorus 7.0 (H) 2.5 - 4.9 mg/dL    Albumin 2.9 (L) 3.4 - 5.0 g/dL   Vancomycin   Result Value Ref Range    Vancomycin 16.6 5.0 - 20.0 ug/mL    CBC and Auto Differential   Result Value Ref Range    WBC 10.5 4.4 - 11.3 x10*3/uL    nRBC 0.0 0.0 - 0.0 /100 WBCs    RBC 3.57 (L) 4.50 - 5.90 x10*6/uL    Hemoglobin 10.4 (L) 13.5 - 17.5 g/dL    Hematocrit 29.6 (L) 41.0 - 52.0 %    MCV 83 80 - 100 fL    MCH 29.1 26.0 - 34.0 pg    MCHC 35.1 32.0 - 36.0 g/dL    RDW 14.2 11.5 - 14.5 %    Platelets 110 (L) 150 - 450 x10*3/uL    Neutrophils % 79.4 40.0 - 80.0 %    Immature Granulocytes %, Automated 0.4 0.0 - 0.9 %    Lymphocytes % 11.6 13.0 - 44.0 %    Monocytes % 7.2 2.0 - 10.0 %    Eosinophils % 0.5 0.0 - 6.0 %    Basophils % 0.9 0.0 - 2.0 %    Neutrophils Absolute 8.33 (H) 1.20 - 7.70 x10*3/uL    Immature Granulocytes Absolute, Automated 0.04 0.00 - 0.70 x10*3/uL    Lymphocytes Absolute 1.22 1.20 - 4.80 x10*3/uL    Monocytes Absolute 0.76 0.10 - 1.00 x10*3/uL    Eosinophils Absolute 0.05 0.00 - 0.70 x10*3/uL    Basophils Absolute 0.09 0.00 - 0.10 x10*3/uL   Magnesium   Result Value Ref Range    Magnesium 1.87 1.60 - 2.40 mg/dL   Fibrinogen   Result Value Ref Range    Fibrinogen 624 (H) 200 - 400 mg/dL   Lactate dehydrogenase   Result Value Ref Range     (H) 84 - 246 U/L   Hepatic function panel   Result Value Ref Range    Albumin 2.7 (L) 3.4 - 5.0 g/dL    Bilirubin, Total 1.1 0.0 - 1.2 mg/dL    Bilirubin, Direct 0.4 (H) 0.0 - 0.3 mg/dL    Alkaline Phosphatase 52 33 - 120 U/L    ALT 59 (H) 10 - 52 U/L    AST 40 (H) 9 - 39 U/L    Total Protein 4.6 (L) 6.4 - 8.2 g/dL   Phosphorus   Result Value Ref Range    Phosphorus 6.3 (H) 2.5 - 4.9 mg/dL   Basic Metabolic Panel   Result Value Ref Range    Glucose 89 74 - 99 mg/dL    Sodium 130 (L) 136 - 145 mmol/L    Potassium 5.3 3.5 - 5.3 mmol/L    Chloride 98 98 - 107 mmol/L    Bicarbonate 18 (L) 21 - 32 mmol/L    Anion Gap 19 10 - 20 mmol/L    Urea Nitrogen 62 (H) 6 - 23 mg/dL    Creatinine 6.06 (H) 0.50 - 1.30 mg/dL    eGFR 11 (L) >60 mL/min/1.73m*2    Calcium 7.5 (L) 8.6 - 10.6 mg/dL   ACTIVATED CLOTTING TIME LOW    Result Value Ref Range    POCT Activated Clotting Time Low Range 166 83 - 199 sec   POCT GLUCOSE   Result Value Ref Range    POCT Glucose 99 74 - 99 mg/dL   ACTIVATED CLOTTING TIME LOW   Result Value Ref Range    POCT Activated Clotting Time Low Range 207 (H) 83 - 199 sec   POCT GLUCOSE   Result Value Ref Range    POCT Glucose 93 74 - 99 mg/dL   ACTIVATED CLOTTING TIME LOW   Result Value Ref Range    POCT Activated Clotting Time Low Range     ACTIVATED CLOTTING TIME LOW   Result Value Ref Range    POCT Activated Clotting Time Low Range 160 83 - 199 sec   Renal function panel   Result Value Ref Range    Glucose 91 74 - 99 mg/dL    Sodium 130 (L) 136 - 145 mmol/L    Potassium 5.1 3.5 - 5.3 mmol/L    Chloride 100 98 - 107 mmol/L    Bicarbonate 18 (L) 21 - 32 mmol/L    Anion Gap 17 mmol/L    Urea Nitrogen 65 (H) 6 - 23 mg/dL    Creatinine 6.43 (H) 0.50 - 1.30 mg/dL    eGFR 10 (L) >60 mL/min/1.73m*2    Calcium 7.2 (L) 8.6 - 10.6 mg/dL    Phosphorus 6.3 (H) 2.5 - 4.9 mg/dL    Albumin 2.6 (L) 3.4 - 5.0 g/dL   ACTIVATED CLOTTING TIME LOW   Result Value Ref Range    POCT Activated Clotting Time Low Range 145 83 - 199 sec   ACTIVATED CLOTTING TIME LOW   Result Value Ref Range    POCT Activated Clotting Time Low Range 182 83 - 199 sec       Imaging  EEG    Result Date: 7/12/2024  IMPRESSION This vEEG is indicative of severe diffuse encephalopathy. No epileptiform discharges or lateralizing signs are seen. A full report will be scanned into the patient's chart at a later time. This report has been interpreted and electronically signed by    ECG 12 lead    Result Date: 7/12/2024  Normal sinus rhythm Low voltage QRS Nonspecific T wave abnormality Abnormal ECG When compared with ECG of 11-JUL-2024 07:55, No significant change was found    Transthoracic Echo (TTE) Limited    Result Date: 7/12/2024   [facility], [address] [place] [address]                Tel [phone] and Fax [phone]  TRANSTHORACIC ECHOCARDIOGRAM REPORT  Patient Name:     YAMIL FELICIANO        Reading           [id number] [address]                                        Physician:        MD Study Date:       7/12/2024            Ordering          [id number] ANTOINE SESAY UKAIGWE                                        Provider: MRN/PID:          [id number]             Fellow:           [id number] Sheri Cherry MD Accession#:       [id number]         Nurse: Date of           1980 / 43      Sonographer:      Fellow Birth/Age:        years Gender:           M                    Additional Staff: Height:                                Admit Date:       7/10/2024 Weight:                                Admission Status: Inpatient - STAT BSA / BMI:        m2 / kg/m2           Encounter#:       [id number] Study Type:    TRANSTHORACIC ECHO (TTE) LIMITED Diagnosis/ICD: Cardiac arrest, cause unspecified-I46.9  Study Detail: The following Echo studies were performed: 2D and color flow.  PHYSICIAN INTERPRETATION: Left Ventricle: Left ventricular ejection fraction is moderately decreased, by visual estimate at 30%. The left ventricular cavity size is normal. Left ventricular diastolic filling was not assessed. Left Atrium: The left atrium was not well visualized. Right Ventricle: The right ventricle was not well visualized. There is normal right ventricular global systolic function. Right Atrium: The right atrium was not well visualized. Aortic Valve: The aortic valve was not well visualized. There is trivial aortic valve regurgitation. Mitral Valve: The mitral valve is normal in structure. There is trace to mild mitral valve regurgitation. Tricuspid Valve: The tricuspid valve is structurally normal. Tricuspid regurgitation was not assessed. Pulmonic Valve: The pulmonic valve was not assessed. Pulmonic valve regurgitation was not assessed. Pericardium: There is a trivial pericardial effusion. Aorta: The aortic root was not assessed. Systemic Veins: The  inferior vena cava appears dilated. The inspiratory change of the IVC cannot be assessed due to MV. In comparison to the previous echocardiogram(s): Compared with study dated 7/10/2024, the EF has improved from 10% to 30%. There is now an Impella device visualized in the LV.  LEFT VENTRICULAR ASSIST DEVICE: LVAD: The patient has a(n) Impella LVAD device present. LVAD Comments: The Impella inflow cannula tip is visualized approximately at 5.7 cm from the aortic valve plane.  CONCLUSIONS:  1. Left ventricular ejection fraction is moderately decreased, by visual estimate at 30%.  2. There is normal right ventricular global systolic function.  3. Compared with study dated 7/10/2024, the EF has improved from 10% to 30%. There is now an Impella device visualized in the LV. QUANTITATIVE DATA SUMMARY: 2D MEASUREMENTS:                Normal Ranges: IVSd:  0.95 cm (0.6-1.1cm) LVPWd: 0.95 cm (0.6-1.1cm) LVIDd: 5.07 cm (3.9-5.9cm) LV SYSTOLIC FUNCTION BY 2D PLANIMETRY (MOD):                      Normal Ranges: EF-Visual:      30 % LV EF Reported: 30 %  [id number] Donovan Alexandra MD Electronically signed on 7/12/2024 at 8:46:23 AM  ** Final **         Assessment/Plan   Principal Problem:    Cardiac arrest (Multi)  Active Problems:    Cardiogenic shock (Multi)    Acute hypoxemic respiratory failure (Multi)    Acute renal failure with oliguria (CMS-HCC)    Coma (Multi)    43-year-old male with a history of obesity status post gastric bypass surgery 2 years ago, CHARLEEN, HTN transferred for further management of cardiogenic shock s/p Impella placement.      Cardiac index remains above 3 on P2 impella, will remove impella today. Following that will wean sedation and extubate. [place] may be removed today also. Will consult neurology for final recs on anti-AED management. Looking down the road, will need ICD and cardiac MRI prior to discharge. EF improved to 30%, per TTE     NEUROLOGIC  #Acute metabolic encephalopathy, improving  #History of  myoclonic jerks and ophisthotonos at outside hospital  ::Concern for anoxic brain injury i/s/o cardiac arrest  ::Intuabted and sedated on propofol  ::Reported op[facility]hotonos at [facility]  -Video EEG has preliminary result of severe diffuse encephalopathy with no epileptiform activity  -Light sedation, responds to yes/no questions with nodding, follows simple commands     Plan  - Currently propofol 30 mcg and Fentanyl 50 for sedation  - Stop TTM today  - Brain MRI once off propofol if mental status changes persist  - Keppra 500 mg BID. Consulted neurology on discontinuation of AEDs given negative cvEEG.       CARDIOVASCULAR  #Cardiogenic shock  -On admission, was on norepi 0.07, epi 0.03 and impella P8 for pressure support. Now has been off pressors for two days  -PA catheter numbers on admission: CVP 9, PAP 39/20 (27), CO 7.7, CI 3.3, , SVO2 80% on P8 Impella.  -PA catheter  this morning: CVP 14, PAP 47/26, CI: 7.36, SVRI 901, SvO2 of 81%    Plan  - Remove impella today  - Consider starting GDMT tomorrow   - May be need cardiology follow up post-discharge    #Ventricular Fibrillation and Polymorphic Vtach  #Non-ischemic cardiomyopathy  #Cardiac arrest of unclear etiology  -::TTE post arrest EF 10%, global hypokinesis  ::C/f arrhythmia such as prolonged QT in 500s  ::Was on amdiodarone on admission. Had polymorphic SVT with sinus pauses on night of 7/10, so amiodarone weaned off and switched to lidocaine.  - EP consulted, appreciate recs  - Last Qtc around [phone]     Plan  -Optimize electrolytes K > 4 and Mg > 2  -Monitor on telemetry  - Cardiac MRI when stable  - Continue lidocaine drip     PULMONARY  #Acute hypoxic respiratory failure  #Concern for aspiration in setting of cardiac arrest  ::Secondary to cardiac arrest  ::CT PE negative  ::CXR not concerning for pneumonia. Diffuse pulmonary interstitial opacities more consistent with pulmonary edema or aspiration pneumonitis rather than pneumonia.     Plan  - Plan  for SBT today  - Will consider stopping Zosyn today as negative blood cultures at 48 hrs.     RENAL/  #ANNIE Oliguric   #Concern for acute tubular necrosis  ::Likely pre-renal i/s/o cardiogenic shock   -Urine output of 145 mL over past day  - Nephrology consulted, appreciate recs  -Worsening Hyponatremia, hyperkalemia (136 -> 134 -> 133->132, 4.3 ->5.2 ->5.1 -> 5.9 -> 5.4). EKG with non-peaked T-waves  -Stable acidosis: 15-> 20->18  -Phosphorus increasin.3 -> 5.2 ->6.8  -Creatinine of 2.45 -> 3.00 -> 3.58 -> 5.84 -> 5.61 ->6.43  -Urine output increased to  340 from 180 the day before     Plan  -Continue to monitor urine output and BID RFPs  -Avoid nephrotoxic medications  -Renally dose medications      GASTROINTESTINAL  #Elevated liver enzymes, improving  ::Secondary to cardiogenic shock   ::At risk for shock liver   - AST of 127, ALT of 116, Tbili of 2 on 7/10  - AST of 84, ALT of 70, Tbili of 2.1 today  -Continue to monitor with daily hepatic function panel     #GI Prophylaxis  -Pantoprazole BID while on vent     ENDOCRINE  DUSTIN     HEME/ONC  #At risk for hemolysis and DIC  -Monitor with LDH, haptoglobin, fibrinogen daily  - Risk decreased with impella removed     MSK/DERM  PT/OT        N: NPO  A: PIV, Right IJ triple lumen, Left PA catheter     DVT ppx: SCD  GI ppx: Pantoprazole 40 IV BID  Oxygen: Mechanical Ventilation   Drips Lido 1  Abx Vanc (-), Zosyn (-)  Code Status: Full Code (confirmed on Admission)  Surrogate Medical Decision-maker: Jazmin Bender (wife) [phone]         LOS: 3 days     Wing Barker MD/PhD   PGY-2

## 2024-07-13 NOTE — CARE PLAN
The patient's goals for the shift include  unable to express    The clinical goals for the shift include patient will remain hds throughout shift        Problem: Arrythmia/Dysrhythmia  Goal: Lab values return to normal range  Outcome: Progressing  Goal: No evidence of post procedure complications  Outcome: Progressing  Goal: Promote self management  Outcome: Progressing  Goal: Serial ECG will return to baseline  Outcome: Progressing  Goal: Verbalize understanding of procedures/devices  Outcome: Progressing  Goal: Vital signs return to baseline  Outcome: Progressing  Goal: Care and maintenance of device (specify)  Outcome: Progressing     Problem: Cardiac catheterization  Goal: Free from dysrhythmias  Outcome: Progressing  Goal: Free from pain  Outcome: Progressing  Goal: No evidence of post procedure complications  Outcome: Progressing  Goal: Promote self management  Outcome: Progressing  Goal: Verbalize understanding of procedure  Outcome: Progressing  Goal: Care and maintenance of device (specify)  Outcome: Progressing     Problem: Skin  Goal: Decreased wound size/increased tissue granulation at next dressing change  Outcome: Progressing  Goal: Participates in plan/prevention/treatment measures  Outcome: Progressing  Goal: Prevent/manage excess moisture  Outcome: Progressing  Goal: Prevent/minimize sheer/friction injuries  Outcome: Progressing  Goal: Promote/optimize nutrition  Outcome: Progressing  Goal: Promote skin healing  Outcome: Progressing

## 2024-07-13 NOTE — CARE PLAN
Problem: Arrythmia/Dysrhythmia  Goal: Lab values return to normal range  Outcome: Progressing  Goal: No evidence of post procedure complications  Outcome: Progressing  Goal: Promote self management  Outcome: Progressing  Goal: Serial ECG will return to baseline  Outcome: Progressing  Goal: Verbalize understanding of procedures/devices  Outcome: Progressing  Goal: Vital signs return to baseline  Outcome: Progressing  Goal: Care and maintenance of device (specify)  Outcome: Progressing     Problem: Cardiac catheterization  Goal: Free from dysrhythmias  Outcome: Progressing  Goal: Free from pain  Outcome: Progressing  Goal: No evidence of post procedure complications  Outcome: Progressing  Goal: Promote self management  Outcome: Progressing  Goal: Verbalize understanding of procedure  Outcome: Progressing  Goal: Care and maintenance of device (specify)  Outcome: Progressing     Problem: Skin  Goal: Decreased wound size/increased tissue granulation at next dressing change  7/13/2024 0201 by Leena Holt RN  Outcome: Progressing  Flowsheets (Taken 7/13/2024 0201)  Decreased wound size/increased tissue granulation at next dressing change: Promote sleep for wound healing  7/13/2024 0200 by Leena Holt RN  Outcome: Progressing  Goal: Participates in plan/prevention/treatment measures  7/13/2024 0201 by Leena Holt RN  Outcome: Progressing  Flowsheets (Taken 7/13/2024 0201)  Participates in plan/prevention/treatment measures: Discuss with provider PT/OT consult  7/13/2024 0200 by Leena Holt RN  Outcome: Progressing  Goal: Prevent/manage excess moisture  7/13/2024 0201 by Leena Holt RN  Outcome: Progressing  Flowsheets (Taken 7/13/2024 0201)  Prevent/manage excess moisture: Cleanse incontinence/protect with barrier cream  7/13/2024 0200 by Leena Holt RN  Outcome: Progressing  Goal: Prevent/minimize sheer/friction injuries  7/13/2024 0201 by Leena Holt RN  Outcome:  Progressing  Flowsheets (Taken 7/13/2024 0201)  Prevent/minimize sheer/friction injuries: Turn/reposition every 2 hours/use positioning/transfer devices  7/13/2024 0200 by Leena Holt RN  Outcome: Progressing  Goal: Promote/optimize nutrition  7/13/2024 0201 by Leena Holt RN  Outcome: Progressing  Flowsheets (Taken 7/13/2024 0201)  Promote/optimize nutrition: Monitor/record intake including meals  7/13/2024 0200 by Leena Holt RN  Outcome: Progressing  Goal: Promote skin healing  7/13/2024 0201 by Leena Holt RN  Outcome: Progressing  Flowsheets (Taken 7/13/2024 0201)  Promote skin healing: Assess skin/pad under line(s)/device(s)  7/13/2024 0200 by Leena Holt RN  Outcome: Progressing   The patient's goals for the shift include

## 2024-07-13 NOTE — PROGRESS NOTES
Pharmacy to Dose Vancomycin:  Miguel A Bender is a 43 y.o. male ordered pharmacy to dose vancomycin for pneumonia. Today is day 3 of therapy.  Goal: Trough 15-20mg/L  Results from last 7 days   Lab Units 07/13/24  0111 07/12/24  2138 07/12/24  1708 07/12/24  0351 07/12/24  0203 07/11/24  1101 07/11/24  0417   BUN mg/dL 62* 63* 61*   < > 48*   < > 31*   CREATININE mg/dL 6.06* 6.21* 5.61*   < > 4.32*   < > 2.45*   WBC AUTO x10*3/uL 10.5  --   --   --  14.0*  --  22.2*   VANCOMYCIN RM ug/mL 16.6  --   --   --   --   --   --    VANCOMYCIN TR ug/mL  --   --   --   --  21.6*  --   --     < > = values in this interval not displayed.      Blood Culture   Date/Time Value Ref Range Status   07/11/2024 12:02 AM Micrococcus (AA)  Preliminary     Comment:     A single positive blood culture with this organism may indicate contamination at collection. Notify the microbiology laboratory at [phone] if clinical features indicate the need for further work-up.   07/11/2024 12:02 AM No growth at 2 days  Preliminary     Gram Stain   Date/Time Value Ref Range Status   07/11/2024 01:00 AM (A)  Final    Gram stain indicates specimen contains significant salivary contamination.   07/11/2024 01:00 AM (A)  Final    A specimen of better quality should be submitted if clinically indicated.      Susceptibility data from last 90 days.  Collected Specimen Info Organism   07/11/24 Blood culture from Arterial Line Micrococcus     Renal function is declining. Patient has cleared enough vanco to be back within goal range with level 16.6 this AM. Patient is not clearing it very efficiently; however, and there are no plans for RRT at this time per nephrology note yesterday.    Plan:  Give vanco 1.25G x 1 dose.  Follow-up level ordered for 7/14 AM unless clinically indicated sooner.  Pharmacy will continue daily vancomycin monitoring. Please contact the pharmacy with any questions.    Thank you,  Mauro Harper [facility]

## 2024-07-13 NOTE — PROGRESS NOTES
Vancomycin Dosing by Pharmacy- Cessation of Therapy    Consult to pharmacy for vancomycin dosing has been discontinued by the prescriber, pharmacy will sign off at this time.    Please call pharmacy if there are further questions or re-enter a consult if vancomycin is resumed.     Mauro Hannah, JacobD

## 2024-07-13 NOTE — CONSULTS
Consults    History Of Present Illness  Miguel A Bender is a 43 y.o. male with pmhx of obesity status post gastric bypass surgery 2 years ago, CHARLEEN, HTN transferred to [facility] for cardiogenic shock s/p Impella after witnessed sudden cardiac arrest. Neurology consulted regarding ASM management.     Per chart review, patient presented to OSH after witness cardiac arrest while at work. CPR was started by coworkers and on EMS arrival patient was found to be in vfib. Pt was defibrillated several times, received epinephrine and amio bolus and intubated by the time he arrived to ED. Neurology was consulted at this time secondary to episodes of spontaneous eye opening, pulling at restraints, opisthotonos, and occasional myoclonic jerking. He was loaded with levetiracetam and started on maintenance Keppra 500mg bid.     On arrival to [facility] patient placed on cvEEG and Keppra dosing continued at 500mg bid. cvEEG indicative of severe diffuse encephalopathy. No epileptiform discharges or lateralizing signs are seen. Today, patient remains intubated and sedated on propofol 20 mcg and Fentanyl 50 with plans to possibly extubate today.    Per primary team, family and nursing there have been no further witnessed episodes of myoclonus seen at OSH. No witnessed abnormal movements since arrived at [facility].    Past Medical History  No past medical history on file.  Surgical History  Past Surgical History:   Procedure Laterality Date    CARDIAC CATHETERIZATION N/A 7/10/2024    Procedure: Left Heart Cath, No LV;  Surgeon: Bhavya Restrepo MD;  Location: [facility] Cardiac Cath Lab;  Service: Cardiovascular;  Laterality: N/A;    CARDIAC CATHETERIZATION N/A 7/10/2024    Procedure: PCI;  Surgeon: Bhavya Restrepo MD;  Location: [facility] Cardiac Cath Lab;  Service: Cardiovascular;  Laterality: N/A;     Social History     Allergies  Iodine and Shellfish containing products  Medications Prior to Admission   Medication Sig Dispense Refill Last Dose    amLODIPine  (Norvasc) 2.5 mg tablet Take 1 tablet (2.5 mg) by mouth once daily.   Past Week    beta carotene (vitamin A) 3,000 mcg (10,000 unit) capsule Take 1 capsule (10,000 Units) by mouth once daily.   Past Week    ergocalciferol (Vitamin D-2) 1.25 MG (18751 UT) capsule Take 1 capsule (1,250 mcg) by mouth 1 (one) time per week.   Past Week    multivitamin tablet Take 1 tablet by mouth once daily.   Past Week     Neurological Exam  Physical Exam    Sedated with fent 50 and prop 20, awake and interactive so sedation not held for neurologic exam    GENERAL APPEARANCE:  Intubated, sedated.     MENTAL STATE:   Pt intubated and sedated, not verbal.   Awake and interactive. Able to follow commands in all four extremities    CRANIAL NERVES:   CN 2   Patient able to nod head yes that he sees fingers in all fields when tested bilaterally  CN 3, 4, 6   Pupils round, 4 mm in diameter, equally reactive to light. Lids symmetric; no ptosis. EOM grossly intact. 4 beats of nystagmus noted during left gaze.  CN 5   Unable to assess due to pt's impaired mental status   CN 7   No obvious facial asymmetry, but limited by ETT  CN 8   Intact to conversation  CN 9   Unable to assess as pt is intubated  CN 11   Unable to assess as pt is intubated   CN 12   Unable to assess as pt is intubated    MOTOR:   Muscle bulk and tone were normal in both upper and lower extremities.   No fasciculations, tremor or other abnormal movements were present.   Moves all extremities symmetrically and antigravity by command    REFLEXES:                       R          L  BR:               2         2  Biceps:         2          2  Triceps:        2          2  Knee:            2          2  Ankle:          2          2    Babinski: toes downgoing to plantar stimulation bilaterally.   No clonus.    SENSORY:   Intact to light touch in all four extremities    Last Recorded Vitals  Blood pressure 121/68, pulse 93, temperature 37 °C (98.6 °F), temperature source Core,  "resp. rate 13, height 1.78 m (5' 10.08\"), weight 130 kg (287 lb), SpO2 93%.    Relevant Results              [place] Coma Scale  Best Eye Response: To verbal stimuli  Best Verbal Response: None (ETT)  Best Motor Response: Follows commands  Roderick Coma Scale Score: 10              Scheduled medications  fentaNYL, 25 mcg, intravenous, Once  levETIRAcetam, 500 mg, intravenous, q12h  oxygen, , inhalation, Continuous - Inhalation  pantoprazole, 40 mg, intravenous, BID  polyethylene glycol, 17 g, oral, Daily  sodium bicarbonate, 1,300 mg, orogastric tube, BID  sodium zirconium cyclosilicate, 10 g, oral, Once  sodium zirconium cyclosilicate, 10 g, oral, q8h      Continuous medications  fentaNYL, 0-300 mcg/hr, Last Rate: 50 mcg/hr (07/13/24 1400)  heparin Impella Purge 50 units/mL in 500 mL D5W, 10 mL/hr, Last Rate: 10 mL/hr (07/13/24 0640)  lidocaine, 1 mg/min, Last Rate: 1 mg/min (07/13/24 1400)  propofol, 5-50 mcg/kg/min, Last Rate: 20 mcg/kg/min (07/13/24 1400)      PRN medications  PRN medications: dextrose, dextrose, fentaNYL, glucagon, glucagon    Results for orders placed or performed during the hospital encounter of 07/10/24 (from the past 24 hour(s))   POCT GLUCOSE   Result Value Ref Range    POCT Glucose 123 (H) 74 - 99 mg/dL   Renal function panel   Result Value Ref Range    Glucose 93 74 - 99 mg/dL    Sodium 132 (L) 136 - 145 mmol/L    Potassium 5.4 (H) 3.5 - 5.3 mmol/L    Chloride 102 98 - 107 mmol/L    Bicarbonate 18 (L) 21 - 32 mmol/L    Anion Gap 17 10 - 20 mmol/L    Urea Nitrogen 61 (H) 6 - 23 mg/dL    Creatinine 5.61 (H) 0.50 - 1.30 mg/dL    eGFR 12 (L) >60 mL/min/1.73m*2    Calcium 7.7 (L) 8.6 - 10.6 mg/dL    Phosphorus 6.8 (H) 2.5 - 4.9 mg/dL    Albumin 2.8 (L) 3.4 - 5.0 g/dL   BLOOD GAS MIXED VENOUS FULL PANEL   Result Value Ref Range    POCT pH, Mixed 7.26 (L) 7.33 - 7.43 pH    POCT pCO2, Mixed 41 41 - 51 mm Hg    POCT pO2, Mixed 48 (H) 35 - 45 mm Hg    POCT SO2, Mixed 80 (H) 45 - 75 %    POCT Oxy " Hemoglobin, Mixed 80.0 (H) 45.0 - 75.0 %    POCT Hematocrit Calculated, Mixed 34.0 (L) 41.0 - 52.0 %    POCT Sodium, Mixed 129 (L) 136 - 145 mmol/L    POCT Potassium, Mixed 5.5 (H) 3.5 - 5.3 mmol/L    POCT Chloride, Mixed 100 98 - 107 mmol/L    POCT Ionized Calcium, Mixed 1.14 1.10 - 1.33 mmol/L    POCT Glucose, Mixed 101 (H) 74 - 99 mg/dL    POCT Lactate, Mixed 1.3 0.4 - 2.0 mmol/L    POCT Base Excess, Mixed -8.2 (L) -2.0 - 3.0 mmol/L    POCT HCO3 Calculated, Mixed 18.4 (L) 22.0 - 26.0 mmol/L    POCT Hemoglobin, Mixed 11.2 (L) 13.5 - 17.5 g/dL    POCT Anion Gap, Mixed 16 10 - 25 mmo/L    Patient Temperature 37.0 degrees Celsius    FiO2 40 %   ACTIVATED CLOTTING TIME LOW   Result Value Ref Range    POCT Activated Clotting Time Low Range 170 83 - 199 sec   ECG 12 lead   Result Value Ref Range    Ventricular Rate 86 BPM    Atrial Rate 86 BPM    ID Interval 126 ms    QRS Duration 94 ms    QT Interval 422 ms    QTC Calculation(Bazett) 504 ms    P Axis 27 degrees    R Axis 46 degrees    T Axis 12 degrees    QRS Count 14 beats    Q Onset 222 ms    P Onset 159 ms    P Offset 215 ms    T Offset 433 ms    QTC Fredericia 476 ms   POCT GLUCOSE   Result Value Ref Range    POCT Glucose 85 74 - 99 mg/dL   ACTIVATED CLOTTING TIME LOW   Result Value Ref Range    POCT Activated Clotting Time Low Range 179 83 - 199 sec   Renal function panel   Result Value Ref Range    Glucose 96 74 - 99 mg/dL    Sodium 132 (L) 136 - 145 mmol/L    Potassium 5.3 3.5 - 5.3 mmol/L    Chloride 99 98 - 107 mmol/L    Bicarbonate 20 (L) 21 - 32 mmol/L    Anion Gap 18 10 - 20 mmol/L    Urea Nitrogen 63 (H) 6 - 23 mg/dL    Creatinine 6.21 (H) 0.50 - 1.30 mg/dL    eGFR 11 (L) >60 mL/min/1.73m*2    Calcium 7.8 (L) 8.6 - 10.6 mg/dL    Phosphorus 7.0 (H) 2.5 - 4.9 mg/dL    Albumin 2.9 (L) 3.4 - 5.0 g/dL   Vancomycin   Result Value Ref Range    Vancomycin 16.6 5.0 - 20.0 ug/mL   CBC and Auto Differential   Result Value Ref Range    WBC 10.5 4.4 - 11.3 x10*3/uL     nRBC 0.0 0.0 - 0.0 /100 WBCs    RBC 3.57 (L) 4.50 - 5.90 x10*6/uL    Hemoglobin 10.4 (L) 13.5 - 17.5 g/dL    Hematocrit 29.6 (L) 41.0 - 52.0 %    MCV 83 80 - 100 fL    MCH 29.1 26.0 - 34.0 pg    MCHC 35.1 32.0 - 36.0 g/dL    RDW 14.2 11.5 - 14.5 %    Platelets 110 (L) 150 - 450 x10*3/uL    Neutrophils % 79.4 40.0 - 80.0 %    Immature Granulocytes %, Automated 0.4 0.0 - 0.9 %    Lymphocytes % 11.6 13.0 - 44.0 %    Monocytes % 7.2 2.0 - 10.0 %    Eosinophils % 0.5 0.0 - 6.0 %    Basophils % 0.9 0.0 - 2.0 %    Neutrophils Absolute 8.33 (H) 1.20 - 7.70 x10*3/uL    Immature Granulocytes Absolute, Automated 0.04 0.00 - 0.70 x10*3/uL    Lymphocytes Absolute 1.22 1.20 - 4.80 x10*3/uL    Monocytes Absolute 0.76 0.10 - 1.00 x10*3/uL    Eosinophils Absolute 0.05 0.00 - 0.70 x10*3/uL    Basophils Absolute 0.09 0.00 - 0.10 x10*3/uL   Magnesium   Result Value Ref Range    Magnesium 1.87 1.60 - 2.40 mg/dL   Fibrinogen   Result Value Ref Range    Fibrinogen 624 (H) 200 - 400 mg/dL   Lactate dehydrogenase   Result Value Ref Range     (H) 84 - 246 U/L   Hepatic function panel   Result Value Ref Range    Albumin 2.7 (L) 3.4 - 5.0 g/dL    Bilirubin, Total 1.1 0.0 - 1.2 mg/dL    Bilirubin, Direct 0.4 (H) 0.0 - 0.3 mg/dL    Alkaline Phosphatase 52 33 - 120 U/L    ALT 59 (H) 10 - 52 U/L    AST 40 (H) 9 - 39 U/L    Total Protein 4.6 (L) 6.4 - 8.2 g/dL   Phosphorus   Result Value Ref Range    Phosphorus 6.3 (H) 2.5 - 4.9 mg/dL   Basic Metabolic Panel   Result Value Ref Range    Glucose 89 74 - 99 mg/dL    Sodium 130 (L) 136 - 145 mmol/L    Potassium 5.3 3.5 - 5.3 mmol/L    Chloride 98 98 - 107 mmol/L    Bicarbonate 18 (L) 21 - 32 mmol/L    Anion Gap 19 10 - 20 mmol/L    Urea Nitrogen 62 (H) 6 - 23 mg/dL    Creatinine 6.06 (H) 0.50 - 1.30 mg/dL    eGFR 11 (L) >60 mL/min/1.73m*2    Calcium 7.5 (L) 8.6 - 10.6 mg/dL   ACTIVATED CLOTTING TIME LOW   Result Value Ref Range    POCT Activated Clotting Time Low Range 166 83 - 199 sec   POCT  GLUCOSE   Result Value Ref Range    POCT Glucose 99 74 - 99 mg/dL   ACTIVATED CLOTTING TIME LOW   Result Value Ref Range    POCT Activated Clotting Time Low Range 207 (H) 83 - 199 sec   POCT GLUCOSE   Result Value Ref Range    POCT Glucose 93 74 - 99 mg/dL   ACTIVATED CLOTTING TIME LOW   Result Value Ref Range    POCT Activated Clotting Time Low Range     ACTIVATED CLOTTING TIME LOW   Result Value Ref Range    POCT Activated Clotting Time Low Range 160 83 - 199 sec   Renal function panel   Result Value Ref Range    Glucose 91 74 - 99 mg/dL    Sodium 130 (L) 136 - 145 mmol/L    Potassium 5.1 3.5 - 5.3 mmol/L    Chloride 100 98 - 107 mmol/L    Bicarbonate 18 (L) 21 - 32 mmol/L    Anion Gap 17 mmol/L    Urea Nitrogen 65 (H) 6 - 23 mg/dL    Creatinine 6.43 (H) 0.50 - 1.30 mg/dL    eGFR 10 (L) >60 mL/min/1.73m*2    Calcium 7.2 (L) 8.6 - 10.6 mg/dL    Phosphorus 6.3 (H) 2.5 - 4.9 mg/dL    Albumin 2.6 (L) 3.4 - 5.0 g/dL   ACTIVATED CLOTTING TIME LOW   Result Value Ref Range    POCT Activated Clotting Time Low Range 145 83 - 199 sec   ACTIVATED CLOTTING TIME LOW   Result Value Ref Range    POCT Activated Clotting Time Low Range 182 83 - 199 sec       CT head wo IV contrast    Result Date: 7/10/2024  Interpreted By:  Vaibhav Brunner, STUDY: CT HEAD WO IV CONTRAST;  7/10/2024 2:40 pm   INDICATION: Signs/Symptoms:Altered mental status, s/p cardiac arrest.   COMPARISON: None.   ACCESSION NUMBER(S): [id number]   ORDERING CLINICIAN: ALFA GRIFFIN   TECHNIQUE: Routine axial images were obtained from the skull base through the vertex. Sagittal and coronal reconstruction images were generated. Brain, subdural, and bone windows were reviewed. N/A Unavailable   FINDINGS: INTRACRANIAL: The ventricles, sulci and basal cisterns were within normal limits. No acute intracranial bleed, midline shift, or focal mass effect. No destructive bone lesion. No depressed skull fracture. No abnormal skull base arterial calcifications.   EXTRACRANIAL:  "Visualized paranasal sinuses were clear. Visualized mastoid air cells were clear.       Negative exam.   MACRO: None   Signed by: Vaibhav Brunner 7/10/2024 3:33 PM Dictation workstation:   [id number]       Assessment/Plan   Principal Problem:    Cardiac arrest (Multi)  Active Problems:    Cardiogenic shock (Multi)    Acute hypoxemic respiratory failure (Multi)    Acute renal failure with oliguria (CMS-HCC)    Coma (Multi)    Miguel A Bender is a 43 y.o. male with pmhx of obesity status post gastric bypass surgery 2022, CHARLENE, HTN transferred to [facility] for cardiogenic shock s/p Impella after witnessed sudden cardiac arrest. Neurology consulted regarding ASM management. At OSH, patient was reported to have spontaneous eye opening, pulling at restraints, opisthotonos, and occasional myoclonic jerking, so he was started on cvEEG and started on Keppra 500mg BID. cvEEG is indicative of severe diffuse encephalopathy, with no epileptiform discharges or lateralizing signs. Patient remains intubated and sedated on propofol 20 mcg and Fentanyl 50 with plans to possibly extubate today vs tomorrow. Neurologic exam significant for no abnormal movements, patient awake, alert, and following commands, moving all extremities antigravity and equally. Per primary team, family and nursing there have been no further witnessed episodes of myoclonus seen at OSH.     Recommendations:  - Okay to discontinue EEG as patient neurologically stable   Place \"discontinue EEG\" order  - Will discuss discontinuation of Keppra in AM with attending  - To be formally staffed in AM with attending    Cruzito Hedrick DO  Neurology PGY2    I participated and contributed to the above note including the HPI, physical exam, assessment, and plan. I agree with the above information.     Nanette Lorenzo MD  Neurology PGY3    Post-Staffing Updates:  - continue Keppra 500mg BID  - request outpatient neurology follow up  - Neurology will sign off at this time, but please " do not hesitate to reach out with questions or concerns.    Nanette Lorenzo MD  Neurology PGY3

## 2024-07-13 NOTE — PROGRESS NOTES
Miguel A Bender   43 y.o.    130 kg  MRN/Room: [id number]    Subjective: Currently intubated and sedated with restraints. Family at bedside. Pt moving left arm when examined. On video EEG monitor. Off cooling protocol. On lidocaine drip for polymorphic VT. Off pressors.      Objective:     Meds:   fentaNYL, 25 mcg, Once  levETIRAcetam, 500 mg, q12h  magnesium sulfate, 2 g, Once  oxygen, , Continuous - Inhalation  pantoprazole, 40 mg, BID  piperacillin-tazobactam, 2.25 g, q6h  polyethylene glycol, 17 g, Daily  sodium bicarbonate, 1,300 mg, BID  sodium zirconium cyclosilicate, 10 g, Once  sodium zirconium cyclosilicate, 10 g, q8h      fentaNYL, Last Rate: 50 mcg/hr (07/13/24 0700)  heparin Impella Purge 50 units/mL in 500 mL D5W, Last Rate: 10 mL/hr (07/13/24 0640)  lidocaine, Last Rate: 1 mg/min (07/13/24 0700)  propofol, Last Rate: 30 mcg/kg/min (07/13/24 0700)      dextrose, 12.5 g, q15 min PRN  dextrose, 25 g, q15 min PRN  fentaNYL, 25 mcg, q10 min PRN  glucagon, 1 mg, q15 min PRN  glucagon, 1 mg, q15 min PRN        Vitals:    07/13/24 0900   BP:    Pulse: 82   Resp: 16   Temp:    SpO2: 95%          Intake/Output Summary (Last 24 hours) at 7/13/2024 0949  Last data filed at 7/13/2024 0900  Gross per 24 hour   Intake 4303.23 ml   Output 377.5 ml   Net 3925.73 ml       General appearance: intubated, sedated  Eyes: non-icteric  Skin: no apparent rash  Heart: HR regular  Lungs: CTA bilat no wheezing/crackles  Abdomen: soft, nt/nd  Extremities: mild edema bilat  El  Neuro: sedated. Opening eyes and moving spontaneously  Access :no HD access    Blood Labs:  Results for orders placed or performed during the hospital encounter of 07/10/24 (from the past 24 hour(s))   POCT GLUCOSE   Result Value Ref Range    POCT Glucose 74 74 - 99 mg/dL   ACTIVATED CLOTTING TIME LOW   Result Value Ref Range    POCT Activated Clotting Time Low Range 173 83 - 199 sec   BLOOD GAS MIXED VENOUS FULL PANEL   Result Value Ref Range     POCT pH, Mixed 7.22 (LL) 7.33 - 7.43 pH    POCT pCO2, Mixed 43 41 - 51 mm Hg    POCT pO2, Mixed 49 (H) 35 - 45 mm Hg    POCT SO2, Mixed 80 (H) 45 - 75 %    POCT Oxy Hemoglobin, Mixed 79.4 (H) 45.0 - 75.0 %    POCT Hematocrit Calculated, Mixed 37.0 (L) 41.0 - 52.0 %    POCT Sodium, Mixed 129 (L) 136 - 145 mmol/L    POCT Potassium, Mixed 6.0 (H) 3.5 - 5.3 mmol/L    POCT Chloride, Mixed 101 98 - 107 mmol/L    POCT Ionized Calcium, Mixed 1.16 1.10 - 1.33 mmol/L    POCT Glucose, Mixed 104 (H) 74 - 99 mg/dL    POCT Lactate, Mixed 1.2 0.4 - 2.0 mmol/L    POCT Base Excess, Mixed -9.7 (L) -2.0 - 3.0 mmol/L    POCT HCO3 Calculated, Mixed 17.6 (L) 22.0 - 26.0 mmol/L    POCT Hemoglobin, Mixed 12.2 (L) 13.5 - 17.5 g/dL    POCT Anion Gap, Mixed 16 10 - 25 mmo/L    Patient Temperature 37.0 degrees Celsius    FiO2 40 %   Renal function panel   Result Value Ref Range    Glucose 93 74 - 99 mg/dL    Sodium 132 (L) 136 - 145 mmol/L    Potassium 5.9 (H) 3.5 - 5.3 mmol/L    Chloride 101 98 - 107 mmol/L    Bicarbonate 19 (L) 21 - 32 mmol/L    Anion Gap 18 10 - 20 mmol/L    Urea Nitrogen 59 (H) 6 - 23 mg/dL    Creatinine 5.84 (H) 0.50 - 1.30 mg/dL    eGFR 11 (L) >60 mL/min/1.73m*2    Calcium 7.9 (L) 8.6 - 10.6 mg/dL    Phosphorus 6.6 (H) 2.5 - 4.9 mg/dL    Albumin 3.0 (L) 3.4 - 5.0 g/dL   POCT GLUCOSE   Result Value Ref Range    POCT Glucose 123 (H) 74 - 99 mg/dL   Renal function panel   Result Value Ref Range    Glucose 93 74 - 99 mg/dL    Sodium 132 (L) 136 - 145 mmol/L    Potassium 5.4 (H) 3.5 - 5.3 mmol/L    Chloride 102 98 - 107 mmol/L    Bicarbonate 18 (L) 21 - 32 mmol/L    Anion Gap 17 10 - 20 mmol/L    Urea Nitrogen 61 (H) 6 - 23 mg/dL    Creatinine 5.61 (H) 0.50 - 1.30 mg/dL    eGFR 12 (L) >60 mL/min/1.73m*2    Calcium 7.7 (L) 8.6 - 10.6 mg/dL    Phosphorus 6.8 (H) 2.5 - 4.9 mg/dL    Albumin 2.8 (L) 3.4 - 5.0 g/dL   BLOOD GAS MIXED VENOUS FULL PANEL   Result Value Ref Range    POCT pH, Mixed 7.26 (L) 7.33 - 7.43 pH    POCT pCO2,  Mixed 41 41 - 51 mm Hg    POCT pO2, Mixed 48 (H) 35 - 45 mm Hg    POCT SO2, Mixed 80 (H) 45 - 75 %    POCT Oxy Hemoglobin, Mixed 80.0 (H) 45.0 - 75.0 %    POCT Hematocrit Calculated, Mixed 34.0 (L) 41.0 - 52.0 %    POCT Sodium, Mixed 129 (L) 136 - 145 mmol/L    POCT Potassium, Mixed 5.5 (H) 3.5 - 5.3 mmol/L    POCT Chloride, Mixed 100 98 - 107 mmol/L    POCT Ionized Calcium, Mixed 1.14 1.10 - 1.33 mmol/L    POCT Glucose, Mixed 101 (H) 74 - 99 mg/dL    POCT Lactate, Mixed 1.3 0.4 - 2.0 mmol/L    POCT Base Excess, Mixed -8.2 (L) -2.0 - 3.0 mmol/L    POCT HCO3 Calculated, Mixed 18.4 (L) 22.0 - 26.0 mmol/L    POCT Hemoglobin, Mixed 11.2 (L) 13.5 - 17.5 g/dL    POCT Anion Gap, Mixed 16 10 - 25 mmo/L    Patient Temperature 37.0 degrees Celsius    FiO2 40 %   ACTIVATED CLOTTING TIME LOW   Result Value Ref Range    POCT Activated Clotting Time Low Range 170 83 - 199 sec   POCT GLUCOSE   Result Value Ref Range    POCT Glucose 85 74 - 99 mg/dL   ACTIVATED CLOTTING TIME LOW   Result Value Ref Range    POCT Activated Clotting Time Low Range 179 83 - 199 sec   Renal function panel   Result Value Ref Range    Glucose 96 74 - 99 mg/dL    Sodium 132 (L) 136 - 145 mmol/L    Potassium 5.3 3.5 - 5.3 mmol/L    Chloride 99 98 - 107 mmol/L    Bicarbonate 20 (L) 21 - 32 mmol/L    Anion Gap 18 10 - 20 mmol/L    Urea Nitrogen 63 (H) 6 - 23 mg/dL    Creatinine 6.21 (H) 0.50 - 1.30 mg/dL    eGFR 11 (L) >60 mL/min/1.73m*2    Calcium 7.8 (L) 8.6 - 10.6 mg/dL    Phosphorus 7.0 (H) 2.5 - 4.9 mg/dL    Albumin 2.9 (L) 3.4 - 5.0 g/dL   Vancomycin   Result Value Ref Range    Vancomycin 16.6 5.0 - 20.0 ug/mL   CBC and Auto Differential   Result Value Ref Range    WBC 10.5 4.4 - 11.3 x10*3/uL    nRBC 0.0 0.0 - 0.0 /100 WBCs    RBC 3.57 (L) 4.50 - 5.90 x10*6/uL    Hemoglobin 10.4 (L) 13.5 - 17.5 g/dL    Hematocrit 29.6 (L) 41.0 - 52.0 %    MCV 83 80 - 100 fL    MCH 29.1 26.0 - 34.0 pg    MCHC 35.1 32.0 - 36.0 g/dL    RDW 14.2 11.5 - 14.5 %    Platelets  110 (L) 150 - 450 x10*3/uL    Neutrophils % 79.4 40.0 - 80.0 %    Immature Granulocytes %, Automated 0.4 0.0 - 0.9 %    Lymphocytes % 11.6 13.0 - 44.0 %    Monocytes % 7.2 2.0 - 10.0 %    Eosinophils % 0.5 0.0 - 6.0 %    Basophils % 0.9 0.0 - 2.0 %    Neutrophils Absolute 8.33 (H) 1.20 - 7.70 x10*3/uL    Immature Granulocytes Absolute, Automated 0.04 0.00 - 0.70 x10*3/uL    Lymphocytes Absolute 1.22 1.20 - 4.80 x10*3/uL    Monocytes Absolute 0.76 0.10 - 1.00 x10*3/uL    Eosinophils Absolute 0.05 0.00 - 0.70 x10*3/uL    Basophils Absolute 0.09 0.00 - 0.10 x10*3/uL   Magnesium   Result Value Ref Range    Magnesium 1.87 1.60 - 2.40 mg/dL   Fibrinogen   Result Value Ref Range    Fibrinogen 624 (H) 200 - 400 mg/dL   Lactate dehydrogenase   Result Value Ref Range     (H) 84 - 246 U/L   Hepatic function panel   Result Value Ref Range    Albumin 2.7 (L) 3.4 - 5.0 g/dL    Bilirubin, Total 1.1 0.0 - 1.2 mg/dL    Bilirubin, Direct 0.4 (H) 0.0 - 0.3 mg/dL    Alkaline Phosphatase 52 33 - 120 U/L    ALT 59 (H) 10 - 52 U/L    AST 40 (H) 9 - 39 U/L    Total Protein 4.6 (L) 6.4 - 8.2 g/dL   Phosphorus   Result Value Ref Range    Phosphorus 6.3 (H) 2.5 - 4.9 mg/dL   Basic Metabolic Panel   Result Value Ref Range    Glucose 89 74 - 99 mg/dL    Sodium 130 (L) 136 - 145 mmol/L    Potassium 5.3 3.5 - 5.3 mmol/L    Chloride 98 98 - 107 mmol/L    Bicarbonate 18 (L) 21 - 32 mmol/L    Anion Gap 19 10 - 20 mmol/L    Urea Nitrogen 62 (H) 6 - 23 mg/dL    Creatinine 6.06 (H) 0.50 - 1.30 mg/dL    eGFR 11 (L) >60 mL/min/1.73m*2    Calcium 7.5 (L) 8.6 - 10.6 mg/dL   ACTIVATED CLOTTING TIME LOW   Result Value Ref Range    POCT Activated Clotting Time Low Range 166 83 - 199 sec   POCT GLUCOSE   Result Value Ref Range    POCT Glucose 99 74 - 99 mg/dL   ACTIVATED CLOTTING TIME LOW   Result Value Ref Range    POCT Activated Clotting Time Low Range 207 (H) 83 - 199 sec   POCT GLUCOSE   Result Value Ref Range    POCT Glucose 93 74 - 99 mg/dL    ACTIVATED CLOTTING TIME LOW   Result Value Ref Range    POCT Activated Clotting Time Low Range     ACTIVATED CLOTTING TIME LOW   Result Value Ref Range    POCT Activated Clotting Time Low Range 160 83 - 199 sec   Renal function panel   Result Value Ref Range    Glucose 91 74 - 99 mg/dL    Sodium 130 (L) 136 - 145 mmol/L    Potassium 5.1 3.5 - 5.3 mmol/L    Chloride 100 98 - 107 mmol/L    Bicarbonate 18 (L) 21 - 32 mmol/L    Anion Gap 17 mmol/L    Urea Nitrogen 65 (H) 6 - 23 mg/dL    Creatinine 6.43 (H) 0.50 - 1.30 mg/dL    eGFR 10 (L) >60 mL/min/1.73m*2    Calcium 7.2 (L) 8.6 - 10.6 mg/dL    Phosphorus 6.3 (H) 2.5 - 4.9 mg/dL    Albumin 2.6 (L) 3.4 - 5.0 g/dL   ACTIVATED CLOTTING TIME LOW   Result Value Ref Range    POCT Activated Clotting Time Low Range 145 83 - 199 sec   ACTIVATED CLOTTING TIME LOW   Result Value Ref Range    POCT Activated Clotting Time Low Range 182 83 - 199 sec              ASSESSMENT:  Miguel A Bender is a  43 y.o.  Year old male a past medical hx of HTN, CHARLEEN, NSTEMI, and gastric bypass surgery admitted to [facility] on 7/10 with cardiac arrest. S/p left heart cath with Impella placement. Nephrology consulted for ANNIE/possible HD iso worsening UOP and renal function.     Updates 7/13:  - 340 mL urine output past 24 hours, improving  - Off pressors  - Na today 130 (132)  - On bicarb 1300 mg bid  - ABG's stable; CI improving with weaning Impella    #ANNIE, oliguric  -Baseline Cr 1.19  -Etiology : Post cardiac arrest.  -Electrolytes stable , mild AGMA and NAGMA  -He is positive 2.1L  -His CVP was 13.  -Not on pressors anymore    RECOMMENDATIONS:  - Continue supportive management .  - Continue lokelma prn  - No acute need RRT  - Will continue to follow  -Strict I/Os .  -Avoid IV contrast      Zhao Dhaliwal MD PGY-1  Nephrology Resident    Daytime / Weekend Renal Pager [phone]

## 2024-07-14 ENCOUNTER — APPOINTMENT (OUTPATIENT)
Dept: RADIOLOGY | Facility: HOSPITAL | Age: 44
DRG: 276 | End: 2024-07-14
Payer: COMMERCIAL

## 2024-07-14 ENCOUNTER — APPOINTMENT (OUTPATIENT)
Dept: CARDIOLOGY | Facility: HOSPITAL | Age: 44
DRG: 276 | End: 2024-07-14
Payer: COMMERCIAL

## 2024-07-14 VITALS
HEART RATE: 95 BPM | RESPIRATION RATE: 18 BRPM | OXYGEN SATURATION: 94 % | WEIGHT: 288 LBS | BODY MASS INDEX: 41.23 KG/M2 | TEMPERATURE: 98.2 F | SYSTOLIC BLOOD PRESSURE: 121 MMHG | DIASTOLIC BLOOD PRESSURE: 68 MMHG | HEIGHT: 70 IN

## 2024-07-14 LAB
ALBUMIN SERPL BCP-MCNC: 2.7 G/DL (ref 3.4–5)
ALBUMIN SERPL BCP-MCNC: 2.9 G/DL (ref 3.4–5)
ANION GAP BLDA CALCULATED.4IONS-SCNC: 19 MMO/L (ref 10–25)
ANION GAP BLDA CALCULATED.4IONS-SCNC: 20 MMO/L (ref 10–25)
ANION GAP BLDMV CALCULATED.4IONS-SCNC: 16 MMO/L (ref 10–25)
ANION GAP BLDMV CALCULATED.4IONS-SCNC: 18 MMO/L (ref 10–25)
ANION GAP BLDMV CALCULATED.4IONS-SCNC: 18 MMO/L (ref 10–25)
ANION GAP SERPL CALC-SCNC: 19 MMOL/L (ref 10–20)
ANION GAP SERPL CALC-SCNC: 26 MMOL/L (ref 10–20)
ATRIAL RATE: 77 BPM
BACTERIA BLD AEROBE CULT: ABNORMAL
BACTERIA BLD CULT: ABNORMAL
BACTERIA BLD CULT: NORMAL
BASE EXCESS BLDA CALC-SCNC: -10.7 MMOL/L (ref -2–3)
BASE EXCESS BLDA CALC-SCNC: -11 MMOL/L (ref -2–3)
BASE EXCESS BLDMV CALC-SCNC: -11 MMOL/L (ref -2–3)
BASE EXCESS BLDMV CALC-SCNC: -8.1 MMOL/L (ref -2–3)
BASE EXCESS BLDMV CALC-SCNC: -9.2 MMOL/L (ref -2–3)
BODY TEMPERATURE: 37 DEGREES CELSIUS
BUN SERPL-MCNC: 76 MG/DL (ref 6–23)
BUN SERPL-MCNC: 85 MG/DL (ref 6–23)
CA-I BLDA-SCNC: 1.12 MMOL/L (ref 1.1–1.33)
CA-I BLDA-SCNC: 1.12 MMOL/L (ref 1.1–1.33)
CA-I BLDMV-SCNC: 1.09 MMOL/L (ref 1.1–1.33)
CA-I BLDMV-SCNC: 1.12 MMOL/L (ref 1.1–1.33)
CA-I BLDMV-SCNC: 1.12 MMOL/L (ref 1.1–1.33)
CALCIUM SERPL-MCNC: 7.7 MG/DL (ref 8.6–10.6)
CALCIUM SERPL-MCNC: 7.7 MG/DL (ref 8.6–10.6)
CHLORIDE BLD-SCNC: 96 MMOL/L (ref 98–107)
CHLORIDE BLD-SCNC: 97 MMOL/L (ref 98–107)
CHLORIDE BLD-SCNC: 98 MMOL/L (ref 98–107)
CHLORIDE BLDA-SCNC: 96 MMOL/L (ref 98–107)
CHLORIDE BLDA-SCNC: 96 MMOL/L (ref 98–107)
CHLORIDE SERPL-SCNC: 95 MMOL/L (ref 98–107)
CHLORIDE SERPL-SCNC: 97 MMOL/L (ref 98–107)
CK SERPL-CCNC: 667 U/L (ref 0–325)
CO2 SERPL-SCNC: 14 MMOL/L (ref 21–32)
CO2 SERPL-SCNC: 17 MMOL/L (ref 21–32)
CREAT SERPL-MCNC: 7.88 MG/DL (ref 0.5–1.3)
CREAT SERPL-MCNC: 9.07 MG/DL (ref 0.5–1.3)
EGFRCR SERPLBLD CKD-EPI 2021: 7 ML/MIN/1.73M*2
EGFRCR SERPLBLD CKD-EPI 2021: 8 ML/MIN/1.73M*2
ERYTHROCYTE [DISTWIDTH] IN BLOOD BY AUTOMATED COUNT: 13.7 % (ref 11.5–14.5)
GLUCOSE BLD MANUAL STRIP-MCNC: 90 MG/DL (ref 74–99)
GLUCOSE BLD MANUAL STRIP-MCNC: 97 MG/DL (ref 74–99)
GLUCOSE BLD-MCNC: 79 MG/DL (ref 74–99)
GLUCOSE BLD-MCNC: 83 MG/DL (ref 74–99)
GLUCOSE BLD-MCNC: 89 MG/DL (ref 74–99)
GLUCOSE BLDA-MCNC: 105 MG/DL (ref 74–99)
GLUCOSE BLDA-MCNC: 96 MG/DL (ref 74–99)
GLUCOSE SERPL-MCNC: 64 MG/DL (ref 74–99)
GLUCOSE SERPL-MCNC: 90 MG/DL (ref 74–99)
GRAM STN SPEC: ABNORMAL
HCO3 BLDA-SCNC: 15 MMOL/L (ref 22–26)
HCO3 BLDA-SCNC: 15.3 MMOL/L (ref 22–26)
HCO3 BLDMV-SCNC: 14.6 MMOL/L (ref 22–26)
HCO3 BLDMV-SCNC: 17 MMOL/L (ref 22–26)
HCO3 BLDMV-SCNC: 17.9 MMOL/L (ref 22–26)
HCT VFR BLD AUTO: 27.8 % (ref 41–52)
HCT VFR BLD EST: 17 % (ref 41–52)
HCT VFR BLD EST: 31 % (ref 41–52)
HCT VFR BLD EST: 32 % (ref 41–52)
HGB BLD-MCNC: 9.7 G/DL (ref 13.5–17.5)
HGB BLDA-MCNC: 10.3 G/DL (ref 13.5–17.5)
HGB BLDA-MCNC: 10.5 G/DL (ref 13.5–17.5)
HGB BLDMV-MCNC: 10.2 G/DL (ref 13.5–17.5)
HGB BLDMV-MCNC: 10.3 G/DL (ref 13.5–17.5)
HGB BLDMV-MCNC: 5.7 G/DL (ref 13.5–17.5)
INHALED O2 CONCENTRATION: 28 %
INHALED O2 CONCENTRATION: 40 %
INHALED O2 CONCENTRATION: 60 %
LACTATE BLDA-SCNC: 0.4 MMOL/L (ref 0.4–2)
LACTATE BLDA-SCNC: 0.5 MMOL/L (ref 0.4–2)
LACTATE BLDMV-SCNC: 0.3 MMOL/L (ref 0.4–2)
LACTATE BLDMV-SCNC: 0.5 MMOL/L (ref 0.4–2)
LACTATE BLDMV-SCNC: 0.5 MMOL/L (ref 0.4–2)
LIDOCAIN SERPL-MCNC: 3.1 UG/ML (ref 1–5)
MAGNESIUM SERPL-MCNC: 2.22 MG/DL (ref 1.6–2.4)
MAGNESIUM SERPL-MCNC: 2.32 MG/DL (ref 1.6–2.4)
MCH RBC QN AUTO: 29.4 PG (ref 26–34)
MCHC RBC AUTO-ENTMCNC: 34.9 G/DL (ref 32–36)
MCV RBC AUTO: 84 FL (ref 80–100)
NRBC BLD-RTO: 0 /100 WBCS (ref 0–0)
OXYHGB MFR BLDA: 94.5 % (ref 94–98)
OXYHGB MFR BLDA: 96.6 % (ref 94–98)
OXYHGB MFR BLDMV: 78.4 % (ref 45–75)
OXYHGB MFR BLDMV: 81.8 % (ref 45–75)
OXYHGB MFR BLDMV: 83.6 % (ref 45–75)
P AXIS: 30 DEGREES
P OFFSET: 208 MS
P ONSET: 154 MS
PCO2 BLDA: 32 MM HG (ref 38–42)
PCO2 BLDA: 35 MM HG (ref 38–42)
PCO2 BLDMV: 31 MM HG (ref 41–51)
PCO2 BLDMV: 37 MM HG (ref 41–51)
PCO2 BLDMV: 38 MM HG (ref 41–51)
PH BLDA: 7.25 PH (ref 7.38–7.42)
PH BLDA: 7.28 PH (ref 7.38–7.42)
PH BLDMV: 7.27 PH (ref 7.33–7.43)
PH BLDMV: 7.28 PH (ref 7.33–7.43)
PH BLDMV: 7.28 PH (ref 7.33–7.43)
PHOSPHATE SERPL-MCNC: 7 MG/DL (ref 2.5–4.9)
PHOSPHATE SERPL-MCNC: 8.1 MG/DL (ref 2.5–4.9)
PLATELET # BLD AUTO: 102 X10*3/UL (ref 150–450)
PO2 BLDA: 102 MM HG (ref 85–95)
PO2 BLDA: 82 MM HG (ref 85–95)
PO2 BLDMV: 47 MM HG (ref 35–45)
PO2 BLDMV: 48 MM HG (ref 35–45)
PO2 BLDMV: 50 MM HG (ref 35–45)
POTASSIUM BLDA-SCNC: 4.8 MMOL/L (ref 3.5–5.3)
POTASSIUM BLDA-SCNC: 4.9 MMOL/L (ref 3.5–5.3)
POTASSIUM BLDMV-SCNC: 5 MMOL/L (ref 3.5–5.3)
POTASSIUM BLDMV-SCNC: 5.2 MMOL/L (ref 3.5–5.3)
POTASSIUM BLDMV-SCNC: 5.3 MMOL/L (ref 3.5–5.3)
POTASSIUM SERPL-SCNC: 4.9 MMOL/L (ref 3.5–5.3)
POTASSIUM SERPL-SCNC: 5.2 MMOL/L (ref 3.5–5.3)
PR INTERVAL: 128 MS
Q ONSET: 218 MS
QRS COUNT: 12 BEATS
QRS DURATION: 90 MS
QT INTERVAL: 430 MS
QTC CALCULATION(BAZETT): 486 MS
QTC FREDERICIA: 467 MS
R AXIS: 45 DEGREES
RBC # BLD AUTO: 3.3 X10*6/UL (ref 4.5–5.9)
SAO2 % BLDA: 95 % (ref 94–100)
SAO2 % BLDA: 97 % (ref 94–100)
SAO2 % BLDMV: 79 % (ref 45–75)
SAO2 % BLDMV: 82 % (ref 45–75)
SAO2 % BLDMV: 84 % (ref 45–75)
SODIUM BLDA-SCNC: 125 MMOL/L (ref 136–145)
SODIUM BLDA-SCNC: 126 MMOL/L (ref 136–145)
SODIUM BLDMV-SCNC: 124 MMOL/L (ref 136–145)
SODIUM BLDMV-SCNC: 126 MMOL/L (ref 136–145)
SODIUM BLDMV-SCNC: 127 MMOL/L (ref 136–145)
SODIUM SERPL-SCNC: 128 MMOL/L (ref 136–145)
SODIUM SERPL-SCNC: 130 MMOL/L (ref 136–145)
T AXIS: 21 DEGREES
T OFFSET: 433 MS
VANCOMYCIN TROUGH SERPL-MCNC: 15.1 UG/ML (ref 5–20)
VENTRICULAR RATE: 77 BPM
WBC # BLD AUTO: 7.8 X10*3/UL (ref 4.4–11.3)

## 2024-07-14 PROCEDURE — 2500000004 HC RX 250 GENERAL PHARMACY W/ HCPCS (ALT 636 FOR OP/ED)

## 2024-07-14 PROCEDURE — 5A1955Z RESPIRATORY VENTILATION, GREATER THAN 96 CONSECUTIVE HOURS: ICD-10-PCS | Performed by: INTERNAL MEDICINE

## 2024-07-14 PROCEDURE — 84132 ASSAY OF SERUM POTASSIUM: CPT

## 2024-07-14 PROCEDURE — C9113 INJ PANTOPRAZOLE SODIUM, VIA: HCPCS

## 2024-07-14 PROCEDURE — 85027 COMPLETE CBC AUTOMATED: CPT

## 2024-07-14 PROCEDURE — 86022 PLATELET ANTIBODIES: CPT

## 2024-07-14 PROCEDURE — 84132 ASSAY OF SERUM POTASSIUM: CPT | Performed by: INTERNAL MEDICINE

## 2024-07-14 PROCEDURE — 80176 ASSAY OF LIDOCAINE: CPT

## 2024-07-14 PROCEDURE — 2500000005 HC RX 250 GENERAL PHARMACY W/O HCPCS

## 2024-07-14 PROCEDURE — 93010 ELECTROCARDIOGRAM REPORT: CPT | Performed by: INTERNAL MEDICINE

## 2024-07-14 PROCEDURE — 99232 SBSQ HOSP IP/OBS MODERATE 35: CPT

## 2024-07-14 PROCEDURE — 93005 ELECTROCARDIOGRAM TRACING: CPT

## 2024-07-14 PROCEDURE — 37799 UNLISTED PX VASCULAR SURGERY: CPT | Performed by: STUDENT IN AN ORGANIZED HEALTH CARE EDUCATION/TRAINING PROGRAM

## 2024-07-14 PROCEDURE — 83735 ASSAY OF MAGNESIUM: CPT

## 2024-07-14 PROCEDURE — 71045 X-RAY EXAM CHEST 1 VIEW: CPT | Performed by: STUDENT IN AN ORGANIZED HEALTH CARE EDUCATION/TRAINING PROGRAM

## 2024-07-14 PROCEDURE — 2020000001 HC ICU ROOM DAILY

## 2024-07-14 PROCEDURE — 84132 ASSAY OF SERUM POTASSIUM: CPT | Performed by: STUDENT IN AN ORGANIZED HEALTH CARE EDUCATION/TRAINING PROGRAM

## 2024-07-14 PROCEDURE — 80202 ASSAY OF VANCOMYCIN: CPT

## 2024-07-14 PROCEDURE — 94003 VENT MGMT INPAT SUBQ DAY: CPT

## 2024-07-14 PROCEDURE — 80069 RENAL FUNCTION PANEL: CPT

## 2024-07-14 PROCEDURE — 2500000002 HC RX 250 W HCPCS SELF ADMINISTERED DRUGS (ALT 637 FOR MEDICARE OP, ALT 636 FOR OP/ED)

## 2024-07-14 PROCEDURE — 82947 ASSAY GLUCOSE BLOOD QUANT: CPT

## 2024-07-14 PROCEDURE — 82550 ASSAY OF CK (CPK): CPT

## 2024-07-14 PROCEDURE — 37799 UNLISTED PX VASCULAR SURGERY: CPT

## 2024-07-14 PROCEDURE — 99291 CRITICAL CARE FIRST HOUR: CPT

## 2024-07-14 PROCEDURE — 71045 X-RAY EXAM CHEST 1 VIEW: CPT

## 2024-07-14 RX ORDER — BUMETANIDE 0.25 MG/ML
4 INJECTION INTRAMUSCULAR; INTRAVENOUS ONCE
Status: COMPLETED | OUTPATIENT
Start: 2024-07-14 | End: 2024-07-14

## 2024-07-14 RX ORDER — DEXMEDETOMIDINE HYDROCHLORIDE 4 UG/ML
.1-1.5 INJECTION, SOLUTION INTRAVENOUS CONTINUOUS
Status: DISCONTINUED | OUTPATIENT
Start: 2024-07-14 | End: 2024-07-15

## 2024-07-14 RX ORDER — DEXMEDETOMIDINE HYDROCHLORIDE 4 UG/ML
INJECTION, SOLUTION INTRAVENOUS
Status: COMPLETED
Start: 2024-07-14 | End: 2024-07-14

## 2024-07-14 RX ORDER — METOPROLOL SUCCINATE 25 MG/1
25 TABLET, EXTENDED RELEASE ORAL DAILY
Status: DISCONTINUED | OUTPATIENT
Start: 2024-07-14 | End: 2024-07-16

## 2024-07-14 RX ORDER — SODIUM BICARBONATE 1 MEQ/ML
25 SYRINGE (ML) INTRAVENOUS ONCE
Status: COMPLETED | OUTPATIENT
Start: 2024-07-14 | End: 2024-07-14

## 2024-07-14 ASSESSMENT — PAIN SCALES - GENERAL
PAINLEVEL_OUTOF10: 0 - NO PAIN

## 2024-07-14 ASSESSMENT — PAIN - FUNCTIONAL ASSESSMENT
PAIN_FUNCTIONAL_ASSESSMENT: 0-10
PAIN_FUNCTIONAL_ASSESSMENT: CPOT (CRITICAL CARE PAIN OBSERVATION TOOL)
PAIN_FUNCTIONAL_ASSESSMENT: 0-10

## 2024-07-14 NOTE — CARE PLAN
The patient's goals for the shift include  unable to express    The clinical goals for the shift include patient will remain hds throughout shift      Problem: Arrythmia/Dysrhythmia  Goal: Lab values return to normal range  Outcome: Progressing  Goal: No evidence of post procedure complications  Outcome: Progressing  Goal: Promote self management  Outcome: Progressing  Goal: Serial ECG will return to baseline  Outcome: Progressing  Goal: Verbalize understanding of procedures/devices  Outcome: Progressing  Goal: Vital signs return to baseline  Outcome: Progressing  Goal: Care and maintenance of device (specify)  Outcome: Progressing     Problem: Cardiac catheterization  Goal: Free from dysrhythmias  Outcome: Progressing  Goal: Free from pain  Outcome: Progressing  Goal: No evidence of post procedure complications  Outcome: Progressing  Goal: Promote self management  Outcome: Progressing  Goal: Verbalize understanding of procedure  Outcome: Progressing  Goal: Care and maintenance of device (specify)  Outcome: Progressing     Problem: Skin  Goal: Decreased wound size/increased tissue granulation at next dressing change  Outcome: Progressing  Flowsheets (Taken 7/13/2024 0201)  Decreased wound size/increased tissue granulation at next dressing change: Promote sleep for wound healing  Goal: Participates in plan/prevention/treatment measures  Outcome: Progressing  Flowsheets (Taken 7/13/2024 0201)  Participates in plan/prevention/treatment measures: Discuss with provider PT/OT consult  Goal: Prevent/manage excess moisture  Outcome: Progressing  Flowsheets (Taken 7/13/2024 1833 by France Ennis RN)  Prevent/manage excess moisture: Monitor for/manage infection if present  Goal: Prevent/minimize sheer/friction injuries  Outcome: Progressing  Flowsheets (Taken 7/13/2024 0201)  Prevent/minimize sheer/friction injuries: Turn/reposition every 2 hours/use positioning/transfer devices  Goal: Promote/optimize nutrition  Outcome:  Progressing  Flowsheets (Taken 7/14/2024 0207)  Promote/optimize nutrition: Monitor/record intake including meals  Goal: Promote skin healing  Outcome: Progressing  Flowsheets (Taken 7/14/2024 0207)  Promote skin healing: Assess skin/pad under line(s)/device(s)

## 2024-07-14 NOTE — PROGRESS NOTES
"  CICU PROGRESS NOTE    Miguel A Bender is a 43 y.o. male on hospital day 4    Subjective   Overnight, CVP 15, given 4 mg IV Bumex   Upon evaluation this AM, successful SBT and extubation.   No CP, LE pain, perioral numbness.          Objective     Last Recorded Vitals  Blood pressure 121/68, pulse 89, temperature 36.5 °C (97.7 °F), temperature source Temporal, resp. rate 18, height 1.78 m (5' 10.08\"), weight 131 kg (288 lb), SpO2 97%.    Intake/Output last 3 Shifts:  I/O last 3 completed shifts:  In: 6119.2 (46.8 mL/kg) [I.V.:2174.2 (16.6 mL/kg); NG/GT:2745; IV Piggyback:1200]  Out: 700 (5.4 mL/kg) [Urine:700 (0.1 mL/kg/hr)]  Weight: 130.6 kg     Relevant Results  Results from last 7 days   Lab Units 07/14/24  0031 07/13/24  0111 07/12/24  0203   WBC AUTO x10*3/uL 7.8 10.5 14.0*   HEMOGLOBIN g/dL 9.7* 10.4* 12.6*   HEMATOCRIT % 27.8* 29.6* 36.6*   PLATELETS AUTO x10*3/uL 102* 110* 133*     Results from last 7 days   Lab Units 07/14/24  0031 07/13/24  1711 07/13/24  0708 07/13/24  0111   SODIUM mmol/L 128* 128* 130* 130*   POTASSIUM mmol/L 4.9 5.2 5.1 5.3   CO2 mmol/L 17* 17* 18* 18*   ANION GAP mmol/L 19 20 17 19   BUN mg/dL 76* 71* 65* 62*   CREATININE mg/dL 7.88* 7.09* 6.43* 6.06*   GLUCOSE mg/dL 90 90 91 89   EGFR mL/min/1.73m*2 8* 9* 10* 11*   MAGNESIUM mg/dL 2.22 2.23  --  1.87   PHOSPHORUS mg/dL 7.0* 6.7* 6.3* 6.3*      Results from last 7 days   Lab Units 07/13/24  0111 07/12/24  0913 07/10/24  2306   ALT U/L 59* 70* 116*   AST U/L 40* 84* 127*   ALK PHOS U/L 52 49 71      Results from last 7 days   Lab Units 07/11/24  0417 07/10/24  2335   INR  1.4* 1.5*     Results from last 7 days   Lab Units 07/14/24  0802 07/14/24  0650 07/13/24  2343 07/12/24  1319 07/12/24  0909 07/12/24  0455 07/12/24  0335   POCT PH, ARTERIAL pH 7.25*  --   --   --  7.31*  --  7.33*   POCT PO2, ARTERIAL mm Hg 82*  --   --   --  87  --  143*   POCT PCO2, ARTERIAL mm Hg 35*  --   --   --  35*  --  35*   FIO2 % 40 40 40   < > 40   < > 50 "    < > = values in this interval not displayed.     Results from last 7 days   Lab Units 07/12/24  0532   POCT PH, VENOUS pH 7.26*   POCT PCO2, VENOUS mm Hg 43     Results from last 7 days   Lab Units 07/11/24  0417 07/10/24  2306   LACTATE mmol/L 3.8* 7.1*         trophs:10     Physical Exam  Physical  General: Awake and alert, following commands and answering questions  Pulm: Normal WOB at rest, no crackles or rhonchi  Cardiac: Regular rate and rhythm, normal S1/S2  Abdomen: Non-tender to palpation, non-distended  Extremities: R hand edema, reportedly unchanged from yesterday per RN. Trace LE edema      Medications    fentaNYL, 25 mcg, intravenous, Once  levETIRAcetam, 500 mg, intravenous, q12h  oxygen, , inhalation, Continuous - Inhalation  pantoprazole, 40 mg, intravenous, BID  polyethylene glycol, 17 g, oral, Daily  sodium bicarbonate, 1,300 mg, orogastric tube, BID  sodium zirconium cyclosilicate, 10 g, oral, Once        dexmedeTOMIDine, 0.1-1.5 mcg/kg/hr, Last Rate: 0.3 mcg/kg/hr (07/14/24 0800)  fentaNYL, 0-300 mcg/hr, Last Rate: 50 mcg/hr (07/14/24 0400)  heparin Impella Purge 50 units/mL in 500 mL D5W, 10 mL/hr, Last Rate: 10 mL/hr (07/13/24 0640)  lidocaine, 1 mg/min, Last Rate: 1 mg/min (07/13/24 1800)  propofol, 5-50 mcg/kg/min, Last Rate: 20 mcg/kg/min (07/14/24 0500)        PRN medications: dextrose, dextrose, fentaNYL, glucagon, glucagon           Assessment/Plan   43-year-old male with a history of obesity status post gastric bypass surgery 2 years ago, CHARLEEN, HTN transferred for further management of cardiogenic shock s/p Impella placement.      7/14 updates:  - Impella removed 7/13  - Extubated 7/14 AM  - Cr & phosphate increasing. Minimal response to 4 mg Bumex   - EEG dc per neuro. Rec to keep Keppra BID and FU outpatient  - HIT panel sent  - Needs Cardiac MRI. Rock should be removed before MRI, 7/15  - metop succ 25 daily added for GDMT       NEUROLOGIC  #Acute metabolic encephalopathy,  improving  #History of myoclonic jerks and ophisthotonos at outside hospital  ::Concern for anoxic brain injury i/s/o cardiac arrest  ::Intuabted and sedated on propofol  ::Reported op[facility]hotonos at [facility]  -Video EEG has preliminary result of severe diffuse encephalopathy with no epileptiform activity  -Light sedation, responds to yes/no questions with nodding, follows simple commands     Plan  - Brain MRI once off propofol if mental status changes persist  - Keppra 500 mg BID. Consulted neurology on discontinuation of AEDs given negative cvEEG.       CARDIOVASCULAR  #Cardiogenic shock  -On admission, was on norepi 0.07, epi 0.03 and impella P8 for pressure support. Now has been off pressors for two days  -PA catheter numbers on admission: CVP 9, PAP 39/20 (27), CO 7.7, CI 3.3, , SVO2 80% on P8 Impella.  -PA catheter  this morning: CVP 14, PAP 47/26, CI: 7.36, SVRI 901, SvO2 of 81%     Plan  - metop succ 25 daily  - cardiac MRI , needs swan removed before MRI,     #Ventricular Fibrillation and Polymorphic Vtach  #Non-ischemic cardiomyopathy  #Cardiac arrest of unclear etiology  -::TTE post arrest EF 10%, global hypokinesis  ::C/f arrhythmia such as prolonged QT in 500s  ::Was on amdiodarone on admission. Had polymorphic SVT with sinus pauses on night of 7/10, so amiodarone weaned off and switched to lidocaine.  - EP consulted, appreciate recs  - Last Qtc around [phone]     Plan  -Optimize electrolytes K > 4 and Mg > 2  -Monitor on telemetry  - Cardiac MRI when stable- need to discuss with nephrology before MRI  - Continue lidocaine drip- daily lidocaine level  - needs ICD before discharge     PULMONARY  #Acute hypoxic respiratory failure  #Concern for aspiration in setting of cardiac arrest  ::Secondary to cardiac arrest  ::CT PE negative  ::CXR not concerning for pneumonia. Diffuse pulmonary interstitial opacities more consistent with pulmonary edema or aspiration pneumonitis rather than pneumonia.      Plan  -extubated      RENAL/  #ANNIE Oliguric   #Concern for acute tubular necrosis  ::Likely pre-renal i/s/o cardiogenic shock   -Worsening Hyponatremia, hyperkalemia (136 -> 134 -> 133->132, 4.3 ->5.2 ->5.1 -> 5.9 -> 5.4). EKG with non-peaked T-waves  -Stable acidosis: 15-> 20->18 -> 17  -Phosphorus increasin.3 -> 5.2 ->6.8->7.0  -Creatinine of 2.45 -> 3.00 -> 3.58 -> 5.84 -> 5.61 ->6.43->7.88  -Urine output increasing but still minimal     Plan  - to discuss MRI safety with nephrology- per nephrology newer agents are safe for MRI  -Continue to monitor urine output and BID RFPs  -Avoid nephrotoxic medications  -Renally dose medications   - Nephrology consulted, appreciate recs     GASTROINTESTINAL  #Elevated liver enzymes, improving  ::Secondary to cardiogenic shock   ::At risk for shock liver   - LFTs improving  -Continue to monitor with daily hepatic function panel     #GI Prophylaxis  -Pantoprazole BID while on vent     ENDOCRINE  DUSTIN     HEME/ONC  #At risk for hemolysis and DIC  -Monitor with LDH, haptoglobin, fibrinogen daily  - Risk decreased with impella removed      MSK/DERM  PT/OT        N: NPO- extubated today  A: PIV, Right IJ triple lumen, Left PA catheter     DVT ppx: SCD  GI ppx: Pantoprazole 40 IV BID  Oxygen:supplemental O2  Drips Lido 1  Abx Vanc (-), Zosyn (-)  Code Status: Full Code (confirmed on Admission)  Surrogate Medical Decision-maker: Jazmin Bender (wife) [phone]          Elvia Ignacio MD  Internal Medicine, PGY-1

## 2024-07-14 NOTE — PROGRESS NOTES
Miguel A Bender   43 y.o.    130 kg  MRN/Room: [id number]/16/16-A    Subjective: Urine output gradually increasing. Received IV bumex 4 mg this morning. HDS stable with Impella removed. Off pressors.      Objective:     Meds:   levETIRAcetam, 500 mg, q12h  metoprolol succinate XL, 25 mg, Daily  oxygen, , Continuous - Inhalation  pantoprazole, 40 mg, BID  polyethylene glycol, 17 g, Daily  sodium bicarbonate, 1,300 mg, BID  sodium zirconium cyclosilicate, 10 g, Once      dexmedeTOMIDine, Last Rate: Stopped (07/14/24 0830)  lidocaine, Last Rate: 1 mg/min (07/14/24 1200)      dextrose, 12.5 g, q15 min PRN  dextrose, 25 g, q15 min PRN  glucagon, 1 mg, q15 min PRN  glucagon, 1 mg, q15 min PRN        Vitals:    07/14/24 1200   BP:    Pulse: 92   Resp: 24   Temp: 36.4 °C (97.5 °F)   SpO2: 96%          Intake/Output Summary (Last 24 hours) at 7/14/2024 1243  Last data filed at 7/14/2024 1200  Gross per 24 hour   Intake 1943.06 ml   Output 695 ml   Net 1248.06 ml       General appearance: intubated, sedated  Eyes: non-icteric  Skin: no apparent rash  Heart: HR regular  Lungs: CTA bilat no wheezing/crackles  Abdomen: soft, nt/nd  Extremities: mild edema bilat  El  Neuro: sedated. Opening eyes and moving spontaneously  Access :no HD access    Blood Labs:  Results for orders placed or performed during the hospital encounter of 07/10/24 (from the past 24 hour(s))   POCT GLUCOSE   Result Value Ref Range    POCT Glucose 96 74 - 99 mg/dL   Renal function panel   Result Value Ref Range    Glucose 90 74 - 99 mg/dL    Sodium 128 (L) 136 - 145 mmol/L    Potassium 5.2 3.5 - 5.3 mmol/L    Chloride 96 (L) 98 - 107 mmol/L    Bicarbonate 17 (L) 21 - 32 mmol/L    Anion Gap 20 10 - 20 mmol/L    Urea Nitrogen 71 (H) 6 - 23 mg/dL    Creatinine 7.09 (H) 0.50 - 1.30 mg/dL    eGFR 9 (L) >60 mL/min/1.73m*2    Calcium 7.6 (L) 8.6 - 10.6 mg/dL    Phosphorus 6.7 (H) 2.5 - 4.9 mg/dL    Albumin 2.8 (L) 3.4 - 5.0 g/dL   Magnesium   Result Value Ref Range     Magnesium 2.23 1.60 - 2.40 mg/dL   BLOOD GAS MIXED VENOUS FULL PANEL   Result Value Ref Range    POCT pH, Mixed 7.28 (L) 7.33 - 7.43 pH    POCT pCO2, Mixed 37 (L) 41 - 51 mm Hg    POCT pO2, Mixed 48 (H) 35 - 45 mm Hg    POCT SO2, Mixed 80 (H) 45 - 75 %    POCT Oxy Hemoglobin, Mixed 79.3 (H) 45.0 - 75.0 %    POCT Hematocrit Calculated, Mixed 30.0 (L) 41.0 - 52.0 %    POCT Sodium, Mixed 126 (L) 136 - 145 mmol/L    POCT Potassium, Mixed 5.1 3.5 - 5.3 mmol/L    POCT Chloride, Mixed 98 98 - 107 mmol/L    POCT Ionized Calcium, Mixed 1.07 (L) 1.10 - 1.33 mmol/L    POCT Glucose, Mixed 91 74 - 99 mg/dL    POCT Lactate, Mixed 0.4 0.4 - 2.0 mmol/L    POCT Base Excess, Mixed -8.6 (L) -2.0 - 3.0 mmol/L    POCT HCO3 Calculated, Mixed 17.4 (L) 22.0 - 26.0 mmol/L    POCT Hemoglobin, Mixed 10.1 (L) 13.5 - 17.5 g/dL    POCT Anion Gap, Mixed 16 10 - 25 mmo/L    Patient Temperature 37.0 degrees Celsius    FiO2 40 %   POCT GLUCOSE   Result Value Ref Range    POCT Glucose 90 74 - 99 mg/dL   BLOOD GAS MIXED VENOUS FULL PANEL   Result Value Ref Range    POCT pH, Mixed 7.28 (L) 7.33 - 7.43 pH    POCT pCO2, Mixed 38 (L) 41 - 51 mm Hg    POCT pO2, Mixed 48 (H) 35 - 45 mm Hg    POCT SO2, Mixed 84 (H) 45 - 75 %    POCT Oxy Hemoglobin, Mixed 83.6 (H) 45.0 - 75.0 %    POCT Hematocrit Calculated, Mixed 17.0 (L) 41.0 - 52.0 %    POCT Sodium, Mixed 127 (L) 136 - 145 mmol/L    POCT Potassium, Mixed 5.2 3.5 - 5.3 mmol/L    POCT Chloride, Mixed 98 98 - 107 mmol/L    POCT Ionized Calcium, Mixed 1.12 1.10 - 1.33 mmol/L    POCT Glucose, Mixed 89 74 - 99 mg/dL    POCT Lactate, Mixed 0.5 0.4 - 2.0 mmol/L    POCT Base Excess, Mixed -8.1 (L) -2.0 - 3.0 mmol/L    POCT HCO3 Calculated, Mixed 17.9 (L) 22.0 - 26.0 mmol/L    POCT Hemoglobin, Mixed 5.7 (LL) 13.5 - 17.5 g/dL    POCT Anion Gap, Mixed 16 10 - 25 mmo/L    Patient Temperature 37.0 degrees Celsius    FiO2 40 %   POCT GLUCOSE   Result Value Ref Range    POCT Glucose 74 74 - 99 mg/dL   Renal Function  Panel   Result Value Ref Range    Glucose 90 74 - 99 mg/dL    Sodium 128 (L) 136 - 145 mmol/L    Potassium 4.9 3.5 - 5.3 mmol/L    Chloride 97 (L) 98 - 107 mmol/L    Bicarbonate 17 (L) 21 - 32 mmol/L    Anion Gap 19 10 - 20 mmol/L    Urea Nitrogen 76 (H) 6 - 23 mg/dL    Creatinine 7.88 (H) 0.50 - 1.30 mg/dL    eGFR 8 (L) >60 mL/min/1.73m*2    Calcium 7.7 (L) 8.6 - 10.6 mg/dL    Phosphorus 7.0 (H) 2.5 - 4.9 mg/dL    Albumin 2.7 (L) 3.4 - 5.0 g/dL   Magnesium   Result Value Ref Range    Magnesium 2.22 1.60 - 2.40 mg/dL   CBC   Result Value Ref Range    WBC 7.8 4.4 - 11.3 x10*3/uL    nRBC 0.0 0.0 - 0.0 /100 WBCs    RBC 3.30 (L) 4.50 - 5.90 x10*6/uL    Hemoglobin 9.7 (L) 13.5 - 17.5 g/dL    Hematocrit 27.8 (L) 41.0 - 52.0 %    MCV 84 80 - 100 fL    MCH 29.4 26.0 - 34.0 pg    MCHC 34.9 32.0 - 36.0 g/dL    RDW 13.7 11.5 - 14.5 %    Platelets 102 (L) 150 - 450 x10*3/uL   Vancomycin, Trough   Result Value Ref Range    Vancomycin, Trough 15.1 5.0 - 20.0 ug/mL   BLOOD GAS MIXED VENOUS FULL PANEL   Result Value Ref Range    POCT pH, Mixed 7.27 (L) 7.33 - 7.43 pH    POCT pCO2, Mixed 37 (L) 41 - 51 mm Hg    POCT pO2, Mixed 50 (H) 35 - 45 mm Hg    POCT SO2, Mixed 82 (H) 45 - 75 %    POCT Oxy Hemoglobin, Mixed 81.8 (H) 45.0 - 75.0 %    POCT Hematocrit Calculated, Mixed 31.0 (L) 41.0 - 52.0 %    POCT Sodium, Mixed 126 (L) 136 - 145 mmol/L    POCT Potassium, Mixed 5.0 3.5 - 5.3 mmol/L    POCT Chloride, Mixed 96 (L) 98 - 107 mmol/L    POCT Ionized Calcium, Mixed 1.09 (L) 1.10 - 1.33 mmol/L    POCT Glucose, Mixed 83 74 - 99 mg/dL    POCT Lactate, Mixed 0.3 (L) 0.4 - 2.0 mmol/L    POCT Base Excess, Mixed -9.2 (L) -2.0 - 3.0 mmol/L    POCT HCO3 Calculated, Mixed 17.0 (L) 22.0 - 26.0 mmol/L    POCT Hemoglobin, Mixed 10.2 (L) 13.5 - 17.5 g/dL    POCT Anion Gap, Mixed 18 10 - 25 mmo/L    Patient Temperature 37.0 degrees Celsius    FiO2 40 %   POCT GLUCOSE   Result Value Ref Range    POCT Glucose 90 74 - 99 mg/dL   Blood Gas Arterial Full  Panel   Result Value Ref Range    POCT pH, Arterial 7.25 (LL) 7.38 - 7.42 pH    POCT pCO2, Arterial 35 (L) 38 - 42 mm Hg    POCT pO2, Arterial 82 (L) 85 - 95 mm Hg    POCT SO2, Arterial 95 94 - 100 %    POCT Oxy Hemoglobin, Arterial 94.5 94.0 - 98.0 %    POCT Hematocrit Calculated, Arterial 32.0 (L) 41.0 - 52.0 %    POCT Sodium, Arterial 125 (L) 136 - 145 mmol/L    POCT Potassium, Arterial 4.8 3.5 - 5.3 mmol/L    POCT Chloride, Arterial 96 (L) 98 - 107 mmol/L    POCT Ionized Calcium, Arterial 1.12 1.10 - 1.33 mmol/L    POCT Glucose, Arterial 105 (H) 74 - 99 mg/dL    POCT Lactate, Arterial 0.5 0.4 - 2.0 mmol/L    POCT Base Excess, Arterial -11.0 (L) -2.0 - 3.0 mmol/L    POCT HCO3 Calculated, Arterial 15.3 (L) 22.0 - 26.0 mmol/L    POCT Hemoglobin, Arterial 10.5 (L) 13.5 - 17.5 g/dL    POCT Anion Gap, Arterial 19 10 - 25 mmo/L    Patient Temperature 37.0 degrees Celsius    FiO2 40 %   Creatine Kinase   Result Value Ref Range    Creatine Kinase 667 (H) 0 - 325 U/L   Lidocaine   Result Value Ref Range    Lidocaine  3.1 1.0 - 5.0 ug/mL   Blood Gas Arterial Full Panel   Result Value Ref Range    POCT pH, Arterial 7.28 (L) 7.38 - 7.42 pH    POCT pCO2, Arterial 32 (L) 38 - 42 mm Hg    POCT pO2, Arterial 102 (H) 85 - 95 mm Hg    POCT SO2, Arterial 97 94 - 100 %    POCT Oxy Hemoglobin, Arterial 96.6 94.0 - 98.0 %    POCT Hematocrit Calculated, Arterial 31.0 (L) 41.0 - 52.0 %    POCT Sodium, Arterial 126 (L) 136 - 145 mmol/L    POCT Potassium, Arterial 4.9 3.5 - 5.3 mmol/L    POCT Chloride, Arterial 96 (L) 98 - 107 mmol/L    POCT Ionized Calcium, Arterial 1.12 1.10 - 1.33 mmol/L    POCT Glucose, Arterial 96 74 - 99 mg/dL    POCT Lactate, Arterial 0.4 0.4 - 2.0 mmol/L    POCT Base Excess, Arterial -10.7 (L) -2.0 - 3.0 mmol/L    POCT HCO3 Calculated, Arterial 15.0 (L) 22.0 - 26.0 mmol/L    POCT Hemoglobin, Arterial 10.3 (L) 13.5 - 17.5 g/dL    POCT Anion Gap, Arterial 20 10 - 25 mmo/L    Patient Temperature 37.0 degrees Celsius     FiO2 60 %   POCT GLUCOSE   Result Value Ref Range    POCT Glucose 97 74 - 99 mg/dL              ASSESSMENT:  Miguel A Bender is a  43 y.o.  Year old male a past medical hx of HTN, CHARLEEN, NSTEMI, and gastric bypass surgery admitted to [facility] on 7/10 with cardiac arrest. S/p left heart cath with Impella placement. Nephrology consulted for ANNIE/possible HD iso worsening UOP and renal function.     Updates 7/14:  -Urine output improving  -No indication for RRT at this time    #NANIE, oliguric  -Baseline Cr 1.19  -Etiology : Post cardiac arrest.  -Electrolytes stable , mild AGMA and NAGMA  -He is positive 2.1L  -His CVP was 13.  -Not on pressors anymore    RECOMMENDATIONS:  - Continue supportive management .  - Continue lokelma prn  - No acute need RRT  - Will continue to follow  -Strict I/Os .  -Avoid IV contrast      Zhao Dhaliwal MD PGY-1  Nephrology Resident    Daytime / Weekend Renal Pager [phone]

## 2024-07-15 ENCOUNTER — APPOINTMENT (OUTPATIENT)
Dept: CARDIOLOGY | Facility: HOSPITAL | Age: 44
DRG: 276 | End: 2024-07-15
Payer: COMMERCIAL

## 2024-07-15 ENCOUNTER — APPOINTMENT (OUTPATIENT)
Dept: RADIOLOGY | Facility: HOSPITAL | Age: 44
DRG: 276 | End: 2024-07-15
Payer: COMMERCIAL

## 2024-07-15 LAB
ALBUMIN SERPL BCP-MCNC: 2.9 G/DL (ref 3.4–5)
ALBUMIN SERPL BCP-MCNC: 2.9 G/DL (ref 3.4–5)
ANION GAP BLDMV CALCULATED.4IONS-SCNC: 22 MMO/L (ref 10–25)
ANION GAP SERPL CALC-SCNC: 26 MMOL/L (ref 10–20)
ANION GAP SERPL CALC-SCNC: 27 MMOL/L (ref 10–20)
ATRIAL RATE: 62 BPM
ATRIAL RATE: 68 BPM
BACTERIA BLD AEROBE CULT: ABNORMAL
BACTERIA BLD CULT: ABNORMAL
BACTERIA BLD CULT: NORMAL
BASE EXCESS BLDMV CALC-SCNC: -12.9 MMOL/L (ref -2–3)
BASOPHILS # BLD AUTO: 0.05 X10*3/UL (ref 0–0.1)
BASOPHILS NFR BLD AUTO: 0.5 %
BODY TEMPERATURE: 37 DEGREES CELSIUS
BUN SERPL-MCNC: 104 MG/DL (ref 6–23)
BUN SERPL-MCNC: 92 MG/DL (ref 6–23)
CA-I BLDMV-SCNC: 1.11 MMOL/L (ref 1.1–1.33)
CALCIUM SERPL-MCNC: 8 MG/DL (ref 8.6–10.6)
CALCIUM SERPL-MCNC: 8 MG/DL (ref 8.6–10.6)
CHLORIDE BLD-SCNC: 97 MMOL/L (ref 98–107)
CHLORIDE SERPL-SCNC: 97 MMOL/L (ref 98–107)
CHLORIDE SERPL-SCNC: 97 MMOL/L (ref 98–107)
CO2 SERPL-SCNC: 13 MMOL/L (ref 21–32)
CO2 SERPL-SCNC: 13 MMOL/L (ref 21–32)
CREAT SERPL-MCNC: 10.77 MG/DL (ref 0.5–1.3)
CREAT SERPL-MCNC: 9.89 MG/DL (ref 0.5–1.3)
EGFRCR SERPLBLD CKD-EPI 2021: 5 ML/MIN/1.73M*2
EGFRCR SERPLBLD CKD-EPI 2021: 6 ML/MIN/1.73M*2
EOSINOPHIL # BLD AUTO: 0.11 X10*3/UL (ref 0–0.7)
EOSINOPHIL NFR BLD AUTO: 1.2 %
ERYTHROCYTE [DISTWIDTH] IN BLOOD BY AUTOMATED COUNT: 13.2 % (ref 11.5–14.5)
GLUCOSE BLD MANUAL STRIP-MCNC: 70 MG/DL (ref 74–99)
GLUCOSE BLD MANUAL STRIP-MCNC: 72 MG/DL (ref 74–99)
GLUCOSE BLD MANUAL STRIP-MCNC: 75 MG/DL (ref 74–99)
GLUCOSE BLD MANUAL STRIP-MCNC: 77 MG/DL (ref 74–99)
GLUCOSE BLD MANUAL STRIP-MCNC: 81 MG/DL (ref 74–99)
GLUCOSE BLD MANUAL STRIP-MCNC: 87 MG/DL (ref 74–99)
GLUCOSE BLD MANUAL STRIP-MCNC: 94 MG/DL (ref 74–99)
GLUCOSE BLD-MCNC: 78 MG/DL (ref 74–99)
GLUCOSE SERPL-MCNC: 71 MG/DL (ref 74–99)
GLUCOSE SERPL-MCNC: 80 MG/DL (ref 74–99)
GRAM STN SPEC: ABNORMAL
HCO3 BLDMV-SCNC: 13.3 MMOL/L (ref 22–26)
HCT VFR BLD AUTO: 29.7 % (ref 41–52)
HCT VFR BLD EST: 30 % (ref 41–52)
HGB BLD-MCNC: 10.1 G/DL (ref 13.5–17.5)
HGB BLDMV-MCNC: 9.9 G/DL (ref 13.5–17.5)
IMM GRANULOCYTES # BLD AUTO: 0.05 X10*3/UL (ref 0–0.7)
IMM GRANULOCYTES NFR BLD AUTO: 0.5 % (ref 0–0.9)
INHALED O2 CONCENTRATION: 40 %
INTERPRETATION FOR ANTI-PLATELET FACTOR 4 ANTIBODY: NEGATIVE
LACTATE BLDMV-SCNC: 0.4 MMOL/L (ref 0.4–2)
LIDOCAIN SERPL-MCNC: 2.7 UG/ML (ref 1–5)
LYMPHOCYTES # BLD AUTO: 0.76 X10*3/UL (ref 1.2–4.8)
LYMPHOCYTES NFR BLD AUTO: 8 %
MAGNESIUM SERPL-MCNC: 2.3 MG/DL (ref 1.6–2.4)
MCH RBC QN AUTO: 28.6 PG (ref 26–34)
MCHC RBC AUTO-ENTMCNC: 34 G/DL (ref 32–36)
MCV RBC AUTO: 84 FL (ref 80–100)
MONOCYTES # BLD AUTO: 0.93 X10*3/UL (ref 0.1–1)
MONOCYTES NFR BLD AUTO: 9.8 %
NEUTROPHILS # BLD AUTO: 7.56 X10*3/UL (ref 1.2–7.7)
NEUTROPHILS NFR BLD AUTO: 80 %
NRBC BLD-RTO: 0 /100 WBCS (ref 0–0)
OXYHGB MFR BLDMV: 77.7 % (ref 45–75)
P AXIS: 32 DEGREES
P AXIS: 54 DEGREES
P OFFSET: 205 MS
P OFFSET: 210 MS
P ONSET: 143 MS
P ONSET: 149 MS
PCO2 BLDMV: 31 MM HG (ref 41–51)
PH BLDMV: 7.24 PH (ref 7.33–7.43)
PHOSPHATE SERPL-MCNC: 8.8 MG/DL (ref 2.5–4.9)
PHOSPHATE SERPL-MCNC: 9.7 MG/DL (ref 2.5–4.9)
PLATELET # BLD AUTO: 115 X10*3/UL (ref 150–450)
PO2 BLDMV: 48 MM HG (ref 35–45)
POTASSIUM BLDMV-SCNC: 5.5 MMOL/L (ref 3.5–5.3)
POTASSIUM SERPL-SCNC: 5.1 MMOL/L (ref 3.5–5.3)
POTASSIUM SERPL-SCNC: 5.1 MMOL/L (ref 3.5–5.3)
PR INTERVAL: 134 MS
PR INTERVAL: 150 MS
Q ONSET: 216 MS
Q ONSET: 218 MS
QRS COUNT: 10 BEATS
QRS COUNT: 11 BEATS
QRS DURATION: 102 MS
QRS DURATION: 96 MS
QT INTERVAL: 370 MS
QT INTERVAL: 424 MS
QTC CALCULATION(BAZETT): 393 MS
QTC CALCULATION(BAZETT): 430 MS
QTC FREDERICIA: 386 MS
QTC FREDERICIA: 428 MS
R AXIS: 52 DEGREES
R AXIS: 57 DEGREES
RBC # BLD AUTO: 3.53 X10*6/UL (ref 4.5–5.9)
SAO2 % BLDMV: 79 % (ref 45–75)
SERUM AND PLATELET FACTOR 4: 0.08 OD UNITS
SODIUM BLDMV-SCNC: 127 MMOL/L (ref 136–145)
SODIUM SERPL-SCNC: 131 MMOL/L (ref 136–145)
SODIUM SERPL-SCNC: 132 MMOL/L (ref 136–145)
T AXIS: 18 DEGREES
T AXIS: 3 DEGREES
T OFFSET: 403 MS
T OFFSET: 428 MS
VENTRICULAR RATE: 62 BPM
VENTRICULAR RATE: 68 BPM
WBC # BLD AUTO: 9.5 X10*3/UL (ref 4.4–11.3)

## 2024-07-15 PROCEDURE — 2500000004 HC RX 250 GENERAL PHARMACY W/ HCPCS (ALT 636 FOR OP/ED)

## 2024-07-15 PROCEDURE — 85025 COMPLETE CBC W/AUTO DIFF WBC: CPT

## 2024-07-15 PROCEDURE — 2550000001 HC RX 255 CONTRASTS

## 2024-07-15 PROCEDURE — 97530 THERAPEUTIC ACTIVITIES: CPT | Mod: GP

## 2024-07-15 PROCEDURE — 83735 ASSAY OF MAGNESIUM: CPT

## 2024-07-15 PROCEDURE — 37799 UNLISTED PX VASCULAR SURGERY: CPT

## 2024-07-15 PROCEDURE — 2500000005 HC RX 250 GENERAL PHARMACY W/O HCPCS

## 2024-07-15 PROCEDURE — 99233 SBSQ HOSP IP/OBS HIGH 50: CPT

## 2024-07-15 PROCEDURE — 80176 ASSAY OF LIDOCAINE: CPT

## 2024-07-15 PROCEDURE — 84132 ASSAY OF SERUM POTASSIUM: CPT

## 2024-07-15 PROCEDURE — 97166 OT EVAL MOD COMPLEX 45 MIN: CPT | Mod: GO

## 2024-07-15 PROCEDURE — 97530 THERAPEUTIC ACTIVITIES: CPT | Mod: GO

## 2024-07-15 PROCEDURE — 93010 ELECTROCARDIOGRAM REPORT: CPT | Performed by: INTERNAL MEDICINE

## 2024-07-15 PROCEDURE — A9575 INJ GADOTERATE MEGLUMI 0.1ML: HCPCS

## 2024-07-15 PROCEDURE — 80069 RENAL FUNCTION PANEL: CPT

## 2024-07-15 PROCEDURE — 99291 CRITICAL CARE FIRST HOUR: CPT

## 2024-07-15 PROCEDURE — 93005 ELECTROCARDIOGRAM TRACING: CPT

## 2024-07-15 PROCEDURE — 75565 CARD MRI VELOC FLOW MAPPING: CPT | Performed by: INTERNAL MEDICINE

## 2024-07-15 PROCEDURE — 37799 UNLISTED PX VASCULAR SURGERY: CPT | Performed by: STUDENT IN AN ORGANIZED HEALTH CARE EDUCATION/TRAINING PROGRAM

## 2024-07-15 PROCEDURE — 84132 ASSAY OF SERUM POTASSIUM: CPT | Performed by: STUDENT IN AN ORGANIZED HEALTH CARE EDUCATION/TRAINING PROGRAM

## 2024-07-15 PROCEDURE — 75561 CARDIAC MRI FOR MORPH W/DYE: CPT

## 2024-07-15 PROCEDURE — 82947 ASSAY GLUCOSE BLOOD QUANT: CPT

## 2024-07-15 PROCEDURE — 97162 PT EVAL MOD COMPLEX 30 MIN: CPT | Mod: GP

## 2024-07-15 PROCEDURE — 75561 CARDIAC MRI FOR MORPH W/DYE: CPT | Performed by: INTERNAL MEDICINE

## 2024-07-15 PROCEDURE — 92610 EVALUATE SWALLOWING FUNCTION: CPT | Mod: GN

## 2024-07-15 PROCEDURE — 2020000001 HC ICU ROOM DAILY

## 2024-07-15 RX ORDER — BUMETANIDE 0.25 MG/ML
4 INJECTION INTRAMUSCULAR; INTRAVENOUS ONCE
Status: COMPLETED | OUTPATIENT
Start: 2024-07-15 | End: 2024-07-15

## 2024-07-15 RX ORDER — SODIUM BICARBONATE 1 MEQ/ML
25 SYRINGE (ML) INTRAVENOUS ONCE
Status: COMPLETED | OUTPATIENT
Start: 2024-07-15 | End: 2024-07-15

## 2024-07-15 RX ORDER — GADOTERATE MEGLUMINE 376.9 MG/ML
40 INJECTION INTRAVENOUS
Status: COMPLETED | OUTPATIENT
Start: 2024-07-15 | End: 2024-07-15

## 2024-07-15 RX ORDER — LEVETIRACETAM 5 MG/ML
500 INJECTION INTRAVASCULAR EVERY 24 HOURS
Status: DISCONTINUED | OUTPATIENT
Start: 2024-07-16 | End: 2024-07-17

## 2024-07-15 RX ORDER — BISACODYL 10 MG/1
10 SUPPOSITORY RECTAL ONCE
Status: DISCONTINUED | OUTPATIENT
Start: 2024-07-15 | End: 2024-07-15

## 2024-07-15 ASSESSMENT — PAIN SCALES - GENERAL
PAINLEVEL_OUTOF10: 0 - NO PAIN

## 2024-07-15 ASSESSMENT — COGNITIVE AND FUNCTIONAL STATUS - GENERAL
CLIMB 3 TO 5 STEPS WITH RAILING: TOTAL
DAILY ACTIVITIY SCORE: 14
TURNING FROM BACK TO SIDE WHILE IN FLAT BAD: A LOT
PERSONAL GROOMING: A LITTLE
HELP NEEDED FOR BATHING: A LOT
MOBILITY SCORE: 10
EATING MEALS: A LITTLE
STANDING UP FROM CHAIR USING ARMS: A LOT
WALKING IN HOSPITAL ROOM: TOTAL
MOVING FROM LYING ON BACK TO SITTING ON SIDE OF FLAT BED WITH BEDRAILS: A LOT
MOVING TO AND FROM BED TO CHAIR: A LOT
TOILETING: A LOT
DRESSING REGULAR UPPER BODY CLOTHING: A LOT
DRESSING REGULAR LOWER BODY CLOTHING: A LOT

## 2024-07-15 ASSESSMENT — ACTIVITIES OF DAILY LIVING (ADL)
ADL_ASSISTANCE: INDEPENDENT
ADL_ASSISTANCE: INDEPENDENT
BATHING_ASSISTANCE: MINIMAL

## 2024-07-15 ASSESSMENT — PAIN - FUNCTIONAL ASSESSMENT
PAIN_FUNCTIONAL_ASSESSMENT: 0-10

## 2024-07-15 NOTE — PROGRESS NOTES
NEPHROLOGY FOLLOW UP NOTE    Miguel A Bender   43 y.o.    130 kg  MRN/Room: [id number]/16/16-A    Subjective: Urine output is continuing to increase. Patient received IV bumex 4 mg this morning. He is continuing to be hemodynamically stable. Patient no longer with an Impella and has no need for pressors.     Objective:     Meds:   [START ON 7/16/2024] levETIRAcetam, 500 mg, q24h  metoprolol succinate XL, 25 mg, Daily  oxygen, , Continuous - Inhalation  polyethylene glycol, 17 g, Daily  sodium bicarbonate, 1,300 mg, BID  sodium zirconium cyclosilicate, 10 g, Once      lidocaine, Last Rate: 1 mg/min (07/15/24 0434)      dextrose, 12.5 g, q15 min PRN  dextrose, 25 g, q15 min PRN  glucagon, 1 mg, q15 min PRN  glucagon, 1 mg, q15 min PRN        Vitals:    07/15/24 1133   Pulse:    Resp:    Temp: 36.3 °C (97.3 °F)   SpO2:           Intake/Output Summary (Last 24 hours) at 7/15/2024 1320  Last data filed at 7/15/2024 1000  Gross per 24 hour   Intake 316.76 ml   Output 845 ml   Net -528.24 ml       General appearance: Awake and alert, oriented, . No distress  HEENT: NC/AT, oral mucosa  Skin: no apparent rash  Heart: heart sounds 1 & 2 present and normal, no murmurs heard or rub  Lungs: Adequate air entry, breath sounds bilaterally.  No wheezing/crackles  Abdomen: soft, non tender  Extremities: No edema, no joint swelling  Neuro: No FND,  No asterixis   ACCESS: no HD access    Blood Labs:  Results for orders placed or performed during the hospital encounter of 07/10/24 (from the past 24 hour(s))   BLOOD GAS MIXED VENOUS FULL PANEL   Result Value Ref Range    POCT pH, Mixed 7.28 (L) 7.33 - 7.43 pH    POCT pCO2, Mixed 31 (L) 41 - 51 mm Hg    POCT pO2, Mixed 47 (H) 35 - 45 mm Hg    POCT SO2, Mixed 79 (H) 45 - 75 %    POCT Oxy Hemoglobin, Mixed 78.4 (H) 45.0 - 75.0 %    POCT Hematocrit Calculated, Mixed 31.0 (L) 41.0 - 52.0 %    POCT Sodium, Mixed 124 (L) 136 - 145 mmol/L    POCT Potassium, Mixed 5.3 3.5 - 5.3 mmol/L    POCT  Chloride, Mixed 97 (L) 98 - 107 mmol/L    POCT Ionized Calcium, Mixed 1.12 1.10 - 1.33 mmol/L    POCT Glucose, Mixed 79 74 - 99 mg/dL    POCT Lactate, Mixed 0.5 0.4 - 2.0 mmol/L    POCT Base Excess, Mixed -11.0 (L) -2.0 - 3.0 mmol/L    POCT HCO3 Calculated, Mixed 14.6 (L) 22.0 - 26.0 mmol/L    POCT Hemoglobin, Mixed 10.3 (L) 13.5 - 17.5 g/dL    POCT Anion Gap, Mixed 18 10 - 25 mmo/L    Patient Temperature 37.0 degrees Celsius    FiO2 28 %   Renal function panel   Result Value Ref Range    Glucose 64 (L) 74 - 99 mg/dL    Sodium 130 (L) 136 - 145 mmol/L    Potassium 5.2 3.5 - 5.3 mmol/L    Chloride 95 (L) 98 - 107 mmol/L    Bicarbonate 14 (L) 21 - 32 mmol/L    Anion Gap 26 (H) 10 - 20 mmol/L    Urea Nitrogen 85 (H) 6 - 23 mg/dL    Creatinine 9.07 (H) 0.50 - 1.30 mg/dL    eGFR 7 (L) >60 mL/min/1.73m*2    Calcium 7.7 (L) 8.6 - 10.6 mg/dL    Phosphorus 8.1 (H) 2.5 - 4.9 mg/dL    Albumin 2.9 (L) 3.4 - 5.0 g/dL   Magnesium   Result Value Ref Range    Magnesium 2.32 1.60 - 2.40 mg/dL   Lidocaine   Result Value Ref Range    Lidocaine  2.7 1.0 - 5.0 ug/mL   CBC and Auto Differential   Result Value Ref Range    WBC 9.5 4.4 - 11.3 x10*3/uL    nRBC 0.0 0.0 - 0.0 /100 WBCs    RBC 3.53 (L) 4.50 - 5.90 x10*6/uL    Hemoglobin 10.1 (L) 13.5 - 17.5 g/dL    Hematocrit 29.7 (L) 41.0 - 52.0 %    MCV 84 80 - 100 fL    MCH 28.6 26.0 - 34.0 pg    MCHC 34.0 32.0 - 36.0 g/dL    RDW 13.2 11.5 - 14.5 %    Platelets 115 (L) 150 - 450 x10*3/uL    Neutrophils % 80.0 40.0 - 80.0 %    Immature Granulocytes %, Automated 0.5 0.0 - 0.9 %    Lymphocytes % 8.0 13.0 - 44.0 %    Monocytes % 9.8 2.0 - 10.0 %    Eosinophils % 1.2 0.0 - 6.0 %    Basophils % 0.5 0.0 - 2.0 %    Neutrophils Absolute 7.56 1.20 - 7.70 x10*3/uL    Immature Granulocytes Absolute, Automated 0.05 0.00 - 0.70 x10*3/uL    Lymphocytes Absolute 0.76 (L) 1.20 - 4.80 x10*3/uL    Monocytes Absolute 0.93 0.10 - 1.00 x10*3/uL    Eosinophils Absolute 0.11 0.00 - 0.70 x10*3/uL    Basophils  Absolute 0.05 0.00 - 0.10 x10*3/uL   Renal Function Panel   Result Value Ref Range    Glucose 71 (L) 74 - 99 mg/dL    Sodium 131 (L) 136 - 145 mmol/L    Potassium 5.1 3.5 - 5.3 mmol/L    Chloride 97 (L) 98 - 107 mmol/L    Bicarbonate 13 (L) 21 - 32 mmol/L    Anion Gap 26 (H) 10 - 20 mmol/L    Urea Nitrogen 92 (HH) 6 - 23 mg/dL    Creatinine 9.89 (H) 0.50 - 1.30 mg/dL    eGFR 6 (L) >60 mL/min/1.73m*2    Calcium 8.0 (L) 8.6 - 10.6 mg/dL    Phosphorus 8.8 (H) 2.5 - 4.9 mg/dL    Albumin 2.9 (L) 3.4 - 5.0 g/dL   Magnesium   Result Value Ref Range    Magnesium 2.30 1.60 - 2.40 mg/dL   BLOOD GAS MIXED VENOUS FULL PANEL   Result Value Ref Range    POCT pH, Mixed 7.24 (LL) 7.33 - 7.43 pH    POCT pCO2, Mixed 31 (L) 41 - 51 mm Hg    POCT pO2, Mixed 48 (H) 35 - 45 mm Hg    POCT SO2, Mixed 79 (H) 45 - 75 %    POCT Oxy Hemoglobin, Mixed 77.7 (H) 45.0 - 75.0 %    POCT Hematocrit Calculated, Mixed 30.0 (L) 41.0 - 52.0 %    POCT Sodium, Mixed 127 (L) 136 - 145 mmol/L    POCT Potassium, Mixed 5.5 (H) 3.5 - 5.3 mmol/L    POCT Chloride, Mixed 97 (L) 98 - 107 mmol/L    POCT Ionized Calcium, Mixed 1.11 1.10 - 1.33 mmol/L    POCT Glucose, Mixed 78 74 - 99 mg/dL    POCT Lactate, Mixed 0.4 0.4 - 2.0 mmol/L    POCT Base Excess, Mixed -12.9 (L) -2.0 - 3.0 mmol/L    POCT HCO3 Calculated, Mixed 13.3 (L) 22.0 - 26.0 mmol/L    POCT Hemoglobin, Mixed 9.9 (L) 13.5 - 17.5 g/dL    POCT Anion Gap, Mixed 22 10 - 25 mmo/L    Patient Temperature 37.0 degrees Celsius    FiO2 40 %   POCT GLUCOSE   Result Value Ref Range    POCT Glucose 77 74 - 99 mg/dL   POCT GLUCOSE   Result Value Ref Range    POCT Glucose 70 (L) 74 - 99 mg/dL   POCT GLUCOSE   Result Value Ref Range    POCT Glucose 75 74 - 99 mg/dL   POCT GLUCOSE   Result Value Ref Range    POCT Glucose 87 74 - 99 mg/dL      ASSESSMENT:  Miguel A Bender is a  43 y.o.  year old , with PMHx of HTN, CHARLEEN, NSTEMI, and gastric bypass surgery admitted to [facility] on 7/10 with cardiac arrest. S/p left heart cath with  Impella placement. Nephrology consulted for ANNIE/possible HD iso worsening UOP and renal function.     Updates 7/15:  -Urine output continuing to improve  -No indication for RRT at this time  -Repeat RFP, if patient continues to have persistent acidosis can give IV sodium bicarb     #ANNIE, oliguric  -Baseline Cr 1.19  -Etiology : Post cardiac arrest.  -Electrolytes stable , mild AGMA and NAGMA  -He is negative 0.5L  -His CVP was 13.  -Not on pressors anymore    Recommendations:  - Repeat RFP, if patient continues to have persistent acidosis can give IV sodium bicarb   - Continue supportive management   - Continue lokelma prn  - No acute need for RRT  - Will continue to follow  - Strict I/Os   - Avoid IV contrast      Rossi Medel MD PGY-1  Nephrology Resident  24 hour Renal Pager - [phone]    Discussed with attending nephrologist

## 2024-07-15 NOTE — CARE PLAN
The patient's goals for the shift include  be able to eat/swallow liquids comfortably    The clinical goals for the shift include Patient will remain hemodynamicaly stable throughout shift      Problem: Arrythmia/Dysrhythmia  Goal: Lab values return to normal range  Outcome: Progressing  Goal: No evidence of post procedure complications  Outcome: Progressing  Goal: Promote self management  Outcome: Progressing  Goal: Serial ECG will return to baseline  Outcome: Progressing  Goal: Verbalize understanding of procedures/devices  Outcome: Progressing  Goal: Vital signs return to baseline  Outcome: Progressing  Goal: Care and maintenance of device (specify)  Outcome: Progressing     Problem: Cardiac catheterization  Goal: Free from dysrhythmias  Outcome: Progressing  Goal: Free from pain  Outcome: Progressing  Goal: No evidence of post procedure complications  Outcome: Progressing  Goal: Promote self management  Outcome: Progressing  Goal: Verbalize understanding of procedure  Outcome: Progressing  Goal: Care and maintenance of device (specify)  Outcome: Progressing     Problem: Skin  Goal: Decreased wound size/increased tissue granulation at next dressing change  Outcome: Progressing  Flowsheets (Taken 7/13/2024 0201)  Decreased wound size/increased tissue granulation at next dressing change: Promote sleep for wound healing  Goal: Participates in plan/prevention/treatment measures  Outcome: Progressing  Flowsheets (Taken 7/13/2024 0201)  Participates in plan/prevention/treatment measures: Discuss with provider PT/OT consult  Goal: Prevent/manage excess moisture  Outcome: Progressing  Flowsheets (Taken 7/14/2024 1046 by Josefa Cantrell RN)  Prevent/manage excess moisture:   Cleanse incontinence/protect with barrier cream   Moisturize dry skin  Goal: Prevent/minimize sheer/friction injuries  Outcome: Progressing  Flowsheets (Taken 7/13/2024 0201)  Prevent/minimize sheer/friction injuries: Turn/reposition every 2 hours/use  positioning/transfer devices  Goal: Promote/optimize nutrition  Outcome: Progressing  Flowsheets (Taken 7/14/2024 0207)  Promote/optimize nutrition: Monitor/record intake including meals  Goal: Promote skin healing  Outcome: Progressing  Flowsheets (Taken 7/14/2024 0207)  Promote skin healing: Assess skin/pad under line(s)/device(s)

## 2024-07-15 NOTE — PROGRESS NOTES
Social Work Transitional Care Note (chart review)  - ICU TREATMENT PLAN: Patient was transferred from [place] for cardiogenic shock.  - Payer: [employer].  -Support System: Spouse Jazmin is listed as NOK.   - Planned Disposition: Pending medical outcome and rehab recommendations  - Additional Information: None at this time.  - Barriers to discharge: None at this time. SW will continue to follow.  NILESH WILHELM

## 2024-07-15 NOTE — PROGRESS NOTES
Subjective   No events overnight. He had no arrhythmias on telemetry. Has a cough this morning with mucus. No blood noted with coughing. No shortness  of breath, chest pain or nausea/vomiting. No dizziness or syncope. Has not had a bowel movement for days. He failed his formal swallow eval.     Meds  Scheduled medications  [START ON 7/16/2024] levETIRAcetam, 500 mg, intravenous, q24h  metoprolol succinate XL, 25 mg, oral, Daily  oxygen, , inhalation, Continuous - Inhalation  polyethylene glycol, 17 g, oral, Daily  sodium bicarbonate, 1,300 mg, orogastric tube, BID  sodium zirconium cyclosilicate, 10 g, oral, Once      Continuous medications  lidocaine, 1 mg/min, Last Rate: 1 mg/min (07/15/24 1446)      PRN medications  PRN medications: dextrose, dextrose, glucagon, glucagon      Objective   Vital signs in last 24 hours:  Temp:  [36.3 °C (97.3 °F)-36.8 °C (98.2 °F)] 36.3 °C (97.3 °F)  Heart Rate:  [81-97] 85  Resp:  [0-31] 0  BP: (154)/(80) 154/80  Arterial Line BP 1: (136-166)/(68-88) 160/88    Intake/Output this shift:    Intake/Output Summary (Last 24 hours) at 7/15/2024 1511  Last data filed at 7/15/2024 1000  Gross per 24 hour   Intake 101.76 ml   Output 765 ml   Net -663.24 ml       Physical  General: Patient is awake, non-toxic appearing, normal body habitus  Pulm: Normal WOB at rest, no crackles or rhonchi  Cardiac: Regular rate and rhythm, normal S1/S2  Abdomen: Non-tender to palpation, non-distended  Extremities: No peripheral edema  Neuro: Patient alert and oriented to person, place and partial date (date and year), cranial nerves grossly intact, normal strength and sensation.    Results  Results for orders placed or performed during the hospital encounter of 07/10/24 (from the past 24 hour(s))   BLOOD GAS MIXED VENOUS FULL PANEL   Result Value Ref Range    POCT pH, Mixed 7.28 (L) 7.33 - 7.43 pH    POCT pCO2, Mixed 31 (L) 41 - 51 mm Hg    POCT pO2, Mixed 47 (H) 35 - 45 mm Hg    POCT SO2, Mixed 79 (H) 45 -  75 %    POCT Oxy Hemoglobin, Mixed 78.4 (H) 45.0 - 75.0 %    POCT Hematocrit Calculated, Mixed 31.0 (L) 41.0 - 52.0 %    POCT Sodium, Mixed 124 (L) 136 - 145 mmol/L    POCT Potassium, Mixed 5.3 3.5 - 5.3 mmol/L    POCT Chloride, Mixed 97 (L) 98 - 107 mmol/L    POCT Ionized Calcium, Mixed 1.12 1.10 - 1.33 mmol/L    POCT Glucose, Mixed 79 74 - 99 mg/dL    POCT Lactate, Mixed 0.5 0.4 - 2.0 mmol/L    POCT Base Excess, Mixed -11.0 (L) -2.0 - 3.0 mmol/L    POCT HCO3 Calculated, Mixed 14.6 (L) 22.0 - 26.0 mmol/L    POCT Hemoglobin, Mixed 10.3 (L) 13.5 - 17.5 g/dL    POCT Anion Gap, Mixed 18 10 - 25 mmo/L    Patient Temperature 37.0 degrees Celsius    FiO2 28 %   Renal function panel   Result Value Ref Range    Glucose 64 (L) 74 - 99 mg/dL    Sodium 130 (L) 136 - 145 mmol/L    Potassium 5.2 3.5 - 5.3 mmol/L    Chloride 95 (L) 98 - 107 mmol/L    Bicarbonate 14 (L) 21 - 32 mmol/L    Anion Gap 26 (H) 10 - 20 mmol/L    Urea Nitrogen 85 (H) 6 - 23 mg/dL    Creatinine 9.07 (H) 0.50 - 1.30 mg/dL    eGFR 7 (L) >60 mL/min/1.73m*2    Calcium 7.7 (L) 8.6 - 10.6 mg/dL    Phosphorus 8.1 (H) 2.5 - 4.9 mg/dL    Albumin 2.9 (L) 3.4 - 5.0 g/dL   Magnesium   Result Value Ref Range    Magnesium 2.32 1.60 - 2.40 mg/dL   Lidocaine   Result Value Ref Range    Lidocaine  2.7 1.0 - 5.0 ug/mL   CBC and Auto Differential   Result Value Ref Range    WBC 9.5 4.4 - 11.3 x10*3/uL    nRBC 0.0 0.0 - 0.0 /100 WBCs    RBC 3.53 (L) 4.50 - 5.90 x10*6/uL    Hemoglobin 10.1 (L) 13.5 - 17.5 g/dL    Hematocrit 29.7 (L) 41.0 - 52.0 %    MCV 84 80 - 100 fL    MCH 28.6 26.0 - 34.0 pg    MCHC 34.0 32.0 - 36.0 g/dL    RDW 13.2 11.5 - 14.5 %    Platelets 115 (L) 150 - 450 x10*3/uL    Neutrophils % 80.0 40.0 - 80.0 %    Immature Granulocytes %, Automated 0.5 0.0 - 0.9 %    Lymphocytes % 8.0 13.0 - 44.0 %    Monocytes % 9.8 2.0 - 10.0 %    Eosinophils % 1.2 0.0 - 6.0 %    Basophils % 0.5 0.0 - 2.0 %    Neutrophils Absolute 7.56 1.20 - 7.70 x10*3/uL    Immature  Granulocytes Absolute, Automated 0.05 0.00 - 0.70 x10*3/uL    Lymphocytes Absolute 0.76 (L) 1.20 - 4.80 x10*3/uL    Monocytes Absolute 0.93 0.10 - 1.00 x10*3/uL    Eosinophils Absolute 0.11 0.00 - 0.70 x10*3/uL    Basophils Absolute 0.05 0.00 - 0.10 x10*3/uL   Renal Function Panel   Result Value Ref Range    Glucose 71 (L) 74 - 99 mg/dL    Sodium 131 (L) 136 - 145 mmol/L    Potassium 5.1 3.5 - 5.3 mmol/L    Chloride 97 (L) 98 - 107 mmol/L    Bicarbonate 13 (L) 21 - 32 mmol/L    Anion Gap 26 (H) 10 - 20 mmol/L    Urea Nitrogen 92 (HH) 6 - 23 mg/dL    Creatinine 9.89 (H) 0.50 - 1.30 mg/dL    eGFR 6 (L) >60 mL/min/1.73m*2    Calcium 8.0 (L) 8.6 - 10.6 mg/dL    Phosphorus 8.8 (H) 2.5 - 4.9 mg/dL    Albumin 2.9 (L) 3.4 - 5.0 g/dL   Magnesium   Result Value Ref Range    Magnesium 2.30 1.60 - 2.40 mg/dL   BLOOD GAS MIXED VENOUS FULL PANEL   Result Value Ref Range    POCT pH, Mixed 7.24 (LL) 7.33 - 7.43 pH    POCT pCO2, Mixed 31 (L) 41 - 51 mm Hg    POCT pO2, Mixed 48 (H) 35 - 45 mm Hg    POCT SO2, Mixed 79 (H) 45 - 75 %    POCT Oxy Hemoglobin, Mixed 77.7 (H) 45.0 - 75.0 %    POCT Hematocrit Calculated, Mixed 30.0 (L) 41.0 - 52.0 %    POCT Sodium, Mixed 127 (L) 136 - 145 mmol/L    POCT Potassium, Mixed 5.5 (H) 3.5 - 5.3 mmol/L    POCT Chloride, Mixed 97 (L) 98 - 107 mmol/L    POCT Ionized Calcium, Mixed 1.11 1.10 - 1.33 mmol/L    POCT Glucose, Mixed 78 74 - 99 mg/dL    POCT Lactate, Mixed 0.4 0.4 - 2.0 mmol/L    POCT Base Excess, Mixed -12.9 (L) -2.0 - 3.0 mmol/L    POCT HCO3 Calculated, Mixed 13.3 (L) 22.0 - 26.0 mmol/L    POCT Hemoglobin, Mixed 9.9 (L) 13.5 - 17.5 g/dL    POCT Anion Gap, Mixed 22 10 - 25 mmo/L    Patient Temperature 37.0 degrees Celsius    FiO2 40 %   POCT GLUCOSE   Result Value Ref Range    POCT Glucose 77 74 - 99 mg/dL   POCT GLUCOSE   Result Value Ref Range    POCT Glucose 70 (L) 74 - 99 mg/dL   POCT GLUCOSE   Result Value Ref Range    POCT Glucose 75 74 - 99 mg/dL   POCT GLUCOSE   Result Value Ref  Range    POCT Glucose 87 74 - 99 mg/dL       Imaging  EEG    Result Date: 7/14/2024  IMPRESSION Impression This vEEG is indicative of mild diffuse encephalopathy. No epileptic discharges or seizure episodes were recorded. A full report will be scanned into the patient's chart at a later time. This report has been interpreted and electronically signed by    XR chest 1 view    Result Date: 7/14/2024  Interpreted By:  Ariel Blankenship, STUDY: XR CHEST 1 VIEW;  7/14/2024 7:17 am   INDICATION: Signs/Symptoms:elevated CVP c/f pulm edema.   COMPARISON: Chest radiograph 07/10/2024 and chest CT 07/10/2024   ACCESSION NUMBER(S): [id number]   ORDERING CLINICIAN: ANTOINE SINGER   FINDINGS: AP radiograph of the chest. Patient is slightly rotated towards left. Endotracheal tube tip now projects 4.2 cm superior to van. Enteric tube is again seen coursing below diaphragm and tip not included in field of view. Right IJ approach central venous catheter with tip projecting over mid to lower SVC, unchanged. Left IJ approach Hutchinson-Monty catheter tip stably overlies main pulmonary artery. Esophageal temperature probe tip again overlies at the level of thoracic inlet and consider repositioning. Impella device has been removed.   CARDIOMEDIASTINAL SILHOUETTE: The cardiomediastinal silhouette is persistently enlarged, stable with respect to size, but now with better delineation of bilateral heart borders.   LUNGS: There is improved but residual mild diffuse pulmonary edema and bibasilar mild atelectasis. No pleural effusions or pneumothorax.   ABDOMEN: No remarkable upper abdominal findings.   BONES: No acute osseous abnormality.       1. Improved but residual mild diffuse pulmonary edema and bibasilar mild atelectasis. 2. Medical devices as above. No pneumothorax.   Signed by: Ariel Blankenship 7/14/2024 11:02 AM Dictation workstation:   [id number]        Assessment/Plan   Principal Problem:    Cardiac arrest (Multi)  Active Problems:     Cardiogenic shock (Multi)    Acute hypoxemic respiratory failure (Multi)    Acute renal failure with oliguria (CMS-HCC)    Coma (Multi)    43-year-old male with a history of obesity status post gastric bypass surgery 2 years ago, CHARLEEN, HTN transferred for further management of cardiogenic shock s/p Impella placement.     Patient now maintaining stable pressures off Impella and pressors. He has not had any additional seizure events and will follow with neurology outpatient for his seizures. He failed his formal swallow eval today, so he will remain NPO with ice chips. He got his cardiac MRI today to assess for structural changes.      7/15 updates:  - 1L of urine output yesterday in response to Bumex 4 mg. Only ~500 mL today response.   - Cardiac MRI performed today  -Lidocaine of 2.7  - Closing pressures: PAP 50/26 (34), CVP of 14, CI of 4.55, SVRI of 1547, PVRI of 193  -Cardiac MRI shows dilated Right ventricle with moderate to severe reduction in RVEF  (20%). No evidence of infarct or scar. LVEF of 47%.      NEUROLOGIC  #Acute metabolic encephalopathy, improving  #History of myoclonic jerks and ophisthotonos at outside hospital  ::Concern for anoxic brain injury i/s/o cardiac arrest  ::Intuabted and sedated on propofol  ::Reported opisthotonos at [facility]  -Video EEG has preliminary result of severe diffuse encephalopathy with no epileptiform activity  -Light sedation, responds to yes/no questions with nodding, follows simple commands     Plan  - Keppra 500 mg BID will be continued until outpatient    #Oropharyngeal Dysphagia  - Coughing when swallowing   -Secondary to anoxic brain injury from cardiogenic shock vs upper airway irritation from previous endotracheal tube  - Continue to monitor  - May need discussion about enteral access if swallowing does not recover      CARDIOVASCULAR  #Cardiogenic shock, resolved  -On admission, was on norepi 0.07, epi 0.03 and impella P8 for pressure support. Now has been off  pressors for two days  -PA catheter numbers on admission: CVP 9, PAP 39/20 (27), CO 7.7, CI 3.3, , SVO2 80% on P8 Impella.  -PA catheter  removed overnight. Final CI of 4.55, SVRI of 1547, CVP of 16, SvO2 of 79%  - Cardiac MRI done today     Plan  - metop succ 25 daily  -Other GDMT held due to ANNIE    #Pulmonary Hypertension  #Right Heart Insufficiency  -Mean PA pressure of 34  - Mild TR  -RVEF of 20, CT PE Negative    #Ventricular Fibrillation and Polymorphic Vtach  #Non-ischemic cardiomyopathy  #Cardiac arrest of unclear etiology  -::TTE post arrest EF 10%, global hypokinesis  ::C/f arrhythmia such as prolonged QT in 500s  ::Was on amdiodarone on admission. Had polymorphic SVT with sinus pauses on night of 7/10, so amiodarone weaned off and switched to lidocaine.  - EP consulted, appreciate recs  - Last Qtc around [phone]     Plan  -Optimize electrolytes K > 4 and Mg > 2  -Monitor on telemetry  - Continue lidocaine drip- daily lidocaine level  - needs ICD before discharge     PULMONARY  #Acute hypoxic respiratory failure  #Concern for aspiration in setting of cardiac arrest  ::Secondary to cardiac arrest  ::CT PE negative  ::CXR not concerning for pneumonia. Diffuse pulmonary interstitial opacities more consistent with pulmonary edema or aspiration pneumonitis rather than pneumonia.     Plan  -extubated      RENAL/  #ANNIE Oliguric   #Concern for acute tubular necrosis  ::Likely pre-renal i/s/o cardiogenic shock   -Worsening Hyponatremia, hyperkalemia (136 -> 134 -> 133->132, 4.3 ->5.2 ->5.1 -> 5.9 -> 5.4). EKG with non-peaked T-waves  -Stable acidosis: 15-> 20->18 -> 17  -Phosphorus increasin.3 -> 5.2 ->6.8->7.0  -Creatinine of 2.45 -> 3.00 -> 3.58 -> 5.84 -> 5.61 ->6.43->7.88  -Urine output increasing but still minimal     Plan  -Continue to monitor urine output and BID RFPs  -Avoid nephrotoxic medications  -Renally dose medications   - Nephrology consulted, appreciate recs      GASTROINTESTINAL  #Elevated liver enzymes, improving  ::Secondary to cardiogenic shock   ::At risk for shock liver   - LFTs improving  -Continue to monitor with daily hepatic function panel     #GI Prophylaxis  -Pantoprazole BID while on vent     ENDOCRINE  DUSTIN     HEME/ONC  #At risk for hemolysis and DIC  -Monitor with LDH, haptoglobin, fibrinogen daily  - Risk decreased with impella removed      MSK/DERM  PT/OT        N: NPO- extubated today  A: PIV, Right IJ triple lumen, Left PA catheter     DVT ppx: SCD  GI ppx: None  Oxygen:supplemental O2  Drips Lido 1  Abx Vanc (7/11-7/12), Zosyn (7/11-7/13)  Code Status: Full Code (confirmed on Admission)  Surrogate Medical Decision-maker: Jazmin Bender (wife) [phone]        LOS: 5 days     Wing Barker MD/PhD   PGY-2

## 2024-07-15 NOTE — PROGRESS NOTES
"Speech-Language Pathology  Adult Inpatient Clinical Bedside Swallow Evaluation    Patient Name: Miguel A Bender  MRN: [id number]  Today's Date: 7/15/2024   Start Time: 1030  Stop Time: 1050  Time Calculation (min): 20 minutes     History of Present Illness:   Per EMR: \"Miguel A Bender is a 43 y.o. male with PMHx of obesity status post gastric bypass surgery 2 years ago, CHARLEEN, HTN transferred for cardiogenic shock s/p Impella after witnessed sudden cardiac arrest.     Patient presented after a witnessed cardiac arrest, reportedly witnessed arrest at work approximately 15-20 minutes prior to arrival. CPR started by coworkers. Was found to be in ventricular fibrillation on EMS arrival, defibrillated several times and received epinephrine and amio bolus and intubated by the time he arrived to ED. nitial CBC showing white count 12.2, hemoglobin over hematocrit 14.9/45.1, platelet count 230.  Metabolic panel showed serum glucose 138 sodium 143 potassium 3.0 chloride 106 bicarbonate 24 BUN 12 creatinine 1.01.  Initial high-sensitivity cardiac troponin elevated at 22.  Lactic acid elevated at 8.1. Initial EKG Post ROSC shows NSR with RBBB. Cardiology was consulted and loaded with Amiodarone, [facility] with normal coronary arteries, CI 1.9, Impella placed. Neurology was consulted, patient having episodes of spontaneous eye opening, pulling at restraints, episthotonos, and occasional myoclonic jerking. He was loaded with levetiracetam and started on EEG. Prior to transfer, lactate still high at 7.1, pH 7.18.     Upon admission, patient was intubated with Impella on P8, on norepinephrine 0.07 and epi 0.03, on amio and heparin drip. On propofol 50. Telemetry showing multiple episodes of polymorphic VT episodes few seconds each and EKG concerning for prolonged QTc. Decision was to start lidocaine bolus and infusion with plans to eventually wean off amiodarone. Started patient on Vanc/Zosyn. Gave a liter of LR. Repleted electrolytes (K 3.1 " "hemolyzed and Mg 1.6). Lactate 7.3 on ABG on admission improved to 5.0. PA catheter numbers: CVP 9, PAP 39/20 (27), CO 7.7, CI 3.3, , SVO2 80% on P8 Impella.      No family history of sudden cardiac death or genetic cardiomyopathies per patients wife. Patient was at his normal state of health and active before this sudden arrest.\"    Assessment:   Clinical bedside swallow evaluation completed. Pt received awake/alert, positioned upright in bed. RN at bedside. Reported that pt has failed multiple RN swallow screens and was coughing w/ orange juice that was given this AM 2/2 low blood sugars.   A&O x4 - Slowed processing/Delayed responses. Required repetitions of questions for pt to answer. OME s/f ecchymosis on L side of tongue and nonproductive volitional cough. Pt given and tolerated ice chips x3 - Noted prolonged/disorganized mastication w/ oral holding of bolus w/ mouth in open position prior to initiation of swallow. When given tsp sips of water and single straw sips of water, pt presented w/ coughing on x1/3 tsp trials and x2/3 straw sip trials. Of note, pt w/ multiple swallows per bolus and swallowing w/ mouth in open position or w/ tongue outside of oral cavity. PO trials ceased 2/2 concern for pharyngeal dysphagia/aspiration. Extensive education provided to pt re: anatomy/physiology of swallow function, s/s of aspiration, risk of aspiration, results, diet recommendations and dysphagia POC. Pt verbalized understanding and in agreement. Pt's wife/son now at bedside and updated re: results/recommendations and POC. Not appropriate for instrumental assessment given pt's current mentation and presentation at the bedside. SLP to follow-up next date for ongoing assessment.      Recommendations:  NPO  Frequent, aggressive oral care is strongly recommended to improve infection control as well as reduce dental plaque and bacteria on oropharyngeal surfaces which may increase the risk nosocomial infections, " including pneumonia.  OK for small amounts of ice chips (3-5/hr) after aggressive oral care is completed.  SLP to follow-up next date for ongoing assessment.     Goal:   Pt will tolerate least restrictive diet and recall/utilize safe swallow guidelines independently with no clinical s/s of aspiration 100% of time        Plan:  SLP Services Indicated: Yes  Frequency: 2x week  Discussed POC with patient and spouse  SLP - OK to Discharge    Pain:   0-10  0 = No pain.     Inpatient Education:  Extensive education provided to patient and spouse regarding current swallow function, recommendations/results, and POC.      Consultations/Referrals/Coordination of Services:   N/A

## 2024-07-15 NOTE — CARE PLAN
Problem: Skin  Goal: Promote skin healing  7/14/2024 2305 by Leena Holt RN  Outcome: Progressing  Flowsheets (Taken 7/14/2024 0207)  Promote skin healing: Assess skin/pad under line(s)/device(s)  7/14/2024 2303 by Leena Holt RN  Outcome: Progressing  Flowsheets (Taken 7/14/2024 0207)  Promote skin healing: Assess skin/pad under line(s)/device(s)     Problem: Skin  Goal: Promote/optimize nutrition  7/14/2024 2305 by Leena Holt RN  Outcome: Progressing  Flowsheets (Taken 7/14/2024 0207)  Promote/optimize nutrition: Monitor/record intake including meals  7/14/2024 2303 by Leena Holt RN  Outcome: Progressing  Flowsheets (Taken 7/14/2024 0207)  Promote/optimize nutrition: Monitor/record intake including meals     Problem: Skin  Goal: Prevent/minimize sheer/friction injuries  7/14/2024 2305 by Leena Holt RN  Outcome: Progressing  Flowsheets (Taken 7/13/2024 0201)  Prevent/minimize sheer/friction injuries: Turn/reposition every 2 hours/use positioning/transfer devices  7/14/2024 2303 by Leena Holt RN  Outcome: Progressing  Flowsheets (Taken 7/13/2024 0201)  Prevent/minimize sheer/friction injuries: Turn/reposition every 2 hours/use positioning/transfer devices     Problem: Skin  Goal: Prevent/manage excess moisture  7/14/2024 2305 by Leena Holt RN  Outcome: Progressing  Flowsheets (Taken 7/14/2024 1046 by Josefa Cantrell RN)  Prevent/manage excess moisture:   Cleanse incontinence/protect with barrier cream   Moisturize dry skin  7/14/2024 2303 by Leena Holt RN  Outcome: Progressing  Flowsheets (Taken 7/14/2024 1046 by Josefa Cantrell RN)  Prevent/manage excess moisture:   Cleanse incontinence/protect with barrier cream   Moisturize dry skin     Problem: Skin  Goal: Participates in plan/prevention/treatment measures  7/14/2024 2305 by Leena Holt RN  Outcome: Progressing  Flowsheets (Taken 7/13/2024 0201)  Participates in plan/prevention/treatment measures:  Discuss with provider PT/OT consult  7/14/2024 2303 by Leena Holt RN  Outcome: Progressing  Flowsheets (Taken 7/13/2024 0201)  Participates in plan/prevention/treatment measures: Discuss with provider PT/OT consult     Problem: Skin  Goal: Decreased wound size/increased tissue granulation at next dressing change  7/14/2024 2305 by Leena Holt RN  Outcome: Progressing  Flowsheets (Taken 7/13/2024 0201)  Decreased wound size/increased tissue granulation at next dressing change: Promote sleep for wound healing  7/14/2024 2303 by Leena Holt RN  Outcome: Progressing  Flowsheets (Taken 7/13/2024 0201)  Decreased wound size/increased tissue granulation at next dressing change: Promote sleep for wound healing

## 2024-07-15 NOTE — CARE PLAN
The patient's goals for the shift include      The clinical goals for the shift include remain hds throughout shift

## 2024-07-15 NOTE — PROGRESS NOTES
Physical Therapy    Physical Therapy Evaluation & Treatment    Patient Name: Miguel A Bender  MRN: [id number]  Today's Date: 7/15/2024   Time Calculation  Start Time: 0900  Stop Time: 0951  Time Calculation (min): 51 min    Assessment/Plan   PT Assessment  PT Assessment Results: Decreased strength, Decreased endurance, Impaired balance, Decreased mobility, Decreased coordination, Decreased cognition  Rehab Prognosis: Good  End of Session Communication: Bedside nurse  End of Session Patient Position: Bed, 3 rail up, Alarm off, caregiver present   IP OR SWING BED PT PLAN  Inpatient or Swing Bed: Inpatient  PT Plan  Treatment/Interventions: Bed mobility, Transfer training, Gait training, Stair training, Balance training, Neuromuscular re-education, Endurance training, Strengthening, Range of motion, Therapeutic exercise, Therapeutic activity  PT Plan: Ongoing PT  PT Frequency: 5 times per week  PT Discharge Recommendations: High intensity level of continued care  PT Recommended Transfer Status: Assist x2  PT - OK to Discharge: Yes    Subjective     General Visit Information:  General  Reason for Referral: Cardiact arrest with CPR completed by coworkers for 15-20minutes. Found to be in V-fib by EMS with multiple shocks. Cardiogenic shock s/p femoral impella (removal 7/13), intubated (extubated 7/14)  Past Medical History Relevant to Rehab: Obesity s/p gastric bypass surgery 2 years ago, CHARLEEN, HTN  Family/Caregiver Present: Yes  Caregiver Feedback: Wife and son showed up mid session. Very supportive of therapy.  Prior to Session Communication: Bedside nurse  Patient Position Received: Bed, 3 rail up, Alarm off, not on at start of session  General Comment: Pt was pleasant and cooperative to therapy. Lido 1mg, A-line, telemetry, wright catheter, 3L O2 NC    Home Living:  Home Living  Type of Home: House  Lives With: Spouse, Dependent children  Home Adaptive Equipment: None  Home Layout: Multi-level, Bed/bath upstairs, Stairs to  alternate level with rails  Alternate Level Stairs-Rails: Both  Alternate Level Stairs-Number of Steps: Flight of stairs  Home Access: Stairs to enter without rails  Entrance Stairs-Rails: None  Entrance Stairs-Number of Steps: 2    Prior Level of Function:  Prior Function Per Pt/Caregiver Report  Level of [place]: Independent with ADLs and functional transfers  Receives Help From: Family  ADL Assistance: Independent  Ambulatory Assistance: Independent  Vocational: Full time employment ()  Leisure: Playing poker, fishing  Prior Function Comments: Denies falls, community ambulator, (+) drives, able to walk long distances without taking a break.    Precautions:  Precautions  Medical Precautions: Cardiac precautions, Fall precautions, Oxygen therapy device and L/min    Vital Signs:  Vital Signs  Heart Rate:  (PRE: 88 POST: 85)  SpO2:  (PRE: 100 POST: 97)  BP:  (PRE: 157/71 POST: 150/67)    Objective     Pain:  Pain Assessment  Pain Assessment: 0-10  0-10 (Numeric) Pain Score: 0 - No pain    Cognition:  Cognition  Overall Cognitive Status: Impaired  Arousal/Alertness: Appropriate responses to stimuli  Orientation Level:  (Pt oriented to self. Thought he was at the [facility]. Needed choices for the month and year but able to repeat at end of the session. Did not know exact date. Thought to be at [facility] at end of session. Reorientation provided)  Following Commands: Follows one step commands with repetition    General Assessments:   Activity Tolerance  Early Mobility/Exercise Safety Screen: Proceed with mobilization - No exclusion criteria met    Sensation  Light Touch: No apparent deficits    Strength  Strength Comments: BLE at least 3+/5 shown through functional mobilization  Strength  Strength Comments: BLE at least 3+/5 shown through functional mobilization    Coordination  Alternating Toe Taps:  (Had difficult time performing movement after cues and demonstration)    Static Sitting  Balance  Static Sitting-Balance Support: Feet supported  Dynamic Sitting Balance  Dynamic Sitting-Balance Support: Feet supported  Dynamic Sitting-Comments: CGA    Static Standing Balance  Static Standing-Balance Support: Bilateral upper extremity supported  Static Standing-Level of Assistance: Minimum assistance, Moderate assistance (Bertram x2 to ModA x2 once standing)  Dynamic Standing Balance  Dynamic Standing-Balance Support: Bilateral upper extremity supported  Dynamic Standing-Comments: ModA x2    Functional Assessments:  Bed Mobility  Bed Mobility: Yes  Bed Mobility 1  Bed Mobility 1: Supine to sitting  Level of Assistance 1: Moderate assistance (x1)  Bed Mobility 2  Bed Mobility  2: Sitting to supine  Level of Assistance 2: Moderate assistance (x1, to swing legs into bed)    Transfers  Transfer: Yes  Transfer 1  Transfer From 1: Sit to  Transfer to 1: Stand  Technique 1: Sit to stand  Transfer Level of Assistance 1: Moderate assistance (x2, verbal cues for hand placement)  Transfers 2  Transfer From 2: Stand to  Transfer to 2: Sit  Technique 2: Stand to sit  Transfer Level of Assistance 2: Minimum assistance (x1, minimal eccentric control, verbal cues for hand placement.)    Ambulation/Gait Training  Ambulation/Gait Training Performed: Yes  Ambulation/Gait Training 1  Surface 1: Level tile  Device 1: No device  Assistance 1: Arm in arm assistance, Moderate assistance (x2;)  Quality of Gait 1: Decreased step length, Wide base of support, Shuffling gait, Inconsistent stride length (Verbal cues for increased step length)  Comments/Distance (ft) 1: Few lateral steps to HOB    Extremity/Trunk Assessments:  RLE   RLE : Within Functional Limits  LLE   LLE : Within Functional Limits    Treatments:  Therapeutic Activity  Therapeutic Activity Performed: Yes  Therapeutic Activity 1: Pt performed a sit to stand transfer with ModA x2 and FWW. Verbal cue for hand placement  Therapeutic Activity 2: Pt performed stand to sit  with FWW and Bertram x1. Verbal cues for increased eccentric control and hand placement.  Therapeutic Activity 3: Pt sat EOB for 20 minutes with CGA  Therapeutic Activity 4: Pt performed few lateral steps to HOB with FWW and assist ranging from Bertram x2 to ModA x2. Constant verbal cues for increased step length. Pt was more confined with the walker with poor step length and forward flexed posture.  Therapeutic Activity 5: Pt performed 3 alternating marches on each side. Bertram x2 and FWW.    Outcome Measures:  [facility] Basic Mobility  Turning from your back to your side while in a flat bed without using bedrails: A lot  Moving from lying on your back to sitting on the side of a flat bed without using bedrails: A lot  Moving to and from bed to chair (including a wheelchair): A lot  Standing up from a chair using your arms (e.g. wheelchair or bedside chair): A lot  To walk in hospital room: Total  Climbing 3-5 steps with railing: Total  Basic Mobility - Total Score: 10    Confusion Assessment Method-ICU (CAM-ICU)  Feature 1: Acute Onset or Fluctuating Course: Positive  Feature 2: Inattention: Negative  Feature 3: Altered Level of Consciousness: Negative  Feature 4: Disorganized Thinking: Negative  Overall CAM-ICU: Negative    FSS-ICU  Ambulation: Unable to attempt due to weakness  Rolling: Moderate assistance (performs 50 - 74% of task)  Sitting: Minimal assistance (performs 75% or more of task)  Transfer Sit-to-Stand: Total assistance (performs 25% or requires another person)  Transfer Supine-to-Sit: Moderate assistance (performs 50 - 74% of task)  Total Score: 11    Early Mobility/Exercise Safety Screen: Proceed with mobilization - No exclusion criteria met  ICU Mobility Scale: Marching on spot (at bedside) [6]  E = Exercise and Early Mobility  Early Mobility/Exercise Safety Screen: Proceed with mobilization - No exclusion criteria met  ICU Mobility Scale: Marching on spot (at bedside)    Encounter Problems       Encounter  Problems (Active)       Balance       Pt will perform 20 alternating marches with LRAD and CGA (Progressing)       Start:  07/15/24    Expected End:  07/29/24               Mobility       Patient will ambulate >50ft with LRAD and CGA (Progressing)       Start:  07/15/24    Expected End:  07/29/24               PT Transfers       Patient will perform bed mobility independently (Progressing)       Start:  07/15/24    Expected End:  07/29/24            Patient will transfer sit to and from stand LRAD and CGA (Progressing)       Start:  07/15/24    Expected End:  07/29/24                   Education Documentation  Mobility Training, taught by HENOK Suarez at 7/15/2024 11:37 AM.  Learner: Patient  Readiness: Acceptance  Method: Explanation, Demonstration  Response: Needs Reinforcement    Education Comments  No comments found.

## 2024-07-15 NOTE — PROGRESS NOTES
Occupational Therapy    Evaluation and Treatment    Patient Name: Miguel A Bender  MRN: [id number]  Today's Date: 7/15/2024  Room: 16/16  Time Calculation  Start Time: 1237  Stop Time: 1442  Time Calculation (min): 50 min (75min non-billable d/t split session)    Assessment  IP OT Assessment  OT Assessment: Decreased strength, endurance, cognition, coordination, balance all limiting occupational performance. Pt would benefit from skilled OT service to address acute deficits impacting pt safety and ability to complete ADL and functional mobility at his reported independent PLOF.   Prognosis: Excellent  Barriers to Discharge: Other (Comment) (Decreased strength and endurance)  Evaluation/Treatment Tolerance: Patient tolerated treatment well  Medical Staff Made Aware: Yes  End of Session Communication: Bedside nurse  End of Session Patient Position: Bed, 3 rail up, Alarm off, caregiver present  Plan:  Inpatient Plan  Treatment Interventions: ADL retraining, Visual perceptual retraining, Functional transfer training, UE strengthening/ROM, Endurance training, Cognitive reorientation, Neuromuscular reeducation, Fine motor coordination activities  OT Frequency: 4 times per week  OT Discharge Recommendations: High intensity level of continued care  Equipment Recommended upon Discharge:  (TBD)  OT Recommended Transfer Status: Moderate assist  OT - OK to Discharge: Yes  OT Assessment  OT Assessment Results: Decreased ADL status, Decreased upper extremity strength, Decreased cognition, Decreased endurance, Decreased fine motor control, Decreased functional mobility, Visual deficit  Prognosis: Excellent  Barriers to Discharge: Other (Comment) (Decreased strength and endurance)  Evaluation/Treatment Tolerance: Patient tolerated treatment well  Medical Staff Made Aware: Yes    Subjective   Current Problem:  1. Cardiac arrest (Multi)  EEG    Transthoracic Echo (TTE) Limited    Transthoracic Echo (TTE) Limited        General:  Reason  for Referral: 42 y/o male admitted post Cardiact arrest with CPR. Found to be in V-fib with multiple shocks. Cardiogenic shock s/p femoral impella (removal 7/13), intubated (extubated 7/14)  Past Medical History Relevant to Rehab: Obesity s/p gastric bypass surgery 2 years ago, CHARLEEN, HTN  Prior to Session Communication: Bedside nurse  Patient Position Received: Bed, 3 rail up, Alarm off, not on at start of session  Family/Caregiver Present: Yes  Caregiver Feedback: Wife, son, brother present and supportive  General Comment: Pt pleasant and cooperative. Slight increased time needed for processing commands. He puts forth good effort towards task completion. (Lido 1)   Precautions:  Medical Precautions: Cardiac precautions, Fall precautions, Oxygen therapy device and L/min (3L HFNC)  Vital Signs:  Heart Rate: 81 (82)  SpO2: 98 % (100)  BP: 154/80 (156/72)  MAP (mmHg): 106 (95)  BP Location: Left arm  BP Method: Arterial line  Pain:  Pain Assessment  Pain Assessment: 0-10  0-10 (Numeric) Pain Score: 0 - No pain  Lines/Tubes/Drains:  CVC 07/10/24 Triple lumen Right Internal jugular (Active)   Number of days: 4       Arterial Line 07/10/24 Left Radial (Active)   Number of days: 4       Urethral Catheter 16 Fr. (Active)   Number of days: 5         Objective   Cognition:  Overall Cognitive Status: Impaired  Arousal/Alertness: Delayed responses to stimuli  Orientation Level:  (Oriented to self and date. Needed reorientation to current location (initially stated [facility]).)  Following Commands: Follows one step commands with repetition        Georges Agitation Sedation Scale  Georges Agitation Sedation Scale (RASS): Alert and calm  Home Living:  Type of Home: House  Lives With: Spouse, Dependent children  Home Adaptive Equipment: None  Home Layout: Multi-level, Bed/bath upstairs, Stairs to alternate level with rails  Alternate Level Stairs-Number of Steps:  (Flight)  Home Access: Stairs to enter without  rails  Entrance Stairs-Number of Steps: 2   Prior Function:  Level of [place]: Independent with ADLs and functional transfers, Independent with homemaking with ambulation  Receives Help From: Family  ADL Assistance: Independent  Homemaking Assistance: Independent  Ambulatory Assistance: Independent  Vocational: Full time employment ()  Leisure: Enjoys football, [place]  Prior Function Comments: (+)drives, denies falls  IADL History:     ADL:  Grooming Assistance: Stand by  Grooming Deficit: Increased time to complete, Wash/dry face  Bathing Assistance: Minimal  UE Dressing Assistance: Stand by  UE Dressing Deficit: Increased time to complete  LE Dressing Assistance: Moderate  LE Dressing Deficit: Increased time to complete, Supervision/safety  Toileting Assistance with Device: Maximal  Toileting Deficit: Perineal hygiene  Activity Tolerance:  Endurance: Decreased tolerance for upright activites  Early Mobility/Exercise Safety Screen: Proceed with mobilization - No exclusion criteria met  Balance:  Static Sitting Balance  Static Sitting-Level of Assistance: Contact guard  Static Standing Balance  Static Standing-Level of Assistance: Minimum assistance  Bed Mobility/Transfers: Bed Mobility/Transfers: Bed Mobility  Bed Mobility: Yes  Bed Mobility 1  Bed Mobility 1: Rolling right, Rolling left  Level of Assistance 1: Maximum assistance, +2  Bed Mobility 2  Bed Mobility  2: Sitting to supine  Level of Assistance 2: Maximum assistance  Bed Mobility Comments 2: Cues for hand placement and technique  Bed Mobility 3  Bed Mobility 3: Sitting to supine  Level of Assistance 3: Maximum assistance, +2  Bed Mobility Comments 3: After bring up in chair for an hour. Increased fatigue.   and Transfers  Transfer: Yes  Transfer 1  Transfer From 1: Sit to  Transfer to 1: Stand, Bed  Technique 1: Sit to stand, Stand to sit  Transfer Device 1: Walker  Transfer Level of Assistance 1: Moderate assistance (Cues for hand  placement and technique.)  Trials/Comments 1: Cues for proper posture upon standing  Transfers 2  Transfer From 2: Bed to  Transfer to 2: Chair with arms  Technique 2: Stand pivot  Transfer Device 2: Walker  Transfer Level of Assistance 2: Minimum assistance  Trials/Comments 2: Cues for task sequencing. Increased time to process comands and for task completion.  Transfers 3  Transfer From 3: Chair with arms to  Transfer to 3: Bed  Technique 3: Stand pivot  Transfer Level of Assistance 3: Moderate assistance, +2, Arm in arm assistance  Trials/Comments 3: After up in chair for an hour. Increased fatigue.  IADL's:      Vision:     and Vision - Complex Assessment  Ocular Range of Motion: Within Functional Limits  Tracking:  (Able to track in all quadrants. Slight delay.)  Sensation:  Light Touch: No apparent deficits  Strength:  Strength Comments: BUE strength 3+/5  Perception:  Inattention/Neglect: Appears intact  Coordination:  Movements are Fluid and Coordinated: No  Coordination Comment: Slightly slowed movements   Hand Function:  Hand Function  Gross Grasp: Functional  Coordination: Functional        Outcome Measures: [facility] Daily Activity  Putting on and taking off regular lower body clothing: A lot  Bathing (including washing, rinsing, drying): A lot  Putting on and taking off regular upper body clothing: A lot  Toileting, which includes using toilet, bedpan or urinal: A lot  Taking care of personal grooming such as brushing teeth: A little  Eating Meals: A little  Daily Activity - Total Score: 14    Confusion Assessment Method-ICU (CAM-ICU)  Feature 3: Altered Level of Consciousness: Negative   ICU Mobility Screen  Early Mobility/Exercise Safety Screen: Proceed with mobilization - No exclusion criteria met,   Georges Agitation Sedation Scale  Georges Agitation Sedation Scale (RASS): Alert and calm      Education Documentation  Body Mechanics, taught by Concetta Baez OT at 7/15/2024  3:11 PM.  Learner:  Patient  Readiness: Acceptance  Method: Explanation  Response: Needs Reinforcement    Precautions, taught by Concetta Baez OT at 7/15/2024  3:11 PM.  Learner: Patient  Readiness: Acceptance  Method: Explanation  Response: Needs Reinforcement    ADL Training, taught by Concetta Baez OT at 7/15/2024  3:11 PM.  Learner: Patient  Readiness: Acceptance  Method: Explanation  Response: Needs Reinforcement    Education Comments  No comments found.        Goals:   Encounter Problems       Encounter Problems (Active)       ADLs       Patient will complete grooming tasks with Sup in order to maximize functional Indep with task completion.        Start:  07/15/24    Expected End:  08/05/24            Patient will complete lower body dressing with SBA  for donning and doffing all LE clothes in order to increase Indep with task participation.        Start:  07/15/24    Expected End:  08/05/24            Patient will complete toileting, including clothing management and hygiene, with MIN A in order to maximize functional Indep with task completion.        Start:  07/15/24    Expected End:  08/05/24               BALANCE       Pt will increase static/dynamic stand to Good to increase safety and indep with functional task completion.         Start:  07/15/24    Expected End:  08/05/24               COGNITION/SAFETY       Pt will follow multi-step commands accurately 100% of the time with no more than min vc, throughout duration of treatment, demonstrating improved cognition for participation in functional tasks.        Start:  07/15/24    Expected End:  08/05/24               EXERCISE/STRENGTHENING       Pt will increase overall BUE strength to 4+/5 in order to increase functional task participation.        Start:  07/15/24    Expected End:  08/05/24               TRANSFERS       Patient will complete functional transfers using least restrictive device with  SBA  in order to maximize functional potential and increase safety.         Start:  07/15/24    Expected End:  08/05/24                   Treatment Completed on Evaluation         Activities of Daily Living:    Grooming  Grooming Level of Assistance: Setup  Grooming Where Assessed: Edge of bed  Grooming Comments: To wash face. Increased time and repetition on commnds.  UE Bathing  UE Bathing Level of Assistance: Moderate assistance  UE Bathing Where Assessed: Edge of bed  UE Bathing Comments: Assist needed to wash back.                     Therapy/Activity:     Therapeutic Activity  Therapeutic Activity Performed: Yes  Therapeutic Activity 1: During initial stand, able to take 3 steps each forward/reverse and 4 lateral steps towards HOB using FWW. Needed MOD A and cues for task sequencng. Increased time needed to process commands and complete tasks.  Therapeutic Activity 2: Static EOB sit for approx 10min with CGA while engaging in task completon. Cues to correct forward flexed posture and maintain head-up positioning.             07/15/24 at 3:18 PM   Concetta Baez, OT   Rehab Office: [phone]

## 2024-07-16 ENCOUNTER — APPOINTMENT (OUTPATIENT)
Dept: RADIOLOGY | Facility: HOSPITAL | Age: 44
DRG: 276 | End: 2024-07-16
Payer: COMMERCIAL

## 2024-07-16 ENCOUNTER — APPOINTMENT (OUTPATIENT)
Dept: CARDIOLOGY | Facility: HOSPITAL | Age: 44
DRG: 276 | End: 2024-07-16
Payer: COMMERCIAL

## 2024-07-16 LAB
ALBUMIN SERPL BCP-MCNC: 2.8 G/DL (ref 3.4–5)
ALBUMIN SERPL BCP-MCNC: 3 G/DL (ref 3.4–5)
ANION GAP SERPL CALC-SCNC: 27 MMOL/L (ref 10–20)
ANION GAP SERPL CALC-SCNC: 29 MMOL/L (ref 10–20)
BASOPHILS # BLD AUTO: 0.03 X10*3/UL (ref 0–0.1)
BASOPHILS NFR BLD AUTO: 0.3 %
BUN SERPL-MCNC: 109 MG/DL (ref 6–23)
BUN SERPL-MCNC: 121 MG/DL (ref 6–23)
CALCIUM SERPL-MCNC: 8.2 MG/DL (ref 8.6–10.6)
CALCIUM SERPL-MCNC: 8.5 MG/DL (ref 8.6–10.6)
CHLORIDE SERPL-SCNC: 97 MMOL/L (ref 98–107)
CHLORIDE SERPL-SCNC: 97 MMOL/L (ref 98–107)
CO2 SERPL-SCNC: 13 MMOL/L (ref 21–32)
CO2 SERPL-SCNC: 14 MMOL/L (ref 21–32)
CREAT SERPL-MCNC: 11.37 MG/DL (ref 0.5–1.3)
CREAT SERPL-MCNC: 12.61 MG/DL (ref 0.5–1.3)
EGFRCR SERPLBLD CKD-EPI 2021: 5 ML/MIN/1.73M*2
EGFRCR SERPLBLD CKD-EPI 2021: 5 ML/MIN/1.73M*2
EOSINOPHIL # BLD AUTO: 0.22 X10*3/UL (ref 0–0.7)
EOSINOPHIL NFR BLD AUTO: 2.5 %
ERYTHROCYTE [DISTWIDTH] IN BLOOD BY AUTOMATED COUNT: 13 % (ref 11.5–14.5)
GLUCOSE BLD MANUAL STRIP-MCNC: 78 MG/DL (ref 74–99)
GLUCOSE BLD MANUAL STRIP-MCNC: 85 MG/DL (ref 74–99)
GLUCOSE BLD MANUAL STRIP-MCNC: 89 MG/DL (ref 74–99)
GLUCOSE BLD MANUAL STRIP-MCNC: 91 MG/DL (ref 74–99)
GLUCOSE BLD MANUAL STRIP-MCNC: 92 MG/DL (ref 74–99)
GLUCOSE SERPL-MCNC: 80 MG/DL (ref 74–99)
GLUCOSE SERPL-MCNC: 83 MG/DL (ref 74–99)
HCT VFR BLD AUTO: 26.8 % (ref 41–52)
HGB BLD-MCNC: 9.4 G/DL (ref 13.5–17.5)
IMM GRANULOCYTES # BLD AUTO: 0.12 X10*3/UL (ref 0–0.7)
IMM GRANULOCYTES NFR BLD AUTO: 1.4 % (ref 0–0.9)
LIDOCAIN SERPL-MCNC: 3 UG/ML (ref 1–5)
LYMPHOCYTES # BLD AUTO: 0.68 X10*3/UL (ref 1.2–4.8)
LYMPHOCYTES NFR BLD AUTO: 7.7 %
MAGNESIUM SERPL-MCNC: 2.46 MG/DL (ref 1.6–2.4)
MAGNESIUM SERPL-MCNC: 2.67 MG/DL (ref 1.6–2.4)
MCH RBC QN AUTO: 28.7 PG (ref 26–34)
MCHC RBC AUTO-ENTMCNC: 35.1 G/DL (ref 32–36)
MCV RBC AUTO: 82 FL (ref 80–100)
MONOCYTES # BLD AUTO: 0.99 X10*3/UL (ref 0.1–1)
MONOCYTES NFR BLD AUTO: 11.2 %
NEUTROPHILS # BLD AUTO: 6.82 X10*3/UL (ref 1.2–7.7)
NEUTROPHILS NFR BLD AUTO: 76.9 %
NRBC BLD-RTO: 0 /100 WBCS (ref 0–0)
PHOSPHATE SERPL-MCNC: 10.1 MG/DL (ref 2.5–4.9)
PHOSPHATE SERPL-MCNC: 10.3 MG/DL (ref 2.5–4.9)
PLATELET # BLD AUTO: 140 X10*3/UL (ref 150–450)
POTASSIUM SERPL-SCNC: 4.9 MMOL/L (ref 3.5–5.3)
POTASSIUM SERPL-SCNC: 4.9 MMOL/L (ref 3.5–5.3)
RBC # BLD AUTO: 3.28 X10*6/UL (ref 4.5–5.9)
SODIUM SERPL-SCNC: 133 MMOL/L (ref 136–145)
SODIUM SERPL-SCNC: 134 MMOL/L (ref 136–145)
WBC # BLD AUTO: 8.9 X10*3/UL (ref 4.4–11.3)

## 2024-07-16 PROCEDURE — 2500000005 HC RX 250 GENERAL PHARMACY W/O HCPCS

## 2024-07-16 PROCEDURE — 2500000004 HC RX 250 GENERAL PHARMACY W/ HCPCS (ALT 636 FOR OP/ED): Mod: JZ

## 2024-07-16 PROCEDURE — 84132 ASSAY OF SERUM POTASSIUM: CPT | Performed by: INTERNAL MEDICINE

## 2024-07-16 PROCEDURE — 93005 ELECTROCARDIOGRAM TRACING: CPT

## 2024-07-16 PROCEDURE — 85025 COMPLETE CBC W/AUTO DIFF WBC: CPT

## 2024-07-16 PROCEDURE — 80176 ASSAY OF LIDOCAINE: CPT

## 2024-07-16 PROCEDURE — 99291 CRITICAL CARE FIRST HOUR: CPT

## 2024-07-16 PROCEDURE — 97530 THERAPEUTIC ACTIVITIES: CPT | Mod: GP

## 2024-07-16 PROCEDURE — 92526 ORAL FUNCTION THERAPY: CPT | Mod: GN

## 2024-07-16 PROCEDURE — 93010 ELECTROCARDIOGRAM REPORT: CPT | Performed by: INTERNAL MEDICINE

## 2024-07-16 PROCEDURE — 82947 ASSAY GLUCOSE BLOOD QUANT: CPT

## 2024-07-16 PROCEDURE — 2500000001 HC RX 250 WO HCPCS SELF ADMINISTERED DRUGS (ALT 637 FOR MEDICARE OP)

## 2024-07-16 PROCEDURE — 80069 RENAL FUNCTION PANEL: CPT

## 2024-07-16 PROCEDURE — 2020000001 HC ICU ROOM DAILY

## 2024-07-16 PROCEDURE — 37799 UNLISTED PX VASCULAR SURGERY: CPT

## 2024-07-16 PROCEDURE — 83735 ASSAY OF MAGNESIUM: CPT

## 2024-07-16 PROCEDURE — 99233 SBSQ HOSP IP/OBS HIGH 50: CPT | Performed by: INTERNAL MEDICINE

## 2024-07-16 PROCEDURE — 2500000004 HC RX 250 GENERAL PHARMACY W/ HCPCS (ALT 636 FOR OP/ED): Performed by: INTERNAL MEDICINE

## 2024-07-16 PROCEDURE — 83735 ASSAY OF MAGNESIUM: CPT | Performed by: INTERNAL MEDICINE

## 2024-07-16 PROCEDURE — 37799 UNLISTED PX VASCULAR SURGERY: CPT | Performed by: INTERNAL MEDICINE

## 2024-07-16 RX ORDER — SODIUM BICARBONATE 650 MG/1
1300 TABLET ORAL 3 TIMES DAILY
Status: DISCONTINUED | OUTPATIENT
Start: 2024-07-16 | End: 2024-07-19

## 2024-07-16 RX ORDER — METOPROLOL TARTRATE 25 MG/1
12.5 TABLET, FILM COATED ORAL 2 TIMES DAILY
Status: DISCONTINUED | OUTPATIENT
Start: 2024-07-16 | End: 2024-07-19

## 2024-07-16 RX ORDER — BUMETANIDE 0.25 MG/ML
4 INJECTION INTRAMUSCULAR; INTRAVENOUS ONCE
Status: COMPLETED | OUTPATIENT
Start: 2024-07-16 | End: 2024-07-16

## 2024-07-16 RX ORDER — LIDOCAINE 560 MG/1
1 PATCH PERCUTANEOUS; TOPICAL; TRANSDERMAL DAILY
Status: DISCONTINUED | OUTPATIENT
Start: 2024-07-16 | End: 2024-07-27 | Stop reason: HOSPADM

## 2024-07-16 RX ORDER — ACETAMINOPHEN 10 MG/ML
1000 INJECTION, SOLUTION INTRAVENOUS ONCE
Status: COMPLETED | OUTPATIENT
Start: 2024-07-16 | End: 2024-07-16

## 2024-07-16 RX ORDER — ACETAMINOPHEN 325 MG/1
975 TABLET ORAL EVERY 8 HOURS PRN
Status: DISCONTINUED | OUTPATIENT
Start: 2024-07-16 | End: 2024-07-27 | Stop reason: HOSPADM

## 2024-07-16 RX ORDER — BUMETANIDE 0.25 MG/ML
4 INJECTION INTRAMUSCULAR; INTRAVENOUS ONCE
Status: DISCONTINUED | OUTPATIENT
Start: 2024-07-16 | End: 2024-07-17

## 2024-07-16 RX ORDER — BUMETANIDE 0.25 MG/ML
4 INJECTION INTRAMUSCULAR; INTRAVENOUS ONCE
Status: DISCONTINUED | OUTPATIENT
Start: 2024-07-16 | End: 2024-07-16

## 2024-07-16 RX ORDER — ACETAMINOPHEN 10 MG/ML
1000 INJECTION, SOLUTION INTRAVENOUS EVERY 6 HOURS PRN
Status: DISCONTINUED | OUTPATIENT
Start: 2024-07-16 | End: 2024-07-17

## 2024-07-16 ASSESSMENT — COGNITIVE AND FUNCTIONAL STATUS - GENERAL
STANDING UP FROM CHAIR USING ARMS: TOTAL
EATING MEALS: A LOT
TURNING FROM BACK TO SIDE WHILE IN FLAT BAD: A LOT
PERSONAL GROOMING: TOTAL
STANDING UP FROM CHAIR USING ARMS: A LOT
MOVING FROM LYING ON BACK TO SITTING ON SIDE OF FLAT BED WITH BEDRAILS: A LOT
DAILY ACTIVITIY SCORE: 7
DRESSING REGULAR UPPER BODY CLOTHING: TOTAL
HELP NEEDED FOR BATHING: TOTAL
WALKING IN HOSPITAL ROOM: TOTAL
DRESSING REGULAR LOWER BODY CLOTHING: TOTAL
WALKING IN HOSPITAL ROOM: A LOT
MOVING TO AND FROM BED TO CHAIR: A LOT
MOVING TO AND FROM BED TO CHAIR: A LOT
MOVING FROM LYING ON BACK TO SITTING ON SIDE OF FLAT BED WITH BEDRAILS: A LOT
CLIMB 3 TO 5 STEPS WITH RAILING: TOTAL
CLIMB 3 TO 5 STEPS WITH RAILING: TOTAL
MOBILITY SCORE: 11
MOBILITY SCORE: 9
TOILETING: TOTAL
TURNING FROM BACK TO SIDE WHILE IN FLAT BAD: A LOT

## 2024-07-16 ASSESSMENT — PAIN - FUNCTIONAL ASSESSMENT
PAIN_FUNCTIONAL_ASSESSMENT: 0-10

## 2024-07-16 ASSESSMENT — PAIN DESCRIPTION - LOCATION: LOCATION: BACK

## 2024-07-16 ASSESSMENT — PAIN SCALES - GENERAL
PAINLEVEL_OUTOF10: 0 - NO PAIN
PAINLEVEL_OUTOF10: 6

## 2024-07-16 NOTE — PROGRESS NOTES
Speech-Language Pathology                 Therapy Communication Note    Patient Name: Miguel A Bender  MRN: [id number]  Today's Date: 7/16/2024     Discipline: Speech Language Pathology    Missed Visit Reason:  Per RN, cannot send pt to Radiology 2/2 on Lidocaine. MD deferred this date and will reattempt MBSS next date as able/appropriate.     Missed Time: Attempt    Comment:

## 2024-07-16 NOTE — PROGRESS NOTES
NEPHROLOGY FOLLOW UP NOTE    Miguel A Bender   43 y.o.     MRN/Room: [id number]/16/16-A    Subjective: No acute overnight events. Resting comfortably in bed. Patient continuing to make urine and output is being maintained. He is continuing to be hemodynamically stable. Patient no longer with an Impella and has no need for pressors.    Objective:     Meds:   levETIRAcetam, 500 mg, q24h  lidocaine, 1 patch, Daily  metoprolol succinate XL, 25 mg, Daily  oxygen, , Continuous - Inhalation  polyethylene glycol, 17 g, Daily  sodium bicarbonate, 1,300 mg, TID  sodium zirconium cyclosilicate, 10 g, Once      lidocaine, Last Rate: 1 mg/min (07/16/24 0635)      dextrose, 12.5 g, q15 min PRN  dextrose, 25 g, q15 min PRN  glucagon, 1 mg, q15 min PRN  glucagon, 1 mg, q15 min PRN        Vitals:    07/16/24 0800   BP:    Pulse:    Resp:    Temp: 36 °C (96.8 °F)   SpO2:           Intake/Output Summary (Last 24 hours) at 7/16/2024 0810  Last data filed at 7/16/2024 0635  Gross per 24 hour   Intake 195.14 ml   Output 880 ml   Net -684.86 ml       General appearance: Awake and alert, oriented, . No distress  HEENT: NC/AT, oral mucosa  Skin: no apparent rash  Heart: heart sounds 1 & 2 present and normal, no murmurs heard or rub  Lungs: Adequate air entry, breath sounds bilaterally.  No wheezing/crackles  Abdomen: soft, non tender  Extremities: No edema, no joint swelling  Neuro: No FND,  No asterixis   ACCESS: PIV, R. I J triple lumen     Blood Labs:  Results for orders placed or performed during the hospital encounter of 07/10/24 (from the past 24 hour(s))   POCT GLUCOSE   Result Value Ref Range    POCT Glucose 70 (L) 74 - 99 mg/dL   POCT GLUCOSE   Result Value Ref Range    POCT Glucose 75 74 - 99 mg/dL   POCT GLUCOSE   Result Value Ref Range    POCT Glucose 87 74 - 99 mg/dL   Renal function panel   Result Value Ref Range    Glucose 80 74 - 99 mg/dL    Sodium 132 (L) 136 - 145 mmol/L    Potassium 5.1 3.5 - 5.3 mmol/L    Chloride 97 (L) 98  - 107 mmol/L    Bicarbonate 13 (L) 21 - 32 mmol/L    Anion Gap 27 (H) 10 - 20 mmol/L    Urea Nitrogen 104 (HH) 6 - 23 mg/dL    Creatinine 10.77 (H) 0.50 - 1.30 mg/dL    eGFR 5 (L) >60 mL/min/1.73m*2    Calcium 8.0 (L) 8.6 - 10.6 mg/dL    Phosphorus 9.7 (H) 2.5 - 4.9 mg/dL    Albumin 2.9 (L) 3.4 - 5.0 g/dL   POCT GLUCOSE   Result Value Ref Range    POCT Glucose 72 (L) 74 - 99 mg/dL   POCT GLUCOSE   Result Value Ref Range    POCT Glucose 94 74 - 99 mg/dL   POCT GLUCOSE   Result Value Ref Range    POCT Glucose 81 74 - 99 mg/dL   Lidocaine   Result Value Ref Range    Lidocaine  3.0 1.0 - 5.0 ug/mL   Magnesium   Result Value Ref Range    Magnesium 2.46 (H) 1.60 - 2.40 mg/dL   Renal function panel   Result Value Ref Range    Glucose 80 74 - 99 mg/dL    Sodium 133 (L) 136 - 145 mmol/L    Potassium 4.9 3.5 - 5.3 mmol/L    Chloride 97 (L) 98 - 107 mmol/L    Bicarbonate 14 (L) 21 - 32 mmol/L    Anion Gap 27 (H) 10 - 20 mmol/L    Urea Nitrogen 109 (HH) 6 - 23 mg/dL    Creatinine 11.37 (H) 0.50 - 1.30 mg/dL    eGFR 5 (L) >60 mL/min/1.73m*2    Calcium 8.2 (L) 8.6 - 10.6 mg/dL    Phosphorus 10.3 (H) 2.5 - 4.9 mg/dL    Albumin 2.8 (L) 3.4 - 5.0 g/dL   CBC and Auto Differential   Result Value Ref Range    WBC 8.9 4.4 - 11.3 x10*3/uL    nRBC 0.0 0.0 - 0.0 /100 WBCs    RBC 3.28 (L) 4.50 - 5.90 x10*6/uL    Hemoglobin 9.4 (L) 13.5 - 17.5 g/dL    Hematocrit 26.8 (L) 41.0 - 52.0 %    MCV 82 80 - 100 fL    MCH 28.7 26.0 - 34.0 pg    MCHC 35.1 32.0 - 36.0 g/dL    RDW 13.0 11.5 - 14.5 %    Platelets 140 (L) 150 - 450 x10*3/uL    Neutrophils % 76.9 40.0 - 80.0 %    Immature Granulocytes %, Automated 1.4 (H) 0.0 - 0.9 %    Lymphocytes % 7.7 13.0 - 44.0 %    Monocytes % 11.2 2.0 - 10.0 %    Eosinophils % 2.5 0.0 - 6.0 %    Basophils % 0.3 0.0 - 2.0 %    Neutrophils Absolute 6.82 1.20 - 7.70 x10*3/uL    Immature Granulocytes Absolute, Automated 0.12 0.00 - 0.70 x10*3/uL    Lymphocytes Absolute 0.68 (L) 1.20 - 4.80 x10*3/uL    Monocytes Absolute  0.99 0.10 - 1.00 x10*3/uL    Eosinophils Absolute 0.22 0.00 - 0.70 x10*3/uL    Basophils Absolute 0.03 0.00 - 0.10 x10*3/uL   POCT GLUCOSE   Result Value Ref Range    POCT Glucose 89 74 - 99 mg/dL   POCT GLUCOSE   Result Value Ref Range    POCT Glucose 91 74 - 99 mg/dL        ASSESSMENT:  Miguel A Bender is a  43 y.o.  year old , with PMHx of HTN, CHARLEEN, NSTEMI, and gastric bypass surgery admitted to [facility] on 7/10 with cardiac arrest. S/p left heart cath with Impella placement. Nephrology consulted for ANNIE/possible HD iso worsening UOP and renal function.      Updates 7/16:  -making urine; however, not still appears to be holding fluid   -recommend optimizing medical therapy for adequate urine output   -No indication for RRT at this time  -Repeat RFP, if patient continues to have persistent acidosis can give IV sodium bicarb   - Ok to continue sodium bicarb tablet 1.3g TID in the setting of acidosis      #ANNIE, oliguric  -Baseline Cr 1.19  -Etiology : Post cardiac arrest.  -Electrolytes stable , mild AGMA and NAGMA  -He is negative 0.6L  -His CVP was 13.  -Not on pressors anymore     Recommendations:  - Pt remains non oliguric so would increase diuresis to manage volume   - No indication for RRT at this time. Despite some confusion there  is low suspicion this is due to uremia at this time   - Ok to continue sodium bicarb tablet 1.3g TID in the setting of acidosis   - If patient continues to have persistent acidosis can give IV sodium bicarb   - Continue supportive management   - Continue lokelma prn  - Will continue to follow  - Strict I/Os   - Avoid IV contrast    Rossi Medel MD PGY-1  Nephrology Resident  24 hour Renal Pager - [phone]    Discussed with attending nephrologist Dr. Villalba.

## 2024-07-16 NOTE — PROGRESS NOTES
Physical Therapy    Physical Therapy Treatment    Patient Name: Miguel A Bender  MRN: [id number]  Today's Date: 7/16/2024  Time Calculation  Start Time: 0945  Stop Time: 1023  Time Calculation (min): 38 min    Assessment/Plan   PT Assessment  End of Session Communication: Bedside nurse  End of Session Patient Position: Up in chair, Alarm off, not on at start of session     PT Plan  Treatment/Interventions: Bed mobility, Transfer training, Gait training, Stair training, Balance training, Neuromuscular re-education, Endurance training, Strengthening, Range of motion, Therapeutic exercise, Therapeutic activity  PT Plan: Ongoing PT  PT Frequency: 5 times per week  PT Discharge Recommendations: High intensity level of continued care  PT Recommended Transfer Status: Assist x2  PT - OK to Discharge: Yes    General Visit Information:   PT  Visit  PT Received On: 07/16/24  General  Prior to Session Communication: Bedside nurse  Patient Position Received: Bed, 3 rail up, Alarm off, not on at start of session  General Comment: Pt cooperative to therapy. Telemetry, A-line, 3L O2 NC, Lido 1mg    Subjective     Precautions:  Precautions  Medical Precautions: Cardiac precautions, Fall precautions, Oxygen therapy device and L/min    Vital Signs:  Vital Signs  Heart Rate:  (PRE: 79 POST: 79)  Resp: 18  SpO2:  (PRE: 98 POST: 96)  BP:  (PRE: 174/87 DURING: got up to sys 191. RN notified and okay to keep going. POST: 173/91)    Objective     Pain:  Pain Assessment  Pain Assessment: 0-10  0-10 (Numeric) Pain Score: 0 - No pain    Cognition:  Cognition  Overall Cognitive Status: Within Functional Limits  Orientation Level:  (Oriented to month and year. Reinforcement for exact date. Oriented to place with choices.)    Activity Tolerance:  Activity Tolerance  Early Mobility/Exercise Safety Screen: Proceed with mobilization - No exclusion criteria met    Treatments:  Therapeutic Activity  Therapeutic Activity Performed: Yes  Therapeutic  Activity 1: Pt performed standing alternating marches x5 each side. Verbal cues for increased hip flexion. Assist ranged from [place] x1 to ModA x1 with excessive lateral lean when going to step.  Therapeutic Activity 2: Pt performed 10x seated LAQ each side. Verbal cues for slow, controlled movements  Therapeutic Activity 3: Pt performed 10x seated alternating marches. Verbal cues for increased hip flexion.    Bed Mobility  Bed Mobility: Yes  Bed Mobility 1  Bed Mobility 1: Supine to sitting  Level of Assistance 1: Moderate assistance (x1)  Bed Mobility Comments 1: Verbal cues for body positioning. Pt able to correct seated position on bed on his own with verbal cues.    Ambulation/Gait Training  Ambulation/Gait Training Performed: Yes  Ambulation/Gait Training 1  Surface 1: Level tile  Device 1: No device  Assistance 1: Minimum assistance (x1; Verbal cues for increased step length.)  Quality of Gait 1: Decreased step length, Wide base of support, Shuffling gait, Inconsistent stride length  Comments/Distance (ft) 1: Few lateral steps to HOB, seated break, few lateral steps to chair.  Transfers  Transfer: Yes  Transfer 1  Transfer From 1: Sit to  Transfer to 1: Stand  Technique 1: Sit to stand  Transfer Level of Assistance 1: Maximum assistance (x1; Verbal cues for hand placement)  Trials/Comments 1: x2  Transfers 2  Transfer From 2: Sit to  Transfer to 2: Stand  Technique 2: Sit to stand  Transfer Level of Assistance 2: Moderate assistance (x1; Verbal cues for hand placement)  Trials/Comments 2: x1 trial. Able to rise from higher surface  Transfers 3  Transfer From 3: Stand to  Transfer to 3: Sit  Technique 3: Stand to sit  Transfer Level of Assistance 3: Minimum assistance (x1; Verbal cues for hand placement, legs against chair, and increased eccentric control.)  Trials/Comments 3: x3 trials    Outcome Measures:  [facility] Basic Mobility  Turning from your back to your side while in a flat bed without using bedrails: A  lot  Moving from lying on your back to sitting on the side of a flat bed without using bedrails: A lot  Moving to and from bed to chair (including a wheelchair): A lot  Standing up from a chair using your arms (e.g. wheelchair or bedside chair): Total  To walk in hospital room: Total  Climbing 3-5 steps with railing: Total  Basic Mobility - Total Score: 9    Confusion Assessment Method-ICU (CAM-ICU)  Feature 1: Acute Onset or Fluctuating Course: Positive  Feature 2: Inattention: Negative  Feature 3: Altered Level of Consciousness: Negative  Feature 4: Disorganized Thinking: Negative  Overall CAM-ICU: Negative    FSS-ICU  Ambulation: Unable to attempt due to weakness  Rolling: Moderate assistance (performs 50 - 74% of task)  Sitting: Minimal assistance (performs 75% or more of task)  Transfer Sit-to-Stand: Maximal assistance (performs 25% - 49% of task)  Transfer Supine-to-Sit: Moderate assistance (performs 50 - 74% of task)  Total Score: 12    Early Mobility/Exercise Safety Screen: Proceed with mobilization - No exclusion criteria met  ICU Mobility Scale: Marching on spot (at bedside) [6]  E = Exercise and Early Mobility  Early Mobility/Exercise Safety Screen: Proceed with mobilization - No exclusion criteria met  ICU Mobility Scale: Marching on spot (at bedside)    Education Documentation  Mobility Training, taught by HENOK Suarez at 7/16/2024 10:50 AM.  Learner: Patient  Readiness: Acceptance  Method: Explanation, Demonstration  Response: Needs Reinforcement, Demonstrated Understanding    Education Comments  No comments found.        OP EDUCATION:       Encounter Problems       Encounter Problems (Active)       Balance       Pt will perform 20 alternating marches with LRAD and CGA (Progressing)       Start:  07/15/24    Expected End:  07/29/24               Mobility       Patient will ambulate >50ft with LRAD and CGA (Progressing)       Start:  07/15/24    Expected End:  07/29/24               PT  Transfers       Patient will perform bed mobility independently (Progressing)       Start:  07/15/24    Expected End:  07/29/24            Patient will transfer sit to and from stand LRAD and CGA (Progressing)       Start:  07/15/24    Expected End:  07/29/24

## 2024-07-16 NOTE — PROGRESS NOTES
Speech-Language Pathology  Adult Inpatient Swallow Treatment    Patient Name: Miguel A Bender  MRN: [id number]  Today's Date: 7/16/2024   Start Time: 1045  Stop Time: 1110  Time Calculation (min): 25 minutes    Impression:   Dysphagia tx completed to re-assess swallow function to determine readiness to initiate PO diet vs. need for further instrumental assessment. Pt received awake/alert, positioned upright in chair. A&O x3. Pt given and tolerated ice chips x3. P/w coughing on initial tsp sip of water; however, tolerated additional x3 trials. When cued to take cup sips (deferred straw 2/2 h/o bariatric sx) - Pt presented w/ consistent, persistent throat clearing and coughing on x3/3 trials (immediate and delayed s/p completion of swallow). PO trials ceased 2/2 concern for pharyngeal dysphagia/aspiration. Extensive education provided to pt re: anatomy/physiology of swallow function, s/s of aspiration, risk of aspiration, results, diet recommendations and MBSS procedure. Pt verbalized understanding and in agreement. RN and MD notified. MBSS to be completed pending Radiology's schedule.      Recommendations:  NPO  Frequent, aggressive oral care is strongly recommended to improve infection control as well as reduce dental plaque and bacteria on oropharyngeal surfaces which may increase the risk nosocomial infections, including pneumonia.  OK for small amounts of ice chips (3-5/hr) after aggressive oral care is completed.  MBSS to further assess oropharyngeal swallow function and determine safest oral diet    Goal:   Pt will tolerate least restrictive diet and recall/utilize safe swallow guidelines independently with no clinical s/s of aspiration 100% of time        Plan:  SLP Services Indicated: Yes  Frequency: 2x week  Discussed POC with patient  SLP - OK to Discharge    Pain:   0-10  0 = No pain.     Inpatient Education:  Extensive education provided to patient regarding current swallow function, recommendations/results,  and POC.      Consultations/Referrals/Coordination of Services:   N/A

## 2024-07-16 NOTE — PROGRESS NOTES
Subjective   Overnight, had some mild chest pain that he thinks is do to the compressions and shocks he got before. He got IV Tylenol and lidocaine patches with mild improvement.  He denies any shortness of breath, dizziness or syncope. Telemetry showed no episodes of arrhythmias.     Meds  Scheduled medications  levETIRAcetam, 500 mg, intravenous, q24h  lidocaine, 1 patch, transdermal, Daily  metoprolol succinate XL, 25 mg, oral, Daily  oxygen, , inhalation, Continuous - Inhalation  polyethylene glycol, 17 g, oral, Daily  sodium bicarbonate, 1,300 mg, orogastric tube, TID  sodium zirconium cyclosilicate, 10 g, oral, Once      Continuous medications  lidocaine, 1 mg/min, Last Rate: 1 mg/min (07/16/24 0635)      PRN medications  PRN medications: dextrose, dextrose, glucagon, glucagon      Objective   Vital signs in last 24 hours:  Temp:  [35.9 °C (96.6 °F)-36.6 °C (97.9 °F)] 35.9 °C (96.6 °F)  Heart Rate:  [80-91] 80  Resp:  [0-32] 28  BP: (154)/(80) 154/80  Arterial Line BP 1: (136-178)/(64-88) 163/82    Intake/Output this shift:    Intake/Output Summary (Last 24 hours) at 7/16/2024 0756  Last data filed at 7/16/2024 0635  Gross per 24 hour   Intake 195.14 ml   Output 925 ml   Net -729.86 ml       Physical  General: Patient is awake, non-toxic appearing, normal body habitus  Pulm: Normal WOB at rest and when sitting up, no crackles or rhonchi  Cardiac: Regular rate and rhythm, normal S1/S2  Abdomen: Non-tender to palpation, non-distended  Extremities: No peripheral edema  Neuro: Patient alert and oriented to person and date, 4/5 strength bilaterally, latency between question and response, can follow commands    Results  Results for orders placed or performed during the hospital encounter of 07/10/24 (from the past 24 hour(s))   POCT GLUCOSE   Result Value Ref Range    POCT Glucose 70 (L) 74 - 99 mg/dL   POCT GLUCOSE   Result Value Ref Range    POCT Glucose 75 74 - 99 mg/dL   POCT GLUCOSE   Result Value Ref Range     POCT Glucose 87 74 - 99 mg/dL   Renal function panel   Result Value Ref Range    Glucose 80 74 - 99 mg/dL    Sodium 132 (L) 136 - 145 mmol/L    Potassium 5.1 3.5 - 5.3 mmol/L    Chloride 97 (L) 98 - 107 mmol/L    Bicarbonate 13 (L) 21 - 32 mmol/L    Anion Gap 27 (H) 10 - 20 mmol/L    Urea Nitrogen 104 (HH) 6 - 23 mg/dL    Creatinine 10.77 (H) 0.50 - 1.30 mg/dL    eGFR 5 (L) >60 mL/min/1.73m*2    Calcium 8.0 (L) 8.6 - 10.6 mg/dL    Phosphorus 9.7 (H) 2.5 - 4.9 mg/dL    Albumin 2.9 (L) 3.4 - 5.0 g/dL   POCT GLUCOSE   Result Value Ref Range    POCT Glucose 72 (L) 74 - 99 mg/dL   POCT GLUCOSE   Result Value Ref Range    POCT Glucose 94 74 - 99 mg/dL   POCT GLUCOSE   Result Value Ref Range    POCT Glucose 81 74 - 99 mg/dL   Lidocaine   Result Value Ref Range    Lidocaine  3.0 1.0 - 5.0 ug/mL   Magnesium   Result Value Ref Range    Magnesium 2.46 (H) 1.60 - 2.40 mg/dL   Renal function panel   Result Value Ref Range    Glucose 80 74 - 99 mg/dL    Sodium 133 (L) 136 - 145 mmol/L    Potassium 4.9 3.5 - 5.3 mmol/L    Chloride 97 (L) 98 - 107 mmol/L    Bicarbonate 14 (L) 21 - 32 mmol/L    Anion Gap 27 (H) 10 - 20 mmol/L    Urea Nitrogen 109 (HH) 6 - 23 mg/dL    Creatinine 11.37 (H) 0.50 - 1.30 mg/dL    eGFR 5 (L) >60 mL/min/1.73m*2    Calcium 8.2 (L) 8.6 - 10.6 mg/dL    Phosphorus 10.3 (H) 2.5 - 4.9 mg/dL    Albumin 2.8 (L) 3.4 - 5.0 g/dL   CBC and Auto Differential   Result Value Ref Range    WBC 8.9 4.4 - 11.3 x10*3/uL    nRBC 0.0 0.0 - 0.0 /100 WBCs    RBC 3.28 (L) 4.50 - 5.90 x10*6/uL    Hemoglobin 9.4 (L) 13.5 - 17.5 g/dL    Hematocrit 26.8 (L) 41.0 - 52.0 %    MCV 82 80 - 100 fL    MCH 28.7 26.0 - 34.0 pg    MCHC 35.1 32.0 - 36.0 g/dL    RDW 13.0 11.5 - 14.5 %    Platelets 140 (L) 150 - 450 x10*3/uL    Neutrophils % 76.9 40.0 - 80.0 %    Immature Granulocytes %, Automated 1.4 (H) 0.0 - 0.9 %    Lymphocytes % 7.7 13.0 - 44.0 %    Monocytes % 11.2 2.0 - 10.0 %    Eosinophils % 2.5 0.0 - 6.0 %    Basophils % 0.3 0.0 - 2.0 %     Neutrophils Absolute 6.82 1.20 - 7.70 x10*3/uL    Immature Granulocytes Absolute, Automated 0.12 0.00 - 0.70 x10*3/uL    Lymphocytes Absolute 0.68 (L) 1.20 - 4.80 x10*3/uL    Monocytes Absolute 0.99 0.10 - 1.00 x10*3/uL    Eosinophils Absolute 0.22 0.00 - 0.70 x10*3/uL    Basophils Absolute 0.03 0.00 - 0.10 x10*3/uL   POCT GLUCOSE   Result Value Ref Range    POCT Glucose 89 74 - 99 mg/dL   POCT GLUCOSE   Result Value Ref Range    POCT Glucose 91 74 - 99 mg/dL       Imaging  MR cardiac morphology and function w and wo IV contrast    Result Date: 7/15/2024  Interpreted By:  Alfonso Tang, STUDY: MR CARDIAC MORPHOLOGY AND FUNCTION W AND WO IV CONTRAST;  7/15/2024 3:42 pm   INDICATION: Cardiomyopathy/Viability   COMPARISON: None.   ACCESSION NUMBER(S): [id number]   ORDERING CLINICIAN: ANTOINE SINGER   TECHNIQUE: Siemens1.5  Shabnam MRI scanner. Axial HASTE for anatomic localization. Steady state free precession (bSSFP) imaging for anatomic definition. Dynamic cine bSSFP for cardiac chamber and wall-motion analysis, and valvular analysis. Flow quantification sequences for hemodynamics. T1/T2/T2 star parametric mapping if indicated Delayed gadolinium enhancement analysis after injection of gadolinium-chelate (30 mL of MultiHance, 0.2 mmol/kg).   FINDINGS LEFT VENTRICLE: Quantitative LVEF 47 %. LV wall thickness is normal. LV cavity is mildly enlarged. LV systolic function is normal. There is no LV mass/thrombus.   VIABILITY: Hyperenhancement is normal.   RIGHT VENTRICLE: Quantitative RVEF 20 %.   LV/RV SEPTUM: The ventricular septum is intact.   LA/RA SEPTUM: The atrial septum is intact.   LEFT ATRIUM: LA is mildly enlarged.   RIGHT ATRIUM: RA is mildly enlarged.   PERICARDIUM: Pericardium is normal. There is no pericardial effusion. There are no signs of increased intrapericardial pressures.   PLEURAL EFFUSION: There is a small right pleural effusion. There is a small left pleural effusion.   AORTIC VALVE: Aortic  regurgitant volume 2 ml. Aortic regurgitant fraction 2 %. Peak aortic valve velocity 187 cm/sec.   MITRAL VALVE: The mitral valve annulus is normal in size. Mitral valve leaflets are normal. There is mild mitral regurgitation.   TRICUSPID VALVE: The tricuspid valve annulus is normal in size. Tricuspid valve leaflets are normal. There is mild tricuspid regurgitation.   AORTIC ROOT: The aortic root is normal.     CORE EXAM ====================================================================== ====================================   MEASUREMENTS ---------------------------------------------------------------------- ------------------ VOLUMETRIC ANALYSIS ---------------------------------------------- .-------------------------------------------------------.                  LV    Reference  RV    Reference  +------+----------+------+-----------+------+-----------+  EDV   ml         137              103          ml/mï¿½       57               43    ESV   ml          73               82          ml/mï¿½       30               34    CO    L/min     5.57             1.82          L/min/mï¿½  2.30             0.75    MASS  g          144                           g/mï¿½        59                     SV    ml          64               21          ml/mï¿½       26                9    EF    %           47               20   '------+----------+------+-----------+------+-----------'   CARDIAC OUTPUT HR:  87 bpm LV DIMENSIONS ---------------------------------------------- WALL THICKNESS - ANTEROSEPTAL:  1.3 cm WALL THICKNESS - INFEROLATERAL:  1.3 cm WALL THICKNESS - MAXIMUM:  1.3 cm LV KADEN:  5.9 cm LV ESD:  5.2 cm   LA DIMENSIONS (LV SYSTOLE) ---------------------------------------------- DIAMETER:  4.9 cm AREA - 2 CHAMBER:  31 cmï¿½ LENGTH - 2 CHAMBER:  6.8 cm AREA - 4 CHAMBER:  30 cmï¿½ LENGTH - 4 CHAMBER:  7.3 cm VOLUME:  116 ml VOLUME  NORMALIZED:  47.9 ml/mï¿½   RA DIMENSIONS (RV SYSTOLE) ---------------------------------------------- DIAMETER:  3.6 cm AREA - 4 CHAMBER:  19 cmï¿½ LENGTH - 4 CHAMBER:  6.2 cm   EXTRACELLULAR VOLUME MEASUREMENT ---------------------------------------------- PRE-CONTRAST T1 MYOCARDIUM:  1130 msec PRE-CONTRAST T1 LV CAVITY:  1597 msec POST-CONTRAST T1 MYOCARDIUM:  514 msec POST-CONTRAST T1 LV CAVITY:  362 msec HEMATOCRIT:  29.7 % ECV:  35 %     17 SEGMENT ---------------------------------------------------------------------- ------------------ .--------------------------------------------------------------------- ----------------------------.  Segments            Wall Motion  Hyperenhancement  Stress Perfusion  Interpretation  +--------------------+---------------+------------------+------------- -----+----------------------+  Base Anterior       Normal/Hyper  None                                Normal   Base Anteroseptal   Mild/Mod Hypo  None               Abnormal Wall Motion   Base Inferoseptal   Mild/Mod Hypo  None                                Abnormal Wall Motion   Base Inferior       Normal/Hyper   None               Normal                 Base Inferolateral  Normal/Hyper  None                                Normal   Base Anterolateral  Normal/Hyper   None               Normal                 Mid Anterior        Normal/Hyper  None                                Normal   Mid Anteroseptal    Normal/Hyper   None               Normal                 Mid Inferoseptal    Normal/Hyper  None                                Normal   Mid Inferior        Normal/Hyper   None               Normal                 Mid Inferolateral   Normal/Hyper  None                                Normal   Mid Anterolateral   Normal/Hyper   None               Normal                 Apical Anterior     Normal/Hyper  None                                Normal    Apical Septal       Normal/Hyper   None               Normal                 Apical Inferior     Normal/Hyper  None                                Normal   Apical Lateral      Normal/Hyper   None               Normal                 [place]                Normal/Hyper  None                                Normal  +--------------------+---------------+------------------+------------- -----+----------------------+  RV Segments         Wall Motion  Hyperenhancement  Stress Perfusion  Interpretation  +--------------------+---------------+------------------+------------- -----+----------------------+  RV Basal Anterior   Severe Hypo  None                                  RV Basal Inferior   Severe Hypo    None                                      RV Mid              Severe Hypo  None                                  RV Apical           Severe Hypo    None                                     '--------------------+---------------+------------------+------------- -----+----------------------'   FINDINGS ---------------------------------------------- LV SCAR SIZE (17 SEGMENT):  0 %     SCAN INFO ====================================================================== ====================================   GENERAL ---------------------------------------------------------------------- ------------------ SCANNER ---------------------------------------------- :  [employer] MODEL:  MAGNETOM Aera   CONTRAST AGENT ---------------------------------------------- GD CONCENTRATION:  0.5 M     Report generated by Pre[employer], a product of [employer]       Mildly reduced LVEF. Quantitative LVEF 47 %. No evidence of infarct/scar. RV is dilated with moderate to severe reduction in RVEF. Quantitative RVEF 20 %. Mildly increased ECV of 32%. T2 values in the lateral wall are mildly increased (52-53 ms). No definitive evidence of myocarditis by [place]  Criteria.   Signed by: Alfonso Tang 7/15/2024 4:44 PM Dictation workstation:   [id number]        Assessment/Plan   Principal Problem:    Cardiac arrest (Multi)  Active Problems:    Cardiogenic shock (Multi)    Acute hypoxemic respiratory failure (Multi)    Acute renal failure with oliguria (CMS-HCC)    Coma (Multi)    43-year-old male with a history of obesity status post gastric bypass surgery 2 years ago, CHARLEEN, HTN transferred for further management of cardiogenic shock s/p Impella placement.      Patient's ANNIE continues to worsen. Renal consulted, recommends more aggressive diuresis and does not think he needs dialysis yet. However, if he does not respond, could get dialysis as early as tomorrow. EP thinks he would qualify for transvenous pacemaker, but patient ambivalent and asked to speak with outpatient cardiologist.      7/17 updates:  - 925 mL of urine output  - EP discussing with patient for pacemaker. Possibly as early as Friday. Per discussion with EP, will likely go home on just a B-blocker. OK to interrupt lidocaine drip to go down to radiology for MBS per EP.   - Aggressive Bumex 4 mg BID today. May need dialysis tomorrow. BID RFPs  - Modified barium swallow study today  - Remove art line  -Cardiac MRI shows dilated Right ventricle with moderate to severe reduction in RVEF  (20%). No evidence of infarct or scar. LVEF of 47%.  Due to concern for arrhythmogenic right ventricular cardiomyopathy given low RVEF, RV dilation and ventricular tachycardia, will contact a dedicated specialist for evaluation.       NEUROLOGIC  #Acute metabolic encephalopathy, improving  #History of myoclonic jerks and ophisthotonos at outside hospital  #Concern for anoxic brain injury i/s/o cardiac arrest  ::Reported opisthotonos at [facility]  -Video EEG has preliminary result of severe diffuse encephalopathy with no epileptiform activity- Able to respond to questions but sometimes takes some time to think.     Plan  - Keppra  500 mg BID will be continued until outpatient  -Consider MRI brain if brain injury not improved    #Oropharyngeal Dysphagia  - Coughing when swallowing   -Secondary to anoxic brain injury from cardiogenic shock vs upper airway irritation from previous endotracheal tube  - Modified barium swallow delayed to tomorrow. Can interrupt lidocaine drip to go down for MBS.       CARDIOVASCULAR  #Cardiogenic shock, resolved  -On admission, was on norepi 0.07, epi 0.03 and impella P8 for pressure support. Now has been off pressors for two days  -PA catheter numbers on admission: CVP 9, PAP 39/20 (27), CO 7.7, CI 3.3, , SVO2 80% on P8 Impella.  -PA catheter  removed overnight. Final CI of 4.55, SVRI of 1547, CVP of 16, SvO2 of 79%     Plan  - Metop tartrate 12.5 mg BID  -Other GDMT held due to ANNIE. Restart as tolerated     #Pulmonary Hypertension  #Right Heart Insufficiency  -Mean PA pressure of 34  - Mild TR  -RVEF of 20, CT PE Negative  - Reduce preload through diuresis and dialysis    #Ventricular Fibrillation and Polymorphic Vtach  #Non-ischemic cardiomyopathy  #Cardiac arrest of unclear etiology  #Concern for Arrhythmogenic Right Ventricular Dysplasia  -::TTE post arrest EF 10%, global hypokinesis  ::C/f arrhythmia such as prolonged QT in 500s  ::Was on amdiodarone on admission. Had polymorphic SVT with sinus pauses on night of 7/10, so amiodarone weaned off and switched to lidocaine.  - EP consulted, appreciate recs  - Last Qtc around [phone]  - Meets major criteria of RV dilation and arrhythmia for ARVD. No consistent V1-V3 TWI, epsilon wave, or delayed S wave upstroke. No family history of SCD.     Plan  -Optimize electrolytes K > 4 and Mg > 2  -Monitor on telemetry  - Continue lidocaine drip- daily lidocaine level -> May transition to oral B-blockers for outpatient  - needs ICD before discharge, discussion for Friday  -Emailed a local expert on ARVD to evaluate     PULMONARY  #Acute hypoxic respiratory failure,  resolved  #Concern for aspiration in setting of cardiac arrest  ::Secondary to cardiac arrest  ::CT PE negative  ::CXR not concerning for pneumonia. Diffuse pulmonary interstitial opacities more consistent with pulmonary edema or aspiration pneumonitis rather than pneumonia.     Plan  -extubated   - Now stable on room air     RENAL/  #ANNIE Oliguric   #Concern for acute tubular necrosis  ::Likely pre-renal i/s/o cardiogenic shock   -Worsening Hyponatremia, hyperkalemia (136 -> 134 -> 133->132, 4.3 ->5.2 ->5.1 -> 5.9 -> 5.4). EKG with non-peaked T-waves  -Stable acidosis: 15-> 20->18 -> 17  -Phosphorus increasin.3 -> 5.2 ->6.8->7.0 ->10.3  -Creatinine of 2.45 -> 3.00 -> 3.58 -> 5.84 -> 5.61 ->6.43->7.88 -> 11.4  -Urine output poor, even with 4 mg Bumex daily   - Nephrology consulted, appreciate recs  - Dry weight of 117 -> 126     Plan  - 4 mg Bumex BID today. If fails, may need dialysis tomorrow  -Continue to monitor urine output and BID RFPs     GASTROINTESTINAL  - No active issues     ENDOCRINE  DUSTIN     HEME/ONC  No active issues     MSK/DERM  PT/OT        N: NPO with ice chips  A: PIV, Right IJ triple lumen  DVT ppx: SCD  GI ppx: None  Oxygen:supplemental O2  Drips [place] 1  Abx Vanc (-), Zosyn (-)  Code Status: Full Code (confirmed on Admission)  Surrogate Medical Decision-maker: Jazmin Bender (wife) [phone]       LOS: 6 days     Wing Barker MD/PhD   PGY-2

## 2024-07-16 NOTE — CARE PLAN
The patient's goals for the shift include  rest with controlled pain    The clinical goals for the shift include pt remains HDS throughout shift      Problem: Arrythmia/Dysrhythmia  Goal: Lab values return to normal range  Outcome: Progressing  Goal: No evidence of post procedure complications  Outcome: Progressing  Goal: Promote self management  Outcome: Progressing  Goal: Serial ECG will return to baseline  Outcome: Progressing  Goal: Verbalize understanding of procedures/devices  Outcome: Progressing  Goal: Vital signs return to baseline  Outcome: Progressing  Goal: Care and maintenance of device (specify)  Outcome: Progressing     Problem: Cardiac catheterization  Goal: Free from dysrhythmias  Outcome: Progressing  Goal: Free from pain  Outcome: Progressing  Goal: No evidence of post procedure complications  Outcome: Progressing  Goal: Promote self management  Outcome: Progressing  Goal: Verbalize understanding of procedure  Outcome: Progressing  Goal: Care and maintenance of device (specify)  Outcome: Progressing     Problem: Skin  Goal: Decreased wound size/increased tissue granulation at next dressing change  Outcome: Progressing  Flowsheets (Taken 7/16/2024 0345)  Decreased wound size/increased tissue granulation at next dressing change: Protective dressings over bony prominences  Goal: Participates in plan/prevention/treatment measures  Outcome: Progressing  Flowsheets (Taken 7/16/2024 0345)  Participates in plan/prevention/treatment measures: Discuss with provider PT/OT consult  Goal: Prevent/manage excess moisture  Outcome: Progressing  Flowsheets (Taken 7/16/2024 0345)  Prevent/manage excess moisture: Cleanse incontinence/protect with barrier cream  Goal: Prevent/minimize sheer/friction injuries  Outcome: Progressing  Flowsheets (Taken 7/16/2024 0345)  Prevent/minimize sheer/friction injuries: Turn/reposition every 2 hours/use positioning/transfer devices  Goal: Promote/optimize nutrition  Outcome:  Progressing  Flowsheets (Taken 7/16/2024 0345)  Promote/optimize nutrition: Monitor/record intake including meals  Goal: Promote skin healing  Outcome: Progressing  Flowsheets (Taken 7/16/2024 0345)  Promote skin healing: Assess skin/pad under line(s)/device(s)

## 2024-07-17 ENCOUNTER — APPOINTMENT (OUTPATIENT)
Dept: RADIOLOGY | Facility: HOSPITAL | Age: 44
DRG: 276 | End: 2024-07-17
Payer: COMMERCIAL

## 2024-07-17 LAB
ALBUMIN SERPL BCP-MCNC: 2.8 G/DL (ref 3.4–5)
ALBUMIN SERPL BCP-MCNC: 2.9 G/DL (ref 3.4–5)
ANION GAP SERPL CALC-SCNC: 28 MMOL/L (ref 10–20)
ANION GAP SERPL CALC-SCNC: 29 MMOL/L (ref 10–20)
ATRIAL RATE: 60 BPM
ATRIAL RATE: 64 BPM
ATRIAL RATE: 65 BPM
ATRIAL RATE: 80 BPM
BASOPHILS # BLD AUTO: 0.07 X10*3/UL (ref 0–0.1)
BASOPHILS NFR BLD AUTO: 0.8 %
BUN SERPL-MCNC: 129 MG/DL (ref 6–23)
BUN SERPL-MCNC: 141 MG/DL (ref 6–23)
CALCIUM SERPL-MCNC: 8.4 MG/DL (ref 8.6–10.6)
CALCIUM SERPL-MCNC: 8.4 MG/DL (ref 8.6–10.6)
CHLORIDE SERPL-SCNC: 100 MMOL/L (ref 98–107)
CHLORIDE SERPL-SCNC: 98 MMOL/L (ref 98–107)
CO2 SERPL-SCNC: 13 MMOL/L (ref 21–32)
CO2 SERPL-SCNC: 13 MMOL/L (ref 21–32)
CREAT SERPL-MCNC: 12.72 MG/DL (ref 0.5–1.3)
CREAT SERPL-MCNC: 14.31 MG/DL (ref 0.5–1.3)
EGFRCR SERPLBLD CKD-EPI 2021: 4 ML/MIN/1.73M*2
EGFRCR SERPLBLD CKD-EPI 2021: 4 ML/MIN/1.73M*2
EOSINOPHIL # BLD AUTO: 0.28 X10*3/UL (ref 0–0.7)
EOSINOPHIL NFR BLD AUTO: 3.1 %
ERYTHROCYTE [DISTWIDTH] IN BLOOD BY AUTOMATED COUNT: 12.9 % (ref 11.5–14.5)
GLUCOSE BLD MANUAL STRIP-MCNC: 80 MG/DL (ref 74–99)
GLUCOSE BLD MANUAL STRIP-MCNC: 83 MG/DL (ref 74–99)
GLUCOSE BLD MANUAL STRIP-MCNC: 88 MG/DL (ref 74–99)
GLUCOSE BLD MANUAL STRIP-MCNC: 88 MG/DL (ref 74–99)
GLUCOSE BLD MANUAL STRIP-MCNC: 97 MG/DL (ref 74–99)
GLUCOSE SERPL-MCNC: 80 MG/DL (ref 74–99)
GLUCOSE SERPL-MCNC: 86 MG/DL (ref 74–99)
HCT VFR BLD AUTO: 27.9 % (ref 41–52)
HGB BLD-MCNC: 9.7 G/DL (ref 13.5–17.5)
IMM GRANULOCYTES # BLD AUTO: 0.22 X10*3/UL (ref 0–0.7)
IMM GRANULOCYTES NFR BLD AUTO: 2.5 % (ref 0–0.9)
LIDOCAIN SERPL-MCNC: 2.7 UG/ML (ref 1–5)
LYMPHOCYTES # BLD AUTO: 0.8 X10*3/UL (ref 1.2–4.8)
LYMPHOCYTES NFR BLD AUTO: 8.9 %
MAGNESIUM SERPL-MCNC: 2.66 MG/DL (ref 1.6–2.4)
MAGNESIUM SERPL-MCNC: 2.86 MG/DL (ref 1.6–2.4)
MCH RBC QN AUTO: 28.9 PG (ref 26–34)
MCHC RBC AUTO-ENTMCNC: 34.8 G/DL (ref 32–36)
MCV RBC AUTO: 83 FL (ref 80–100)
MONOCYTES # BLD AUTO: 1.1 X10*3/UL (ref 0.1–1)
MONOCYTES NFR BLD AUTO: 12.3 %
NEUTROPHILS # BLD AUTO: 6.47 X10*3/UL (ref 1.2–7.7)
NEUTROPHILS NFR BLD AUTO: 72.4 %
NRBC BLD-RTO: 0 /100 WBCS (ref 0–0)
P AXIS: 27 DEGREES
P AXIS: 28 DEGREES
P AXIS: 29 DEGREES
P AXIS: 32 DEGREES
P OFFSET: 206 MS
P OFFSET: 207 MS
P OFFSET: 207 MS
P OFFSET: 208 MS
P ONSET: 143 MS
P ONSET: 149 MS
P ONSET: 149 MS
P ONSET: 152 MS
PHOSPHATE SERPL-MCNC: 10.4 MG/DL (ref 2.5–4.9)
PHOSPHATE SERPL-MCNC: 10.6 MG/DL (ref 2.5–4.9)
PLATELET # BLD AUTO: 180 X10*3/UL (ref 150–450)
POTASSIUM SERPL-SCNC: 4.7 MMOL/L (ref 3.5–5.3)
POTASSIUM SERPL-SCNC: 4.9 MMOL/L (ref 3.5–5.3)
PR INTERVAL: 130 MS
PR INTERVAL: 134 MS
PR INTERVAL: 134 MS
PR INTERVAL: 146 MS
Q ONSET: 216 MS
Q ONSET: 217 MS
QRS COUNT: 10 BEATS
QRS COUNT: 10 BEATS
QRS COUNT: 11 BEATS
QRS COUNT: 13 BEATS
QRS DURATION: 104 MS
QRS DURATION: 90 MS
QRS DURATION: 98 MS
QRS DURATION: 98 MS
QT INTERVAL: 392 MS
QT INTERVAL: 394 MS
QT INTERVAL: 410 MS
QT INTERVAL: 498 MS
QTC CALCULATION(BAZETT): 394 MS
QTC CALCULATION(BAZETT): 426 MS
QTC CALCULATION(BAZETT): 452 MS
QTC CALCULATION(BAZETT): 513 MS
QTC FREDERICIA: 394 MS
QTC FREDERICIA: 420 MS
QTC FREDERICIA: 431 MS
QTC FREDERICIA: 508 MS
R AXIS: 38 DEGREES
R AXIS: 50 DEGREES
R AXIS: 52 DEGREES
R AXIS: 54 DEGREES
RBC # BLD AUTO: 3.36 X10*6/UL (ref 4.5–5.9)
SODIUM SERPL-SCNC: 135 MMOL/L (ref 136–145)
SODIUM SERPL-SCNC: 136 MMOL/L (ref 136–145)
T AXIS: 21 DEGREES
T AXIS: 26 DEGREES
T AXIS: 34 DEGREES
T AXIS: 39 DEGREES
T OFFSET: 412 MS
T OFFSET: 413 MS
T OFFSET: 422 MS
T OFFSET: 465 MS
VENTRICULAR RATE: 60 BPM
VENTRICULAR RATE: 64 BPM
VENTRICULAR RATE: 65 BPM
VENTRICULAR RATE: 80 BPM
WBC # BLD AUTO: 8.9 X10*3/UL (ref 4.4–11.3)

## 2024-07-17 PROCEDURE — 2020000001 HC ICU ROOM DAILY

## 2024-07-17 PROCEDURE — 83735 ASSAY OF MAGNESIUM: CPT

## 2024-07-17 PROCEDURE — 2500000004 HC RX 250 GENERAL PHARMACY W/ HCPCS (ALT 636 FOR OP/ED)

## 2024-07-17 PROCEDURE — 99291 CRITICAL CARE FIRST HOUR: CPT

## 2024-07-17 PROCEDURE — 80176 ASSAY OF LIDOCAINE: CPT

## 2024-07-17 PROCEDURE — 80069 RENAL FUNCTION PANEL: CPT

## 2024-07-17 PROCEDURE — 37799 UNLISTED PX VASCULAR SURGERY: CPT

## 2024-07-17 PROCEDURE — 82947 ASSAY GLUCOSE BLOOD QUANT: CPT

## 2024-07-17 PROCEDURE — 99233 SBSQ HOSP IP/OBS HIGH 50: CPT

## 2024-07-17 PROCEDURE — 85025 COMPLETE CBC W/AUTO DIFF WBC: CPT

## 2024-07-17 PROCEDURE — 2500000005 HC RX 250 GENERAL PHARMACY W/O HCPCS

## 2024-07-17 PROCEDURE — 2500000001 HC RX 250 WO HCPCS SELF ADMINISTERED DRUGS (ALT 637 FOR MEDICARE OP)

## 2024-07-17 PROCEDURE — 2500000004 HC RX 250 GENERAL PHARMACY W/ HCPCS (ALT 636 FOR OP/ED): Performed by: INTERNAL MEDICINE

## 2024-07-17 PROCEDURE — 97530 THERAPEUTIC ACTIVITIES: CPT | Mod: GO

## 2024-07-17 PROCEDURE — 97535 SELF CARE MNGMENT TRAINING: CPT | Mod: GO

## 2024-07-17 RX ORDER — BUMETANIDE 0.25 MG/ML
4 INJECTION INTRAMUSCULAR; INTRAVENOUS 2 TIMES DAILY
Status: DISCONTINUED | OUTPATIENT
Start: 2024-07-17 | End: 2024-07-19

## 2024-07-17 RX ORDER — BUMETANIDE 0.25 MG/ML
4 INJECTION INTRAMUSCULAR; INTRAVENOUS ONCE
Status: COMPLETED | OUTPATIENT
Start: 2024-07-17 | End: 2024-07-17

## 2024-07-17 RX ORDER — CHLOROTHIAZIDE SODIUM 500 MG/1
500 INJECTION INTRAVENOUS ONCE
Status: COMPLETED | OUTPATIENT
Start: 2024-07-17 | End: 2024-07-17

## 2024-07-17 RX ORDER — LEVETIRACETAM 100 MG/ML
500 SOLUTION ORAL DAILY
Status: DISCONTINUED | OUTPATIENT
Start: 2024-07-18 | End: 2024-07-22

## 2024-07-17 ASSESSMENT — COGNITIVE AND FUNCTIONAL STATUS - GENERAL
DAILY ACTIVITIY SCORE: 7
DAILY ACTIVITIY SCORE: 12
PERSONAL GROOMING: A LOT
DRESSING REGULAR UPPER BODY CLOTHING: TOTAL
MOVING TO AND FROM BED TO CHAIR: A LOT
MOBILITY SCORE: 11
PERSONAL GROOMING: A LOT
DRESSING REGULAR LOWER BODY CLOTHING: A LOT
TOILETING: A LOT
CLIMB 3 TO 5 STEPS WITH RAILING: TOTAL
STANDING UP FROM CHAIR USING ARMS: A LOT
HELP NEEDED FOR BATHING: A LOT
MOVING FROM LYING ON BACK TO SITTING ON SIDE OF FLAT BED WITH BEDRAILS: A LOT
DRESSING REGULAR UPPER BODY CLOTHING: A LOT
EATING MEALS: TOTAL
EATING MEALS: A LOT
HELP NEEDED FOR BATHING: TOTAL
WALKING IN HOSPITAL ROOM: A LOT
TOILETING: TOTAL
TURNING FROM BACK TO SIDE WHILE IN FLAT BAD: A LOT
DRESSING REGULAR LOWER BODY CLOTHING: TOTAL

## 2024-07-17 ASSESSMENT — PAIN - FUNCTIONAL ASSESSMENT
PAIN_FUNCTIONAL_ASSESSMENT: 0-10
PAIN_FUNCTIONAL_ASSESSMENT: 0-10
PAIN_FUNCTIONAL_ASSESSMENT: WONG-BAKER FACES

## 2024-07-17 ASSESSMENT — PAIN SCALES - GENERAL
PAINLEVEL_OUTOF10: 0 - NO PAIN

## 2024-07-17 ASSESSMENT — ACTIVITIES OF DAILY LIVING (ADL): HOME_MANAGEMENT_TIME_ENTRY: 13

## 2024-07-17 NOTE — PROGRESS NOTES
Speech-Language Pathology                 Therapy Communication Note    Patient Name: Miguel A Bender  MRN: [id number]  Today's Date: 7/17/2024     Discipline: Speech Language Pathology    Missed Visit Reason:  Tentative plan to complete MBSS this date. Per RN, pt delirious, not following commands and not appropriate for instrumental swallow assessment. Will follow up next date to determine readiness as pt able/schedule permits.      Missed Time: Attempt

## 2024-07-17 NOTE — HOSPITAL COURSE
Miguel A Bedner is a 43 y.o. male with PMHx of obesity status post gastric bypass surgery 2 years ago, CHARLEEN, HTN transferred for cardiogenic shock s/p Impella after witnessed sudden cardiac arrest.     Patient presented after a witnessed cardiac arrest, reportedly witnessed arrest at work approximately 15-20 minutes prior to arrival. CPR started by coworkers. Was found to be in ventricular fibrillation on EMS arrival, defibrillated several times and received epinephrine and amio bolus and intubated by the time he arrived to ED. nitial CBC showing white count 12.2, hemoglobin over hematocrit 14.9/45.1, platelet count 230.  Metabolic panel showed serum glucose 138 sodium 143 potassium 3.0 chloride 106 bicarbonate 24 BUN 12 creatinine 1.01.  Initial high-sensitivity cardiac troponin elevated at 22.  Lactic acid elevated at 8.1. Initial EKG Post ROSC shows NSR with RBBB. Cardiology was consulted and loaded with Amiodarone, [facility] with normal coronary arteries, CI 1.9, Impella placed. Neurology was consulted, patient having episodes of spontaneous eye opening, pulling at restraints, episthotonos, and occasional myoclonic jerking. He was loaded with levetiracetam and started on EEG. Prior to transfer, lactate still high at 7.1, pH 7.18.     Upon admission on 7/11, patient was intubated with Impella on P8, on norepinephrine 0.07 and epi 0.03, on amio and heparin drip. On propofol 50. Telemetry showing multiple episodes of polymorphic VT episodes few seconds each and EKG concerning for prolonged QTc. Decision was to start lidocaine bolus and infusion with weaning off amiodarone on night of 7/11. Started patient on Vanc/Zosyn. Gave a liter of LR. Repleted electrolytes (K 3.1 hemolyzed and Mg 1.6). Lactate 7.3 on ABG on admission improved to 5.0. PA catheter numbers: CVP 9, PAP 39/20 (27), CO 7.7, CI 3.3, , SVO2 80% on P8 Impella.     On the night of 7/11, he had a few episodes of polymorphic VT for a few seconds but  reverted back to normal sinus rhythm. He had no other telemetry abnormalities after that. EP consulted, and recommended a cardiac MRI with ICD placement while continuing on lidocaine.  He came off pressors by the morning of 7/11, and was weaned down to P2 Impella on 7/12, with Impella removed on 7/13. He was extubated on 7/14.  Hospital course here complicated by an oliguric ANNIE with creatinine worsening from 2.45, to 12.61 on 7/16.  Nephrology consulted, recommended aggressive diuresis. Cardiac MRI performed 7/15; it shows recovered EF to 47% in left ventricle but poor EF of 20% in right ventricle. Given RV dilation and ventricular arrhythmia, we were concerned about arrhythmogenic right ventricular dysplasia and genetics was consulted for further workup, with [facility] lab panel sent out on 7/18. Given ongoing encephalopathy with concern for possible uremic cause, pt had trialysis line placed and underwent dialysis on 7/18 with subsequent improvement in encephalopathy. Following discussions with EP, determined pt could be transitioned from lidocaine infusion to nadolol 20mg daily.   Pt was transferred to the telemetry floor on 7/20. The night of 7/20 had mild fever 37.4, c/o cough productive of yellow sputum since 7/18.   On 7/21, CXR, sputum and blood Cx x 2 ordered, Vanc&Zosyn initiated. Transitioned pt's IV Keppra and bumex to PO. Started pt on isordil 5 mg and hydralazine 10 mg. Removed El per pt's request, strict I&Os, and bladder scan ordered.  7/22: Voiding well, w/o retention since El removal. MRSA swab (+), blood Cx NGTD, leukocytosis and cough improved, continuing Vanc&Zosyn. Several non sustained VT on telemetry.  7/23: Continue full course of Vanc &Zosyn. Cough is minimal, w/o sputum. Bumex is held per Nephrology recommendations.  7/24: Increased Isordil to 10 mg TID, and hydralazine 25 mg TID. Continue to hold Bumex and no HD per Nephrology.  7/25: Trialysis line removed, no HD per Nephrology. Pt has  great urinary output. Holding bumex.   7/26: Discontinued isordil d/t headaches shortly after pt takes his BP meds. ICD placement scheduled for today.  7/27: Discontinued vancomycin, outpatient consultations scheduled

## 2024-07-17 NOTE — PROGRESS NOTES
Occupational Therapy    Occupational Therapy Treatment    Name: Miguel A Bender  MRN: [id number]  : 1980  Date: 24  Time Calculation  Start Time: 859  Stop Time: 925  Time Calculation (min): 26 min    Assessment:  OT Assessment: Decreased strength, endurance, cognition, coordination, balance all limiting occupational performance. Would benefit from skilled services to maximize fuctional potential.  Prognosis: Good  Barriers to Discharge:  (Decreased strength and endurance and cognition)  Evaluation/Treatment Tolerance: Patient tolerated treatment well  Medical Staff Made Aware: Yes  End of Session Communication: Bedside nurse  End of Session Patient Position: Bed, 3 rail up, Alarm off, not on at start of session (Sitter and caregiver present)  Plan:  Treatment Interventions: ADL retraining, Visual perceptual retraining, Functional transfer training, UE strengthening/ROM, Endurance training, Cognitive reorientation, Neuromuscular reeducation, Fine motor coordination activities  OT Frequency: 4 times per week  OT Discharge Recommendations: High intensity level of continued care  Equipment Recommended upon Discharge:  (TBD)  OT Recommended Transfer Status: Moderate assist  OT - OK to Discharge: Yes    Subjective   Previous Visit Info:  OT Last Visit  OT Received On: 24  General:  General  Reason for Referral: 44 y/o male admitted post Cardiact arrest with CPR. Found to be in V-fib with multiple shocks. Cardiogenic shock s/p femoral impella (removal ), intubated (extubated )  Prior to Session Communication: Bedside nurse  Patient Position Received: Bed, 3 rail up, Alarm off, not on at start of session (Sitter present)  General Comment: Pt agitated at times. Often with delayed responses to questions asked of him.Need increased time to process commands, redirection to tasks.  Precautions:  Medical Precautions: Cardiac precautions, Fall precautions  Vitals:  Vital Signs  Heart Rate: 66  (65)  Resp: (!) 29  SpO2: 99 % (100)  BP: 157/80 (155/96)  MAP (mmHg): 98 (101)  Pain Assessment:  Pain Assessment  Pain Assessment: 0-10  0-10 (Numeric) Pain Score: 0 - No pain     Objective   Cognition:  Overall Cognitive Status: Impaired  Arousal/Alertness: Delayed responses to stimuli  Orientation Level: Disoriented to place, Disoriented to time, Disoriented to situation (Oriented to self. Needed visual and verbal cues to correctly identify the date. Was able to state '2024'. Adament that he was not in a hospital. Stated that he was 'on a train' and 'not in [place]'.)  Following Commands:  (Inconsistent command following. Needed increased time and repetition throughout.)  Activities of Daily Living:      Grooming  Grooming Level of Assistance: Moderate assistance  Grooming Where Assessed: Bed level  Grooming Comments: To wash face. Able to grasp cloth with repetition and initiate brinnging toface. needed overall MOD A to fully complete. Needed moD A to accurately use suction toothbrush. Repetition of instruction and MOD A for accuracy.                   LE Dressing  LE Dressing: Yes  LE Dressing Comments: MAX A to manage socks while seated EOB.          Bed Mobility/Transfers: Bed Mobility  Bed Mobility: Yes  Bed Mobility 1  Bed Mobility 1: Supine to sitting  Level of Assistance 1: Moderate assistance  Bed Mobility Comments 1: Lengthy time to complete  Bed Mobility 2  Bed Mobility  2: Sitting to supine  Level of Assistance 2: Moderate assistance, +2    Transfers  Transfer: Yes  Transfer 1  Transfer From 1: Sit to  Transfer to 1: Stand  Technique 1: Sit to stand, Stand to sit  Transfer Device 1: Walker  Transfer Level of Assistance 1: Moderate assistance  Trials/Comments 1: Max vc for hand placement and technique.       Therapy/Activity: Therapeutic Activity  Therapeutic Activity Performed: Yes  Therapeutic Activity 1: Static stand at walker with CGA for approx 3min while engaging in orientation task.  Therapeutic  Activity 2: Pt able to take approx 4 steps each forward/reverse using FWW. Completed with MOD A and max vc. Delayed task inititiation.       Cognitive Skill Development:  Cognitive Skill Development  Cognitive Skill Development Activity 1: Visual cues tprovided from dry erase board to assist with orientation.  Cognitive Skill Development Activity 2: Needed cues and tactile assist to problem solve what to do with toothbrush  Cognitive Skill Development Activity 3: Needed mod-max verbal and tactile cues throughout for command following and to intitate tasks.        Outcome Measures:  [facility] Daily Activity  Putting on and taking off regular lower body clothing: A lot  Bathing (including washing, rinsing, drying): A lot  Putting on and taking off regular upper body clothing: A lot  Toileting, which includes using toilet, bedpan or urinal: A lot  Taking care of personal grooming such as brushing teeth: A lot  Eating Meals: A lot  Daily Activity - Total Score: 12        Education Documentation  Body Mechanics, taught by Concetta Baez OT at 7/17/2024  1:23 PM.  Learner: Patient  Readiness: Acceptance  Method: Explanation  Response: Needs Reinforcement    Precautions, taught by Concetta Baez OT at 7/17/2024  1:23 PM.  Learner: Patient  Readiness: Acceptance  Method: Explanation  Response: Needs Reinforcement    ADL Training, taught by Concetta Baez OT at 7/17/2024  1:23 PM.  Learner: Patient  Readiness: Acceptance  Method: Explanation  Response: Needs Reinforcement    Education Comments  No comments found.      Goals:  Encounter Problems       Encounter Problems (Active)       ADLs       Patient will complete grooming tasks with Sup in order to maximize functional Indep with task completion.        Start:  07/15/24    Expected End:  08/05/24            Patient will complete lower body dressing with SBA  for donning and doffing all LE clothes in order to increase Indep with task participation.        Start:  07/15/24     Expected End:  08/05/24            Patient will complete toileting, including clothing management and hygiene, with MIN A in order to maximize functional Indep with task completion.        Start:  07/15/24    Expected End:  08/05/24               BALANCE       Pt will increase static/dynamic stand to Good to increase safety and indep with functional task completion.         Start:  07/15/24    Expected End:  08/05/24               COGNITION/SAFETY       Pt will follow multi-step commands accurately 100% of the time with no more than min vc, throughout duration of treatment, demonstrating improved cognition for participation in functional tasks.        Start:  07/15/24    Expected End:  08/05/24               EXERCISE/STRENGTHENING       Pt will increase overall BUE strength to 4+/5 in order to increase functional task participation.        Start:  07/15/24    Expected End:  08/05/24               TRANSFERS       Patient will complete functional transfers using least restrictive device with  SBA  in order to maximize functional potential and increase safety.        Start:  07/15/24    Expected End:  08/05/24

## 2024-07-17 NOTE — CARE PLAN
The patient's goals for the shift include  leave from hospital    The clinical goals for the shift include patient will rest throughout the night to help prevent delirium.      Problem: Arrythmia/Dysrhythmia  Goal: Lab values return to normal range  Outcome: Progressing  Goal: No evidence of post procedure complications  Outcome: Progressing  Goal: Promote self management  Outcome: Progressing  Goal: Serial ECG will return to baseline  Outcome: Progressing  Goal: Verbalize understanding of procedures/devices  Outcome: Progressing  Goal: Vital signs return to baseline  Outcome: Progressing  Goal: Care and maintenance of device (specify)  Outcome: Progressing     Problem: Cardiac catheterization  Goal: Free from dysrhythmias  Outcome: Progressing  Goal: Free from pain  Outcome: Progressing  Goal: No evidence of post procedure complications  Outcome: Progressing  Goal: Promote self management  Outcome: Progressing  Goal: Verbalize understanding of procedure  Outcome: Progressing  Goal: Care and maintenance of device (specify)  Outcome: Progressing     Problem: Skin  Goal: Decreased wound size/increased tissue granulation at next dressing change  Outcome: Progressing  Flowsheets (Taken 7/17/2024 0352)  Decreased wound size/increased tissue granulation at next dressing change: Promote sleep for wound healing  Goal: Participates in plan/prevention/treatment measures  Outcome: Progressing  Flowsheets (Taken 7/17/2024 0352)  Participates in plan/prevention/treatment measures: Increase activity/out of bed for meals  Goal: Prevent/manage excess moisture  Outcome: Progressing  Flowsheets (Taken 7/17/2024 0352)  Prevent/manage excess moisture: Cleanse incontinence/protect with barrier cream  Goal: Prevent/minimize sheer/friction injuries  Outcome: Progressing  Flowsheets (Taken 7/17/2024 0352)  Prevent/minimize sheer/friction injuries: Turn/reposition every 2 hours/use positioning/transfer devices  Goal: Promote/optimize  nutrition  Outcome: Progressing  Flowsheets (Taken 7/17/2024 0352)  Promote/optimize nutrition: Offer water/supplements/favorite foods  Goal: Promote skin healing  Outcome: Progressing  Flowsheets (Taken 7/17/2024 0352)  Promote skin healing: Turn/reposition every 2 hours/use positioning/transfer devices     Problem: Safety - Medical Restraint  Goal: Remains free of injury from restraints (Restraint for Interference with Medical Device)  Outcome: Progressing  Goal: Free from restraint(s) (Restraint for Interference with Medical Device)  Outcome: Progressing

## 2024-07-17 NOTE — CARE PLAN
The patient's goals for the shift include      The clinical goals for the shift include diuresising or dialysis awaiting renal decisions for plan or goals of care.    Over the shift, the patient did not make progress toward the following goals. Barriers to progression include continue to exhibit de. Recommendations to address these barriers include delium    Problem: Arrythmia/Dysrhythmia  Goal: No evidence of post procedure complications  Outcome: Not Progressing  Goal: Promote self management  Outcome: Not Progressing  Goal: Verbalize understanding of procedures/devices  Outcome: Not Progressing     Problem: Cardiac catheterization  Goal: No evidence of post procedure complications  Outcome: Not Progressing

## 2024-07-17 NOTE — PROGRESS NOTES
NEPHROLOGY FOLLOW UP NOTE    Miguel A Bender   43 y.o.    126 kg  MRN/Room: [id number]/16/16-A    Subjective: No acute overnight events. Resting comfortably in bed. Patient continuing to make urine and output is being maintained. Received IV bumex 4mg yesterday. He is continuing to be hemodynamically stable. Patient no longer with an Impella and has no need for pressors. Seems to be more conversant today although improvement in mentation is unclear.     Objective:     Meds:   bumetanide, 4 mg, Once  levETIRAcetam, 500 mg, q24h  lidocaine, 1 patch, Daily  metoprolol tartrate, 12.5 mg, BID  oxygen, , Continuous - Inhalation  polyethylene glycol, 17 g, Daily  sodium bicarbonate, 1,300 mg, TID  sodium zirconium cyclosilicate, 10 g, Once      lidocaine, Last Rate: 1 mg/min (07/17/24 0354)      acetaminophen, 1,000 mg, q6h PRN  acetaminophen, 975 mg, q8h PRN  dextrose, 12.5 g, q15 min PRN  dextrose, 25 g, q15 min PRN  glucagon, 1 mg, q15 min PRN  glucagon, 1 mg, q15 min PRN        Vitals:    07/17/24 0803   BP:    Pulse:    Resp:    Temp: 36.8 °C (98.2 °F)   SpO2:           Intake/Output Summary (Last 24 hours) at 7/17/2024 0807  Last data filed at 7/17/2024 0600  Gross per 24 hour   Intake 309.88 ml   Output 830 ml   Net -520.12 ml     General appearance: Awake and alert, oriented. No acute distress  HEENT: NC/AT, oral mucosa  Skin: no apparent rash  Heart: heart sounds 1 & 2 present and normal, no murmurs heard or rub  Lungs: Adequate air entry, breath sounds bilaterally.  No wheezing/crackles  Abdomen: soft, non tender  Extremities: No edema, no joint swelling  Neuro: No FND,  No asterixis   ACCESS: PIV, R. I J triple lumen     Blood Labs:  Results for orders placed or performed during the hospital encounter of 07/10/24 (from the past 24 hour(s))   POCT GLUCOSE   Result Value Ref Range    POCT Glucose 92 74 - 99 mg/dL   POCT GLUCOSE   Result Value Ref Range    POCT Glucose 85 74 - 99 mg/dL   Renal function panel   Result  Value Ref Range    Glucose 83 74 - 99 mg/dL    Sodium 134 (L) 136 - 145 mmol/L    Potassium 4.9 3.5 - 5.3 mmol/L    Chloride 97 (L) 98 - 107 mmol/L    Bicarbonate 13 (L) 21 - 32 mmol/L    Anion Gap 29 (H) 10 - 20 mmol/L    Urea Nitrogen 121 (HH) 6 - 23 mg/dL    Creatinine 12.61 (H) 0.50 - 1.30 mg/dL    eGFR 5 (L) >60 mL/min/1.73m*2    Calcium 8.5 (L) 8.6 - 10.6 mg/dL    Phosphorus 10.1 (H) 2.5 - 4.9 mg/dL    Albumin 3.0 (L) 3.4 - 5.0 g/dL   Magnesium   Result Value Ref Range    Magnesium 2.67 (H) 1.60 - 2.40 mg/dL   POCT GLUCOSE   Result Value Ref Range    POCT Glucose 78 74 - 99 mg/dL   Lidocaine   Result Value Ref Range    Lidocaine  2.7 1.0 - 5.0 ug/mL   CBC and Auto Differential   Result Value Ref Range    WBC 8.9 4.4 - 11.3 x10*3/uL    nRBC 0.0 0.0 - 0.0 /100 WBCs    RBC 3.36 (L) 4.50 - 5.90 x10*6/uL    Hemoglobin 9.7 (L) 13.5 - 17.5 g/dL    Hematocrit 27.9 (L) 41.0 - 52.0 %    MCV 83 80 - 100 fL    MCH 28.9 26.0 - 34.0 pg    MCHC 34.8 32.0 - 36.0 g/dL    RDW 12.9 11.5 - 14.5 %    Platelets 180 150 - 450 x10*3/uL    Neutrophils % 72.4 40.0 - 80.0 %    Immature Granulocytes %, Automated 2.5 (H) 0.0 - 0.9 %    Lymphocytes % 8.9 13.0 - 44.0 %    Monocytes % 12.3 2.0 - 10.0 %    Eosinophils % 3.1 0.0 - 6.0 %    Basophils % 0.8 0.0 - 2.0 %    Neutrophils Absolute 6.47 1.20 - 7.70 x10*3/uL    Immature Granulocytes Absolute, Automated 0.22 0.00 - 0.70 x10*3/uL    Lymphocytes Absolute 0.80 (L) 1.20 - 4.80 x10*3/uL    Monocytes Absolute 1.10 (H) 0.10 - 1.00 x10*3/uL    Eosinophils Absolute 0.28 0.00 - 0.70 x10*3/uL    Basophils Absolute 0.07 0.00 - 0.10 x10*3/uL   Renal Function Panel   Result Value Ref Range    Glucose 80 74 - 99 mg/dL    Sodium 135 (L) 136 - 145 mmol/L    Potassium 4.9 3.5 - 5.3 mmol/L    Chloride 98 98 - 107 mmol/L    Bicarbonate 13 (L) 21 - 32 mmol/L    Anion Gap 29 (H) 10 - 20 mmol/L    Urea Nitrogen 129 (HH) 6 - 23 mg/dL    Creatinine 12.72 (H) 0.50 - 1.30 mg/dL    eGFR 4 (L) >60 mL/min/1.73m*2     Calcium 8.4 (L) 8.6 - 10.6 mg/dL    Phosphorus 10.4 (H) 2.5 - 4.9 mg/dL    Albumin 2.9 (L) 3.4 - 5.0 g/dL   Magnesium   Result Value Ref Range    Magnesium 2.66 (H) 1.60 - 2.40 mg/dL   POCT GLUCOSE   Result Value Ref Range    POCT Glucose 83 74 - 99 mg/dL   POCT GLUCOSE   Result Value Ref Range    POCT Glucose 80 74 - 99 mg/dL        ASSESSMENT:  Miguel A Bender is a  43 y.o.  year old , with PMHx of HTN, CHARLEEN, NSTEMI, and gastric bypass surgery admitted to [facility] on 7/10 with cardiac arrest. S/p left heart cath with Impella placement. Nephrology consulted for ANNIE/possible HD iso worsening UOP and renal function.      Updates 7/17:  -watch urine output closely today   -making urine; however, still appears to be holding fluid   -recommend optimizing medical therapy for adequate urine output, Ok with IV Bumex 4mg BID  -No plan for HD at this time, can consider tomorrow if not improved   - Ok to continue sodium bicarb tablet 1.3g TID in the setting of acidosis; however, if unable to tolerate PO,  can give IV sodium bicarb iso persistent acidosis      #ANNIE, oliguric (Baseline Cr 1.19)  -Etiology : Post cardiac arrest.  -Electrolytes stable , mild AGMA and NAGMA  -BUN: 129  -He is negative 0.6L  -His CVP was 13.  -Not on pressors anymore     Recommendations:  - Watch urine output closely today   - Pt remains non oliguric, ok with continuing IV bumex 4mg BID  - No indication for RRT at this time. Despite some confusion there is low suspicion this is due to uremia at this time   - Ok to continue sodium bicarb tablet 1.3g TID in the setting of acidosis   - If patient continues to have persistent acidosis and cannot tolerate PO intake, can give IV sodium bicarb   - Continue supportive management   - Continue lokelma prn  - Will continue to follow  - Strict I/Os   - Avoid IV contrast    Rossi Medel MD PGY-1  Nephrology Resident  24 hour Renal Pager - [phone]    Discussed with attending nephrologist Dr. Villalba.

## 2024-07-17 NOTE — PROGRESS NOTES
"Miguel A Bender is a 43 y.o. male on day 7 of admission presenting with Cardiac arrest (Multi).    Subjective   Pt very confused overnight, briefly required restraints due to worsening agitation and multiple attempts to get out of bed. This morning, pt confused, responds to questions inappropriately. States his ID mae says [facility] \"but I am in [place]\".     On rounds later in the morning, family at bedside notes he seemed to become more confused over the course of the morning.     Patient denies any fever, chills, lightheadedness, shortness of breath, chest pain, abdominal pain, N/V/C/D, lower extremity pain/swelling.    10 point ROS performed and negative unless stated above.          Objective   Weight: 117 kg (257 lb 15 oz) (07/10/24 2300)    Daily Weight  07/17/24 : 126 kg (277 lb 12.5 oz)      Last Recorded Vitals  Heart Rate:  [66-90]   Temp:  [36.3 °C (97.3 °F)-36.8 °C (98.2 °F)]   Resp:  [17-32]   BP: ()/()   Weight:  [126 kg (277 lb 12.5 oz)]   SpO2:  [90 %-99 %]          Intake/Output last 3 Shifts:  I/O last 3 completed shifts:  In: 505 (4 mL/kg) [P.O.:50; I.V.:355 (2.8 mL/kg); IV Piggyback:100]  Out: 1230 (9.8 mL/kg) [Urine:1230 (0.3 mL/kg/hr)]  Weight: 126 kg       Relevant Results  Results from last 7 days   Lab Units 07/17/24  0003 07/16/24  0115 07/15/24  0135   WBC AUTO x10*3/uL 8.9 8.9 9.5   HEMOGLOBIN g/dL 9.7* 9.4* 10.1*   HEMATOCRIT % 27.9* 26.8* 29.7*   PLATELETS AUTO x10*3/uL 180 140* 115*     Results from last 7 days   Lab Units 07/17/24  0003 07/16/24  1721 07/16/24  0115   SODIUM mmol/L 135* 134* 133*   POTASSIUM mmol/L 4.9 4.9 4.9   CO2 mmol/L 13* 13* 14*   ANION GAP mmol/L 29* 29* 27*   BUN mg/dL 129* 121* 109*   CREATININE mg/dL 12.72* 12.61* 11.37*   GLUCOSE mg/dL 80 83 80   EGFR mL/min/1.73m*2 4* 5* 5*   MAGNESIUM mg/dL 2.66* 2.67* 2.46*   PHOSPHORUS mg/dL 10.4* 10.1* 10.3*      Results from last 7 days   Lab Units 07/13/24  0111 07/12/24  0913 " 07/10/24  2306   ALT U/L 59* 70* 116*   AST U/L 40* 84* 127*   ALK PHOS U/L 52 49 71      Results from last 7 days   Lab Units 07/11/24  0417 07/10/24  2335   INR  1.4* 1.5*     Results from last 7 days   Lab Units 07/15/24  0454 07/14/24  1752 07/14/24  0940 07/14/24  0802 07/12/24  1319 07/12/24  0909   POCT PH, ARTERIAL pH  --   --  7.28* 7.25*  --  7.31*   POCT PO2, ARTERIAL mm Hg  --   --  102* 82*  --  87   POCT PCO2, ARTERIAL mm Hg  --   --  32* 35*  --  35*   FIO2 % 40 28 60 40   < > 40    < > = values in this interval not displayed.     Results from last 7 days   Lab Units 07/12/24  0532   POCT PH, VENOUS pH 7.26*   POCT PCO2, VENOUS mm Hg 43     Results from last 7 days   Lab Units 07/11/24  0417 07/10/24  2306   LACTATE mmol/L 3.8* 7.1*             Physical Exam  General: well-developed, well-nourished, no acute distress, AAOx1  HEENT: EOM intact, PERRL, no JVD  CV: regular rate and rhythm, normal S1/S2, no murmur, gallop, or rub  Pulm: normal respiratory effort, clear to auscultation bilaterally with no wheezes, rales, rhonchi  Abd: soft, nontender, nondistended, no masses, normoactive bowel sounds  Extremities: warm, no LE edema  Neuro: moves all extremities equally, CN II - XII grossly intact  Psych: normal mood and affect  Skin: warm, dry, intact      Medications    bumetanide, 4 mg, intravenous, BID  [START ON 7/18/2024] levETIRAcetam, 500 mg, oral, Daily  lidocaine, 1 patch, transdermal, Daily  metoprolol tartrate, 12.5 mg, oral, BID  oxygen, , inhalation, Continuous - Inhalation  polyethylene glycol, 17 g, oral, Daily  sodium bicarbonate, 1,300 mg, orogastric tube, TID  sodium zirconium cyclosilicate, 10 g, oral, Once        lidocaine, 1 mg/min, Last Rate: 1 mg/min (07/17/24 1400)        PRN medications: acetaminophen, dextrose, dextrose, glucagon, glucagon            Assessment/Plan   Principal Problem:    Cardiac arrest (Multi)  Active Problems:    Cardiogenic shock (Multi)    Acute hypoxemic  respiratory failure (Multi)    Acute renal failure with oliguria (CMS-HCC)    Coma (Multi)    43-year-old male with a history of obesity status post gastric bypass surgery 2 years ago, CHARLEEN, HTN transferred for further management of cardiogenic shock s/p Impella placement. Patient's ANNIE continues to worsen. Renal consulted, recommends more aggressive diuresis and does not think he needs dialysis yet. However, if he does not respond, could get dialysis as early as tomorrow. EP thinks he would qualify for transvenous pacemaker, but patient ambivalent and asked to speak with outpatient cardiologist. Genetics also consulted for concern for ARVC.     7/17 updates:  - Pt with 920cc of urine output recorded 7/16, 400cc recorded today  - Increasingly confused, concerning for uremic encephalopathy; discuss with nephrology plan for possible dialysis today  - Bumex 4mg BID; CVP 8-9  - EP consulted, appreciate recs; will consult [facility] for concern for ARVC       NEUROLOGIC  #Acute metabolic encephalopathy  #History of myoclonic jerks and ophisthotonos at outside hospital  #Concern for anoxic brain injury i/s/o cardiac arrest  ::Reported opisthotonos at [facility]  -Video EEG has preliminary result of severe diffuse encephalopathy with no epileptiform activity- Able to respond to questions but sometimes takes some time to think.  - Continued/worsening altered mentation concerning for possible metabolic encephalopathy  - Keppra 500 mg BID will be continued until outpatient  -Consider MRI brain if brain injury not improved     #Oropharyngeal Dysphagia  - Coughing when swallowing   -Secondary to anoxic brain injury from cardiogenic shock vs upper airway irritation from previous endotracheal tube  - Modified barium swallow delayed to tomorrow. Can interrupt lidocaine drip to go down for MBS.       CARDIOVASCULAR  #Cardiogenic shock, resolved  -On admission, was on norepi 0.07, epi 0.03 and impella P8 for pressure support. Impella  removed 7/13   -PA catheter removed 7/15. Final CI of 4.55, SVRI of 1547, CVP of 16, SvO2 of 79%     Plan  - Metop tartrate 12.5 mg BID  -Other GDMT held due to ANNIE. Restart as tolerated     #Pulmonary Hypertension  #Right Heart Insufficiency  -Mean PA pressure of 34  - Mild TR  -RVEF of 20, CT PE Negative  - Reduce preload through diuresis and dialysis    #Ventricular Fibrillation and Polymorphic Vtach  #Non-ischemic cardiomyopathy  #Cardiac arrest of unclear etiology  #Concern for Arrhythmogenic Right Ventricular Cardiomyopathy  -::TTE post arrest EF 10%, global hypokinesis  ::C/f arrhythmia such as prolonged QT in 500s  ::Was on amdiodarone on admission. Had polymorphic SVT with sinus pauses on night of 7/10, so amiodarone weaned off and switched to lidocaine.  - EP consulted, appreciate recs  -Genetics consulted, appreciate recs  - Last Qtc around [phone]  - Meets major criteria of RV dilation and arrhythmia for ARVC. No consistent V1-V3 TWI, epsilon wave, or delayed S wave upstroke. No family history of SCD  -Optimize electrolytes K > 4 and Mg > 2  -Monitor on telemetry  - Continue lidocaine drip- daily lidocaine level -> May transition to oral B-blockers for outpatient  - needs ICD before discharge       PULMONARY  #Acute hypoxic respiratory failure, resolved  #Concern for aspiration in setting of cardiac arrest  ::Secondary to cardiac arrest  ::CT PE negative  ::CXR not concerning for pneumonia. Diffuse pulmonary interstitial opacities more consistent with pulmonary edema or aspiration pneumonitis rather than pneumonia.  -extubated 7/14  - Now stable on room air     RENAL/  #ANNIE Oliguric   #Concern for acute tubular necrosis  ::Likely pre-renal i/s/o cardiogenic shock   -Uremia uptrending with concern for uremic encephalopathy  -900cc urine output in last 24 hours  -Increase diuresis to bumex 4mg BID  - Nephrology consulted, appreciate recs  - Dry weight of 117 -> 126        GASTROINTESTINAL  - No active  issues     ENDOCRINE  DUSTIN     HEME/ONC  No active issues     MSK/DERM  PT/OT        N: NPO with ice chips  A: PIV, Right IJ triple lumen  DVT ppx: SCD  GI ppx: None  Oxygen:supplemental O2  Drips Lido 1  Abx Vanc (7/11-7/12), Zosyn (7/11-7/13)  Code Status: Full Code (confirmed on Admission)  Surrogate Medical Decision-maker: Jazmin Bender (wife) [phone]

## 2024-07-18 ENCOUNTER — APPOINTMENT (OUTPATIENT)
Dept: RADIOLOGY | Facility: HOSPITAL | Age: 44
DRG: 276 | End: 2024-07-18
Payer: COMMERCIAL

## 2024-07-18 ENCOUNTER — APPOINTMENT (OUTPATIENT)
Dept: DIALYSIS | Facility: HOSPITAL | Age: 44
End: 2024-07-18
Payer: COMMERCIAL

## 2024-07-18 LAB
ALBUMIN SERPL BCP-MCNC: 2.8 G/DL (ref 3.4–5)
ALP SERPL-CCNC: 93 U/L (ref 33–120)
ALT SERPL W P-5'-P-CCNC: 35 U/L (ref 10–52)
ANION GAP SERPL CALC-SCNC: 27 MMOL/L (ref 10–20)
AST SERPL W P-5'-P-CCNC: 20 U/L (ref 9–39)
ATRIAL RATE: 91 BPM
BASOPHILS # BLD AUTO: 0.08 X10*3/UL (ref 0–0.1)
BASOPHILS NFR BLD AUTO: 0.9 %
BILIRUB SERPL-MCNC: 0.6 MG/DL (ref 0–1.2)
BUN SERPL-MCNC: 144 MG/DL (ref 6–23)
CALCIUM SERPL-MCNC: 8.3 MG/DL (ref 8.6–10.6)
CHLORIDE SERPL-SCNC: 99 MMOL/L (ref 98–107)
CO2 SERPL-SCNC: 14 MMOL/L (ref 21–32)
CREAT SERPL-MCNC: 13.87 MG/DL (ref 0.5–1.3)
EGFRCR SERPLBLD CKD-EPI 2021: 4 ML/MIN/1.73M*2
EOSINOPHIL # BLD AUTO: 0.46 X10*3/UL (ref 0–0.7)
EOSINOPHIL NFR BLD AUTO: 5 %
ERYTHROCYTE [DISTWIDTH] IN BLOOD BY AUTOMATED COUNT: 12.9 % (ref 11.5–14.5)
GLUCOSE BLD MANUAL STRIP-MCNC: 73 MG/DL (ref 74–99)
GLUCOSE BLD MANUAL STRIP-MCNC: 83 MG/DL (ref 74–99)
GLUCOSE BLD MANUAL STRIP-MCNC: 83 MG/DL (ref 74–99)
GLUCOSE BLD MANUAL STRIP-MCNC: 87 MG/DL (ref 74–99)
GLUCOSE BLD MANUAL STRIP-MCNC: 91 MG/DL (ref 74–99)
GLUCOSE SERPL-MCNC: 86 MG/DL (ref 74–99)
HBV SURFACE AB SER-ACNC: <3.1 MIU/ML
HBV SURFACE AG SERPL QL IA: NONREACTIVE
HCT VFR BLD AUTO: 27.1 % (ref 41–52)
HCV AB SER QL: NONREACTIVE
HGB BLD-MCNC: 9.2 G/DL (ref 13.5–17.5)
HIV 1+2 AB+HIV1 P24 AG SERPL QL IA: NONREACTIVE
IMM GRANULOCYTES # BLD AUTO: 0.2 X10*3/UL (ref 0–0.7)
IMM GRANULOCYTES NFR BLD AUTO: 2.2 % (ref 0–0.9)
LIDOCAIN SERPL-MCNC: 3.2 UG/ML (ref 1–5)
LYMPHOCYTES # BLD AUTO: 0.86 X10*3/UL (ref 1.2–4.8)
LYMPHOCYTES NFR BLD AUTO: 9.3 %
MAGNESIUM SERPL-MCNC: 2.78 MG/DL (ref 1.6–2.4)
MCH RBC QN AUTO: 28.8 PG (ref 26–34)
MCHC RBC AUTO-ENTMCNC: 33.9 G/DL (ref 32–36)
MCV RBC AUTO: 85 FL (ref 80–100)
MONOCYTES # BLD AUTO: 0.99 X10*3/UL (ref 0.1–1)
MONOCYTES NFR BLD AUTO: 10.7 %
NEUTROPHILS # BLD AUTO: 6.69 X10*3/UL (ref 1.2–7.7)
NEUTROPHILS NFR BLD AUTO: 71.9 %
NRBC BLD-RTO: 0 /100 WBCS (ref 0–0)
P AXIS: 27 DEGREES
P OFFSET: 203 MS
P ONSET: 141 MS
PHOSPHATE SERPL-MCNC: 10.8 MG/DL (ref 2.5–4.9)
PLATELET # BLD AUTO: 245 X10*3/UL (ref 150–450)
POTASSIUM SERPL-SCNC: 4.4 MMOL/L (ref 3.5–5.3)
PR INTERVAL: 142 MS
PROT SERPL-MCNC: 5.1 G/DL (ref 6.4–8.2)
Q ONSET: 212 MS
QRS COUNT: 15 BEATS
QRS DURATION: 90 MS
QT INTERVAL: 378 MS
QTC CALCULATION(BAZETT): 464 MS
QTC FREDERICIA: 434 MS
R AXIS: 15 DEGREES
RBC # BLD AUTO: 3.19 X10*6/UL (ref 4.5–5.9)
SODIUM SERPL-SCNC: 136 MMOL/L (ref 136–145)
T AXIS: -13 DEGREES
T OFFSET: 401 MS
VENTRICULAR RATE: 91 BPM
WBC # BLD AUTO: 9.3 X10*3/UL (ref 4.4–11.3)

## 2024-07-18 PROCEDURE — 99233 SBSQ HOSP IP/OBS HIGH 50: CPT

## 2024-07-18 PROCEDURE — 71045 X-RAY EXAM CHEST 1 VIEW: CPT | Performed by: RADIOLOGY

## 2024-07-18 PROCEDURE — 84075 ASSAY ALKALINE PHOSPHATASE: CPT

## 2024-07-18 PROCEDURE — 2500000004 HC RX 250 GENERAL PHARMACY W/ HCPCS (ALT 636 FOR OP/ED)

## 2024-07-18 PROCEDURE — 80069 RENAL FUNCTION PANEL: CPT | Mod: CCI

## 2024-07-18 PROCEDURE — 37799 UNLISTED PX VASCULAR SURGERY: CPT

## 2024-07-18 PROCEDURE — 86706 HEP B SURFACE ANTIBODY: CPT | Performed by: INTERNAL MEDICINE

## 2024-07-18 PROCEDURE — 87340 HEPATITIS B SURFACE AG IA: CPT | Performed by: INTERNAL MEDICINE

## 2024-07-18 PROCEDURE — 86803 HEPATITIS C AB TEST: CPT | Performed by: STUDENT IN AN ORGANIZED HEALTH CARE EDUCATION/TRAINING PROGRAM

## 2024-07-18 PROCEDURE — 2500000001 HC RX 250 WO HCPCS SELF ADMINISTERED DRUGS (ALT 637 FOR MEDICARE OP)

## 2024-07-18 PROCEDURE — 5A1D70Z PERFORMANCE OF URINARY FILTRATION, INTERMITTENT, LESS THAN 6 HOURS PER DAY: ICD-10-PCS | Performed by: INTERNAL MEDICINE

## 2024-07-18 PROCEDURE — 2500000005 HC RX 250 GENERAL PHARMACY W/O HCPCS

## 2024-07-18 PROCEDURE — 82947 ASSAY GLUCOSE BLOOD QUANT: CPT

## 2024-07-18 PROCEDURE — 83735 ASSAY OF MAGNESIUM: CPT

## 2024-07-18 PROCEDURE — 80176 ASSAY OF LIDOCAINE: CPT

## 2024-07-18 PROCEDURE — 05HN33Z INSERTION OF INFUSION DEVICE INTO LEFT INTERNAL JUGULAR VEIN, PERCUTANEOUS APPROACH: ICD-10-PCS | Performed by: INTERNAL MEDICINE

## 2024-07-18 PROCEDURE — 37799 UNLISTED PX VASCULAR SURGERY: CPT | Performed by: INTERNAL MEDICINE

## 2024-07-18 PROCEDURE — 85025 COMPLETE CBC W/AUTO DIFF WBC: CPT

## 2024-07-18 PROCEDURE — 71045 X-RAY EXAM CHEST 1 VIEW: CPT

## 2024-07-18 PROCEDURE — 2020000001 HC ICU ROOM DAILY

## 2024-07-18 PROCEDURE — 84100 ASSAY OF PHOSPHORUS: CPT

## 2024-07-18 PROCEDURE — 99291 CRITICAL CARE FIRST HOUR: CPT

## 2024-07-18 PROCEDURE — 36556 INSERT NON-TUNNEL CV CATH: CPT | Performed by: STUDENT IN AN ORGANIZED HEALTH CARE EDUCATION/TRAINING PROGRAM

## 2024-07-18 PROCEDURE — 2500000004 HC RX 250 GENERAL PHARMACY W/ HCPCS (ALT 636 FOR OP/ED): Performed by: INTERNAL MEDICINE

## 2024-07-18 PROCEDURE — 87389 HIV-1 AG W/HIV-1&-2 AB AG IA: CPT | Performed by: STUDENT IN AN ORGANIZED HEALTH CARE EDUCATION/TRAINING PROGRAM

## 2024-07-18 PROCEDURE — 99223 1ST HOSP IP/OBS HIGH 75: CPT | Performed by: MEDICAL GENETICS

## 2024-07-18 RX ORDER — BUMETANIDE 0.25 MG/ML
8 INJECTION INTRAMUSCULAR; INTRAVENOUS ONCE
Status: COMPLETED | OUTPATIENT
Start: 2024-07-18 | End: 2024-07-18

## 2024-07-18 RX ORDER — HEPARIN SODIUM 5000 [USP'U]/ML
5000 INJECTION, SOLUTION INTRAVENOUS; SUBCUTANEOUS EVERY 8 HOURS SCHEDULED
Status: DISCONTINUED | OUTPATIENT
Start: 2024-07-18 | End: 2024-07-27

## 2024-07-18 RX ORDER — DIPHENHYDRAMINE HYDROCHLORIDE 50 MG/ML
25 INJECTION INTRAMUSCULAR; INTRAVENOUS ONCE
Status: COMPLETED | OUTPATIENT
Start: 2024-07-18 | End: 2024-07-18

## 2024-07-18 RX ORDER — CHLOROTHIAZIDE SODIUM 500 MG/1
500 INJECTION INTRAVENOUS ONCE
Status: COMPLETED | OUTPATIENT
Start: 2024-07-18 | End: 2024-07-18

## 2024-07-18 ASSESSMENT — PAIN SCALES - GENERAL
PAINLEVEL_OUTOF10: 0 - NO PAIN

## 2024-07-18 ASSESSMENT — PAIN - FUNCTIONAL ASSESSMENT
PAIN_FUNCTIONAL_ASSESSMENT: NO/DENIES PAIN
PAIN_FUNCTIONAL_ASSESSMENT: 0-10

## 2024-07-18 NOTE — CARE PLAN
The clinical goals for the shift include Pt will have improved mentation after receiving hemodyalisis    Problem: Arrythmia/Dysrhythmia  Goal: Lab values return to normal range  Outcome: Progressing  Goal: No evidence of post procedure complications  Outcome: Progressing  Goal: Promote self management  Outcome: Progressing  Goal: Verbalize understanding of procedures/devices  Outcome: Progressing  Goal: Vital signs return to baseline  Outcome: Progressing     Problem: Cardiac catheterization  Goal: Free from dysrhythmias  Outcome: Progressing  Goal: No evidence of post procedure complications  Outcome: Progressing  Goal: Promote self management  Outcome: Progressing  Goal: Verbalize understanding of procedure  Outcome: Progressing  Goal: Care and maintenance of device (specify)  Outcome: Progressing     Problem: Skin  Goal: Decreased wound size/increased tissue granulation at next dressing change  Outcome: Progressing  Goal: Participates in plan/prevention/treatment measures  Outcome: Progressing  Goal: Prevent/manage excess moisture  Outcome: Progressing  Goal: Prevent/minimize sheer/friction injuries  Outcome: Progressing  Goal: Promote/optimize nutrition  Outcome: Progressing  Goal: Promote skin healing  Outcome: Progressing     Problem: Safety - Medical Restraint  Goal: Remains free of injury from restraints (Restraint for Interference with Medical Device)  Outcome: Progressing  Goal: Free from restraint(s) (Restraint for Interference with Medical Device)  Outcome: Progressing

## 2024-07-18 NOTE — PROGRESS NOTES
"Nutrition Follow Up Assessment:   Nutrition Assessment    The patient is a 43 y.o. male who is hospital day #8.  Since last RD visit:  - Pt extubated on 07/14   - Nephrology following. Concern for uremic encephalopathy. Plan to start dialysis on 07/18.  - SLP following for swallow eval. So far unable to do MBSS and pt kept NPO.     Nutrition History:  Food and Nutrient History: TF initiated on 07/12 and discontinued on 07/14 prior to extubation. TF running at goal of 25 ml/hr for most of that time. Prostat ordered as well but unclear if given. Pt has remained NPO since extubation - about 4 days now. Per team, hoping MBSS will be able to be done 07/19 otherwise, they will reconsider DHT placement and re-initiating TF.      Anthropometrics:  Start of admission anthropometrics:  Height: 178 cm (5' 10.08\")  Weight: 117 kg (257 lb 15 oz)  BMI (Calculated): 36.93    IBW/kg (Dietitian Calculated): 75.5 kg        Date/Time Weight  07/18/24  123 kg (271 lb 6.2 oz)  07/16/24  126 kg (278 lb)  07/15/24 129 kg (284 lb)  07/14/24  131 kg (288 lb)  07/13/24  130 kg (287 lb)  07/13/24  130 kg (287 lb)  07/12/24  128 kg (282 lb)  07/10/24  117 kg (257 lb 15 oz)    Weight Change %:  Weight History / % Weight Change: Wt variable during admission -  initially uptrending likely d/t fluids - now coming back down to admit wt. Possible fluid status is masking wt loss as pt with inadequate nutrition for the majority of the admission.    Nutrition Focused Physical Exam Findings:    Edema:  Edema Location: generalized non pitting  Physical Findings:  Skin: Negative    Objective Data:    Last BM Date: 07/18/24    Nutrition Significant Labs:    Results from last 7 days   Lab Units 07/18/24  0213 07/17/24  1812 07/17/24  0003 07/16/24  1721 07/16/24  0115   HEMOGLOBIN g/dL 9.2*  --  9.7*  --  9.4*   MCV fL 85  --  83  --  82   GLUCOSE mg/dL 86   < > 80   < > 80   POTASSIUM mmol/L 4.4   < > 4.9   < > 4.9   SODIUM mmol/L 136   < > 135*   < > 133* "   PHOSPHORUS mg/dL 10.8*   < > 10.4*   < > 10.3*   MAGNESIUM mg/dL 2.78*   < > 2.66*   < > 2.46*   CREATININE mg/dL 13.87*   < > 12.72*   < > 11.37*   BUN mg/dL 144*   < > 129*   < > 109*   ALT U/L 35  --   --   --   --    AST U/L 20  --   --   --   --    ALK PHOS U/L 93  --   --   --   --     < > = values in this interval not displayed.       Nutrition Specific Medications:    bumetanide, 4 mg, intravenous, BID  polyethylene glycol, 17 g, oral, Daily  sodium bicarbonate, 1,300 mg, orogastric tube, TID    I/O:   I/O last 2 completed shifts:  In: 115 (0.9 mL/kg) [P.O.:15; IV Piggyback:100]  Out: 1191 (9.7 mL/kg) [Urine:1190 (0.4 mL/kg/hr); Stool:1]  Weight: 123.1 kg     Dietary Orders (From admission, onward)       Start     Ordered    07/15/24 1150  NPO Diet Except: Ice chips; Effective now  Diet effective now        Question:  Except:  Answer:  Ice chips    07/15/24 1149    07/12/24 1123  Oral nutritional supplements  Until discontinued        Comments: Give BID with tube feeds   Question Answer Comment   Deliver with  BID   Select supplement: Pro-Stat AWC        07/12/24 1122                     Estimated Needs:   Total Energy Estimated Needs (kCal): 2000 kCal  Method for Estimating Needs: 27 kcal/kg IBW    Total Protein Estimated Needs (g): 90 g  Method for Estimating Needs: 1.2 g/kg IBW    Method for Estimating Needs: per ICU team          Nutrition Diagnosis        Nutrition Diagnosis  Patient has Nutrition Diagnosis: Yes  Diagnosis Status (1): Resolved  Nutrition Diagnosis 1: Increased energy expenditure  Related to (1): critical illness  As Evidenced by (1): cardiogenic shock, hypocaloric feeding while intubated  Additional Nutrition Diagnosis: Diagnosis 2  Diagnosis Status (2): New  Nutrition Diagnosis 2: Swallowing difficulity  Related to (2): encepalopathy ?  As Evidenced by (2): SLP rec NPO       Nutrition Interventions/Recommendations   Individualized Nutrition Prescription Provided for : 2000 kcal and  90g protein    If unable to advance diet by tomorrow (07/19) recommend gaining enteral access and starting TF as this would put pt at 5 days NPO.   Recommend the following TF regimen:  Start Nepro at 15 ml/hr and increase by 10 ml/hr q12h until goal rate of 45 ml/hr  Check RFP +Mg ; if lytes are low then replete prior to advancing TF rate   FWF per team   This regimen provides: 1080 ml, 1911 kcal, 87 g protein, 785 ml free water     Otherwise, if pt allowed to have thin liquids would order Ensure Plus BID.          Nutrition Monitoring and Evaluation   Monitoring and Evaluation Plan: Enteral and parenteral nutrition intake  Enteral and Parenteral Nutrition Intake: Enteral nutrition formula/solution, Enteral nutrition intake  Criteria: Recommended regimen    Monitoring and Evaluation Plan: Weight  Weight: Weight change  Criteria: no wt change    Monitoring and Evaluation Plan: Electrolyte/renal panel  Electrolyte and Renal Panel: Phosphorus, Magnesium, Potassium, Sodium  Criteria: lytes wnl              Time Spent (min): 30 minutes

## 2024-07-18 NOTE — CONSULTS
Genetics Department  [address] [place], [place] [address]  P: [phone]  F: [phone]    GENETICS CONSULTATION    Miguel A Bender  MRN: [id number]   : 1980    Referring Provider: Dr. Balderrama     Consulting Provider:  Clint Nathan MD  Reason for Consult:  Arrhthymogenic Right Ventricular Cardiomyopathy     HISTORY OF PRESENT ILLNESS:  43 year old male with a history of obesity s/p gastric bypass surgery 2 years ago, CHARLEEN and HTN who was transferred to [facility] CICU for further management of cardiogenic shock s/p Impella placement. Initially presented to the ED at [facility] following a witnessed sudden cardiac arrest and had multiple episodes of polymorphic VT after transfer to [facility]. Prior coronary angiography did not show significant disease per documentation. Was initially treated with pressors (now weaned), and impella was ultimately removed on . Genetics is consulted as he meets the major criteria of RV dilation and arrhythmia for ARVC.     Hospitalization has been complicated by encephalopathy, acute hypoxic repiratory failure, nonologuric ANNIE and pulmonary hypertension. He now being managed with diuresis and may possibly advance to requiring hemodialysis per documentation. EP is considering a transvenous pacemaker as well. Also on a lidocaine gtt, and will need an ICD prior to discharge. His mental status is improving.    RESULTS/DIAGNOSTIC STUDIES:  CMR 2024:  IMPRESSION:  Mildly reduced LVEF. Quantitative LVEF 47 %.  No evidence of infarct/scar.  RV is dilated with moderate to severe reduction in RVEF. Quantitative  RVEF 20 %. Mildly increased ECV of 32%. T2 values in the lateral wall  are mildly increased (52-53 ms). No definitive evidence of  myocarditis by [place] Criteria.    Review of Systems   Still somewhat encephalopathic, is in good spirits. No acute complaints at this time.    No past medical history on file.      Past Surgical History:   Procedure  Laterality Date    CARDIAC CATHETERIZATION N/A 7/10/2024    Procedure: Left Heart Cath, No LV;  Surgeon: Bhavya Restrepo MD;  Location: [facility] Cardiac Cath Lab;  Service: Cardiovascular;  Laterality: N/A;    CARDIAC CATHETERIZATION N/A 7/10/2024    Procedure: PCI;  Surgeon: Bhavya Restrepo MD;  Location: [facility] Cardiac Cath Lab;  Service: Cardiovascular;  Laterality: N/A;       Social History     Socioeconomic History    Marital status:      Social Determinants of Health     Financial Resource Strain: High Risk (7/12/2024)    Overall Financial Resource Strain (CARDIA)     Difficulty of Paying Living Expenses: Very hard   Food Insecurity: No Food Insecurity (7/12/2024)    Hunger Vital Sign     Worried About Running Out of Food in the Last Year: Never true     Ran Out of Food in the Last Year: Never true   Transportation Needs: No Transportation Needs (7/12/2024)    PRAPARE - Transportation     Lack of Transportation (Medical): No     Lack of Transportation (Non-Medical): No   Physical Activity: Sufficiently Active (7/12/2024)    Exercise Vital Sign     Days of Exercise per Week: 3 days     Minutes of Exercise per Session: 50 min   Stress: Stress Concern Present (7/12/2024)    [employer] - Occupational Stress Questionnaire     Feeling of Stress : To some extent   Social Connections: Moderately Isolated (7/12/2024)    Social Connection and Isolation Panel [NHANES]     Frequency of Communication with Friends and Family: More than three times a week     Frequency of Social Gatherings with Friends and Family: More than three times a week     Attends Sabianism Services: Never     Active Member of Clubs or Organizations: No     Attends Club or Organization Meetings: Never     Marital Status:    Intimate Partner Violence: Not At Risk (7/12/2024)    Humiliation, Afraid, Rape, and Kick questionnaire     Fear of Current or Ex-Partner: No     Emotionally Abused: No     Physically Abused:  No     Sexually Abused: No   Housing Stability: Low Risk  (2024)    Housing Stability Vital Sign     Unable to Pay for Housing in the Last Year: No     Number of Times Moved in the Last Year: 1     Homeless in the Last Year: No         PEDIATRIC ADDITIONAL SOCIAL HISTORY:  Social History     Social History Narrative    Not on file           FAMILY HISTORY:  A multigenerational family history was obtained on  and is/will be scanned into the patient EHR.  Briefly, the family history is notable for:  Miguel A has one son aged 16 a/w, and one daughter age 23 with bipolar d/o and substance use. He has one maternal half brother who  age 28 from substance use and epilepsy. He has a full sister age 30s a/w, full brother age 40s with substance use/bipolar d/o who has three children, all a/w. He had one full brother age 3 who  of suffocation as a 3  year old.    Paternal: [place] Ancestry  Father is in his 60s and has obesity. Miguel A has 6 paternal aunts and uncles all a/w, and all of his 1st cousins are this side are alive and well. It is noted that obesity is common on this side of the family. Paternal grandfather  in his 60s of unknown causes, and grandmother  in her 70s and had obesity.     Maternal: Taj[place]/Iranian Ancestry  Miguel A;s mother  age 45 of suicide and had throat cancer, she was a smoker. One maternal aunt is alive in her 60s and suffers from substance abuse, her children are a/w. One maternal aunt  of suicide in her 20s. Little is known of Miguel A's maternal grandparents.    The remainder of the history is negative for congenital anomalies, intellectual disability, multiple pregnancy losses, and known genetic diseases.  Consanguinity is not reported, nor is Ashkenazi Yazdanism ancestry.    No family history on file.    CURRENT MEDICATION:     Current Facility-Administered Medications:     acetaminophen (Tylenol) tablet 975 mg, 975 mg, oral, q8h PRN, MD naima Lao  "(Bumex) injection 4 mg, 4 mg, intravenous, BID, Ollie Ware MD, 4 mg at 07/18/24 0901    dextrose 50 % injection 12.5 g, 12.5 g, intravenous, q15 min PRN, Wing Barker MD, 12.5 g at 07/15/24 1923    dextrose 50 % injection 25 g, 25 g, intravenous, q15 min PRN, Wing Barker MD    glucagon (Glucagen) injection 1 mg, 1 mg, intramuscular, q15 min PRN, Wing Barker MD    glucagon (Glucagen) injection 1 mg, 1 mg, intramuscular, q15 min PRN, Wing Barker MD    levETIRAcetam (Keppra) 100 mg/mL solution 500 mg, 500 mg, oral, Daily, Ollie Ware MD, 500 mg at 07/18/24 0908    lidocaine 4 % patch 1 patch, 1 patch, transdermal, Daily, Alec Hall MD, 1 patch at 07/18/24 0902    lidocaine PF 2000 mg in dextrose 5% 250 mL (8 mg/mL) infusion (premix), 1 mg/min, intravenous, Continuous, Alec Hall MD, Last Rate: 7.5 mL/hr at 07/17/24 1737, 1 mg/min at 07/17/24 1737    metoprolol tartrate (Lopressor) tablet 12.5 mg, 12.5 mg, oral, BID, Wing Barker MD, 12.5 mg at 07/18/24 0901    oxygen (O2) therapy, , inhalation, Continuous - Inhalation, Alec Hall MD, 21 percent at 07/17/24 2000    polyethylene glycol (Glycolax, Miralax) packet 17 g, 17 g, oral, Daily, Alec Hall MD, 17 g at 07/14/24 0729    sodium bicarbonate tablet 1,300 mg, 1,300 mg, orogastric tube, TID, Nash Becker DO, 1,300 mg at 07/18/24 0901    sodium zirconium cyclosilicate (Lokelma) packet 10 g, 10 g, oral, Once, Alec Hall MD      VITAL SIGNS:   Weight: 123 kg (271 lb 6.2 oz); Facility age limit for growth %margoth is 20 years.   Height: 178 cm (5' 10.08\"); Facility age limit for growth %margoth is 20 years.   BMI: 38.85; [unfilled]  Vitals:    07/18/24 1911   BP:    Pulse: 61   Resp:    Temp: 36.5 °C (97.7 °F)   SpO2:      Physical Exam    Physical exam:  Constitutional: No acute distress, patient comfortable appearing  HEENT: NCAT  Respiratory: Lungs CTAB  Cardiovascular: Normal rate, regular rhythm, no murmurs " "appreciated  Gastrointestinal: Soft,, non-distended, +BS  Integumentary: Intact, warm, dry no rashes on exposed skin  Neurological: alert, no focal deficits, encephalopathic but participates in conversation      ASSESSMENT/RECOMMENDATIONS:  43 year old male with a history of obesity s/p gastric bypass surgery 2 years ago, CHARLEEN and HTN who was transferred to [facility] CICU for further management of cardiogenic shock. Genetics consulted due to a concern for a genetic etiology of ARVC. This patient is also thought to not have ischemic cardiomyopathy based on prior LHC. It is currently unclear exactly why he had a cardiac arrest with VT, and it is reasonable to pursue testing to search for a genetic cause. As the differential is broad, it is reasonable to start with the [facility] Arrhythmia and Cardiomyopathy Comprehensive Panel, which analyzes genes associated with hereditary arrhythmias and cardiomyopathies. While there does not seem to be a significant family history concerning for genetic cardiomyopathies or SCD, may of these conditions can show reduced penetrance or may be de margarito (a new genetic variation) in our patient.    Plan:    - Please draw 3cc of blood in a purple-top EDTA tube with \"Miscellaneous Genetics Order\"   - Genetics will handle the requisition   - Lab is \"[facility]\" test is \"Arrhythmia and Cardiomyopathy Comprehensive Panel\"  - Turnaround time is 3-4 weeks, genetics will follow results and will follow up if still inpatient  - If discharged prior to results, please arrange for follow up ~6 weeks from sample requisition with Dr. Rubio in the cardiac genetics clinic.    Care discussed with: Family and primary team    Please contact the Genetics Service [phone] General Pager with further questions or concerns.    Consulting Attending:  Dr. Dominguez  Genetic Resident:   Clint Nathan    ELECTRONIC SIGNATURE:  Clint Nathan MD, PGY-4 Internal Medicine/Medical Genetics  Discussed with Dr. Angelica SEGOVIA, Medical "       I saw and evaluated the patient. I personally obtained the key and critical portions of the history and physical exam or was physically present for key and critical portions performed by the resident/fellow. I reviewed the resident/fellow's documentation and discussed the patient with the resident/fellow. I agree with the resident/fellow's medical decision making as documented in the note.    MD Debra Thomas MD

## 2024-07-18 NOTE — PROGRESS NOTES
3/3 CICU (DAY-8)     TRANSFER  F:  Griselda 7/10 to ED for cardiogenic shock      SIGNIFICANT EVENTS  07/19 - If diurese not successful to date,  could start RRT today   07/18 - Plan for MBS   07/17 O'nt confusion and restraints briefly   07/14 Extubated  07/13 Impella removed   07/11-17 - C: Nephro (diurese, no indication for RRT)   07/10 C: Neuro - c/f anoxic brain injury i/s/o cardiac arrest  07/10 cardiogenic shock s/p Impella after witnessed sudden cardiac arrest. Intubated.     DC PLAN  Potentially Acute Rehab (AR) if at some future junction in [place], can prove to do near to 3h therapies of AR loc.      > requires review by AR when off CICU  nearer to dc    > If qualifies, precert needed     PAYOR   Dorothy CEVALLOS      PT/OT   07/15 & 07/16 PT (high)   07/15 & 07/17 OT (high)      COMPLETED  (X) Daily ongoing review of patient via chart and/or (M-F) IDT rounds    (X) 07/12 - DC/SDOH assessments with wife/son  (X) 07/12 EPIC info verified for address, PCP, Pharmacy     NOTE  [facility]  has already seen wife. Son adolescence and 2yo at home. Explained that if need FMLA done, the team usually does not complete till dc when have a more comprehensive idea of how many days off from work required.  Prompted wife to contact patient's wife when able.      Sylvia Bailey (LSW, MSW)

## 2024-07-18 NOTE — PROGRESS NOTES
"Miguel A Bender is a 43 y.o. male on day 8 of admission presenting with Cardiac arrest (Multi).    Subjective   No acute events overnight. Pt sleepy but arouses easily to voice and tactile stimulation. Confused but not agitated this morning. Sitter at bedside.    Patient denies any fever, chills, lightheadedness, shortness of breath, chest pain, abdominal pain, N/V/C/D, lower extremity pain/swelling.    10 point ROS performed and negative unless stated above.          Objective   Weight: 117 kg (257 lb 15 oz) (07/10/24 2300)    Daily Weight  07/18/24 : 123 kg (271 lb 6.2 oz)      Last Recorded Vitals  Heart Rate:  [58-79]   Temp:  [35.8 °C (96.4 °F)-36.8 °C (98.2 °F)]   Resp:  [12-26]   BP: (134-167)/(67-97)   Weight:  [123 kg (271 lb 6.2 oz)]   SpO2:  [93 %-98 %]          Intake/Output last 3 Shifts:  I/O last 3 completed shifts:  In: 274.9 (2.2 mL/kg) [P.O.:15; I.V.:159.9 (1.3 mL/kg); IV Piggyback:100]  Out: 1531 (12.4 mL/kg) [Urine:1530 (0.3 mL/kg/hr); Stool:1]  Weight: 123.1 kg       Relevant Results  Results from last 7 days   Lab Units 07/18/24  0213 07/17/24  0003 07/16/24  0115   WBC AUTO x10*3/uL 9.3 8.9 8.9   HEMOGLOBIN g/dL 9.2* 9.7* 9.4*   HEMATOCRIT % 27.1* 27.9* 26.8*   PLATELETS AUTO x10*3/uL 245 180 140*     Results from last 7 days   Lab Units 07/18/24  0213 07/17/24  1812 07/17/24  0003   SODIUM mmol/L 136 136 135*   POTASSIUM mmol/L 4.4 4.7 4.9   CO2 mmol/L 14* 13* 13*   ANION GAP mmol/L 27* 28* 29*   BUN mg/dL 144* 141* 129*   CREATININE mg/dL 13.87* 14.31* 12.72*   GLUCOSE mg/dL 86 86 80   EGFR mL/min/1.73m*2 4* 4* 4*   MAGNESIUM mg/dL 2.78* 2.86* 2.66*   PHOSPHORUS mg/dL 10.8* 10.6* 10.4*      Results from last 7 days   Lab Units 07/18/24  0213 07/13/24  0111 07/12/24  0913   ALT U/L 35 59* 70*   AST U/L 20 40* 84*   ALK PHOS U/L 93 52 49            No lab exists for component: \"PT\"  Results from last 7 days   Lab Units 07/15/24  0454 07/14/24  1752 07/14/24  0940 07/14/24  0802 07/12/24  1319 " "07/12/24  0909   POCT PH, ARTERIAL pH  --   --  7.28* 7.25*  --  7.31*   POCT PO2, ARTERIAL mm Hg  --   --  102* 82*  --  87   POCT PCO2, ARTERIAL mm Hg  --   --  32* 35*  --  35*   FIO2 % 40 28 60 40   < > 40    < > = values in this interval not displayed.     Results from last 7 days   Lab Units 07/12/24  0532   POCT PH, VENOUS pH 7.26*   POCT PCO2, VENOUS mm Hg 43           No lab exists for component: \"BNPRESU\", \"CPKT\"          Physical Exam  General: well-developed, well-nourished, no acute distress, AAOx1  HEENT: EOM intact, PERRL, no JVD  CV: regular rate and rhythm, normal S1/S2, no murmur, gallop, or rub  Pulm: normal respiratory effort, clear to auscultation bilaterally with no wheezes, rales, rhonchi  Abd: soft, nontender, nondistended, no masses, normoactive bowel sounds  Extremities: warm, no LE edema  Neuro: moves all extremities equally, CN II - XII grossly intact  Psych: normal mood and affect  Skin: warm, dry, intact    Medications    bumetanide, 4 mg, intravenous, BID  heparin (porcine), 5,000 Units, subcutaneous, q8h CINDY  levETIRAcetam, 500 mg, oral, Daily  lidocaine, 1 patch, transdermal, Daily  metoprolol tartrate, 12.5 mg, oral, BID  oxygen, , inhalation, Continuous - Inhalation  polyethylene glycol, 17 g, oral, Daily  sodium bicarbonate, 1,300 mg, orogastric tube, TID  sodium zirconium cyclosilicate, 10 g, oral, Once        lidocaine, 1 mg/min, Last Rate: 1 mg/min (07/17/24 5454)        PRN medications: acetaminophen, dextrose, dextrose, glucagon, glucagon            Assessment/Plan   Principal Problem:    Cardiac arrest (Multi)  Active Problems:    Cardiogenic shock (Multi)    Acute hypoxemic respiratory failure (Multi)    Acute renal failure with oliguria (CMS-HCC)    Coma (Multi)    43-year-old male with a history of obesity status post gastric bypass surgery 2 years ago, CHARLEEN, HTN transferred for further management of VT arrest c/b cardiogenic shock s/p Impella placement which was " subsequently removed 7/13. Hospital course c/b non-oliguric ANNIE and altered mentation concerning for uremic encephalopathy vs hospital delirium vs anoxic brain injury. Nephrology on board, planning for dialysis initiation on 7/18. EP also consulted for VT arrest, cMRI notable for dilated RV c/f possible ARVC, for which genetics was also consulted. If mentation does not improve with dialysis initiation and improvement in uremia, will likely pursue brain MRI to evaluate for possible complications from cardiac arrest.     7/18: Updates:  -Plan to proceed to iHD per Nephrology today; will place trialysis line  -Continue on lidocaine gtt for VT arrest  -Monitor mentation; consider MRI brain if mentation does not improve following dialysis        NEUROLOGIC  #Acute metabolic encephalopathy  #History of myoclonic jerks and ophisthotonos at outside hospital  #Concern for anoxic brain injury i/s/o cardiac arrest  ::Rep[facility] op[facility]hotonos at [facility]  -Video EEG has preliminary result of severe diffuse encephalopathy with no epileptiform activity- Able to respond to questions but sometimes takes some time to think.  - Continued/worsening altered mentation concerning for possible metabolic encephalopathy  - Keppra 500 mg BID will be continued until outpatient  -Consider MRI brain if brain injury not improved     #Oropharyngeal Dysphagia  - Coughing when swallowing   -Secondary to anoxic brain injury from cardiogenic shock vs upper airway irritation from previous endotracheal tube  - Modified barium swallow delayed to tomorrow. Can interrupt lidocaine drip to go down for MBS.       CARDIOVASCULAR  #Cardiogenic shock, resolved  -On admission, was on norepi 0.07, epi 0.03 and impella P8 for pressure support. Impella removed 7/13   -PA catheter removed 7/15. Final CI of 4.55, SVRI of 1547, CVP of 16, SvO2 of 79%  - Metop tartrate 12.5 mg BID  -Other GDMT held due to ANNIE. Restart as tolerated     #Pulmonary Hypertension  #Right Heart  Insufficiency  -Mean PA pressure of 34  - Mild TR  -RVEF of 20, CT PE Negative  - Reduce preload through diuresis and dialysis    #Ventricular Fibrillation and Polymorphic Vtach  #Non-ischemic cardiomyopathy  #Cardiac arrest of unclear etiology  #Concern for Arrhythmogenic Right Ventricular Cardiomyopathy  -::TTE post arrest EF 10%, global hypokinesis  ::C/f arrhythmia such as prolonged QT in 500s  ::Was on amdiodarone on admission. Had polymorphic SVT with sinus pauses on night of 7/10, so amiodarone weaned off and switched to lidocaine.  - EP consulted, appreciate recs  -Genetics consulted, appreciate recs  - Last Qtc around [phone]  - Meets major criteria of RV dilation and arrhythmia for ARVC. No consistent V1-V3 TWI, epsilon wave, or delayed S wave upstroke. No family history of SCD  -Optimize electrolytes K > 4 and Mg > 2  -Monitor on telemetry  - Continue lidocaine drip- daily lidocaine level -> May transition to oral B-blockers for outpatient  - needs ICD before discharge        PULMONARY  #Acute hypoxic respiratory failure, resolved  #Concern for aspiration in setting of cardiac arrest  ::Secondary to cardiac arrest  ::CT PE negative  ::CXR not concerning for pneumonia. Diffuse pulmonary interstitial opacities more consistent with pulmonary edema or aspiration pneumonitis rather than pneumonia.  -extubated 7/14  - Now stable on room air     RENAL/  #ANNIE Non-oliguric   #Concern for acute tubular necrosis  ::Likely pre-renal i/s/o cardiogenic shock   -BUN uptrending with concern for uremic encephalopathy  -Approximately 1L of urine output in last 24 hours  -Plan for dialysis initiation on 7/18/24  -Increase diuresis to bumex 4mg BID  - Nephrology consulted, appreciate recs  - Dry weight of 117 -> 126         GASTROINTESTINAL  - No active issues     ENDOCRINE  DUSTIN     HEME/ONC  No active issues     MSK/DERM  PT/OT        N: NPO with ice chips  A: PIV, Right IJ triple lumen  DVT ppx: SCD  GI ppx:  None  Oxygen:supplemental O2  Drips Lido 1  Abx Vanc (7/11-7/12), Zosyn (7/11-7/13)  Code Status: Full Code (confirmed on Admission)  Surrogate Medical Decision-maker: Jazmin Bender (wife) [phone]

## 2024-07-18 NOTE — CARE PLAN
The patient's goals for the shift include      The clinical goals for the shift include diuresising or dialysis    Over the shift, the patient did not make progress toward the following goals. Recommendations to address these barriers include   Problem: Arrythmia/Dysrhythmia  Goal: Lab values return to normal range  Outcome: Progressing  Goal: No evidence of post procedure complications  Outcome: Progressing  Goal: Promote self management  Outcome: Progressing  Goal: Verbalize understanding of procedures/devices  Outcome: Progressing  Goal: Vital signs return to baseline  Outcome: Progressing     Problem: Skin  Goal: Decreased wound size/increased tissue granulation at next dressing change  Outcome: Progressing  Goal: Participates in plan/prevention/treatment measures  Outcome: Progressing  Goal: Prevent/manage excess moisture  Outcome: Progressing  Goal: Prevent/minimize sheer/friction injuries  Outcome: Progressing  Goal: Promote/optimize nutrition  Outcome: Progressing  Goal: Promote skin healing  Outcome: Progressing

## 2024-07-18 NOTE — PROGRESS NOTES
NEPHROLOGY FOLLOW UP NOTE    Miguel A Bender   43 y.o.    123 kg  MRN/Room: [id number]/16/16-A    Subjective: No acute overnight events. Patient continuing to make urine and output is being maintained. Received IV bumex 4mg BID yesterday and bumex 8mg early this AM. He is continuing to be hemodynamically stable. More somnolent this AM and less responsive.     Objective:     Meds:   bumetanide, 4 mg, BID  levETIRAcetam, 500 mg, Daily  lidocaine, 1 patch, Daily  metoprolol tartrate, 12.5 mg, BID  oxygen, , Continuous - Inhalation  polyethylene glycol, 17 g, Daily  sodium bicarbonate, 1,300 mg, TID  sodium zirconium cyclosilicate, 10 g, Once      lidocaine, Last Rate: 1 mg/min (07/17/24 1737)      acetaminophen, 975 mg, q8h PRN  dextrose, 12.5 g, q15 min PRN  dextrose, 25 g, q15 min PRN  glucagon, 1 mg, q15 min PRN  glucagon, 1 mg, q15 min PRN        Vitals:    07/18/24 0800   BP:    Pulse:    Resp:    Temp: 36.5 °C (97.7 °F)   SpO2:           Intake/Output Summary (Last 24 hours) at 7/18/2024 0821  Last data filed at 7/18/2024 0500  Gross per 24 hour   Intake 100 ml   Output 1040 ml   Net -940 ml       General appearance: Awake and alert, oriented. No acute distress  HEENT: NC/AT, oral mucosa  Skin: no apparent rash  Heart: heart sounds 1 & 2 present and normal, no murmurs heard or rub  Lungs: Adequate air entry, breath sounds bilaterally.  No wheezing/crackles  Abdomen: soft, non tender  Extremities: No edema, no joint swelling  Neuro: No FND,  No asterixis   ACCESS: PIV, R. I J triple lumen     Blood Labs:  Results for orders placed or performed during the hospital encounter of 07/10/24 (from the past 24 hour(s))   POCT GLUCOSE   Result Value Ref Range    POCT Glucose 97 74 - 99 mg/dL   POCT GLUCOSE   Result Value Ref Range    POCT Glucose 88 74 - 99 mg/dL   Renal function panel   Result Value Ref Range    Glucose 86 74 - 99 mg/dL    Sodium 136 136 - 145 mmol/L    Potassium 4.7 3.5 - 5.3 mmol/L    Chloride 100 98 - 107  mmol/L    Bicarbonate 13 (L) 21 - 32 mmol/L    Anion Gap 28 (H) 10 - 20 mmol/L    Urea Nitrogen 141 (HH) 6 - 23 mg/dL    Creatinine 14.31 (H) 0.50 - 1.30 mg/dL    eGFR 4 (L) >60 mL/min/1.73m*2    Calcium 8.4 (L) 8.6 - 10.6 mg/dL    Phosphorus 10.6 (H) 2.5 - 4.9 mg/dL    Albumin 2.8 (L) 3.4 - 5.0 g/dL   Magnesium   Result Value Ref Range    Magnesium 2.86 (H) 1.60 - 2.40 mg/dL   POCT GLUCOSE   Result Value Ref Range    POCT Glucose 88 74 - 99 mg/dL   POCT GLUCOSE   Result Value Ref Range    POCT Glucose 83 74 - 99 mg/dL   Lidocaine   Result Value Ref Range    Lidocaine  3.2 1.0 - 5.0 ug/mL   CBC and Auto Differential   Result Value Ref Range    WBC 9.3 4.4 - 11.3 x10*3/uL    nRBC 0.0 0.0 - 0.0 /100 WBCs    RBC 3.19 (L) 4.50 - 5.90 x10*6/uL    Hemoglobin 9.2 (L) 13.5 - 17.5 g/dL    Hematocrit 27.1 (L) 41.0 - 52.0 %    MCV 85 80 - 100 fL    MCH 28.8 26.0 - 34.0 pg    MCHC 33.9 32.0 - 36.0 g/dL    RDW 12.9 11.5 - 14.5 %    Platelets 245 150 - 450 x10*3/uL    Neutrophils % 71.9 40.0 - 80.0 %    Immature Granulocytes %, Automated 2.2 (H) 0.0 - 0.9 %    Lymphocytes % 9.3 13.0 - 44.0 %    Monocytes % 10.7 2.0 - 10.0 %    Eosinophils % 5.0 0.0 - 6.0 %    Basophils % 0.9 0.0 - 2.0 %    Neutrophils Absolute 6.69 1.20 - 7.70 x10*3/uL    Immature Granulocytes Absolute, Automated 0.20 0.00 - 0.70 x10*3/uL    Lymphocytes Absolute 0.86 (L) 1.20 - 4.80 x10*3/uL    Monocytes Absolute 0.99 0.10 - 1.00 x10*3/uL    Eosinophils Absolute 0.46 0.00 - 0.70 x10*3/uL    Basophils Absolute 0.08 0.00 - 0.10 x10*3/uL   Comprehensive Metabolic Panel   Result Value Ref Range    Glucose 86 74 - 99 mg/dL    Sodium 136 136 - 145 mmol/L    Potassium 4.4 3.5 - 5.3 mmol/L    Chloride 99 98 - 107 mmol/L    Bicarbonate 14 (L) 21 - 32 mmol/L    Anion Gap 27 (H) 10 - 20 mmol/L    Urea Nitrogen 144 (HH) 6 - 23 mg/dL    Creatinine 13.87 (H) 0.50 - 1.30 mg/dL    eGFR 4 (L) >60 mL/min/1.73m*2    Calcium 8.3 (L) 8.6 - 10.6 mg/dL    Albumin 2.8 (L) 3.4 - 5.0 g/dL     Alkaline Phosphatase 93 33 - 120 U/L    Total Protein 5.1 (L) 6.4 - 8.2 g/dL    AST 20 9 - 39 U/L    Bilirubin, Total 0.6 0.0 - 1.2 mg/dL    ALT 35 10 - 52 U/L   Magnesium   Result Value Ref Range    Magnesium 2.78 (H) 1.60 - 2.40 mg/dL   Phosphorus   Result Value Ref Range    Phosphorus 10.8 (H) 2.5 - 4.9 mg/dL   POCT GLUCOSE   Result Value Ref Range    POCT Glucose 83 74 - 99 mg/dL        ASSESSMENT:  Miguel A Bender is a  43 y.o.  year old , with PMHx of HTN, CHARLEEN, NSTEMI, and gastric bypass surgery admitted to [facility] on 7/10 with cardiac arrest. S/p left heart cath with Impella placement. Nephrology consulted for ANNIE/possible HD iso worsening UOP and renal function.      Updates 7/18:  -1L output in last 24 hours  - Plan for HD today after catheter placement   - Ok to continue sodium bicarb tablet 1.3g TID in the setting of acidosis; however, if unable to tolerate PO,  can give IV sodium bicarb iso persistent acidosis      #ANNIE, oliguric (Baseline Cr 1.19)  -Etiology : Post cardiac arrest.  -Electrolytes stable , mild AGMA and NAGMA  -BUN: 144  -He is negative 1L  -His CVP was 13.  -Not on pressors anymore     Recommendations:  - Plan for HD today after catheter placement in the setting of uremia and increased somnolence   - Ok to continue sodium bicarb tablet 1.3g TID in the setting of acidosis   - If patient continues to have persistent acidosis and cannot tolerate PO intake, can give IV sodium bicarb   - Continue supportive management   - Continue lokelma prn  - Will continue to follow  - Strict I/Os   - Avoid IV contrast    Rossi Medel MD PGY-1  Nephrology Resident  24 hour Renal Pager - [phone]    Discussed with attending nephrologist Dr. Villalba.

## 2024-07-18 NOTE — PROCEDURES
Central Line    Date/Time: 7/18/2024 4:06 PM    Performed by: Michelle Tobar MD  Authorized by: Michelle Tobar MD    Consent:     Consent obtained:  Verbal    Consent given by:  Spouse    Risks, benefits, and alternatives were discussed: yes      Risks discussed:  Arterial puncture, incorrect placement, nerve damage, bleeding, infection and pneumothorax  Universal protocol:     Procedure explained and questions answered to patient or proxy's satisfaction: yes      Patient identity confirmed:  Arm band  Pre-procedure details:     Indication(s): central venous access      Indication(s) comment:  For dialysis    Hand hygiene: Hand hygiene performed prior to insertion      Sterile barrier technique: All elements of maximal sterile technique followed      Skin preparation:  Chlorhexidine    Skin preparation agent: Skin preparation agent completely dried prior to procedure    Sedation:     Sedation type:  None  Anesthesia:     Anesthesia method:  Local infiltration    Local anesthetic:  Lidocaine 2% w/o epi  Procedure details:     Location:  L internal jugular    Patient position:  Trendelenburg    Procedural supplies:  Triple lumen    Catheter size:  13 Fr    Landmarks identified: yes      Ultrasound guidance: yes      Ultrasound guidance timing: real time      Sterile ultrasound techniques: Sterile gel and sterile probe covers were used      Number of attempts:  1    Successful placement: yes    Post-procedure details:     Post-procedure:  Dressing applied and line sutured    Assessment:  Blood return through all ports, no pneumothorax on x-ray and placement verified by x-ray    Procedure completion:  Tolerated well, no immediate complications

## 2024-07-19 ENCOUNTER — APPOINTMENT (OUTPATIENT)
Dept: RADIOLOGY | Facility: HOSPITAL | Age: 44
DRG: 276 | End: 2024-07-19
Payer: COMMERCIAL

## 2024-07-19 ENCOUNTER — APPOINTMENT (OUTPATIENT)
Dept: CARDIOLOGY | Facility: HOSPITAL | Age: 44
DRG: 276 | End: 2024-07-19
Payer: COMMERCIAL

## 2024-07-19 ENCOUNTER — APPOINTMENT (OUTPATIENT)
Dept: DIALYSIS | Facility: HOSPITAL | Age: 44
End: 2024-07-19
Payer: COMMERCIAL

## 2024-07-19 LAB
ALBUMIN SERPL BCP-MCNC: 2.5 G/DL (ref 3.4–5)
ALBUMIN SERPL BCP-MCNC: 2.7 G/DL (ref 3.4–5)
ALP SERPL-CCNC: 89 U/L (ref 33–120)
ALT SERPL W P-5'-P-CCNC: 29 U/L (ref 10–52)
ANION GAP SERPL CALC-SCNC: 24 MMOL/L (ref 10–20)
ANION GAP SERPL CALC-SCNC: 25 MMOL/L (ref 10–20)
AST SERPL W P-5'-P-CCNC: 24 U/L (ref 9–39)
ATRIAL RATE: 84 BPM
BASOPHILS # BLD AUTO: 0.1 X10*3/UL (ref 0–0.1)
BASOPHILS NFR BLD AUTO: 0.8 %
BILIRUB SERPL-MCNC: 0.6 MG/DL (ref 0–1.2)
BUN SERPL-MCNC: 118 MG/DL (ref 6–23)
BUN SERPL-MCNC: 123 MG/DL (ref 6–23)
CALCIUM SERPL-MCNC: 7.7 MG/DL (ref 8.6–10.6)
CALCIUM SERPL-MCNC: 8.1 MG/DL (ref 8.6–10.6)
CHLORIDE SERPL-SCNC: 100 MMOL/L (ref 98–107)
CHLORIDE SERPL-SCNC: 99 MMOL/L (ref 98–107)
CO2 SERPL-SCNC: 15 MMOL/L (ref 21–32)
CO2 SERPL-SCNC: 16 MMOL/L (ref 21–32)
CREAT SERPL-MCNC: 12.63 MG/DL (ref 0.5–1.3)
CREAT SERPL-MCNC: 13.46 MG/DL (ref 0.5–1.3)
EGFRCR SERPLBLD CKD-EPI 2021: 4 ML/MIN/1.73M*2
EGFRCR SERPLBLD CKD-EPI 2021: 5 ML/MIN/1.73M*2
EOSINOPHIL # BLD AUTO: 0.3 X10*3/UL (ref 0–0.7)
EOSINOPHIL NFR BLD AUTO: 2.3 %
ERYTHROCYTE [DISTWIDTH] IN BLOOD BY AUTOMATED COUNT: 12.8 % (ref 11.5–14.5)
GLUCOSE BLD MANUAL STRIP-MCNC: 110 MG/DL (ref 74–99)
GLUCOSE BLD MANUAL STRIP-MCNC: 121 MG/DL (ref 74–99)
GLUCOSE BLD MANUAL STRIP-MCNC: 90 MG/DL (ref 74–99)
GLUCOSE SERPL-MCNC: 86 MG/DL (ref 74–99)
GLUCOSE SERPL-MCNC: 87 MG/DL (ref 74–99)
HCT VFR BLD AUTO: 29 % (ref 41–52)
HGB BLD-MCNC: 9.9 G/DL (ref 13.5–17.5)
IMM GRANULOCYTES # BLD AUTO: 0.17 X10*3/UL (ref 0–0.7)
IMM GRANULOCYTES NFR BLD AUTO: 1.3 % (ref 0–0.9)
LIDOCAIN SERPL-MCNC: 2.3 UG/ML (ref 1–5)
LYMPHOCYTES # BLD AUTO: 1 X10*3/UL (ref 1.2–4.8)
LYMPHOCYTES NFR BLD AUTO: 7.6 %
MAGNESIUM SERPL-MCNC: 2.55 MG/DL (ref 1.6–2.4)
MAGNESIUM SERPL-MCNC: 2.57 MG/DL (ref 1.6–2.4)
MCH RBC QN AUTO: 29.6 PG (ref 26–34)
MCHC RBC AUTO-ENTMCNC: 34.1 G/DL (ref 32–36)
MCV RBC AUTO: 87 FL (ref 80–100)
MONOCYTES # BLD AUTO: 1.14 X10*3/UL (ref 0.1–1)
MONOCYTES NFR BLD AUTO: 8.6 %
NEUTROPHILS # BLD AUTO: 10.51 X10*3/UL (ref 1.2–7.7)
NEUTROPHILS NFR BLD AUTO: 79.4 %
NRBC BLD-RTO: 0 /100 WBCS (ref 0–0)
P AXIS: 23 DEGREES
P OFFSET: 202 MS
P ONSET: 145 MS
PHOSPHATE SERPL-MCNC: 8.3 MG/DL (ref 2.5–4.9)
PLATELET # BLD AUTO: 295 X10*3/UL (ref 150–450)
POTASSIUM SERPL-SCNC: 4.1 MMOL/L (ref 3.5–5.3)
POTASSIUM SERPL-SCNC: 4.8 MMOL/L (ref 3.5–5.3)
PR INTERVAL: 132 MS
PROT SERPL-MCNC: 5.1 G/DL (ref 6.4–8.2)
Q ONSET: 211 MS
QRS COUNT: 14 BEATS
QRS DURATION: 92 MS
QT INTERVAL: 368 MS
QTC CALCULATION(BAZETT): 434 MS
QTC FREDERICIA: 411 MS
R AXIS: 14 DEGREES
RBC # BLD AUTO: 3.35 X10*6/UL (ref 4.5–5.9)
SODIUM SERPL-SCNC: 134 MMOL/L (ref 136–145)
SODIUM SERPL-SCNC: 136 MMOL/L (ref 136–145)
T AXIS: 17 DEGREES
T OFFSET: 395 MS
VENTRICULAR RATE: 84 BPM
WBC # BLD AUTO: 13.2 X10*3/UL (ref 4.4–11.3)

## 2024-07-19 PROCEDURE — 93005 ELECTROCARDIOGRAM TRACING: CPT

## 2024-07-19 PROCEDURE — 97116 GAIT TRAINING THERAPY: CPT | Mod: GP

## 2024-07-19 PROCEDURE — 2500000004 HC RX 250 GENERAL PHARMACY W/ HCPCS (ALT 636 FOR OP/ED): Performed by: INTERNAL MEDICINE

## 2024-07-19 PROCEDURE — 8010000001 HC DIALYSIS - HEMODIALYSIS PER DAY

## 2024-07-19 PROCEDURE — 2500000005 HC RX 250 GENERAL PHARMACY W/O HCPCS

## 2024-07-19 PROCEDURE — 74230 X-RAY XM SWLNG FUNCJ C+: CPT

## 2024-07-19 PROCEDURE — 2500000004 HC RX 250 GENERAL PHARMACY W/ HCPCS (ALT 636 FOR OP/ED)

## 2024-07-19 PROCEDURE — 2500000001 HC RX 250 WO HCPCS SELF ADMINISTERED DRUGS (ALT 637 FOR MEDICARE OP)

## 2024-07-19 PROCEDURE — 99233 SBSQ HOSP IP/OBS HIGH 50: CPT

## 2024-07-19 PROCEDURE — 99291 CRITICAL CARE FIRST HOUR: CPT

## 2024-07-19 PROCEDURE — 82947 ASSAY GLUCOSE BLOOD QUANT: CPT

## 2024-07-19 PROCEDURE — 74230 X-RAY XM SWLNG FUNCJ C+: CPT | Performed by: RADIOLOGY

## 2024-07-19 PROCEDURE — 2500000005 HC RX 250 GENERAL PHARMACY W/O HCPCS: Performed by: INTERNAL MEDICINE

## 2024-07-19 PROCEDURE — 37799 UNLISTED PX VASCULAR SURGERY: CPT

## 2024-07-19 PROCEDURE — 80176 ASSAY OF LIDOCAINE: CPT

## 2024-07-19 PROCEDURE — 83735 ASSAY OF MAGNESIUM: CPT

## 2024-07-19 PROCEDURE — 97530 THERAPEUTIC ACTIVITIES: CPT | Mod: GP

## 2024-07-19 PROCEDURE — 80053 COMPREHEN METABOLIC PANEL: CPT

## 2024-07-19 PROCEDURE — 1100000001 HC PRIVATE ROOM DAILY

## 2024-07-19 PROCEDURE — 92611 MOTION FLUOROSCOPY/SWALLOW: CPT | Mod: GN

## 2024-07-19 PROCEDURE — 99233 SBSQ HOSP IP/OBS HIGH 50: CPT | Performed by: INTERNAL MEDICINE

## 2024-07-19 PROCEDURE — 92526 ORAL FUNCTION THERAPY: CPT | Mod: GN

## 2024-07-19 PROCEDURE — 85025 COMPLETE CBC W/AUTO DIFF WBC: CPT

## 2024-07-19 RX ORDER — NADOLOL 20 MG/1
20 TABLET ORAL DAILY
Status: DISCONTINUED | OUTPATIENT
Start: 2024-07-19 | End: 2024-07-27 | Stop reason: HOSPADM

## 2024-07-19 RX ORDER — BUMETANIDE 0.25 MG/ML
4 INJECTION INTRAMUSCULAR; INTRAVENOUS DAILY
Status: DISCONTINUED | OUTPATIENT
Start: 2024-07-20 | End: 2024-07-21

## 2024-07-19 ASSESSMENT — PAIN SCALES - GENERAL
PAINLEVEL_OUTOF10: 0 - NO PAIN

## 2024-07-19 ASSESSMENT — COGNITIVE AND FUNCTIONAL STATUS - GENERAL
WALKING IN HOSPITAL ROOM: A LITTLE
WALKING IN HOSPITAL ROOM: A LOT
DAILY ACTIVITIY SCORE: 18
MOBILITY SCORE: 13
MOVING TO AND FROM BED TO CHAIR: A LOT
HELP NEEDED FOR BATHING: A LITTLE
TURNING FROM BACK TO SIDE WHILE IN FLAT BAD: A LOT
MOBILITY SCORE: 15
PERSONAL GROOMING: A LITTLE
MOVING TO AND FROM BED TO CHAIR: A LITTLE
MOVING FROM LYING ON BACK TO SITTING ON SIDE OF FLAT BED WITH BEDRAILS: A LITTLE
DRESSING REGULAR LOWER BODY CLOTHING: A LITTLE
CLIMB 3 TO 5 STEPS WITH RAILING: A LOT
TOILETING: A LITTLE
EATING MEALS: A LITTLE
STANDING UP FROM CHAIR USING ARMS: A LOT
STANDING UP FROM CHAIR USING ARMS: A LOT
MOVING FROM LYING ON BACK TO SITTING ON SIDE OF FLAT BED WITH BEDRAILS: A LITTLE
DRESSING REGULAR UPPER BODY CLOTHING: A LITTLE
CLIMB 3 TO 5 STEPS WITH RAILING: TOTAL
TURNING FROM BACK TO SIDE WHILE IN FLAT BAD: A LITTLE

## 2024-07-19 ASSESSMENT — PAIN - FUNCTIONAL ASSESSMENT
PAIN_FUNCTIONAL_ASSESSMENT: 0-10

## 2024-07-19 NOTE — PROGRESS NOTES
NEPHROLOGY FOLLOW UP NOTE    Miguel A Bender   43 y.o.    123kg  MRN/Room: [id number]/16/16-A    Subjective: No acute overnight events. Patient continuing to make urine and output is being maintained. Receiving IV Bumex 4mg BID. Tolerated HD session well yesterday and is continuing to be hemodynamically stable. Patient is A&Ox3 and is mentating well. Less somnolent and more conversive today.     Objective:     Meds:   bumetanide, 4 mg, BID  heparin (porcine), 5,000 Units, q8h CINDY  levETIRAcetam, 500 mg, Daily  lidocaine, 1 patch, Daily  metoprolol tartrate, 12.5 mg, BID  oxygen, , Continuous - Inhalation  polyethylene glycol, 17 g, Daily  sodium bicarbonate, 1,300 mg, TID  sodium zirconium cyclosilicate, 10 g, Once      lidocaine, Last Rate: 1 mg/min (07/18/24 1952)      acetaminophen, 975 mg, q8h PRN  dextrose, 12.5 g, q15 min PRN  dextrose, 25 g, q15 min PRN  glucagon, 1 mg, q15 min PRN  glucagon, 1 mg, q15 min PRN        Vitals:    07/19/24 0700   BP: 149/71   Pulse: 64   Resp: (!) 27   Temp:    SpO2: 94%          Intake/Output Summary (Last 24 hours) at 7/19/2024 0806  Last data filed at 7/19/2024 0600  Gross per 24 hour   Intake 1075 ml   Output 1160 ml   Net -85 ml       General appearance: Awake and alert, oriented. No acute distress  HEENT: NC/AT, oral mucosa  Skin: no apparent rash  Heart: heart sounds 1 & 2 present and normal, no murmurs heard or rub  Lungs: Adequate air entry, breath sounds bilaterally.  No wheezing/crackles  Abdomen: soft, non tender  Extremities: No edema, no joint swelling  Neuro: No FND,  No asterixis   ACCESS: PIV, R. I J triple lumen     Blood Labs:  Results for orders placed or performed during the hospital encounter of 07/10/24 (from the past 24 hour(s))   POCT GLUCOSE   Result Value Ref Range    POCT Glucose 91 74 - 99 mg/dL   Hepatitis B surface antibody   Result Value Ref Range    Hepatitis B Surface AB <3.1 <10.0 mIU/mL   Hepatitis B surface antigen   Result Value Ref Range     Hepatitis B Surface AG Nonreactive Nonreactive   Hepatitis C antibody   Result Value Ref Range    Hepatitis C AB Nonreactive Nonreactive   HIV 1/2 Antigen/Antibody Screen with Reflex to Confirmation   Result Value Ref Range    HIV 1/2 Antigen/Antibody Screen with Reflex to Confirmation Nonreactive Nonreactive   POCT GLUCOSE   Result Value Ref Range    POCT Glucose 87 74 - 99 mg/dL   POCT GLUCOSE   Result Value Ref Range    POCT Glucose 73 (L) 74 - 99 mg/dL   Renal function panel   Result Value Ref Range    Glucose 87 74 - 99 mg/dL    Sodium 136 136 - 145 mmol/L    Potassium 4.1 3.5 - 5.3 mmol/L    Chloride 100 98 - 107 mmol/L    Bicarbonate 16 (L) 21 - 32 mmol/L    Anion Gap 24 (H) 10 - 20 mmol/L    Urea Nitrogen 118 (HH) 6 - 23 mg/dL    Creatinine 12.63 (H) 0.50 - 1.30 mg/dL    eGFR 5 (L) >60 mL/min/1.73m*2    Calcium 8.1 (L) 8.6 - 10.6 mg/dL    Phosphorus 8.3 (H) 2.5 - 4.9 mg/dL    Albumin 2.7 (L) 3.4 - 5.0 g/dL   Magnesium   Result Value Ref Range    Magnesium 2.55 (H) 1.60 - 2.40 mg/dL   Lidocaine   Result Value Ref Range    Lidocaine  2.3 1.0 - 5.0 ug/mL   Magnesium   Result Value Ref Range    Magnesium 2.57 (H) 1.60 - 2.40 mg/dL   CBC and Auto Differential   Result Value Ref Range    WBC 13.2 (H) 4.4 - 11.3 x10*3/uL    nRBC 0.0 0.0 - 0.0 /100 WBCs    RBC 3.35 (L) 4.50 - 5.90 x10*6/uL    Hemoglobin 9.9 (L) 13.5 - 17.5 g/dL    Hematocrit 29.0 (L) 41.0 - 52.0 %    MCV 87 80 - 100 fL    MCH 29.6 26.0 - 34.0 pg    MCHC 34.1 32.0 - 36.0 g/dL    RDW 12.8 11.5 - 14.5 %    Platelets 295 150 - 450 x10*3/uL    Neutrophils % 79.4 40.0 - 80.0 %    Immature Granulocytes %, Automated 1.3 (H) 0.0 - 0.9 %    Lymphocytes % 7.6 13.0 - 44.0 %    Monocytes % 8.6 2.0 - 10.0 %    Eosinophils % 2.3 0.0 - 6.0 %    Basophils % 0.8 0.0 - 2.0 %    Neutrophils Absolute 10.51 (H) 1.20 - 7.70 x10*3/uL    Immature Granulocytes Absolute, Automated 0.17 0.00 - 0.70 x10*3/uL    Lymphocytes Absolute 1.00 (L) 1.20 - 4.80 x10*3/uL    Monocytes  Absolute 1.14 (H) 0.10 - 1.00 x10*3/uL    Eosinophils Absolute 0.30 0.00 - 0.70 x10*3/uL    Basophils Absolute 0.10 0.00 - 0.10 x10*3/uL   Comprehensive Metabolic Panel   Result Value Ref Range    Glucose 86 74 - 99 mg/dL    Sodium 134 (L) 136 - 145 mmol/L    Potassium 4.8 3.5 - 5.3 mmol/L    Chloride 99 98 - 107 mmol/L    Bicarbonate 15 (L) 21 - 32 mmol/L    Anion Gap 25 (H) 10 - 20 mmol/L    Urea Nitrogen 123 (HH) 6 - 23 mg/dL    Creatinine 13.46 (H) 0.50 - 1.30 mg/dL    eGFR 4 (L) >60 mL/min/1.73m*2    Calcium 7.7 (L) 8.6 - 10.6 mg/dL    Albumin 2.5 (L) 3.4 - 5.0 g/dL    Alkaline Phosphatase 89 33 - 120 U/L    Total Protein 5.1 (L) 6.4 - 8.2 g/dL    AST 24 9 - 39 U/L    Bilirubin, Total 0.6 0.0 - 1.2 mg/dL    ALT 29 10 - 52 U/L   POCT GLUCOSE   Result Value Ref Range    POCT Glucose 90 74 - 99 mg/dL          ASSESSMENT:  Miguel A Bender is a  43 y.o.  year old , with PMHx of HTN, CHARLEEN, NSTEMI, and gastric bypass surgery admitted to [facility] on 7/10 with cardiac arrest. S/p left heart cath with Impella placement. Nephrology consulted for ANNIE/possible HD iso worsening UOP and renal function.      Updates 7/19:  - 1.3L output in last 24 hours  - Plan for another HD session today   - Ok to stop bicarbonate supplement   - Ok to decrease Bumex dosing to qday     #ANNIE, oliguric (Baseline Cr 1.19)  -Etiology : Post cardiac arrest.  -Electrolytes stable , mild AGMA and NAGMA  -BUN: 144  -He is negative -49 mL  -His CVP was 13.  -Not on pressors anymore     Recommendations:  - Plan for HD today and another session tomorrow (7/20)  - Ok to decrease Bumex dosing to qday  - Ok to stop bicarbonate supplement   - Continue supportive management   - Continue lokelma prn  - Will continue to follow  - Strict I/Os   - Avoid IV contrast    Rossi Medel MD PGY-1  Nephrology Resident  24 hour Renal Pager - [phone]    Discussed with attending nephrologist Dr. Villalba.

## 2024-07-19 NOTE — PROGRESS NOTES
Miguel A Bender is a 43 y.o. male on day 9 of admission presenting with Cardiac arrest (Multi).    Subjective   No acute events overnight. Pt with improved mentation this morning, states full name and appropriate date. Reports he believes he is at [facility], but otherwise completely oriented. He is able to discuss his job as a . Denies any pain this morning.     Patient denies any fever, chills, lightheadedness, shortness of breath, chest pain, abdominal pain, N/V/C/D, lower extremity pain/swelling.    10 point ROS performed and negative unless stated above.          Objective   Weight: 117 kg (257 lb 15 oz) (07/10/24 2300)    Daily Weight  07/19/24 : 123 kg (272 lb 0.8 oz)      Last Recorded Vitals  Heart Rate:  [57-76]   Temp:  [36 °C (96.8 °F)-36.7 °C (98.1 °F)]   Resp:  [0-27]   BP: (128-160)/()   Weight:  [123 kg (272 lb 0.8 oz)]   SpO2:  [92 %-99 %]          Intake/Output last 3 Shifts:  I/O last 3 completed shifts:  In: 1285.8 (10.4 mL/kg) [I.V.:1285.8 (10.4 mL/kg)]  Out: 1980 (16 mL/kg) [Urine:1980 (0.4 mL/kg/hr)]  Weight: 123.4 kg       Relevant Results  Results from last 7 days   Lab Units 07/19/24  0445 07/18/24 0213 07/17/24  0003   WBC AUTO x10*3/uL 13.2* 9.3 8.9   HEMOGLOBIN g/dL 9.9* 9.2* 9.7*   HEMATOCRIT % 29.0* 27.1* 27.9*   PLATELETS AUTO x10*3/uL 295 245 180     Results from last 7 days   Lab Units 07/19/24  0445 07/18/24  2316 07/18/24  0213 07/17/24  1812   SODIUM mmol/L 134* 136 136 136   POTASSIUM mmol/L 4.8 4.1 4.4 4.7   CO2 mmol/L 15* 16* 14* 13*   ANION GAP mmol/L 25* 24* 27* 28*   BUN mg/dL 123* 118* 144* 141*   CREATININE mg/dL 13.46* 12.63* 13.87* 14.31*   GLUCOSE mg/dL 86 87 86 86   EGFR mL/min/1.73m*2 4* 5* 4* 4*   MAGNESIUM mg/dL 2.57* 2.55* 2.78* 2.86*   PHOSPHORUS mg/dL  --  8.3* 10.8* 10.6*      Results from last 7 days   Lab Units 07/19/24  0445 07/18/24  0213 07/13/24  0111   ALT U/L 29 35 59*   AST U/L 24 20 40*   ALK PHOS U/L 89 93 52            No  "lab exists for component: \"PT\"  Results from last 7 days   Lab Units 07/15/24  0454 07/14/24  1752 07/14/24  0940 07/14/24  0802 07/12/24  1319 07/12/24  0909   POCT PH, ARTERIAL pH  --   --  7.28* 7.25*  --  7.31*   POCT PO2, ARTERIAL mm Hg  --   --  102* 82*  --  87   POCT PCO2, ARTERIAL mm Hg  --   --  32* 35*  --  35*   FIO2 % 40 28 60 40   < > 40    < > = values in this interval not displayed.               No lab exists for component: \"BNPRESU\", \"CPKT\"          Physical Exam  General: well-developed, well-nourished, no acute distress, AAOx1  HEENT: EOM intact, PERRL, no JVD  CV: regular rate and rhythm, normal S1/S2, no murmur, gallop, or rub  Pulm: normal respiratory effort, clear to auscultation bilaterally with no wheezes, rales, rhonchi  Abd: soft, nontender, nondistended, no masses, normoactive bowel sounds  Extremities: warm, no LE edema  Neuro: moves all extremities equally, CN II - XII grossly intact  Psych: normal mood and affect  Skin: warm, dry, intact    Medications    bumetanide, 4 mg, intravenous, BID  heparin (porcine), 5,000 Units, subcutaneous, q8h CINDY  levETIRAcetam, 500 mg, oral, Daily  lidocaine, 1 patch, transdermal, Daily  metoprolol tartrate, 12.5 mg, oral, BID  oxygen, , inhalation, Continuous - Inhalation  polyethylene glycol, 17 g, oral, Daily  sodium bicarbonate, 1,300 mg, orogastric tube, TID  sodium zirconium cyclosilicate, 10 g, oral, Once        lidocaine, 1 mg/min, Last Rate: 1 mg/min (07/18/24 1952)        PRN medications: acetaminophen, dextrose, dextrose, glucagon, glucagon            Assessment/Plan   Principal Problem:    Cardiac arrest (Multi)  Active Problems:    Cardiogenic shock (Multi)    Acute hypoxemic respiratory failure (Multi)    Acute renal failure with oliguria (CMS-HCC)    Coma (Multi)    43-year-old male with a history of obesity status post gastric bypass surgery 2 years ago, CHARLEEN, HTN transferred for further management of VT arrest c/b cardiogenic shock s/p " Impella placement which was subsequently removed 7/13. Hospital course c/b non-oliguric ANNIE and altered mentation concerning for uremic encephalopathy vs hospital delirium vs anoxic brain injury. Nephrology on board, dialysis initiated 7/18 with subsequent improvement in mentation. EP also consulted for VT arrest, cMRI notable for dilated RV c/f possible ARVC, for which genetics was also consulted. ARVC lab panel sent out 7/18 with likely 3-4 week turnaround time.     7/19 Updates:  -Improved mentation following dialysis on 7/18  -Continue to monitor urine output and need for dialysis  -Consider transition from lidocaine infusion to PO agent for VT suppression        NEUROLOGIC  #Acute metabolic encephalopathy  #History of myoclonic jerks and ophisthotonos at outside hospital  #Concern for anoxic brain injury i/s/o cardiac arrest  ::Reported opisthotonos at [facility]  -Video EEG has preliminary result of severe diffuse encephalopathy with no epileptiform activity- Able to respond to questions but sometimes takes some time to think.  - Continued/worsening altered mentation concerning for possible metabolic encephalopathy  - Keppra 500 mg daily (renal dosing) will be continued until outpatient  -Consider MRI brain if altered mentation  -Mentation improved following dialysis initiation on 7/18; continue to monitor and consider MRI brain if remains altered     #Oropharyngeal Dysphagia  - Coughing when swallowing   -Secondary to anoxic brain injury from cardiogenic shock vs upper airway irritation from previous endotracheal tube  - Modified barium swallow performed 7/19; OK for easy to chew diet, thin liquids, and meds with purees      CARDIOVASCULAR  #Cardiogenic shock, resolved  -On admission, was on norepi 0.07, epi 0.03 and impella P8 for pressure support. Impella removed 7/13   -PA catheter removed 7/15. Final CI of 4.55, SVRI of 1547, CVP of 16, SvO2 of 79%  - Metop tartrate 12.5 mg BID  -Other GDMT held due to ANNIE.  Restart as tolerated     #Pulmonary Hypertension  #Right Heart Insufficiency  -Mean PA pressure of 34  - Mild TR  -RVEF of 20, CT PE Negative  - Reduce preload through diuresis and dialysis    #Ventricular Fibrillation and Polymorphic Vtach  #Non-ischemic cardiomyopathy  #Cardiac arrest of unclear etiology  #Concern for Arrhythmogenic Right Ventricular Cardiomyopathy  -::TTE post arrest EF 10%, global hypokinesis  ::C/f arrhythmia such as prolonged QT in 500s  ::Was on amdiodarone on admission. Had polymorphic SVT with sinus pauses on night of 7/10, so amiodarone weaned off and switched to lidocaine.  - EP consulted, appreciate recs  -Genetics consulted, appreciate recs  - Last Qtc around [phone]  - Meets major criteria of RV dilation and arrhythmia for ARVC. No consistent V1-V3 TWI, epsilon wave, or delayed S wave upstroke. No family history of SCD  -Optimize electrolytes K > 4 and Mg > 2  -Monitor on telemetry  - Continue lidocaine drip- daily lidocaine level -> May transition to oral B-blockers (nadolol)  - needs ICD before discharge        PULMONARY  #Acute hypoxic respiratory failure, resolved  #Concern for aspiration in setting of cardiac arrest  ::Secondary to cardiac arrest  ::CT PE negative  ::CXR not concerning for pneumonia. Diffuse pulmonary interstitial opacities more consistent with pulmonary edema or aspiration pneumonitis rather than pneumonia.  -extubated 7/14  - Now stable on room air     RENAL/  #ANNIE Non-oliguric   #Concern for acute tubular necrosis  ::Likely pre-renal i/s/o cardiogenic shock   -BUN uptrending with concern for uremic encephalopathy  -Approximately 1L of urine output in last 24 hours  -Plan for dialysis initiation on 7/18/24  -Increase diuresis to bumex 4mg BID  - Nephrology consulted, appreciate recs  - Dry weight of 117 -> 126         GASTROINTESTINAL  - No active issues     ENDOCRINE  DUSTIN     HEME/ONC  No active issues     MSK/DERM  PT/OT        N: cardiac diet (easy to  chew), thin liquids, meds with purees  A: PIV, Right IJ triple lumen  DVT ppx: SCD  GI ppx: None  Oxygen: RA  Drips lido gtt  Abx Vanc (7/11-7/12), Zosyn (7/11-7/13)  Code Status: Full Code (confirmed on Admission)  Surrogate Medical Decision-maker: Jazmin Bender (wife) [phone]

## 2024-07-19 NOTE — CARE PLAN
The patient's goals for the shift include      The clinical goals for the shift include Pt will have improved mentation after receiving hemodyalisis    Over the shift, the patient did not make progress toward the following goals. Recommendations to address these barriers include   Problem: Arrythmia/Dysrhythmia  Goal: Lab values return to normal range  Outcome: Progressing  Goal: No evidence of post procedure complications  Outcome: Progressing  Goal: Promote self management  Outcome: Progressing  Goal: Verbalize understanding of procedures/devices  Outcome: Progressing  Goal: Vital signs return to baseline  Outcome: Progressing     Problem: Cardiac catheterization  Goal: Free from dysrhythmias  Outcome: Progressing  Goal: No evidence of post procedure complications  Outcome: Progressing  Goal: Promote self management  Outcome: Progressing  Goal: Verbalize understanding of procedure  Outcome: Progressing  Goal: Care and maintenance of device (specify)  Outcome: Progressing     Problem: Skin  Goal: Decreased wound size/increased tissue granulation at next dressing change  Outcome: Progressing  Goal: Participates in plan/prevention/treatment measures  Outcome: Progressing  Goal: Prevent/manage excess moisture  Outcome: Progressing  Goal: Prevent/minimize sheer/friction injuries  Outcome: Progressing  Goal: Promote/optimize nutrition  Outcome: Progressing  Goal: Promote skin healing  Outcome: Progressing

## 2024-07-19 NOTE — PROGRESS NOTES
"Speech-Language Pathology  Adult Inpatient Modified Barium Swallow Study (MBSS)    Patient Name: Miguel A Bender  MRN: [id number]  Today's Date: 7/19/2024   Start Time: 1050  Stop Time: 1115  Time Calculation (min): 25 minutes    Initial MBSS: Yes    Respiratory Status:  Room air, WFL    History of Present Illness:   Per EMR: \"Miguel A Bender is a 43 y.o. male with PMHx of obesity status post gastric bypass surgery 2 years ago, CHARLEEN, HTN transferred for cardiogenic shock s/p Impella after witnessed sudden cardiac arrest.     Patient presented after a witnessed cardiac arrest, reportedly witnessed arrest at work approximately 15-20 minutes prior to arrival. CPR started by coworkers. Was found to be in ventricular fibrillation on EMS arrival, defibrillated several times and received epinephrine and amio bolus and intubated by the time he arrived to ED. nitial CBC showing white count 12.2, hemoglobin over hematocrit 14.9/45.1, platelet count 230.  Metabolic panel showed serum glucose 138 sodium 143 potassium 3.0 chloride 106 bicarbonate 24 BUN 12 creatinine 1.01.  Initial high-sensitivity cardiac troponin elevated at 22.  Lactic acid elevated at 8.1. Initial EKG Post ROSC shows NSR with RBBB. Cardiology was consulted and loaded with Amiodarone, [facility] with normal coronary arteries, CI 1.9, Impella placed. Neurology was consulted, patient having episodes of spontaneous eye opening, pulling at restraints, episthotonos, and occasional myoclonic jerking. He was loaded with levetiracetam and started on EEG. Prior to transfer, lactate still high at 7.1, pH 7.18.     Upon admission, patient was intubated with Impella on P8, on norepinephrine 0.07 and epi 0.03, on amio and heparin drip. On propofol 50. Telemetry showing multiple episodes of polymorphic VT episodes few seconds each and EKG concerning for prolonged QTc. Decision was to start lidocaine bolus and infusion with plans to eventually wean off amiodarone. Started patient on " "Vanc/Zosyn. Gave a liter of LR. Repleted electrolytes (K 3.1 hemolyzed and Mg 1.6). Lactate 7.3 on ABG on admission improved to 5.0. PA catheter numbers: CVP 9, PAP 39/20 (27), CO 7.7, CI 3.3, , SVO2 80% on P8 Impella.      No family history of sudden cardiac death or genetic cardiomyopathies per patients wife. Patient was at his normal state of health and active before this sudden arrest.\"    Impression:   Modified Barium Swallow Study completed. Pt received awake/alert, positioned upright in Hausted chair. Room air, IV.   Informed verbal consent obtained prior to completion of exam. Trials of thin and nectar thick liquids were given in addition to pureed textures and regular solids. Pt presented w/ mild oropharyngeal dysphagia as described below:  Orally, pt w/ holding and lingual pumping of bolus. Delayed swallow onset to level of piriforms w/ x1 instance of trace-transient laryngeal penetration w/ thin liquids. During the swallow, pt demonstrated decreased BOT retraction, hyolaryngeal elevation/excursion, pharyngeal squeeze and UES opening. Mod amounts of residue remained. Pt completed a 2ndary swallow to clear and or use of liquid wash was effective w/ solids.   Completed esophageal sweep 2/2 pt coughing throughout exam w/o residuals visualized within laryngeal vestibule/subglottic space. Nothing noted w/in proximal esophagus. Defer to Radiologist.   Pt deemed safe for an Easy to Chew diet w/ Thin liquids - See below for safe swallow guidelines. SLP notified pt, RN and medical team of results/recommendations.    Treatment:  SLP reviewed MBSS images, results/recommendations and safe swallow guidelines. Written instructions provided as a visual cue/reminder. Pt verbalized understanding and able to recall strategies with 100% acc. SLP to f/u for ongoing tx.      [place]:  Thin: 2 - Material enters airway, remains above the folds, ejected from airway.  [place]: 1 - Material does not enter airway.   Puree: 1 " - Material does not enter airway.   Solids: 1 - Material does not enter airway.     Recommendations:  Easy to Chew diet w/ Thin liquids  Upright for all PO intake  Remain upright for 20-30 min after eating  Small bites/sips  Straws ok  Slow rate of consumption  Medication crushed in purees  Alternate food and liquids  If any changes to mentation/medical status or s/s of aspiration demonstrated during PO consumption, please make NPO and alert AND reconsult SLP to re-assess swallow function    Goal:   Pt will tolerate least restrictive diet and recall/utilize safe swallow guidelines independently with no clinical s/s of aspiration 100% of time        Plan:  SLP Services Indicated: Yes  Frequency: 2x week  Discussed POC with patient  SLP - OK to Discharge    Pain:   0-10  0 = No pain.     Inpatient Education:  Extensive education provided to patient regarding current swallow function, recommendations/results, and POC.      Consultations/Referrals/Coordination of Services:   N/A

## 2024-07-19 NOTE — CARE PLAN
The patient's goals for the shift include      The clinical goals for the shift include Pass swallow study' continue to improve mentation    Over the shift, the patient did not make progress toward the following goals. Barriers to progression include . Recommendations to address these barriers include .      Problem: Arrythmia/Dysrhythmia  Goal: Lab values return to normal range  Outcome: Progressing  Goal: No evidence of post procedure complications  Outcome: Progressing  Goal: Promote self management  Outcome: Progressing  Goal: Verbalize understanding of procedures/devices  Outcome: Progressing  Goal: Vital signs return to baseline  Outcome: Progressing     Problem: Cardiac catheterization  Goal: Free from dysrhythmias  Outcome: Progressing  Goal: No evidence of post procedure complications  Outcome: Progressing  Goal: Promote self management  Outcome: Progressing  Goal: Verbalize understanding of procedure  Outcome: Progressing  Goal: Care and maintenance of device (specify)  Outcome: Progressing     Problem: Skin  Goal: Decreased wound size/increased tissue granulation at next dressing change  Outcome: Progressing  Goal: Participates in plan/prevention/treatment measures  Outcome: Progressing  Goal: Prevent/manage excess moisture  Outcome: Progressing  Goal: Prevent/minimize sheer/friction injuries  Outcome: Progressing  Goal: Promote/optimize nutrition  Outcome: Progressing  Goal: Promote skin healing  Outcome: Progressing     Problem: Safety - Medical Restraint  Goal: Remains free of injury from restraints (Restraint for Interference with Medical Device)  Outcome: Progressing  Goal: Free from restraint(s) (Restraint for Interference with Medical Device)  Outcome: Progressing

## 2024-07-19 NOTE — PROGRESS NOTES
Physical Therapy    Physical Therapy Treatment    Patient Name: Miguel A Bender  MRN: [id number]  Today's Date: 2024  Time Calculation  Start Time: 1615  Stop Time: 1647  Time Calculation (min): 32 min    Assessment/Plan   PT Assessment  End of Session Communication: Bedside nurse  End of Session Patient Position: Up in chair, Alarm on     PT Plan  Treatment/Interventions: Bed mobility, Transfer training, Gait training, Stair training, Balance training, Neuromuscular re-education, Endurance training, Strengthening, Range of motion, Therapeutic exercise, Therapeutic activity  PT Plan: Ongoing PT  PT Frequency: 5 times per week  PT Discharge Recommendations: High intensity level of continued care  PT Recommended Transfer Status: Assist x2  PT - OK to Discharge: Yes    General Visit Information:   PT  Visit  PT Received On: 24  General  Prior to Session Communication: Bedside nurse  Patient Position Received: Bed, 3 rail up, Alarm off, not on at start of session  General Comment: Pt pleasant and cooperative to therapy. Telemetry and wright catheter    Subjective     Precautions:  Precautions  Medical Precautions: Fall precautions, Cardiac precautions    Vital Signs:  Vital Signs  Heart Rate:  (PRE: 65 POST: 71)  Resp:  (PRE: 25 POST: 11)  SpO2:  (PRE: 96 POST 96)  BP:  (PRE: 155/78 DURIN/73 POST: 143/85)    Objective     Pain:  Pain Assessment  Pain Assessment: 0-10  0-10 (Numeric) Pain Score: 0 - No pain    Cognition:  Cognition  Orientation Level: Oriented X4    Activity Tolerance:  Activity Tolerance  Early Mobility/Exercise Safety Screen: Proceed with mobilization - No exclusion criteria met    Treatments:  Therapeutic Activity  Therapeutic Activity Performed: Yes  Therapeutic Activity 1: Pt performed a warmup of 10x B ankle pumps and 10x B LAQ seated EOB with close supervision  Therapeutic Activity 2: Pt stood statically with CGA for 1 minute with no assistive device and HHA    Bed Mobility  Bed  Mobility: Yes  Bed Mobility 1  Bed Mobility 1: Supine to sitting  Level of Assistance 1: Contact guard  Bed Mobility Comments 1: Increased time to complete    Ambulation/Gait Training  Ambulation/Gait Training Performed: Yes  Ambulation/Gait Training 1  Surface 1: Level tile  Device 1: Rolling walker  Assistance 1: Close supervision  Quality of Gait 1: Decreased step length, Wide base of support, Inconsistent stride length, Shuffling gait (Verbal cues to fix forward flexed posture and for increased step length)  Comments/Distance (ft) 1: 40ft  Transfers  Transfer: Yes  Transfer 1  Transfer From 1: Sit to  Transfer to 1: Stand  Technique 1: Sit to stand  Transfer Level of Assistance 1: Moderate assistance (x1)  Trials/Comments 1: x1, from a low surface  Transfers 2  Transfer From 2: Sit to  Transfer to 2: Stand  Technique 2: Sit to stand  Transfer Device 2: Walker  Transfer Level of Assistance 2: Minimum assistance (x1; from a higher surface)  Trials/Comments 2: x1  Transfers 3  Transfer From 3: Stand to  Transfer to 3: Sit  Technique 3: Stand to sit  Transfer Device 3: Walker  Transfer Level of Assistance 3: Minimum assistance (x1; verbal cues for legs to hit the chair and for hand positioning)  Trials/Comments 3: x2 (x1 trial without the walker and x1 trial with walker)  Transfers 4  Transfer From 4: Bed to  Transfer to 4: Chair with arms  Technique 4:  (Few lateral steps from bed to chair)  Transfer Level of Assistance 4: Minimum assistance, Hand held assistance (x1; verbal cues for increased step length)  Trials/Comments 4: decreased step length, shuffling gait    Outcome Measures:  [facility] Basic Mobility  Turning from your back to your side while in a flat bed without using bedrails: A little  Moving from lying on your back to sitting on the side of a flat bed without using bedrails: A little  Moving to and from bed to chair (including a wheelchair): A little  Standing up from a chair using your arms (e.g.  wheelchair or bedside chair): A lot  To walk in hospital room: A little  Climbing 3-5 steps with railing: Total  Basic Mobility - Total Score: 15    Confusion Assessment Method-ICU (CAM-ICU)  Feature 1: Acute Onset or Fluctuating Course: Positive  Feature 2: Inattention: Negative  Overall CAM-ICU: Negative    FSS-ICU  Ambulation: Walks <50 feet with any assistance x1 or walks any distance with assistance x2 people  Rolling: Minimal assistance (performs 75% or more of task)  Sitting: Supervision or set-up only  Transfer Sit-to-Stand: Moderate assistance (performs 50 - 74% of task)  Transfer Supine-to-Sit: Minimal assistance (performs 75% or more of task)  Total Score: 17    Early Mobility/Exercise Safety Screen: Proceed with mobilization - No exclusion criteria met  ICU Mobility Scale: Walking with assistance of 1 person [8]  E = Exercise and Early Mobility  Early Mobility/Exercise Safety Screen: Proceed with mobilization - No exclusion criteria met  ICU Mobility Scale: Walking with assistance of 1 person    Education Documentation  Mobility Training, taught by HENOK Suarez at 7/19/2024  5:02 PM.  Learner: Patient  Readiness: Acceptance  Method: Explanation  Response: Demonstrated Understanding, Needs Reinforcement    Education Comments  No comments found.      Encounter Problems       Encounter Problems (Active)       Balance       Pt will perform 20 alternating marches with LRAD and CGA (Progressing)       Start:  07/15/24    Expected End:  07/29/24               Mobility       Patient will ambulate >50ft with LRAD and CGA (Progressing)       Start:  07/15/24    Expected End:  07/29/24               PT Transfers       Patient will perform bed mobility independently (Progressing)       Start:  07/15/24    Expected End:  07/29/24            Patient will transfer sit to and from stand LRAD and CGA (Progressing)       Start:  07/15/24    Expected End:  07/29/24

## 2024-07-19 NOTE — PROGRESS NOTES
Overnight Events:    No acute events overnight.     Objective Data:  Last Recorded Vitals:  Vitals:    07/19/24 1205 07/19/24 1330 07/19/24 1500 07/19/24 1600   BP:  146/80 138/82 155/78   BP Location:       Patient Position:       Pulse:  59 65 69   Resp:  20 (!) 27 (!) 30   Temp: 36.4 °C (97.5 °F) 36.2 °C (97.2 °F)  36 °C (96.8 °F)   TempSrc: Temporal Temporal  Temporal   SpO2:  97% 93% 95%   Weight:       Height:           Last Labs:  CBC - 7/19/2024:  4:45 AM  13.2 9.9 295    29.0      CMP - 7/19/2024:  4:45 AM  7.7 5.1 24 --- 0.6   8.3 2.5 29 89      PTT - 7/11/2024:  4:17 AM  1.4   15.7 37     TROPHS   Date/Time Value Ref Range Status   07/10/2024 12:13 PM 2,496 0 - 20 ng/L Final      Last I/O:  I/O last 3 completed shifts:  In: 1375.8 (11.1 mL/kg) [I.V.:1375.8 (11.1 mL/kg)]  Out: 1980 (16 mL/kg) [Urine:1980 (0.4 mL/kg/hr)]  Weight: 123.4 kg     Past Cardiology Tests (Last 3 Years):  EKG:  ECG 12 lead 07/16/2024      ECG 12 lead 07/15/2024      ECG 12 lead 07/14/2024 (Preliminary)      ECG 12 lead 07/13/2024      ECG 12 lead 07/12/2024 (Preliminary)      ECG 12 lead 07/12/2024 (Preliminary)      ECG 12 lead 07/12/2024 (Preliminary)      ECG 12 lead 07/11/2024      ECG 12 lead 07/11/2024 (Preliminary)      ECG 12 lead 07/11/2024 (Preliminary)      ECG 12 lead 07/11/2024 (Preliminary)      ECG 12 lead 07/11/2024 (Preliminary)      ECG 12 lead 07/11/2024      ECG 12 lead 07/11/2024      ECG 12 lead 07/11/2024      ECG 12 lead 07/11/2024    Echo:  Transthoracic Echo (TTE) Limited 07/12/2024      Transthoracic Echo (TTE) Complete 07/10/2024    Ejection Fractions:  EF   Date/Time Value Ref Range Status   07/12/2024 05:52 AM 30 %    07/10/2024 04:44 PM 10 %      Cath:  Cardiac Catheterization Procedure 07/10/2024    Stress Test:  No results found for this or any previous visit from the past 1095 days.    Cardiac Imaging:  MR cardiac morphology and function w and wo IV contrast 07/15/2024      Inpatient  Medications:  Scheduled medications   Medication Dose Route Frequency    [START ON 7/20/2024] bumetanide  4 mg intravenous Daily    heparin (porcine)  5,000 Units subcutaneous q8h CINDY    levETIRAcetam  500 mg oral Daily    lidocaine  1 patch transdermal Daily    nadolol  20 mg oral Daily    oxygen   inhalation Continuous - Inhalation    polyethylene glycol  17 g oral Daily     PRN medications   Medication    acetaminophen    dextrose    dextrose    glucagon    glucagon     Continuous Medications   Medication Dose Last Rate     Assessment/Plan   Miguel A Bender is a 43 y.o. male with a PMHx of obesity status post gastric bypass surgery 2 years ago, CHARLEEN, HTN transferred for cardiogenic shock s/p Impella after witnessed sudden V-fib arrest cardiac arrest.     #V-fib arrest  #C/F Torsades de pointes  The patient presented to the hospital after V-fib arrest, requiring multiple DCCV.  Coronary angiogram on 7/10/2024 showed normal coronaries.  During ICU stay, the patient developed multiple episodes of polymorphic VT, these episodes were preceded by PVCs along with underlying rhythm showing prolonged Qtc (590)  which raises concern for torsades de pointes however that was while the patient was on Amiodarone which can be a trigger for prolonged qtc. Other ddx includes ARVD, however on echo there was limited visualization of the right ventricle. Genetics consulted.  -Given the concern for prolonged qt as a potential cause of polymorphic VT, recommend switching metoprolol to Nadolol. Can stop Lidocaine drip.   -Will need an ICD before discharge.    CVC 07/18/24 Triple lumen Left Internal jugular (Active)   Site Assessment Clean;Dry;Intact 07/19/24 1600   Proximal Lumen Status Heparin locked 07/19/24 1600   Medial 1 Lumen Status Infusing 07/19/24 1600   Distal Lumen Status Heparin locked 07/19/24 1600   Dressing Type Antimicrobial patch;Transparent 07/19/24 1600   Dressing Status Clean;Dry;Occlusive 07/19/24 1600   Dressing  Change Due 07/25/24 07/19/24 1600   Number of days: 1       Peripheral IV 07/18/24 20 G Left;Proximal;Anterior Forearm (Active)   Site Assessment Clean;Dry;Intact 07/19/24 1600   Dressing Type Transparent 07/19/24 1600   Line Status Flushed;Capped 07/19/24 1600   Dressing Status Clean;Dry;Occlusive 07/19/24 1600   Number of days: 1       Urethral Catheter 16 Fr. (Active)   Site Assessment Clean;Skin intact 07/19/24 1626   Number of days: 9       Code Status:  Full Code    This case was discussed with Dr. Macario and > 30 minutes was spent in the professional and overall care of this patient.    Bria Malhotra MD  Cardiology Fellow PGY5

## 2024-07-19 NOTE — CARE PLAN
The patient's goals for the shift include      The clinical goals for the shift include Pass swallow study' continue to improve mentation    Over the shift, the patient did not make progress toward the following goals. Barriers to progression include . Recommendations to address these barriers include .      Problem: Skin  Goal: Decreased wound size/increased tissue granulation at next dressing change  7/19/2024 1701 by Stella Rodriguez RN  Flowsheets (Taken 7/19/2024 1701)  Decreased wound size/increased tissue granulation at next dressing change: Protective dressings over bony prominences  7/19/2024 1347 by Stella Rodriguez RN  Outcome: Progressing     Problem: Skin  Goal: Participates in plan/prevention/treatment measures  7/19/2024 1701 by Stella Rodriguez RN  Flowsheets (Taken 7/17/2024 0352 by Leena Holt, RN)  Participates in plan/prevention/treatment measures: Increase activity/out of bed for meals  7/19/2024 1347 by Stella Rodriguez RN  Outcome: Progressing     Problem: Skin  Goal: Prevent/manage excess moisture  7/19/2024 1701 by Stella Rodriguez RN  Flowsheets (Taken 7/17/2024 0352 by Leena Holt, RN)  Prevent/manage excess moisture: Cleanse incontinence/protect with barrier cream  7/19/2024 1347 by Stella Rodriguez RN  Outcome: Progressing     Problem: Skin  Goal: Prevent/minimize sheer/friction injuries  7/19/2024 1701 by Stella Rodriguez RN  Flowsheets (Taken 7/19/2024 1701)  Prevent/minimize sheer/friction injuries: Increase activity/out of bed for meals  7/19/2024 1347 by Stella Rodriguez RN  Outcome: Progressing     Problem: Skin  Goal: Promote/optimize nutrition  7/19/2024 1701 by Stella Rodriguez RN  Flowsheets (Taken 7/19/2024 1701)  Promote/optimize nutrition: Consume > 50% meals/supplements  7/19/2024 1347 by Stella Rodriguez RN  Outcome: Progressing     Problem: Skin  Goal: Promote skin healing  7/19/2024 1701 by Stella Rodriguez RN  Flowsheets (Taken 7/19/2024 1701)  Promote skin healing:   Assess  skin/pad under line(s)/device(s)   Protective dressings over bony prominences  7/19/2024 1347 by Stella Rodriguez RN  Outcome: Progressing

## 2024-07-20 ENCOUNTER — APPOINTMENT (OUTPATIENT)
Dept: DIALYSIS | Facility: HOSPITAL | Age: 44
End: 2024-07-20
Payer: COMMERCIAL

## 2024-07-20 PROBLEM — N18.6 ESRD (END STAGE RENAL DISEASE) ON DIALYSIS (MULTI): Status: ACTIVE | Noted: 2024-07-20

## 2024-07-20 PROBLEM — Z99.2 ESRD (END STAGE RENAL DISEASE) ON DIALYSIS (MULTI): Status: ACTIVE | Noted: 2024-07-20

## 2024-07-20 LAB
ALBUMIN SERPL BCP-MCNC: 2.7 G/DL (ref 3.4–5)
ALP SERPL-CCNC: 88 U/L (ref 33–120)
ALT SERPL W P-5'-P-CCNC: 28 U/L (ref 10–52)
ANION GAP SERPL CALC-SCNC: 20 MMOL/L (ref 10–20)
AST SERPL W P-5'-P-CCNC: 22 U/L (ref 9–39)
BASOPHILS # BLD AUTO: 0.07 X10*3/UL (ref 0–0.1)
BASOPHILS NFR BLD AUTO: 0.5 %
BILIRUB SERPL-MCNC: 0.5 MG/DL (ref 0–1.2)
BUN SERPL-MCNC: 99 MG/DL (ref 6–23)
CALCIUM SERPL-MCNC: 7.9 MG/DL (ref 8.6–10.6)
CHLORIDE SERPL-SCNC: 97 MMOL/L (ref 98–107)
CO2 SERPL-SCNC: 23 MMOL/L (ref 21–32)
CREAT SERPL-MCNC: 10.77 MG/DL (ref 0.5–1.3)
EGFRCR SERPLBLD CKD-EPI 2021: 5 ML/MIN/1.73M*2
EOSINOPHIL # BLD AUTO: 0.35 X10*3/UL (ref 0–0.7)
EOSINOPHIL NFR BLD AUTO: 2.4 %
ERYTHROCYTE [DISTWIDTH] IN BLOOD BY AUTOMATED COUNT: 12.6 % (ref 11.5–14.5)
GLUCOSE SERPL-MCNC: 78 MG/DL (ref 74–99)
HCT VFR BLD AUTO: 26.9 % (ref 41–52)
HGB BLD-MCNC: 9.4 G/DL (ref 13.5–17.5)
IMM GRANULOCYTES # BLD AUTO: 0.18 X10*3/UL (ref 0–0.7)
IMM GRANULOCYTES NFR BLD AUTO: 1.2 % (ref 0–0.9)
LIDOCAIN SERPL-MCNC: 1.1 UG/ML (ref 1–5)
LYMPHOCYTES # BLD AUTO: 1.44 X10*3/UL (ref 1.2–4.8)
LYMPHOCYTES NFR BLD AUTO: 9.7 %
MAGNESIUM SERPL-MCNC: 2.4 MG/DL (ref 1.6–2.4)
MCH RBC QN AUTO: 29.5 PG (ref 26–34)
MCHC RBC AUTO-ENTMCNC: 34.9 G/DL (ref 32–36)
MCV RBC AUTO: 84 FL (ref 80–100)
MONOCYTES # BLD AUTO: 1.09 X10*3/UL (ref 0.1–1)
MONOCYTES NFR BLD AUTO: 7.3 %
NEUTROPHILS # BLD AUTO: 11.74 X10*3/UL (ref 1.2–7.7)
NEUTROPHILS NFR BLD AUTO: 78.9 %
NRBC BLD-RTO: 0 /100 WBCS (ref 0–0)
PLATELET # BLD AUTO: 342 X10*3/UL (ref 150–450)
POTASSIUM SERPL-SCNC: 3.6 MMOL/L (ref 3.5–5.3)
PROT SERPL-MCNC: 5.6 G/DL (ref 6.4–8.2)
RBC # BLD AUTO: 3.19 X10*6/UL (ref 4.5–5.9)
SODIUM SERPL-SCNC: 136 MMOL/L (ref 136–145)
WBC # BLD AUTO: 14.9 X10*3/UL (ref 4.4–11.3)

## 2024-07-20 PROCEDURE — 36415 COLL VENOUS BLD VENIPUNCTURE: CPT

## 2024-07-20 PROCEDURE — 1100000001 HC PRIVATE ROOM DAILY

## 2024-07-20 PROCEDURE — 2500000004 HC RX 250 GENERAL PHARMACY W/ HCPCS (ALT 636 FOR OP/ED)

## 2024-07-20 PROCEDURE — 2500000005 HC RX 250 GENERAL PHARMACY W/O HCPCS

## 2024-07-20 PROCEDURE — 85025 COMPLETE CBC W/AUTO DIFF WBC: CPT

## 2024-07-20 PROCEDURE — 83735 ASSAY OF MAGNESIUM: CPT

## 2024-07-20 PROCEDURE — 93010 ELECTROCARDIOGRAM REPORT: CPT | Performed by: INTERNAL MEDICINE

## 2024-07-20 PROCEDURE — 80176 ASSAY OF LIDOCAINE: CPT

## 2024-07-20 PROCEDURE — 2500000001 HC RX 250 WO HCPCS SELF ADMINISTERED DRUGS (ALT 637 FOR MEDICARE OP)

## 2024-07-20 PROCEDURE — 51702 INSERT TEMP BLADDER CATH: CPT

## 2024-07-20 PROCEDURE — 8010000001 HC DIALYSIS - HEMODIALYSIS PER DAY

## 2024-07-20 PROCEDURE — 90935 HEMODIALYSIS ONE EVALUATION: CPT | Performed by: INTERNAL MEDICINE

## 2024-07-20 PROCEDURE — 80053 COMPREHEN METABOLIC PANEL: CPT

## 2024-07-20 ASSESSMENT — PAIN SCALES - GENERAL
PAINLEVEL_OUTOF10: 0 - NO PAIN
PAINLEVEL_OUTOF10: 0 - NO PAIN

## 2024-07-20 ASSESSMENT — COGNITIVE AND FUNCTIONAL STATUS - GENERAL
DRESSING REGULAR UPPER BODY CLOTHING: A LITTLE
DRESSING REGULAR LOWER BODY CLOTHING: A LITTLE
TURNING FROM BACK TO SIDE WHILE IN FLAT BAD: A LITTLE
DAILY ACTIVITIY SCORE: 19
HELP NEEDED FOR BATHING: A LITTLE
DRESSING REGULAR LOWER BODY CLOTHING: A LITTLE
HELP NEEDED FOR BATHING: A LITTLE
MOVING TO AND FROM BED TO CHAIR: A LITTLE
CLIMB 3 TO 5 STEPS WITH RAILING: A LOT
WALKING IN HOSPITAL ROOM: A LOT
MOBILITY SCORE: 16
TOILETING: A LOT
STANDING UP FROM CHAIR USING ARMS: A LOT
STANDING UP FROM CHAIR USING ARMS: A LOT
MOVING TO AND FROM BED TO CHAIR: A LITTLE
CLIMB 3 TO 5 STEPS WITH RAILING: A LOT
DRESSING REGULAR UPPER BODY CLOTHING: A LITTLE
TURNING FROM BACK TO SIDE WHILE IN FLAT BAD: A LITTLE
DAILY ACTIVITIY SCORE: 19
WALKING IN HOSPITAL ROOM: A LOT
MOBILITY SCORE: 16
TOILETING: A LOT

## 2024-07-20 ASSESSMENT — ACTIVITIES OF DAILY LIVING (ADL): LACK_OF_TRANSPORTATION: NO

## 2024-07-20 ASSESSMENT — PAIN - FUNCTIONAL ASSESSMENT: PAIN_FUNCTIONAL_ASSESSMENT: NO/DENIES PAIN

## 2024-07-20 NOTE — PROGRESS NOTES
07/20/24 1044   Discharge Planning   Living Arrangements Spouse/significant other;Family members   Support Systems Spouse/significant other;Family members   Assistance Needed none   Type of Residence Private residence   Number of Stairs to Enter Residence 2   Number of Stairs Within Residence 2   Do you have animals or pets at home? Yes   Type of Animals or Pets Dogs   Who is requesting discharge planning? Provider   Home or Post Acute Services None   Does the patient need discharge transport arranged? No   Financial Resource Strain   How hard is it for you to pay for the very basics like food, housing, medical care, and heating? Not very   Housing Stability   In the last 12 months, was there a time when you were not able to pay the mortgage or rent on time? N   In the past 12 months, how many times have you moved where you were living? 1   At any time in the past 12 months, were you homeless or living in a shelter (including now)? N   Transportation Needs   In the past 12 months, has lack of transportation kept you from medical appointments or from getting medications? no   In the past 12 months, has lack of transportation kept you from meetings, work, or from getting things needed for daily living? No   Patient Choice   Provider Choice list and CMS website (https://medicare.gov/care-compare#search) for post-acute Quality and Resource Measure Data were provided and reviewed with: Patient   Patient / Family choosing to utilize agency / facility established prior to hospitalization No     Transitional Care Coordinator Note:  7/20/2024@9:00 Met with patient to introduce myself, role and discuss discharge planning s/p admission.  Patient lives with wife   Independent in all ADL's. Does Not  require assist devices for ambulation. Demographics and contact information confirmed.  Will continue to monitor patient for all home going needs.  Michael Pond RN [facility] [phone]              Previous Home Care  None   DME   None   Pharmacy: [facility]   Falls: None   PCP  DR Dangelo last seen 1 yr ago   O2/Cpap- None   Dialysis [facility]   Transportation at discharge- Has transportation

## 2024-07-20 NOTE — PROGRESS NOTES
07/20/24 1044   Discharge Planning   Living Arrangements Spouse/significant other;Family members   Support Systems Spouse/significant other;Family members   Assistance Needed none   Type of Residence Private residence   Number of Stairs to Enter Residence 2   Number of Stairs Within Residence 2   Do you have animals or pets at home? Yes   Type of Animals or Pets Dogs   Who is requesting discharge planning? Provider   Home or Post Acute Services None   Does the patient need discharge transport arranged? No   Financial Resource Strain   How hard is it for you to pay for the very basics like food, housing, medical care, and heating? Not very   Housing Stability   In the last 12 months, was there a time when you were not able to pay the mortgage or rent on time? N   In the past 12 months, how many times have you moved where you were living? 1   At any time in the past 12 months, were you homeless or living in a shelter (including now)? N   Transportation Needs   In the past 12 months, has lack of transportation kept you from medical appointments or from getting medications? no   In the past 12 months, has lack of transportation kept you from meetings, work, or from getting things needed for daily living? No   Patient Choice   Provider Choice list and CMS website (https://medicare.gov/care-compare#search) for post-acute Quality and Resource Measure Data were provided and reviewed with: Patient   Patient / Family choosing to utilize agency / facility established prior to hospitalization No

## 2024-07-20 NOTE — CARE PLAN
The patient's goals for the shift include remain oriented and follow commands    The clinical goals for the shift include improve urine output    Over the shift, the patient remained HDS this shift; pt had HD today and tolerated well; able to feed self; tolerate diet well; pt looking forward to eating regular diet consistency.

## 2024-07-20 NOTE — PROGRESS NOTES
43-year-old male with a history of obesity status post gastric bypass surgery 2 years ago, CHARLEEN, HTN transferred for further management of VT arrest c/b cardiogenic shock s/p Impella placement which was subsequently removed . Hospital course c/b non-oliguric ANNIE and altered mentation concerning for uremic encephalopathy vs hospital delirium vs anoxic brain injury. Nephrology on board, dialysis initiated  with subsequent improvement in mentation. EP also consulted for VT arrest, cMRI notable for dilated RV c/f possible ARVC, for which genetics was also consulted. ARVC lab panel sent out  with likely 3-4 week turnaround time. Pt transferred to telemetry floor on .      Subjective   Pt resting comfortably in bed.   Reports generalized weakness, stable CP and back pain from chest compressions. C/o mild SOB, but w/o orthopnea.    Denies palpitations/nausea/vomiting/lightheadedness/dizziness/chest pain.       Objective   24 Hour Vitals  Temp:  [36 °C (96.8 °F)-36.8 °C (98.2 °F)] 36.8 °C (98.2 °F)  Heart Rate:  [52-64] 57  Resp:  [18-22] 20  BP: (124-162)/(64-90) 162/85    Temp (24hrs), Av.4 °C (97.5 °F), Min:36 °C (96.8 °F), Max:36.8 °C (98.2 °F)     24 hour Intake/Output    Intake/Output Summary (Last 24 hours) at 2024  Last data filed at 2024 1900  Gross per 24 hour   Intake 2040 ml   Output 3225 ml   Net -1185 ml        Labs  CBC  Results from last 72 hours   Lab Units 24  0537 24  0445 24  0213   WBC AUTO x10*3/uL 14.9* 13.2* 9.3   HEMOGLOBIN g/dL 9.4* 9.9* 9.2*   HEMATOCRIT % 26.9* 29.0* 27.1*   PLATELETS AUTO x10*3/uL 342 295 245      BMP  Results from last 72 hours   Lab Units 24  0537 24  0445 24  2316 24  0213   SODIUM mmol/L 136 134* 136 136   POTASSIUM mmol/L 3.6 4.8 4.1 4.4   CHLORIDE mmol/L 97* 99 100 99   BUN mg/dL 99* 123* 118* 144*   CREATININE mg/dL 10.77* 13.46* 12.63* 13.87*   MAGNESIUM mg/dL 2.40 2.57* 2.55* 2.78*   PHOSPHORUS  mg/dL  --   --  8.3* 10.8*       Medications   Scheduled Medications  bumetanide, 4 mg, intravenous, Daily  heparin (porcine), 5,000 Units, subcutaneous, q8h CINDY  levETIRAcetam, 500 mg, oral, Daily  lidocaine, 1 patch, transdermal, Daily  nadolol, 20 mg, oral, Daily  oxygen, , inhalation, Continuous - Inhalation  polyethylene glycol, 17 g, oral, Daily     Continuous Medications    PRN Medications  PRN medications: acetaminophen, dextrose, dextrose, glucagon, glucagon        Physical Exam  General: Resting comfortably in bed. Well developed, obese. Appears stated age. In no acute distress   HEENT: Normocephalic and atraumatic. EOMI, sclera non-icteric, MMM, no JVD  Cardiovascular: RRR, no r/m/g  Pulmonary: bibasilar crackles. No wheezes.  GI: +BS, soft, non-tender, non-distended  : No suprapubic/ flank pain  Extremities: No LE edema, no asterixis  Skin: warm and dry. Bruises on chest wall after chest compressions  Neurologic: CN II-XII grossly intact. No focal neurologic deficit   Psych: Pleasant. Appropriate mood and affect        Assessment/Plan      43-year-old male with a history of obesity status post gastric bypass surgery 2 years ago, CHARLEEN, HTN, s/p VT arrest c/b cardiogenic shock requiring Impella (removed 7/13), non oliguric ANNIE currently on dialysis, AMS likely d/t uremia.  Pt had runs of polymorphic VT while in ICU. cMRI w/ dilated RV c/f Arrhythmogenic RV cardiomyopahty. Genetic testing pending. EP rec. ICD placement.     #Ventricular Fibrillation and Polymorphic Vtach  #Non-ischemic cardiomyopathy  #Cardiac arrest of unclear etiology  #Concern for Arrhythmogenic Right Ventricular Cardiomyopathy  ::Cath from this admission clean  ::C/f arrhythmia such as prolonged QT in 500s  ::Was on amdiodarone on admission. Had polymorphic SVT with sinus pauses on night of 7/10, so amiodarone weaned off and switched to lidocaine  - EP following  - Genetics cfollowing  - Last Qtc around 413-428  - Meets major criteria  of RV dilation and arrhythmia for ARVC. No consistent V1-V3 TWI, epsilon wave, or delayed S wave upstroke. No family history of SCD  -Optimize electrolytes K > 4 and Mg > 2  -Monitor on telemetry  - lidocaine gtt transitioned to nadolol per EP recs  - needs ICD before discharge     #HFrEF  ::Most recent TTE with EF 30%, 10% on admission  -on nadolol for BB per EP  -avoiding ACE/ARB iso acute renal failure  -avoiding MRA/SGLT2 given acute renal failure  -Can consider hydral/isordil for afterload reduction        #acute renal failure  #uremic encephalopathy-resolved::Likely pre-renal i/s/o cardiogenic shock   -BUN down trending  -Currently PRN HD for uremia and fluid overload per nephro  -Increase diuresis to bumex 4mg BID  - Nephrology consulted, appreciate recs  - Dry weight of 117 -> 118   - avoid nephrotoxins  - Daily RFP     #Acute metabolic encephalopathy-resolved  #History of myoclonic jerks and ophisthotonos at outside hospital  #Concern for anoxic brain injury i/s/o cardiac arrest-resolved  ::Reported opisthotonos at [facility]  -Video EEG has preliminary result of severe diffuse encephalopathy with no epileptiform activity- Able to respond to questions but sometimes takes some time to think.  - Keppra 500 mg daily (renal dosing) will be continued until outpatient  -Consider MRI brain if altered mentation  -Mentation improved following dialysis initiation on 7/18; continue to monitor and consider MRI brain if remains altered      Gabrielle Lopez MD  Anesthesiology PGY-1

## 2024-07-20 NOTE — PROGRESS NOTES
NEPHROLOGY FOLLOW UP NOTE    Miguel A Bender   43 y.o.    123kg  MRN/Room: [id number]    Subjective: No acute overnight events.     Objective:   He is more conversant, awake and responsive  Labs acceptable but planned for HD today.  Hemodynamics stable    Meds:   bumetanide, 4 mg, Daily  heparin (porcine), 5,000 Units, q8h CINDY  levETIRAcetam, 500 mg, Daily  lidocaine, 1 patch, Daily  nadolol, 20 mg, Daily  oxygen, , Continuous - Inhalation  polyethylene glycol, 17 g, Daily        acetaminophen, 975 mg, q8h PRN  dextrose, 12.5 g, q15 min PRN  dextrose, 25 g, q15 min PRN  glucagon, 1 mg, q15 min PRN  glucagon, 1 mg, q15 min PRN        Vitals:    07/20/24 1524   BP: 131/75   Pulse: 62   Resp: 18   Temp: 36.6 °C (97.9 °F)   SpO2: 93%          Intake/Output Summary (Last 24 hours) at 7/20/2024 1606  Last data filed at 7/20/2024 1524  Gross per 24 hour   Intake 2166.25 ml   Output 2920 ml   Net -753.75 ml       General appearance: Awake and alert, oriented. No acute distress  HEENT: NC/AT, oral mucosa  Skin: no apparent rash  Heart: heart sounds 1 & 2 present and normal, no murmurs heard or rub  Lungs: Adequate air entry, breath sounds bilaterally.  No wheezing/crackles  Abdomen: soft, non tender  Extremities: No edema, no joint swelling  Neuro: No FND,  No asterixis   ACCESS: PIV, R. I J triple lumen     Blood Labs:  Results for orders placed or performed during the hospital encounter of 07/10/24 (from the past 24 hour(s))   Lidocaine   Result Value Ref Range    Lidocaine  1.1 1.0 - 5.0 ug/mL   Magnesium   Result Value Ref Range    Magnesium 2.40 1.60 - 2.40 mg/dL   CBC and Auto Differential   Result Value Ref Range    WBC 14.9 (H) 4.4 - 11.3 x10*3/uL    nRBC 0.0 0.0 - 0.0 /100 WBCs    RBC 3.19 (L) 4.50 - 5.90 x10*6/uL    Hemoglobin 9.4 (L) 13.5 - 17.5 g/dL    Hematocrit 26.9 (L) 41.0 - 52.0 %    MCV 84 80 - 100 fL    MCH 29.5 26.0 - 34.0 pg    MCHC 34.9 32.0 - 36.0 g/dL    RDW 12.6 11.5 - 14.5 %    Platelets 342  150 - 450 x10*3/uL    Neutrophils % 78.9 40.0 - 80.0 %    Immature Granulocytes %, Automated 1.2 (H) 0.0 - 0.9 %    Lymphocytes % 9.7 13.0 - 44.0 %    Monocytes % 7.3 2.0 - 10.0 %    Eosinophils % 2.4 0.0 - 6.0 %    Basophils % 0.5 0.0 - 2.0 %    Neutrophils Absolute 11.74 (H) 1.20 - 7.70 x10*3/uL    Immature Granulocytes Absolute, Automated 0.18 0.00 - 0.70 x10*3/uL    Lymphocytes Absolute 1.44 1.20 - 4.80 x10*3/uL    Monocytes Absolute 1.09 (H) 0.10 - 1.00 x10*3/uL    Eosinophils Absolute 0.35 0.00 - 0.70 x10*3/uL    Basophils Absolute 0.07 0.00 - 0.10 x10*3/uL   Comprehensive Metabolic Panel   Result Value Ref Range    Glucose 78 74 - 99 mg/dL    Sodium 136 136 - 145 mmol/L    Potassium 3.6 3.5 - 5.3 mmol/L    Chloride 97 (L) 98 - 107 mmol/L    Bicarbonate 23 21 - 32 mmol/L    Anion Gap 20 10 - 20 mmol/L    Urea Nitrogen 99 (HH) 6 - 23 mg/dL    Creatinine 10.77 (H) 0.50 - 1.30 mg/dL    eGFR 5 (L) >60 mL/min/1.73m*2    Calcium 7.9 (L) 8.6 - 10.6 mg/dL    Albumin 2.7 (L) 3.4 - 5.0 g/dL    Alkaline Phosphatase 88 33 - 120 U/L    Total Protein 5.6 (L) 6.4 - 8.2 g/dL    AST 22 9 - 39 U/L    Bilirubin, Total 0.5 0.0 - 1.2 mg/dL    ALT 28 10 - 52 U/L          ASSESSMENT:  Miguel A Bender is a  43 y.o.  year old , with PMHx of HTN, CHARLEEN, NSTEMI, and gastric bypass surgery admitted to [facility] on 7/10 with cardiac arrest. S/p left heart cath with Impella placement. Nephrology consulted for ANNIE with worsening UOP and renal function.        #ANNIE, oliguric (Baseline Cr 1.19)  - Etiology : Post cardiac arrest (Hemodynamic likely with ATN)  - Electrolytes stable , mild AGMA and NAGMA  - Likely progressed to ATN  - On HD for Overload and Uremia (Possibly uremic encephalopathy)     Recommendations:  - Underwent HD today  - Continue supportive management   - Will continue to follow  - Strict I/Os   - Avoid IV contrast & Nephrotoxics.    Dr. Tesfaye Garcia  Nephrology Fellow

## 2024-07-20 NOTE — SIGNIFICANT EVENT
"SENIOR STAFFING NOTE:    S:    Seen after dialysis, transferred from CICU this AM. Patient is doing well. States he has some dyspnea and chest pain from contractions but it has improved. Has been making urine.     O:  Physical Exam:    /75 (BP Location: Right arm, Patient Position: Lying)   Pulse 62   Temp 36.6 °C (97.9 °F) (Temporal)   Resp 18   Ht 1.78 m (5' 10.08\")   Wt 118 kg (261 lb 3.9 oz)   SpO2 93%   BMI 37.40 kg/m²     HEENT: NCAT, no JVD  CHEST: crackles b/l bases, normal WOB  CV: RRR no RMG  ABDOMEN: soft NTND  EXT: no edema b/l  NEURO: Aox4, no FND, no asterixis     A/P: 43-year-old male with a history of obesity status post gastric bypass surgery 2 years ago, CHARLEEN, HTN transferred for further management of VT arrest c/b cardiogenic shock s/p Impella placement which was subsequently removed 7/13. Hospital course c/b non-oliguric ANNIE and altered mentation concerning for uremic encephalopathy vs hospital delirium vs anoxic brain injury. Nephrology on board, dialysis initiated 7/18 with subsequent improvement in mentation. EP also consulted for VT arrest, cMRI notable for dilated RV c/f possible ARVC, for which genetics was also consulted. ARVC lab panel sent out 7/18 with likely 3-4 week turnaround time.       #Ventricular Fibrillation and Polymorphic Vtach  #Non-ischemic cardiomyopathy  #Cardiac arrest of unclear etiology  #Concern for Arrhythmogenic Right Ventricular Cardiomyopathy  ::Cath from this admission clean  ::C/f arrhythmia such as prolonged QT in 500s  ::Was on amdiodarone on admission. Had polymorphic SVT with sinus pauses on night of 7/10, so amiodarone weaned off and switched to lidocaine  - EP consulted, appreciate recs  -Genetics consulted, appreciate recs  - Last Qtc around 413-428  - Meets major criteria of RV dilation and arrhythmia for ARVC. No consistent V1-V3 TWI, epsilon wave, or delayed S wave upstroke. No family history of SCD  -Optimize electrolytes K > 4 and Mg > " 2  -Monitor on telemetry  - lidocaine gtt transitioned to nadolol per EP recs  - needs ICD before discharge    #HFrEF  ::Most recent TTE with EF 30%, 10% on admission  -on nadolol for BB per EP  -avoiding ACE/ARB iso acute renal failure  -avoiding MRA/SGLT2 given acute renal failure  -Can consider hydral/isordil for afterload reduction      #acute renal failure  #uremic encephalopathy-resolved::Likely pre-renal i/s/o cardiogenic shock   -BUN uptrending with concern for uremic encephalopathy  -Approximately 1L of urine output in last 24 hours  -Currently PRN HD per nephro  -Increase diuresis to bumex 4mg BID  - Nephrology consulted, appreciate recs  - Dry weight of 117 -> 126   - avoid nephrotoxins  - Daily RFP    #Acute metabolic encephalopathy-resolved  #History of myoclonic jerks and ophisthotonos at outside hospital  #Concern for anoxic brain injury i/s/o cardiac arrest-resolved  ::Reported opisthotonos at [facility]  -Video EEG has preliminary result of severe diffuse encephalopathy with no epileptiform activity- Able to respond to questions but sometimes takes some time to think.  - Continued/worsening altered mentation concerning for possible metabolic encephalopathy  - Keppra 500 mg daily (renal dosing) will be continued until outpatient  -Consider MRI brain if altered mentation  -Mentation improved following dialysis initiation on 7/18; continue to monitor and consider MRI brain if remains altered    FULL CODE  NOK: Wife Jazmin Bender [phone]    Rest of care per excellent intern progress note

## 2024-07-20 NOTE — NURSING NOTE
Report to Receiving RN:    Report To: Safia  Time Report Called: 1310  Hand-Off Communication: post /81, P 52, had some nausea during tx but it subsided. Was bradycardic the entire tx, mostly 52-54. No fluid removed.  Complications During Treatment: No  Ultrafiltration Treatment: No  Medications Administered During Dialysis: No  Blood Products Administered During Dialysis: No  Labs Sent During Dialysis: No  Heparin Drip Rate Changes: N/A  Dialysis Catheter Dressing: intact  Last Dressing Change: unknown  During tx pt was educated on symptoms that he would want to let the staff know immediately including: becoming suddenly hot, . Nauseous, chest pain, light headed. He verb understanding. Also, not to eat a heavy meal before tx.    E  Last Updated: 3:12 PM by DELTA FORBES

## 2024-07-20 NOTE — NURSING NOTE
Report from Sending RN:    Report From: Safia  Recent Surgery of Procedure: Yes  Baseline Level of Consciousness (LOC): A and O x 3, getting better with dialysis.  Oxygen Use: No  Type: Na  Diabetic: No  Last BP Med Given Day of Dialysis: None  Last Pain Med Given: None  Lab Tests to be Obtained with Dialysis: Yes  Blood Transfusion to be Given During Dialysis: No  Available IV Access: Yes  Medications to be Administered During Dialysis: No  Continuous IV Infusion Running: No  Restraints on Currently or in the Last 24 Hours: No  Hand-Off Communication: Is stable to come but will require telemetry.   Dialysis Catheter Dressing:  Intact  Last Dressing Change: Unknown

## 2024-07-21 ENCOUNTER — APPOINTMENT (OUTPATIENT)
Dept: CARDIOLOGY | Facility: HOSPITAL | Age: 44
DRG: 276 | End: 2024-07-21
Payer: COMMERCIAL

## 2024-07-21 ENCOUNTER — APPOINTMENT (OUTPATIENT)
Dept: RADIOLOGY | Facility: HOSPITAL | Age: 44
DRG: 276 | End: 2024-07-21
Payer: COMMERCIAL

## 2024-07-21 VITALS
OXYGEN SATURATION: 95 % | DIASTOLIC BLOOD PRESSURE: 89 MMHG | WEIGHT: 266.8 LBS | SYSTOLIC BLOOD PRESSURE: 156 MMHG | HEART RATE: 54 BPM | RESPIRATION RATE: 20 BRPM | TEMPERATURE: 98.2 F | HEIGHT: 70 IN | BODY MASS INDEX: 38.2 KG/M2

## 2024-07-21 LAB
ALBUMIN SERPL BCP-MCNC: 2.7 G/DL (ref 3.4–5)
ALBUMIN SERPL BCP-MCNC: 2.8 G/DL (ref 3.4–5)
ALP SERPL-CCNC: 87 U/L (ref 33–120)
ALT SERPL W P-5'-P-CCNC: 28 U/L (ref 10–52)
ANION GAP SERPL CALC-SCNC: 18 MMOL/L (ref 10–20)
ANION GAP SERPL CALC-SCNC: 19 MMOL/L (ref 10–20)
AST SERPL W P-5'-P-CCNC: 24 U/L (ref 9–39)
BACTERIA BLD CULT: NORMAL
BACTERIA BLD CULT: NORMAL
BILIRUB SERPL-MCNC: 0.5 MG/DL (ref 0–1.2)
BUN SERPL-MCNC: 67 MG/DL (ref 6–23)
BUN SERPL-MCNC: 70 MG/DL (ref 6–23)
CALCIUM SERPL-MCNC: 7.7 MG/DL (ref 8.6–10.6)
CALCIUM SERPL-MCNC: 7.8 MG/DL (ref 8.6–10.6)
CHLORIDE SERPL-SCNC: 96 MMOL/L (ref 98–107)
CHLORIDE SERPL-SCNC: 98 MMOL/L (ref 98–107)
CO2 SERPL-SCNC: 25 MMOL/L (ref 21–32)
CO2 SERPL-SCNC: 25 MMOL/L (ref 21–32)
CREAT SERPL-MCNC: 9.06 MG/DL (ref 0.5–1.3)
CREAT SERPL-MCNC: 9.75 MG/DL (ref 0.5–1.3)
EGFRCR SERPLBLD CKD-EPI 2021: 6 ML/MIN/1.73M*2
EGFRCR SERPLBLD CKD-EPI 2021: 7 ML/MIN/1.73M*2
GLUCOSE SERPL-MCNC: 82 MG/DL (ref 74–99)
GLUCOSE SERPL-MCNC: 96 MG/DL (ref 74–99)
LIDOCAIN SERPL-MCNC: <1 UG/ML (ref 1–5)
MAGNESIUM SERPL-MCNC: 2.16 MG/DL (ref 1.6–2.4)
MAGNESIUM SERPL-MCNC: 2.29 MG/DL (ref 1.6–2.4)
MRSA DNA SPEC QL NAA+PROBE: DETECTED
PHOSPHATE SERPL-MCNC: 5.8 MG/DL (ref 2.5–4.9)
POTASSIUM SERPL-SCNC: 3.6 MMOL/L (ref 3.5–5.3)
POTASSIUM SERPL-SCNC: 3.7 MMOL/L (ref 3.5–5.3)
PROCALCITONIN SERPL-MCNC: 1.16 NG/ML
PROT SERPL-MCNC: 5.6 G/DL (ref 6.4–8.2)
SODIUM SERPL-SCNC: 136 MMOL/L (ref 136–145)
SODIUM SERPL-SCNC: 137 MMOL/L (ref 136–145)

## 2024-07-21 PROCEDURE — 83735 ASSAY OF MAGNESIUM: CPT

## 2024-07-21 PROCEDURE — 2500000005 HC RX 250 GENERAL PHARMACY W/O HCPCS

## 2024-07-21 PROCEDURE — 36415 COLL VENOUS BLD VENIPUNCTURE: CPT

## 2024-07-21 PROCEDURE — 80176 ASSAY OF LIDOCAINE: CPT

## 2024-07-21 PROCEDURE — 93005 ELECTROCARDIOGRAM TRACING: CPT

## 2024-07-21 PROCEDURE — 1100000001 HC PRIVATE ROOM DAILY

## 2024-07-21 PROCEDURE — 80053 COMPREHEN METABOLIC PANEL: CPT

## 2024-07-21 PROCEDURE — 2500000001 HC RX 250 WO HCPCS SELF ADMINISTERED DRUGS (ALT 637 FOR MEDICARE OP)

## 2024-07-21 PROCEDURE — 71045 X-RAY EXAM CHEST 1 VIEW: CPT

## 2024-07-21 PROCEDURE — 99232 SBSQ HOSP IP/OBS MODERATE 35: CPT

## 2024-07-21 PROCEDURE — 2500000004 HC RX 250 GENERAL PHARMACY W/ HCPCS (ALT 636 FOR OP/ED)

## 2024-07-21 PROCEDURE — 87040 BLOOD CULTURE FOR BACTERIA: CPT

## 2024-07-21 PROCEDURE — 93010 ELECTROCARDIOGRAM REPORT: CPT | Performed by: INTERNAL MEDICINE

## 2024-07-21 PROCEDURE — 71045 X-RAY EXAM CHEST 1 VIEW: CPT | Performed by: RADIOLOGY

## 2024-07-21 PROCEDURE — 80069 RENAL FUNCTION PANEL: CPT | Mod: CCI

## 2024-07-21 PROCEDURE — 2500000002 HC RX 250 W HCPCS SELF ADMINISTERED DRUGS (ALT 637 FOR MEDICARE OP, ALT 636 FOR OP/ED)

## 2024-07-21 PROCEDURE — 84145 PROCALCITONIN (PCT): CPT

## 2024-07-21 PROCEDURE — 87641 MR-STAPH DNA AMP PROBE: CPT

## 2024-07-21 RX ORDER — VANCOMYCIN HYDROCHLORIDE 1 G/20ML
INJECTION, POWDER, LYOPHILIZED, FOR SOLUTION INTRAVENOUS DAILY PRN
Status: DISPENSED | OUTPATIENT
Start: 2024-07-21 | End: 2024-07-27

## 2024-07-21 RX ORDER — HYDRALAZINE HYDROCHLORIDE 10 MG/1
10 TABLET, FILM COATED ORAL 3 TIMES DAILY
Status: DISCONTINUED | OUTPATIENT
Start: 2024-07-21 | End: 2024-07-24

## 2024-07-21 RX ORDER — ISOSORBIDE DINITRATE 10 MG/1
5 TABLET ORAL
Status: DISCONTINUED | OUTPATIENT
Start: 2024-07-21 | End: 2024-07-24

## 2024-07-21 RX ORDER — BUMETANIDE 1 MG/1
4 TABLET ORAL DAILY
Status: DISCONTINUED | OUTPATIENT
Start: 2024-07-21 | End: 2024-07-27 | Stop reason: HOSPADM

## 2024-07-21 RX ORDER — POTASSIUM CHLORIDE 20 MEQ/1
40 TABLET, EXTENDED RELEASE ORAL ONCE
Status: COMPLETED | OUTPATIENT
Start: 2024-07-21 | End: 2024-07-21

## 2024-07-21 ASSESSMENT — COGNITIVE AND FUNCTIONAL STATUS - GENERAL
DRESSING REGULAR LOWER BODY CLOTHING: A LITTLE
HELP NEEDED FOR BATHING: A LITTLE
STANDING UP FROM CHAIR USING ARMS: A LOT
WALKING IN HOSPITAL ROOM: A LOT
DRESSING REGULAR UPPER BODY CLOTHING: A LITTLE
DAILY ACTIVITIY SCORE: 20
HELP NEEDED FOR BATHING: A LITTLE
CLIMB 3 TO 5 STEPS WITH RAILING: A LOT
TOILETING: A LOT
TURNING FROM BACK TO SIDE WHILE IN FLAT BAD: A LITTLE
MOVING TO AND FROM BED TO CHAIR: A LITTLE
WALKING IN HOSPITAL ROOM: A LITTLE
TOILETING: A LITTLE
STANDING UP FROM CHAIR USING ARMS: A LOT
DRESSING REGULAR UPPER BODY CLOTHING: A LITTLE
MOBILITY SCORE: 17
TURNING FROM BACK TO SIDE WHILE IN FLAT BAD: A LITTLE
DRESSING REGULAR LOWER BODY CLOTHING: A LITTLE
MOVING TO AND FROM BED TO CHAIR: A LITTLE
CLIMB 3 TO 5 STEPS WITH RAILING: A LOT

## 2024-07-21 ASSESSMENT — PAIN - FUNCTIONAL ASSESSMENT
PAIN_FUNCTIONAL_ASSESSMENT: 0-10
PAIN_FUNCTIONAL_ASSESSMENT: 0-10

## 2024-07-21 ASSESSMENT — PAIN SCALES - GENERAL
PAINLEVEL_OUTOF10: 0 - NO PAIN
PAINLEVEL_OUTOF10: 0 - NO PAIN

## 2024-07-21 NOTE — PROGRESS NOTES
Discharge Planning Note:      Miguel A Bender is a 43 y.o. male on day 11 of admission presenting with Cardiac arrest (Multi).    Spoke with the patients wife informed of the AR recommendation form therapy, explained the difference of care levels. Would like a list of preferences prior to decision. Will pass this on to the next TCC to take a list.      Zulay Garza RN

## 2024-07-21 NOTE — CONSULTS
Vancomycin Dosing by Pharmacy- INITIAL    Miguel A Bender is a 43 y.o. year old male who Pharmacy has been consulted for vancomycin dosing for pneumonia. Based on the patient's indication and renal status this patient will be dosed based on a goal pre-HD level of 20-25.     Renal function is currently improving - Scr trending downwards and making urine. Per Nephro and daily progress note by Cardio, patient on IHD for uremia and fluid overload (does not appear to be on IHD at baseline). Last IHD session was .     Visit Vitals  /81   Pulse 56   Temp 36.8 °C (98.2 °F)   Resp 18      Lab Results   Component Value Date    CREATININE 10.77 (H) 2024    CREATININE 13.46 (H) 2024    CREATININE 12.63 (H) 2024    CREATININE 13.87 (H) 2024      Patient weight is as follows:   Vitals:    24 0600   Weight: 121 kg (266 lb 12.8 oz)     Cultures:  Susceptibility data for the encounter in last 14 days.  Collected Specimen Info Organism   24 Blood culture from Arterial Line Micrococcus         I/O last 3 completed shifts:  In: 2400 (19.8 mL/kg) [P.O.:600; I.V.:1000 (8.3 mL/kg); Other:800]  Out: 3650 (30.2 mL/kg) [Urine:2850 (0.7 mL/kg/hr); Other:800]  Weight: 121 kg   I/O during current shift:  I/O this shift:  In: -   Out: 750 [Urine:750]    Temp (24hrs), Av.6 °C (97.8 °F), Min:36 °C (96.8 °F), Max:37.4 °C (99.3 °F)     Assessment/Plan     Patient will be given a loading dose of 2000 mg.  No follow-up level ordered yet due to unclear HD plan - follow Nephro plan on when next IHD session will be and order pre-IHD level.   Will continue to monitor renal function daily while on vancomycin and order serum creatinine at least every 48 hours if not already ordered.  Follow for continued vancomycin needs, clinical response, and signs/symptoms of toxicity.     Elle Gutiérrez, PharmD

## 2024-07-21 NOTE — PROGRESS NOTES
43-year-old male with a history of obesity status post gastric bypass surgery 2 years ago, CHARLEEN, HTN transferred for further management of VT arrest c/b cardiogenic shock s/p Impella placement which was subsequently removed . Hospital course c/b non-oliguric ANNIE and altered mentation concerning for uremic encephalopathy vs hospital delirium vs anoxic brain injury. Nephrology on board, dialysis initiated  with subsequent improvement in mentation. Pt had episodes of opisthotonus at [facility], w/o epileptiform activity, was put on Keppra with plan to continue as OP.  EP also consulted for VT arrest, cMRI notable for dilated RV c/f possible ARVC, for which genetics was also consulted. ARVC lab panel sent out . Pt transferred to telemetry floor on .      Subjective   No acute events overnight.    This AM, pt states had fever at night for which he received tylenol. Fever subsided and has not returned. C/o cough productive of yellow sputum since , although the frequency of it has decreased. Denies pain with urination, burning, hematuria. C/o nausea at night, but no vomiting.    Denies CP/SOB/palpitations/vomiting/lightheadedness/dizziness/vision changes.       Objective   24 Hour Vitals  Temp:  [36 °C (96.8 °F)-37.4 °C (99.3 °F)] 36.9 °C (98.4 °F)  Heart Rate:  [52-62] 59  Resp:  [16-20] 18  BP: (131-168)/(75-94) 152/88    Temp (24hrs), Av.7 °C (98 °F), Min:36 °C (96.8 °F), Max:37.4 °C (99.3 °F)     24 hour Intake/Output    Intake/Output Summary (Last 24 hours) at 2024 1305  Last data filed at 2024 0733  Gross per 24 hour   Intake 1640 ml   Output 3375 ml   Net -1735 ml        Labs  CBC  Results from last 72 hours   Lab Units 24  0537 24  0445   WBC AUTO x10*3/uL 14.9* 13.2*   HEMOGLOBIN g/dL 9.4* 9.9*   HEMATOCRIT % 26.9* 29.0*   PLATELETS AUTO x10*3/uL 342 295      BMP  Results from last 72 hours   Lab Units 24  0818 24  0537 24  0445 24  7220    SODIUM mmol/L 137 136 134* 136   POTASSIUM mmol/L 3.7 3.6 4.8 4.1   CHLORIDE mmol/L 98 97* 99 100   BUN mg/dL 67* 99* 123* 118*   CREATININE mg/dL 9.06* 10.77* 13.46* 12.63*   MAGNESIUM mg/dL 2.29 2.40 2.57* 2.55*   PHOSPHORUS mg/dL  --   --   --  8.3*       Medications   Scheduled Medications  bumetanide, 4 mg, intravenous, Daily  heparin (porcine), 5,000 Units, subcutaneous, q8h CINDY  hydrALAZINE, 10 mg, oral, TID  isosorbide dinitrate, 5 mg, oral, TID  levETIRAcetam, 500 mg, oral, Daily  lidocaine, 1 patch, transdermal, Daily  nadolol, 20 mg, oral, Daily  oxygen, , inhalation, Continuous - Inhalation  piperacillin-tazobactam, 2.25 g, intravenous, q8h  polyethylene glycol, 17 g, oral, Daily  vancomycin, 2,000 mg, intravenous, Once     Continuous Medications    PRN Medications  PRN medications: acetaminophen, dextrose, dextrose, glucagon, glucagon, vancomycin        Physical Exam  General: Resting comfortably in bed. Well developed, obese. Appears stated age. In no acute distress   HEENT: Normocephalic and atraumatic. EOMI, sclera non-icteric, MMM, no JVD  Cardiovascular: RRR, no r/m/g  Pulmonary: bibasilar crackles. Decreased breath sounds over R lung base to mid region. No wheezes.  GI: +BS, soft, non-tender, non-distended  : No suprapubic/ flank pain  Extremities: No LE edema, no asterixis  Skin: warm and dry. Bruises on chest wall after chest compressions  Neurologic: CN II-XII grossly intact. No focal neurologic deficit   Psych: Pleasant. Appropriate mood and affect        Assessment/Plan      43-year-old male with a history of obesity status post gastric bypass surgery 2 years ago, CHARLEEN, HTN, s/p VT arrest c/b cardiogenic shock requiring Impella (removed 7/13), non oliguric ANNIE currently on dialysis, AMS likely d/t uremia.Pt had runs of polymorphic VT while in ICU. cMRI w/ dilated RV c/f Arrhythmogenic RV cardiomyopahty. Genetic testing pending. EP rec. ICD placement.       Updates:    7/21:  - mild fever  overnight, productive cough, crackles and decreased breath sounds on R side on PE => CXR, procal, blood cx, empiric Vanc&Zosyn initiated  - initiated hydralazine 10 mg and isordil 5 mg  - removed El per pt's request, strict I&Os, bladder scan, straight cath PRN  - added Tigan PRN for nausea    #Ventricular Fibrillation and Polymorphic Vtach  #Non-ischemic cardiomyopathy  #Cardiac arrest of unclear etiology  #Concern for Arrhythmogenic Right Ventricular Cardiomyopathy  ::Cath from this admission clean  ::Hx of prolonged Qtc in ICU  ::Was on amdiodarone on admission. Had polymorphic SVT with sinus pauses on night of 7/10, so amiodarone weaned off and switched to lidocaine  - EP, Genetics following  - Last QTc around 413-428  - Meets major criteria of RV dilation and arrhythmia for ARVC. No consistent V1-V3 TWI, epsilon wave, or delayed S wave upstroke. No family history of SCD  - continue Nadolol per EP recs  - needs ICD before discharge     #HFrEF  ::Most recent TTE with EF 30% prior to this admission, 10% on admission  -on Nadolol for BB per EP  -avoiding ACE/ARB iso acute renal failure  -avoiding MRA/SGLT2 given acute renal failure  -started hydralazine 10 mg TID, isordil 5mg TID     #acute renal failure  #uremic encephalopathy-resolved::Likely pre-renal i/s/o cardiogenic shock   -BUN down trending  - next HD session 7/23  - Bumex 4mg PO every day   - Nephrology following, next HD session on 7/23.  - Tigan PRN nausea     #Acute metabolic encephalopathy-resolved  #History of myoclonic jerks and ophisthotonos at outside hospital  #Concern for anoxic brain injury i/s/o cardiac arrest-resolved  ::Reported opisthotonos at [facility]  -Video EEG has preliminary result of severe diffuse encephalopathy with no epileptiform activity- Able to respond to questions but sometimes takes some time to think.  - Keppra 500 mg daily (renal dosing) will be continued until outpatient  -Consider MRI brain if altered mentation  -Mentation  improved following dialysis initiation on 7/18; continue to monitor and consider MRI brain if remains altered      Gabrielle Lopez MD  Anesthesiology PGY-1

## 2024-07-22 LAB
ALBUMIN SERPL BCP-MCNC: 2.8 G/DL (ref 3.4–5)
ALBUMIN SERPL BCP-MCNC: 2.9 G/DL (ref 3.4–5)
ALP SERPL-CCNC: 84 U/L (ref 33–120)
ALT SERPL W P-5'-P-CCNC: 27 U/L (ref 10–52)
ANION GAP SERPL CALC-SCNC: 16 MMOL/L (ref 10–20)
ANION GAP SERPL CALC-SCNC: 17 MMOL/L (ref 10–20)
AST SERPL W P-5'-P-CCNC: 32 U/L (ref 9–39)
BASOPHILS # BLD AUTO: 0.13 X10*3/UL (ref 0–0.1)
BASOPHILS NFR BLD AUTO: 1.1 %
BILIRUB SERPL-MCNC: 0.5 MG/DL (ref 0–1.2)
BUN SERPL-MCNC: 75 MG/DL (ref 6–23)
BUN SERPL-MCNC: 77 MG/DL (ref 6–23)
CALCIUM SERPL-MCNC: 8.1 MG/DL (ref 8.6–10.6)
CALCIUM SERPL-MCNC: 8.2 MG/DL (ref 8.6–10.6)
CHLORIDE SERPL-SCNC: 96 MMOL/L (ref 98–107)
CHLORIDE SERPL-SCNC: 96 MMOL/L (ref 98–107)
CO2 SERPL-SCNC: 25 MMOL/L (ref 21–32)
CO2 SERPL-SCNC: 26 MMOL/L (ref 21–32)
CREAT SERPL-MCNC: 10.61 MG/DL (ref 0.5–1.3)
CREAT SERPL-MCNC: 9.8 MG/DL (ref 0.5–1.3)
EGFRCR SERPLBLD CKD-EPI 2021: 6 ML/MIN/1.73M*2
EGFRCR SERPLBLD CKD-EPI 2021: 6 ML/MIN/1.73M*2
EOSINOPHIL # BLD AUTO: 0.6 X10*3/UL (ref 0–0.7)
EOSINOPHIL NFR BLD AUTO: 5 %
ERYTHROCYTE [DISTWIDTH] IN BLOOD BY AUTOMATED COUNT: 12.3 % (ref 11.5–14.5)
GLUCOSE SERPL-MCNC: 107 MG/DL (ref 74–99)
GLUCOSE SERPL-MCNC: 67 MG/DL (ref 74–99)
HCT VFR BLD AUTO: 28.3 % (ref 41–52)
HGB BLD-MCNC: 9.6 G/DL (ref 13.5–17.5)
HOLD SPECIMEN: NORMAL
IMM GRANULOCYTES # BLD AUTO: 0.23 X10*3/UL (ref 0–0.7)
IMM GRANULOCYTES NFR BLD AUTO: 1.9 % (ref 0–0.9)
LIDOCAIN SERPL-MCNC: <1 UG/ML (ref 1–5)
LYMPHOCYTES # BLD AUTO: 1.35 X10*3/UL (ref 1.2–4.8)
LYMPHOCYTES NFR BLD AUTO: 11.2 %
MAGNESIUM SERPL-MCNC: 2.22 MG/DL (ref 1.6–2.4)
MAGNESIUM SERPL-MCNC: 2.25 MG/DL (ref 1.6–2.4)
MCH RBC QN AUTO: 28.8 PG (ref 26–34)
MCHC RBC AUTO-ENTMCNC: 33.9 G/DL (ref 32–36)
MCV RBC AUTO: 85 FL (ref 80–100)
MONOCYTES # BLD AUTO: 0.86 X10*3/UL (ref 0.1–1)
MONOCYTES NFR BLD AUTO: 7.2 %
NEUTROPHILS # BLD AUTO: 8.85 X10*3/UL (ref 1.2–7.7)
NEUTROPHILS NFR BLD AUTO: 73.6 %
NRBC BLD-RTO: 0 /100 WBCS (ref 0–0)
PHOSPHATE SERPL-MCNC: 6 MG/DL (ref 2.5–4.9)
PLATELET # BLD AUTO: 415 X10*3/UL (ref 150–450)
POTASSIUM SERPL-SCNC: 3.4 MMOL/L (ref 3.5–5.3)
POTASSIUM SERPL-SCNC: 3.8 MMOL/L (ref 3.5–5.3)
PROT SERPL-MCNC: 6 G/DL (ref 6.4–8.2)
RBC # BLD AUTO: 3.33 X10*6/UL (ref 4.5–5.9)
SODIUM SERPL-SCNC: 134 MMOL/L (ref 136–145)
SODIUM SERPL-SCNC: 135 MMOL/L (ref 136–145)
WBC # BLD AUTO: 12 X10*3/UL (ref 4.4–11.3)

## 2024-07-22 PROCEDURE — 36415 COLL VENOUS BLD VENIPUNCTURE: CPT

## 2024-07-22 PROCEDURE — 2500000004 HC RX 250 GENERAL PHARMACY W/ HCPCS (ALT 636 FOR OP/ED)

## 2024-07-22 PROCEDURE — 97116 GAIT TRAINING THERAPY: CPT | Mod: GP

## 2024-07-22 PROCEDURE — 2500000002 HC RX 250 W HCPCS SELF ADMINISTERED DRUGS (ALT 637 FOR MEDICARE OP, ALT 636 FOR OP/ED)

## 2024-07-22 PROCEDURE — 2500000001 HC RX 250 WO HCPCS SELF ADMINISTERED DRUGS (ALT 637 FOR MEDICARE OP)

## 2024-07-22 PROCEDURE — 80053 COMPREHEN METABOLIC PANEL: CPT

## 2024-07-22 PROCEDURE — 97112 NEUROMUSCULAR REEDUCATION: CPT | Mod: GP

## 2024-07-22 PROCEDURE — 83735 ASSAY OF MAGNESIUM: CPT

## 2024-07-22 PROCEDURE — 1100000001 HC PRIVATE ROOM DAILY

## 2024-07-22 PROCEDURE — 85025 COMPLETE CBC W/AUTO DIFF WBC: CPT

## 2024-07-22 PROCEDURE — 99233 SBSQ HOSP IP/OBS HIGH 50: CPT

## 2024-07-22 PROCEDURE — 99232 SBSQ HOSP IP/OBS MODERATE 35: CPT | Performed by: INTERNAL MEDICINE

## 2024-07-22 PROCEDURE — 84100 ASSAY OF PHOSPHORUS: CPT

## 2024-07-22 PROCEDURE — 97530 THERAPEUTIC ACTIVITIES: CPT | Mod: GP

## 2024-07-22 PROCEDURE — 80176 ASSAY OF LIDOCAINE: CPT

## 2024-07-22 RX ORDER — LEVETIRACETAM 500 MG/1
500 TABLET ORAL DAILY
Status: DISCONTINUED | OUTPATIENT
Start: 2024-07-23 | End: 2024-07-27 | Stop reason: HOSPADM

## 2024-07-22 RX ORDER — POTASSIUM CHLORIDE 20 MEQ/1
40 TABLET, EXTENDED RELEASE ORAL ONCE
Status: COMPLETED | OUTPATIENT
Start: 2024-07-22 | End: 2024-07-22

## 2024-07-22 ASSESSMENT — PAIN - FUNCTIONAL ASSESSMENT
PAIN_FUNCTIONAL_ASSESSMENT: 0-10

## 2024-07-22 ASSESSMENT — PAIN SCALES - GENERAL
PAINLEVEL_OUTOF10: 0 - NO PAIN

## 2024-07-22 ASSESSMENT — COGNITIVE AND FUNCTIONAL STATUS - GENERAL
TURNING FROM BACK TO SIDE WHILE IN FLAT BAD: A LITTLE
WALKING IN HOSPITAL ROOM: A LITTLE
STANDING UP FROM CHAIR USING ARMS: A LITTLE
MOVING TO AND FROM BED TO CHAIR: A LITTLE
MOBILITY SCORE: 18
DAILY ACTIVITIY SCORE: 22
CLIMB 3 TO 5 STEPS WITH RAILING: A LOT
DRESSING REGULAR LOWER BODY CLOTHING: A LITTLE
CLIMB 3 TO 5 STEPS WITH RAILING: A LOT
DRESSING REGULAR UPPER BODY CLOTHING: A LITTLE
MOBILITY SCORE: 22

## 2024-07-22 NOTE — PROGRESS NOTES
Physical Therapy    Physical Therapy Treatment    Patient Name: Miguel A Bender  MRN: [id number]  Today's Date: 7/22/2024  Time Calculation  Start Time: 1430  Stop Time: 1511  Time Calculation (min): 41 min    Assessment/Plan   PT Assessment  Barriers to Participation: Comorbidities  End of Session Communication: Bedside nurse  Assessment Comment: Pt tolerated session well. Demo'd improved ambulation distance with and without FWW, continues to be limited by impaired balance, postural control and activity tolerance. Focused on inc balance activities with rest breaks as needed. Remains appropriate for high intensity therapy upon d/c.  End of Session Patient Position: Bed, 3 rail up, Alarm off, not on at start of session     PT Plan  Treatment/Interventions: Bed mobility, Transfer training, Gait training, Stair training, Balance training, Neuromuscular re-education, Endurance training, Strengthening, Range of motion, Therapeutic exercise, Therapeutic activity  PT Plan: Ongoing PT  PT Frequency: 5 times per week  PT Discharge Recommendations: High intensity level of continued care  PT Recommended Transfer Status: Assist x2  PT - OK to Discharge: Yes      General Visit Information:   PT  Visit  PT Received On: 07/22/24  General  Family/Caregiver Present: Yes  Caregiver Feedback: wife and son at bedside  Prior to Session Communication: Bedside nurse  Patient Position Received: Bed, 3 rail up, Alarm off, not on at start of session  Preferred Learning Style: visual, verbal  General Comment: Pt pleasant and cooperative, highly motivated to work with therapy    Subjective   Precautions:  Precautions  Medical Precautions: Fall precautions, Cardiac precautions  Vital Signs:  Vital Signs  Heart Rate: 76  Heart Rate Source: Monitor    Objective   Pain:  Pain Assessment  Pain Assessment: 0-10  0-10 (Numeric) Pain Score: 0 - No pain  Cognition:  Cognition  Arousal/Alertness: Appropriate responses to stimuli  Orientation Level: Oriented  X4     Postural Control:  Static Sitting Balance  Static Sitting-Balance Support: Feet supported  Static Sitting-Level of Assistance: Distant supervision  Static Standing Balance  Static Standing-Balance Support: Bilateral upper extremity supported  Static Standing-Level of Assistance: Distant supervision     Activity Tolerance:  Activity Tolerance  Endurance: Tolerates 10 - 20 min exercise with multiple rests  Treatments:  Therapeutic Activity  Therapeutic Activity Performed: Yes  Therapeutic Activity 1: sit<>stand x6 with push off arm rest of chairs, to standing without device  Therapeutic Activity 2: static standing with eyes open x6 trials at 20 seconds with SBA, wide ADORE    Balance/Neuromuscular Re-Education  Balance/Neuromuscular Re-Education Activity Performed: Yes  Balance/Neuromuscular Re-Education Activity 1: single limb stance bilaterally x10 seconds 4 trials. Initial trial with camilo UE support, transitioned to unilateral UE support and final trial with 2 finger tactile input to railing  Balance/Neuromuscular Re-Education Activity 2: standing with narrow ADORE x20 seconds with SBA  Balance/Neuromuscular Re-Education Activity 3: static standing with camilo eye closed x30 seconds, SBA    Bed Mobility  Bed Mobility: Yes  Bed Mobility 1  Bed Mobility 1: Supine to sitting, Sitting to supine  Level of Assistance 1: Close supervision    Ambulation/Gait Training  Ambulation/Gait Training Performed: Yes  Ambulation/Gait Training 1  Surface 1: Level tile  Device 1: Rolling walker  Assistance 1: Close supervision  Quality of Gait 1: Decreased step length, Wide base of support, Diminished heel strike (inc lateral trunk sway with dec push off bilaterally)  Comments/Distance (ft) 1: 75ft x2 (rest break between)  Ambulation/Gait Training 2  Surface 2: Level tile  Device 2: No device  Assistance 2: Contact guard  Quality of Gait 2: Narrow base of support, Diminished heel strike, Decreased step length (inc lateral trunk lean  with dec knee flexion bilaterally in swing phase and impaired push off)  Comments/Distance (ft) 2: 75ft x2 (seated rest break between)  Transfers  Transfer: Yes  Transfer 1  Technique 1: Sit to stand, Stand to sit  Transfer Device 1: Walker  Transfer Level of Assistance 1: Close supervision  Trials/Comments 1: cues for UE placement  Transfers 2  Technique 2: Sit to stand, Stand to sit  Transfer Level of Assistance 2: Contact guard  Trials/Comments 2: cues for UE placement    Outcome Measures:  [facility] Basic Mobility  Turning from your back to your side while in a flat bed without using bedrails: None  Moving from lying on your back to sitting on the side of a flat bed without using bedrails: A little  Moving to and from bed to chair (including a wheelchair): A little  Standing up from a chair using your arms (e.g. wheelchair or bedside chair): A little  To walk in hospital room: A little  Climbing 3-5 steps with railing: A lot  Basic Mobility - Total Score: 18    Tinetti  Sitting Balance: Steady, safe  Arises: Able, uses arms to help  Attempts to Arise: Able to arise, one attempt  Immediate Standing Balance (First 5 Seconds): Steady but uses walker or other support  Standing Balance: Steady but wide stance, uses cane or other support  Nudged: Staggers, grabs, catches self  Eyes Closed: Steady  Turned 360 Degrees: Steadiness: Steady  Turned 360 Degrees: Continuity of Steps: Continuous  Sitting Down: Uses arms or not a smooth motion  Balance Score: 11  Initiation of Gait: No hesitancy  Step Height: R Swing Foot: Right foot complete clears floor  Step Length: R Swing Foot: Passes left stance foot  Step Height: L Swing Foot: Left foot complete clears floor  Step Length: L Swing Foot: Passes right stance foot  Step Symmetry: Right and left step appear equal  Step Continuity: Steps appear continuous  Path: Mild/moderate deviation or uses walking aid  Trunk: Marked sway or uses walking aid  Walking Time: Heels apart  Gait  Score: 8  Total Score: 19    Education Documentation  Mobility Training, taught by Riddhi Garsia PT at 7/22/2024  3:30 PM.  Learner: Patient  Readiness: Acceptance  Method: Explanation  Response: Verbalizes Understanding    Education Comments  No comments found.        OP EDUCATION:       Encounter Problems       Encounter Problems (Active)       Balance       Pt will perform 20 alternating marches with LRAD and CGA (Progressing)       Start:  07/15/24    Expected End:  07/29/24               Mobility       Patient will ambulate >50ft with LRAD and CGA (Progressing)       Start:  07/15/24    Expected End:  07/29/24               PT Transfers       Patient will perform bed mobility independently (Progressing)       Start:  07/15/24    Expected End:  07/29/24            Patient will transfer sit to and from stand LRAD and CGA (Progressing)       Start:  07/15/24    Expected End:  07/29/24               Pain - Adult

## 2024-07-22 NOTE — CARE PLAN
The patient's goals for the shift include sit up to chair and ambulate in room today    The clinical goals for the shift include Patient will have urine output >1litre by the end of shift    Over the shift, the patient had an output 550ml (post wright removal). Bladder scan was 0 ml. Patient continues with antibiotic therapy.

## 2024-07-22 NOTE — PROGRESS NOTES
NEPHROLOGY FOLLOW UP NOTE    Miguel A Bender   43 y.o.    116kg  MRN/Room: [id number]    Subjective: No acute overnight events. Patient continuing to make urine and output is being maintained. Patient is A&Ox3 and is mentating well. Much more awake and conversive today. Continuing to be hemodynamically stable.     Objective:     Meds:   bumetanide, 4 mg, Daily  heparin (porcine), 5,000 Units, q8h CINDY  hydrALAZINE, 10 mg, TID  isosorbide dinitrate, 5 mg, TID  levETIRAcetam, 500 mg, Daily  lidocaine, 1 patch, Daily  nadolol, 20 mg, Daily  oxygen, , Continuous - Inhalation  piperacillin-tazobactam, 2.25 g, q8h  polyethylene glycol, 17 g, Daily         acetaminophen, 975 mg, q8h PRN  dextrose, 12.5 g, q15 min PRN  dextrose, 25 g, q15 min PRN  glucagon, 1 mg, q15 min PRN  glucagon, 1 mg, q15 min PRN  trimethobenzamide, 200 mg, q8h PRN  vancomycin, , Daily PRN        Vitals:    07/21/24 2341   BP: 156/89   Pulse: 54   Resp: 20   Temp: 36.8 °C (98.2 °F)   SpO2: 95%          Intake/Output Summary (Last 24 hours) at 7/22/2024 0807  Last data filed at 7/22/2024 0014  Gross per 24 hour   Intake 1430 ml   Output 1800 ml   Net -370 ml       General appearance: Awake and alert, oriented. No acute distress  HEENT: NC/AT, oral mucosa  Skin: no apparent rash  Heart: heart sounds 1 & 2 present and normal, no murmurs heard or rub  Lungs: Adequate air entry, breath sounds bilaterally.  No wheezing/crackles  Abdomen: soft, non tender  Extremities: No edema, no joint swelling  Neuro: No FND,  No asterixis   ACCESS: PIV, R. I J triple lumen     Blood Labs:  Results for orders placed or performed during the hospital encounter of 07/10/24 (from the past 24 hour(s))   Lidocaine   Result Value Ref Range    Lidocaine  <1.0 (L) 1.0 - 5.0 ug/mL   Magnesium   Result Value Ref Range    Magnesium 2.29 1.60 - 2.40 mg/dL   Comprehensive Metabolic Panel   Result Value Ref Range    Glucose 82 74 - 99 mg/dL    Sodium 137 136 - 145 mmol/L     Potassium 3.7 3.5 - 5.3 mmol/L    Chloride 98 98 - 107 mmol/L    Bicarbonate 25 21 - 32 mmol/L    Anion Gap 18 10 - 20 mmol/L    Urea Nitrogen 67 (H) 6 - 23 mg/dL    Creatinine 9.06 (H) 0.50 - 1.30 mg/dL    eGFR 7 (L) >60 mL/min/1.73m*2    Calcium 7.7 (L) 8.6 - 10.6 mg/dL    Albumin 2.7 (L) 3.4 - 5.0 g/dL    Alkaline Phosphatase 87 33 - 120 U/L    Total Protein 5.6 (L) 6.4 - 8.2 g/dL    AST 24 9 - 39 U/L    Bilirubin, Total 0.5 0.0 - 1.2 mg/dL    ALT 28 10 - 52 U/L   MRSA Surveillance for Vancomycin De-escalation, PCR    Specimen: Anterior Nares; Swab   Result Value Ref Range    MRSA PCR Detected (A) Not Detected   Procalcitonin   Result Value Ref Range    Procalcitonin 1.16 (H) <=0.07 ng/mL   Blood Culture    Specimen: Central Line/Catheter (Specify below); Blood culture   Result Value Ref Range    Blood Culture Loaded on Instrument - Culture in progress    Blood Culture    Specimen: Peripheral Venipuncture; Blood culture   Result Value Ref Range    Blood Culture Loaded on Instrument - Culture in progress    Renal Function Panel   Result Value Ref Range    Glucose 96 74 - 99 mg/dL    Sodium 136 136 - 145 mmol/L    Potassium 3.6 3.5 - 5.3 mmol/L    Chloride 96 (L) 98 - 107 mmol/L    Bicarbonate 25 21 - 32 mmol/L    Anion Gap 19 10 - 20 mmol/L    Urea Nitrogen 70 (H) 6 - 23 mg/dL    Creatinine 9.75 (H) 0.50 - 1.30 mg/dL    eGFR 6 (L) >60 mL/min/1.73m*2    Calcium 7.8 (L) 8.6 - 10.6 mg/dL    Phosphorus 5.8 (H) 2.5 - 4.9 mg/dL    Albumin 2.8 (L) 3.4 - 5.0 g/dL   Magnesium   Result Value Ref Range    Magnesium 2.16 1.60 - 2.40 mg/dL        ASSESSMENT:  Miguel A Bender is a  43 y.o.  year old , with PMHx of HTN, CHARLEEN, NSTEMI, and gastric bypass surgery admitted to [facility] on 7/10 with cardiac arrest. S/p left heart cath with Impella placement. Nephrology consulted for ANNIE with worsening UOP and renal function.     7/22 updates:  - 2.55L output in last 24 hours  - No plan for HD today, will evaluate tomorrow for dialysis  needs      #ANNIE, oliguric (Baseline Cr 1.19)  - Etiology : Post cardiac arrest (Hemodynamic likely with ATN)  - Electrolytes stable , mild AGMA and NAGMA  - Likely progressed to ATN  - On HD for Overload and Uremia (Possibly uremic encephalopathy)     Recommendations:  - No need for dialysis at this time, will evaluate tomorrow for dialysis needs   - Ok to continue Bumex   - Continue supportive management   - Will continue to follow  - Strict I/Os   - Avoid IV contrast & Nephrotoxics.    Rossi Medel MD PGY-1  Nephrology Resident  24 hour Renal Pager - [phone]    Discussed with attending nephrologist Dr. Villalba.

## 2024-07-22 NOTE — PROGRESS NOTES
43-year-old male with a history of obesity status post gastric bypass surgery 2 years ago, CHARLEEN, HTN transferred for further management of VT arrest c/b cardiogenic shock s/p Impella placement which was subsequently removed . Hospital course c/b non-oliguric ANNIE and altered mentation concerning for uremic encephalopathy vs hospital delirium vs anoxic brain injury. Nephrology on board, dialysis initiated  with subsequent improvement in mentation. Pt had episodes of opisthotonus at [facility], w/o epileptiform activity, was put on Keppra with plan to continue as OP.  EP also consulted for VT arrest, cMRI notable for dilated RV c/f possible ARVC, for which genetics was also consulted. ARVC lab panel sent out . Pt transferred to telemetry floor on .      Subjective   No acute events overnight.    This AM, pt reports feeling better, has been ambulating with a walker without issues. Notes improvement in cough.    Denies fever/chills/CP/SOB/palpitations/lightheadedness/dizziness/vision changes.       Objective   24 Hour Vitals  Temp:  [36.2 °C (97.2 °F)-37 °C (98.6 °F)] 36.8 °C (98.2 °F)  Heart Rate:  [54-66] 66  Resp:  [18-20] 18  BP: (107-156)/(68-89) 107/68    Temp (24hrs), Av.7 °C (98 °F), Min:36.2 °C (97.2 °F), Max:37 °C (98.6 °F)     24 hour Intake/Output    Intake/Output Summary (Last 24 hours) at 2024 1325  Last data filed at 2024 0859  Gross per 24 hour   Intake 890 ml   Output 2300 ml   Net -1410 ml        Labs  CBC  Results from last 72 hours   Lab Units 24  0758 24  0537   WBC AUTO x10*3/uL 12.0* 14.9*   HEMOGLOBIN g/dL 9.6* 9.4*   HEMATOCRIT % 28.3* 26.9*   PLATELETS AUTO x10*3/uL 415 342      BMP  Results from last 72 hours   Lab Units 24  0758 24  1826 24  0818   SODIUM mmol/L 135* 136 137   POTASSIUM mmol/L 3.4* 3.6 3.7   CHLORIDE mmol/L 96* 96* 98   BUN mg/dL 77* 70* 67*   CREATININE mg/dL 9.80* 9.75* 9.06*   MAGNESIUM mg/dL 2.22 2.16 2.29   PHOSPHORUS  mg/dL  --  5.8*  --        Medications   Scheduled Medications  bumetanide, 4 mg, oral, Daily  heparin (porcine), 5,000 Units, subcutaneous, q8h CINDY  hydrALAZINE, 10 mg, oral, TID  isosorbide dinitrate, 5 mg, oral, TID  [START ON 7/23/2024] levETIRAcetam, 500 mg, oral, Daily  lidocaine, 1 patch, transdermal, Daily  nadolol, 20 mg, oral, Daily  oxygen, , inhalation, Continuous - Inhalation  piperacillin-tazobactam, 2.25 g, intravenous, q8h  polyethylene glycol, 17 g, oral, Daily     Continuous Medications    PRN Medications  PRN medications: acetaminophen, dextrose, dextrose, glucagon, glucagon, trimethobenzamide, vancomycin     Tele: several episodes of non sustained Vtach (several beats)    Physical Exam  General: Resting comfortably in bed. Well developed, obese. Appears stated age. In no acute distress   HEENT: Normocephalic and atraumatic. EOMI, sclera non-icteric, MMM, no JVD  Cardiovascular: RRR, no r/m/g  Pulmonary: improved air movement bilaterally. No wheezes/rales/rhonchi  GI: soft, non-tender, non-distended  : No suprapubic/ flank pain  Extremities: No LE edema, no asterixis  Skin: warm and dry.   Neurologic: CN II-XII grossly intact. No focal neurologic deficit   Psych: Pleasant. Appropriate mood and affect        Assessment/Plan      43-year-old male with a history of obesity status post gastric bypass surgery 2 years ago, CHARLEEN, HTN, s/p VT arrest c/b cardiogenic shock requiring Impella (removed 7/13), non oliguric ANNIE currently on dialysis, AMS likely d/t uremia.Pt had runs of polymorphic VT while in ICU. cMRI w/ dilated RV c/f Arrhythmogenic RV cardiomyopahty. Genetic testing pending. EP rec. ICD placement.  Currently on Vanc&Zosyn w/ improvement in leukocytosis and cough     Updates:    7/22:  - cough and leukocytosis improving, continue Vanc&Zosyn  - PT/OT    7/21:  - mild fever overnight, productive cough, crackles and decreased breath sounds on R side on PE => CXR, procal, blood cx, empiric  Vanc&Zosyn initiated  - initiated hydralazine 10 mg and isordil 5 mg  - removed El per pt's request, strict I&Os, bladder scan, straight cath PRN  - added Tigan PRN for nausea    #Ventricular Fibrillation and Polymorphic Vtach  #Non-ischemic cardiomyopathy  #Cardiac arrest of unclear etiology  #Concern for Arrhythmogenic Right Ventricular Cardiomyopathy  ::LVH w/o obstructive CAD  ::Hx of prolonged Qtc in ICU, last QTc wnl  ::Was on amdiodarone on admission. Had polymorphic SVT with sinus pauses on night of 7/10, so amiodarone weaned off and switched to lidocaine  - EP, Genetics following  - Meets major criteria of RV dilation and arrhythmia for ARVC. No consistent V1-V3 TWI, epsilon wave, or delayed S wave upstroke. No family history of SCD  - continue Nadolol per EP recs  - needs ICD before discharge     #HFrEF  ::Most recent TTE with EF 30% prior to this admission, 10% on admission  -on Nadolol for BB per EP  -avoiding ACE/ARB iso acute renal failure  -avoiding MRA/SGLT2 given acute renal failure  -started hydralazine 10 mg TID, isordil 5mg TID     #Non oliguric acute renal failure  #uremic encephalopathy-resolved::Likely pre-renal i/s/o cardiogenic shock   - next HD session 7/23  - Bumex 4mg PO every day   - removed El 7/22, voiding w/o difficulty, no retention  - Nephrology following, next HD session on 7/23.  - Tigan PRN nausea     #Acute metabolic encephalopathy-resolved  #History of myoclonic jerks and ophisthotonos at outside hospital  #Concern for anoxic brain injury i/s/o cardiac arrest-resolved  - Reported opisthotonos at [facility]  -Video EEG has preliminary result of severe diffuse encephalopathy with no epileptiform activity  - Keppra 500 mg daily (renal dosing) will be continued until outpatient  -Consider MRI brain if altered mentation  -Mentation improved following dialysis initiation on 7/18; continue to monitor and consider MRI brain if remains altered    #Productive Cough -  improving  #Leukocytosis - improving  #Single Episode of Mild fever the night of 7/19  - CXR 7/20 w/ R>L bibasilar airspace opacity which may be due   to atelectasis, cannot exclude developing   infectious infiltrate. Recommend follow-up radiograph. Small/trace right pleural effusion.   - blood Cx NGTD, cough and leukocytosis improving  - (+) MRSA swab   - continue Vanc&Zosyn (day 2)      Gabrielle Lopez MD  Anesthesiology PGY-1

## 2024-07-23 LAB
ALBUMIN SERPL BCP-MCNC: 2.8 G/DL (ref 3.4–5)
ALBUMIN SERPL BCP-MCNC: 2.9 G/DL (ref 3.4–5)
ALP SERPL-CCNC: 81 U/L (ref 33–120)
ALT SERPL W P-5'-P-CCNC: 24 U/L (ref 10–52)
ANION GAP SERPL CALC-SCNC: 17 MMOL/L (ref 10–20)
ANION GAP SERPL CALC-SCNC: 18 MMOL/L (ref 10–20)
AST SERPL W P-5'-P-CCNC: 21 U/L (ref 9–39)
ATRIAL RATE: 59 BPM
ATRIAL RATE: 65 BPM
ATRIAL RATE: 71 BPM
ATRIAL RATE: 75 BPM
ATRIAL RATE: 77 BPM
BASOPHILS # BLD AUTO: 0.12 X10*3/UL (ref 0–0.1)
BASOPHILS NFR BLD AUTO: 1.2 %
BILIRUB SERPL-MCNC: 0.6 MG/DL (ref 0–1.2)
BUN SERPL-MCNC: 79 MG/DL (ref 6–23)
BUN SERPL-MCNC: 80 MG/DL (ref 6–23)
CALCIUM SERPL-MCNC: 8.3 MG/DL (ref 8.6–10.6)
CALCIUM SERPL-MCNC: 8.5 MG/DL (ref 8.6–10.6)
CHLORIDE SERPL-SCNC: 94 MMOL/L (ref 98–107)
CHLORIDE SERPL-SCNC: 95 MMOL/L (ref 98–107)
CO2 SERPL-SCNC: 24 MMOL/L (ref 21–32)
CO2 SERPL-SCNC: 25 MMOL/L (ref 21–32)
CREAT SERPL-MCNC: 10.29 MG/DL (ref 0.5–1.3)
CREAT SERPL-MCNC: 9.5 MG/DL (ref 0.5–1.3)
EGFRCR SERPLBLD CKD-EPI 2021: 6 ML/MIN/1.73M*2
EGFRCR SERPLBLD CKD-EPI 2021: 6 ML/MIN/1.73M*2
EOSINOPHIL # BLD AUTO: 0.52 X10*3/UL (ref 0–0.7)
EOSINOPHIL NFR BLD AUTO: 5.3 %
ERYTHROCYTE [DISTWIDTH] IN BLOOD BY AUTOMATED COUNT: 12.2 % (ref 11.5–14.5)
GLUCOSE SERPL-MCNC: 80 MG/DL (ref 74–99)
GLUCOSE SERPL-MCNC: 99 MG/DL (ref 74–99)
HCT VFR BLD AUTO: 28.3 % (ref 41–52)
HGB BLD-MCNC: 9.6 G/DL (ref 13.5–17.5)
IMM GRANULOCYTES # BLD AUTO: 0.21 X10*3/UL (ref 0–0.7)
IMM GRANULOCYTES NFR BLD AUTO: 2.1 % (ref 0–0.9)
LYMPHOCYTES # BLD AUTO: 1.34 X10*3/UL (ref 1.2–4.8)
LYMPHOCYTES NFR BLD AUTO: 13.6 %
MAGNESIUM SERPL-MCNC: 2.11 MG/DL (ref 1.6–2.4)
MAGNESIUM SERPL-MCNC: 2.12 MG/DL (ref 1.6–2.4)
MCH RBC QN AUTO: 29.1 PG (ref 26–34)
MCHC RBC AUTO-ENTMCNC: 33.9 G/DL (ref 32–36)
MCV RBC AUTO: 86 FL (ref 80–100)
MONOCYTES # BLD AUTO: 0.92 X10*3/UL (ref 0.1–1)
MONOCYTES NFR BLD AUTO: 9.3 %
NEUTROPHILS # BLD AUTO: 6.77 X10*3/UL (ref 1.2–7.7)
NEUTROPHILS NFR BLD AUTO: 68.5 %
NRBC BLD-RTO: 0 /100 WBCS (ref 0–0)
P AXIS: 22 DEGREES
P AXIS: 23 DEGREES
P AXIS: 23 DEGREES
P AXIS: 30 DEGREES
P AXIS: 33 DEGREES
P OFFSET: 206 MS
P OFFSET: 207 MS
P OFFSET: 209 MS
P OFFSET: 209 MS
P OFFSET: 211 MS
P ONSET: 153 MS
P ONSET: 154 MS
P ONSET: 156 MS
PHOSPHATE SERPL-MCNC: 6.8 MG/DL (ref 2.5–4.9)
PLATELET # BLD AUTO: 442 X10*3/UL (ref 150–450)
POTASSIUM SERPL-SCNC: 3.4 MMOL/L (ref 3.5–5.3)
POTASSIUM SERPL-SCNC: 4 MMOL/L (ref 3.5–5.3)
PR INTERVAL: 122 MS
PR INTERVAL: 126 MS
PR INTERVAL: 128 MS
PR INTERVAL: 128 MS
PR INTERVAL: 130 MS
PROT SERPL-MCNC: 6.3 G/DL (ref 6.4–8.2)
Q ONSET: 217 MS
Q ONSET: 218 MS
Q ONSET: 219 MS
QRS COUNT: 11 BEATS
QRS COUNT: 11 BEATS
QRS COUNT: 12 BEATS
QRS COUNT: 13 BEATS
QRS COUNT: 9 BEATS
QRS DURATION: 92 MS
QRS DURATION: 92 MS
QRS DURATION: 94 MS
QRS DURATION: 94 MS
QRS DURATION: 98 MS
QT INTERVAL: 370 MS
QT INTERVAL: 382 MS
QT INTERVAL: 402 MS
QT INTERVAL: 442 MS
QT INTERVAL: 486 MS
QTC CALCULATION(BAZETT): 413 MS
QTC CALCULATION(BAZETT): 432 MS
QTC CALCULATION(BAZETT): 436 MS
QTC CALCULATION(BAZETT): 437 MS
QTC CALCULATION(BAZETT): 505 MS
QTC FREDERICIA: 398 MS
QTC FREDERICIA: 415 MS
QTC FREDERICIA: 425 MS
QTC FREDERICIA: 439 MS
QTC FREDERICIA: 498 MS
R AXIS: 20 DEGREES
R AXIS: 37 DEGREES
R AXIS: 37 DEGREES
R AXIS: 42 DEGREES
R AXIS: 43 DEGREES
RBC # BLD AUTO: 3.3 X10*6/UL (ref 4.5–5.9)
SODIUM SERPL-SCNC: 132 MMOL/L (ref 136–145)
SODIUM SERPL-SCNC: 134 MMOL/L (ref 136–145)
T AXIS: 0 DEGREES
T AXIS: 18 DEGREES
T AXIS: 22 DEGREES
T AXIS: 28 DEGREES
T AXIS: 42 DEGREES
T OFFSET: 402 MS
T OFFSET: 408 MS
T OFFSET: 419 MS
T OFFSET: 440 MS
T OFFSET: 460 MS
VANCOMYCIN SERPL-MCNC: 17.8 UG/ML (ref 5–20)
VENTRICULAR RATE: 59 BPM
VENTRICULAR RATE: 65 BPM
VENTRICULAR RATE: 71 BPM
VENTRICULAR RATE: 75 BPM
VENTRICULAR RATE: 77 BPM
WBC # BLD AUTO: 9.9 X10*3/UL (ref 4.4–11.3)

## 2024-07-23 PROCEDURE — 83735 ASSAY OF MAGNESIUM: CPT

## 2024-07-23 PROCEDURE — 2500000004 HC RX 250 GENERAL PHARMACY W/ HCPCS (ALT 636 FOR OP/ED)

## 2024-07-23 PROCEDURE — 36415 COLL VENOUS BLD VENIPUNCTURE: CPT

## 2024-07-23 PROCEDURE — 2500000001 HC RX 250 WO HCPCS SELF ADMINISTERED DRUGS (ALT 637 FOR MEDICARE OP)

## 2024-07-23 PROCEDURE — 97129 THER IVNTJ 1ST 15 MIN: CPT | Mod: GO

## 2024-07-23 PROCEDURE — 85025 COMPLETE CBC W/AUTO DIFF WBC: CPT

## 2024-07-23 PROCEDURE — 97530 THERAPEUTIC ACTIVITIES: CPT | Mod: GO

## 2024-07-23 PROCEDURE — 80053 COMPREHEN METABOLIC PANEL: CPT

## 2024-07-23 PROCEDURE — 80069 RENAL FUNCTION PANEL: CPT | Mod: CCI

## 2024-07-23 PROCEDURE — 2500000002 HC RX 250 W HCPCS SELF ADMINISTERED DRUGS (ALT 637 FOR MEDICARE OP, ALT 636 FOR OP/ED)

## 2024-07-23 PROCEDURE — 99233 SBSQ HOSP IP/OBS HIGH 50: CPT

## 2024-07-23 PROCEDURE — 99232 SBSQ HOSP IP/OBS MODERATE 35: CPT | Performed by: INTERNAL MEDICINE

## 2024-07-23 PROCEDURE — 1100000001 HC PRIVATE ROOM DAILY

## 2024-07-23 PROCEDURE — 2500000005 HC RX 250 GENERAL PHARMACY W/O HCPCS

## 2024-07-23 PROCEDURE — 80202 ASSAY OF VANCOMYCIN: CPT

## 2024-07-23 RX ORDER — VITAMIN A 3000 MCG
3000 CAPSULE ORAL DAILY
Status: DISCONTINUED | OUTPATIENT
Start: 2024-07-23 | End: 2024-07-27 | Stop reason: HOSPADM

## 2024-07-23 RX ORDER — POTASSIUM CHLORIDE 20 MEQ/1
40 TABLET, EXTENDED RELEASE ORAL ONCE
Status: COMPLETED | OUTPATIENT
Start: 2024-07-23 | End: 2024-07-23

## 2024-07-23 RX ORDER — ACETAMINOPHEN 500 MG
50000 TABLET ORAL WEEKLY
Status: DISCONTINUED | OUTPATIENT
Start: 2024-07-28 | End: 2024-07-27 | Stop reason: HOSPADM

## 2024-07-23 RX ORDER — MULTIVIT-MIN/IRON FUM/FOLIC AC 7.5 MG-4
1 TABLET ORAL DAILY
Status: DISCONTINUED | OUTPATIENT
Start: 2024-07-23 | End: 2024-07-27 | Stop reason: HOSPADM

## 2024-07-23 RX ORDER — VANCOMYCIN HYDROCHLORIDE 500 MG/100ML
500 INJECTION, SOLUTION INTRAVENOUS ONCE
Status: COMPLETED | OUTPATIENT
Start: 2024-07-23 | End: 2024-07-23

## 2024-07-23 ASSESSMENT — COGNITIVE AND FUNCTIONAL STATUS - GENERAL
CLIMB 3 TO 5 STEPS WITH RAILING: A LITTLE
DRESSING REGULAR LOWER BODY CLOTHING: A LITTLE
MOBILITY SCORE: 23
TOILETING: A LITTLE
DAILY ACTIVITIY SCORE: 19
DRESSING REGULAR UPPER BODY CLOTHING: A LITTLE
DAILY ACTIVITIY SCORE: 22
DRESSING REGULAR UPPER BODY CLOTHING: A LITTLE
MOBILITY SCORE: 23
HELP NEEDED FOR BATHING: A LITTLE
DAILY ACTIVITIY SCORE: 24
CLIMB 3 TO 5 STEPS WITH RAILING: A LITTLE
DRESSING REGULAR LOWER BODY CLOTHING: A LITTLE
PERSONAL GROOMING: A LITTLE

## 2024-07-23 ASSESSMENT — PAIN SCALES - GENERAL
PAINLEVEL_OUTOF10: 0 - NO PAIN
PAINLEVEL_OUTOF10: 0 - NO PAIN

## 2024-07-23 ASSESSMENT — PAIN - FUNCTIONAL ASSESSMENT
PAIN_FUNCTIONAL_ASSESSMENT: 0-10

## 2024-07-23 NOTE — CARE PLAN
Patient safe and stable throughout shift. Continues on IV antibiotics. Plan for PPM placement.       Problem: Arrythmia/Dysrhythmia  Goal: Lab values return to normal range  Outcome: Progressing  Goal: No evidence of post procedure complications  Outcome: Progressing  Goal: Promote self management  Outcome: Progressing  Goal: Verbalize understanding of procedures/devices  Outcome: Progressing  Goal: Vital signs return to baseline  Outcome: Progressing     Problem: Cardiac catheterization  Goal: Free from dysrhythmias  Outcome: Progressing  Goal: No evidence of post procedure complications  Outcome: Progressing  Goal: Promote self management  Outcome: Progressing  Goal: Verbalize understanding of procedure  Outcome: Progressing  Goal: Care and maintenance of device (specify)  Outcome: Progressing     Problem: Skin  Goal: Decreased wound size/increased tissue granulation at next dressing change  Outcome: Progressing  Goal: Participates in plan/prevention/treatment measures  Outcome: Progressing  Goal: Prevent/manage excess moisture  Outcome: Progressing  Goal: Prevent/minimize sheer/friction injuries  Outcome: Progressing  Goal: Promote/optimize nutrition  Outcome: Progressing  Goal: Promote skin healing  Outcome: Progressing     Problem: Safety - Medical Restraint  Goal: Remains free of injury from restraints (Restraint for Interference with Medical Device)  Outcome: Progressing  Goal: Free from restraint(s) (Restraint for Interference with Medical Device)  Outcome: Progressing     Problem: Pain - Adult  Goal: Verbalizes/displays adequate comfort level or baseline comfort level  Outcome: Progressing     Problem: Safety - Adult  Goal: Free from fall injury  Outcome: Progressing     Problem: Discharge Planning  Goal: Discharge to home or other facility with appropriate resources  Outcome: Progressing     Problem: Chronic Conditions and Co-morbidities  Goal: Patient's chronic conditions and co-morbidity symptoms are  monitored and maintained or improved  Outcome: Progressing     Problem: Fall/Injury  Goal: Not fall by end of shift  Outcome: Progressing  Goal: Be free from injury by end of the shift  Outcome: Progressing  Goal: Verbalize understanding of personal risk factors for fall in the hospital  Outcome: Progressing  Goal: Verbalize understanding of risk factor reduction measures to prevent injury from fall in the home  Outcome: Progressing  Goal: Use assistive devices by end of the shift  Outcome: Progressing  Goal: Pace activities to prevent fatigue by end of the shift  Outcome: Progressing

## 2024-07-23 NOTE — PROGRESS NOTES
NEPHROLOGY FOLLOW UP NOTE    Miguel A Bender   43 y.o.    114 kg  MRN/Room: [id number]-    Subjective: No acute overnight events. Patient continuing to make urine and output is being maintained. Patient is A&Ox3 and is mentating well. He is very awake and conversive today. Continuing to be hemodynamically stable.     Objective:     Meds:   bumetanide, 4 mg, Daily  heparin (porcine), 5,000 Units, q8h CINDY  hydrALAZINE, 10 mg, TID  isosorbide dinitrate, 5 mg, TID  levETIRAcetam, 500 mg, Daily  lidocaine, 1 patch, Daily  nadolol, 20 mg, Daily  oxygen, , Continuous - Inhalation  piperacillin-tazobactam, 2.25 g, q8h  polyethylene glycol, 17 g, Daily         acetaminophen, 975 mg, q8h PRN  dextrose, 12.5 g, q15 min PRN  dextrose, 25 g, q15 min PRN  glucagon, 1 mg, q15 min PRN  glucagon, 1 mg, q15 min PRN  trimethobenzamide, 200 mg, q8h PRN  vancomycin, , Daily PRN        Vitals:    07/23/24 0432   BP: 138/89   Pulse: 53   Resp: 20   Temp: 35.7 °C (96.3 °F)   SpO2: 96%          Intake/Output Summary (Last 24 hours) at 7/23/2024 0809  Last data filed at 7/23/2024 0036  Gross per 24 hour   Intake 150 ml   Output 1250 ml   Net -1100 ml     General appearance: Awake and alert, oriented. No acute distress  HEENT: NC/AT, oral mucosa  Skin: no apparent rash  Heart: heart sounds 1 & 2 present and normal, no murmurs heard or rub  Lungs: Adequate air entry, breath sounds bilaterally.  No wheezing/crackles  Abdomen: soft, non tender  Extremities: No edema, no joint swelling  Neuro: No FND,  No asterixis   ACCESS: PIV, R. I J triple lumen     Blood Labs:  Results for orders placed or performed during the hospital encounter of 07/10/24 (from the past 24 hour(s))   Renal Function Panel   Result Value Ref Range    Glucose 107 (H) 74 - 99 mg/dL    Sodium 134 (L) 136 - 145 mmol/L    Potassium 3.8 3.5 - 5.3 mmol/L    Chloride 96 (L) 98 - 107 mmol/L    Bicarbonate 25 21 - 32 mmol/L    Anion Gap 17 10 - 20 mmol/L    Urea Nitrogen 75 (H)  6 - 23 mg/dL    Creatinine 10.61 (H) 0.50 - 1.30 mg/dL    eGFR 6 (L) >60 mL/min/1.73m*2    Calcium 8.2 (L) 8.6 - 10.6 mg/dL    Phosphorus 6.0 (H) 2.5 - 4.9 mg/dL    Albumin 2.9 (L) 3.4 - 5.0 g/dL   Magnesium   Result Value Ref Range    Magnesium 2.25 1.60 - 2.40 mg/dL   Lavender Top   Result Value Ref Range    Extra Tube Hold for add-ons.    Magnesium   Result Value Ref Range    Magnesium 2.11 1.60 - 2.40 mg/dL   CBC and Auto Differential   Result Value Ref Range    WBC 9.9 4.4 - 11.3 x10*3/uL    nRBC 0.0 0.0 - 0.0 /100 WBCs    RBC 3.30 (L) 4.50 - 5.90 x10*6/uL    Hemoglobin 9.6 (L) 13.5 - 17.5 g/dL    Hematocrit 28.3 (L) 41.0 - 52.0 %    MCV 86 80 - 100 fL    MCH 29.1 26.0 - 34.0 pg    MCHC 33.9 32.0 - 36.0 g/dL    RDW 12.2 11.5 - 14.5 %    Platelets 442 150 - 450 x10*3/uL    Neutrophils % 68.5 40.0 - 80.0 %    Immature Granulocytes %, Automated 2.1 (H) 0.0 - 0.9 %    Lymphocytes % 13.6 13.0 - 44.0 %    Monocytes % 9.3 2.0 - 10.0 %    Eosinophils % 5.3 0.0 - 6.0 %    Basophils % 1.2 0.0 - 2.0 %    Neutrophils Absolute 6.77 1.20 - 7.70 x10*3/uL    Immature Granulocytes Absolute, Automated 0.21 0.00 - 0.70 x10*3/uL    Lymphocytes Absolute 1.34 1.20 - 4.80 x10*3/uL    Monocytes Absolute 0.92 0.10 - 1.00 x10*3/uL    Eosinophils Absolute 0.52 0.00 - 0.70 x10*3/uL    Basophils Absolute 0.12 (H) 0.00 - 0.10 x10*3/uL   Comprehensive Metabolic Panel   Result Value Ref Range    Glucose 80 74 - 99 mg/dL    Sodium 134 (L) 136 - 145 mmol/L    Potassium 3.4 (L) 3.5 - 5.3 mmol/L    Chloride 94 (L) 98 - 107 mmol/L    Bicarbonate 25 21 - 32 mmol/L    Anion Gap 18 10 - 20 mmol/L    Urea Nitrogen 80 (H) 6 - 23 mg/dL    Creatinine 9.50 (H) 0.50 - 1.30 mg/dL    eGFR 6 (L) >60 mL/min/1.73m*2    Calcium 8.5 (L) 8.6 - 10.6 mg/dL    Albumin 2.9 (L) 3.4 - 5.0 g/dL    Alkaline Phosphatase 81 33 - 120 U/L    Total Protein 6.3 (L) 6.4 - 8.2 g/dL    AST 21 9 - 39 U/L    Bilirubin, Total 0.6 0.0 - 1.2 mg/dL    ALT 24 10 - 52 U/L   Vancomycin    Result Value Ref Range    Vancomycin 17.8 5.0 - 20.0 ug/mL        ASSESSMENT:  Miguel A Bender is a  43 y.o.  year old , with PMHx of HTN, CHARLEEN, NSTEMI, and gastric bypass surgery admitted to [facility] on 7/10 with cardiac arrest. S/p left heart cath with Impella placement. Nephrology consulted for ANNIE with worsening UOP and renal function.      7/23 updates:  - 1.25L output in last 24 hours  - No plan for HD today, will continue to evaluate for dialysis needs   - Plan to keep patient euvolemic, hold Bumex for now   - Started HD on 7/19 with last session on 7/20     #ANNIE, non-oliguric (Baseline Cr 1.19)  - Etiology : Post cardiac arrest (Hemodynamic likely with ATN)  - Electrolytes stable , mild AGMA and NAGMA  - Likely progressed to ATN  - On HD for Overload and Uremia (Possibly uremic encephalopathy)     Recommendations:  - No need for dialysis at this time, will continue to evaluate for dialysis needs   - Plan to keep patient euvolemic, please hold Bumex for now   - Continue supportive management   - Will continue to follow  - Strict I/Os   - Avoid IV contrast & Nephrotoxics    Rossi Medel MD PGY-1  Nephrology Resident  24 hour Renal Pager - [phone]    Discussed with attending nephrologist Dr. Villalba.

## 2024-07-23 NOTE — PROGRESS NOTES
07/23/24        Transitional Care Coordination Progress Note:   Patient discussed during interdisciplinary rounds.   Team members present: RN SORAYA REDD MD   Plan per Medical/Surgical team: Monitoring Kidney Function On IV antibiotics patient not medically ready for discharge   Discharge disposition: Home vs acute rehab   Status-Inpatient   Payer- ANTH   Potential Barriers: None   ADOD: 7-   Michael Pond RN [facility] [phone]

## 2024-07-23 NOTE — PROGRESS NOTES
43-year-old male with a history of obesity status post gastric bypass surgery 2 years ago, CHARLEEN, HTN transferred for further management of VT arrest c/b cardiogenic shock s/p Impella placement which was subsequently removed . Hospital course c/b non-oliguric ANNIE and altered mentation concerning for uremic encephalopathy vs hospital delirium vs anoxic brain injury. Nephrology on board, dialysis initiated  with subsequent improvement in mentation. Pt had episodes of opisthotonus at [facility], w/o epileptiform activity, was put on Keppra with plan to continue as OP.  EP also consulted for VT arrest, cMRI notable for dilated RV c/f possible ARVC, for which genetics was also consulted. ARVC lab panel sent out . Pt transferred to telemetry floor on .      Subjective   No acute events overnight.    This AM, pt reports minimal cough. Sputum production stopped. Ambulates with walker without difficulty. Feels better overall. Pt has many questions about ICD.    Denies fever/chills/CP/SOB/palpitations/lightheadedness/dizziness/vision changes/abdominal pain     Tele: NSR w/ occasional PVCs and non sustained Vtach    Objective   24 Hour Vitals  Temp:  [35.7 °C (96.3 °F)-36.8 °C (98.2 °F)] 36.8 °C (98.2 °F)  Heart Rate:  [52-76] 64  Resp:  [18-20] 18  BP: (107-153)/(68-89) 126/81    Temp (24hrs), Av.4 °C (97.6 °F), Min:35.7 °C (96.3 °F), Max:36.8 °C (98.2 °F)     24 hour Intake/Output    Intake/Output Summary (Last 24 hours) at 2024 1042  Last data filed at 2024 0036  Gross per 24 hour   Intake 100 ml   Output 750 ml   Net -650 ml        Labs  CBC  Results from last 72 hours   Lab Units 24  0549 24  0758   WBC AUTO x10*3/uL 9.9 12.0*   HEMOGLOBIN g/dL 9.6* 9.6*   HEMATOCRIT % 28.3* 28.3*   PLATELETS AUTO x10*3/uL 442 415      BMP  Results from last 72 hours   Lab Units 24  0549 24  1811 24  0758 24  1826   SODIUM mmol/L 134* 134* 135* 136   POTASSIUM mmol/L 3.4* 3.8 3.4*  3.6   CHLORIDE mmol/L 94* 96* 96* 96*   BUN mg/dL 80* 75* 77* 70*   CREATININE mg/dL 9.50* 10.61* 9.80* 9.75*   MAGNESIUM mg/dL 2.11 2.25 2.22 2.16   PHOSPHORUS mg/dL  --  6.0*  --  5.8*       Medications   Scheduled Medications  bumetanide, 4 mg, oral, Daily  heparin (porcine), 5,000 Units, subcutaneous, q8h CINDY  hydrALAZINE, 10 mg, oral, TID  isosorbide dinitrate, 5 mg, oral, TID  levETIRAcetam, 500 mg, oral, Daily  lidocaine, 1 patch, transdermal, Daily  nadolol, 20 mg, oral, Daily  oxygen, , inhalation, Continuous - Inhalation  piperacillin-tazobactam, 2.25 g, intravenous, q8h  polyethylene glycol, 17 g, oral, Daily     Continuous Medications    PRN Medications  PRN medications: acetaminophen, dextrose, dextrose, glucagon, glucagon, trimethobenzamide, vancomycin     Blood Cx NTD    Tele: few NSR, few PVCs and non sustained Vtach    Physical Exam  General: Resting comfortably in bed. Well developed, obese. Appears stated age. In no acute distress   HEENT: Normocephalic and atraumatic. EOMI, sclera non-icteric, MMM, no JVD  Cardiovascular: RRR, no r/m/g  Pulmonary: improved air movement bilaterally. No wheezes/rales/rhonchi  GI: soft, non-tender, non-distended  : No suprapubic/ flank pain  Extremities: No LE edema, no asterixis  Skin: warm and dry.   Neurologic: CN II-XII grossly intact. No focal neurologic deficit   Psych: Pleasant. Appropriate mood and affect        Assessment/Plan      43-year-old male with a history of obesity status post gastric bypass surgery 2 years ago, CHARLEEN, HTN, s/p VT arrest c/b cardiogenic shock requiring Impella (removed 7/13), non oliguric ANNIE currently on dialysis, AMS likely d/t uremia.Pt had runs of polymorphic VT while in ICU. cMRI w/ dilated RV c/f Arrhythmogenic RV cardiomyopahty. Genetic testing pending. EP rec. ICD placement.  Currently on Vanc&Zosyn w/ improvement in leukocytosis and cough     Updates:    7/23:  - holding bumex per Nephrology recs  - leukocytosis resolved,  minimal cough  - Cr down trending, but BUN rising    7/22:  - cough and leukocytosis improving, continue Vanc&Zosyn  - PT/OT    7/21:  - mild fever overnight, productive cough, crackles and decreased breath sounds on R side on PE => CXR, procal, blood cx, empiric Vanc&Zosyn initiated  - initiated hydralazine 10 mg and isordil 5 mg  - removed El per pt's request, strict I&Os, bladder scan, straight cath PRN  - added Tigan PRN for nausea    #Ventricular Fibrillation and Polymorphic Vtach  #Non-ischemic cardiomyopathy  #Cardiac arrest of unclear etiology  #Concern for Arrhythmogenic Right Ventricular Cardiomyopathy  ::LVH w/o obstructive CAD  ::Hx of prolonged Qtc in ICU, last QTc wnl  ::Was on amdiodarone on admission. Had polymorphic SVT with sinus pauses on night of 7/10, so amiodarone weaned off and switched to lidocaine  - EP, Genetics following  - Meets major criteria of RV dilation and arrhythmia for ARVC. No consistent V1-V3 TWI, epsilon wave, or delayed S wave upstroke. No family history of SCD  - continue Nadolol per EP recs  - EP will discuss with pt ICD before discharge     #HFrEF  ::Most recent TTE with EF 30% prior to this admission, 10% on admission  -on Nadolol for BB per EP  -avoiding ACE/ARB iso acute renal failure  -avoiding MRA/SGLT2 given acute renal failure  - continue hydralazine 10 mg TID, isordil 5mg TID  - holding bumex per Nephrology recs     #Non oliguric acute renal failure  #uremic encephalopathy-resolved::Likely pre-renal i/s/o cardiogenic shock   - removed El 7/22, voiding w/o difficulty, no retention  - Nephrology following  - holding bumex (7/23); no HF for now  - Tigan PRN nausea     #Acute metabolic encephalopathy-resolved  #History of myoclonic jerks and ophisthotonos at outside hospital  #Concern for anoxic brain injury i/s/o cardiac arrest-resolved  - Reported opisthotonos at [facility]  -Video EEG has preliminary result of severe diffuse encephalopathy with no epileptiform  activity  - Keppra 500 mg daily (renal dosing) will be continued until outpatient      #Productive Cough - improving  #Leukocytosis - resolved  #Single Episode of Mild fever the night of 7/19 - no recurrence  - CXR 7/20 w/ R>L bibasilar airspace opacity which may be due   to atelectasis, cannot exclude developing   infectious infiltrate. Small/trace right pleural effusion.   - blood Cx NGTD  - (+) MRSA swab   - continue Vanc&Zosyn (day 3)    #Obesity  #S/p Gastric bypass  - ordered 88822 vit D weekly, vit A 3 g daily, multivitamin per pt's request (pt's home regimen)      Gabrielle Lopez MD  Anesthesiology PGY-1

## 2024-07-23 NOTE — PROGRESS NOTES
Vancomycin Dosing by Pharmacy- FOLLOW UP    Miguel A Bender is a 43 y.o. year old male who Pharmacy has been consulted for vancomycin dosing for pneumonia. Based on the patient's indication and renal status this patient is being dosed based on a goal pre-HD level of 20-25.     Renal function is currently stable.    Current vancomycin dose: 2000 x 1 dose.     Most recent random level: 17.8 mcg/mL    Visit Vitals  /81 (Patient Position: Sitting)   Pulse 64   Temp 36.8 °C (98.2 °F) (Temporal)   Resp 18        Lab Results   Component Value Date    CREATININE 9.50 (H) 2024    CREATININE 10.61 (H) 2024    CREATININE 9.80 (H) 2024    CREATININE 9.75 (H) 2024        Patient weight is as follows:   Vitals:    24 0432   Weight: 114 kg (252 lb 3.3 oz)       Cultures:  Susceptibility data for the encounter in last 14 days.  Collected Specimen Info Organism   24 Blood culture from Arterial Line Micrococcus        I/O last 3 completed shifts:  In: 250 (2.2 mL/kg) [IV Piggyback:250]  Out: 1800 (15.7 mL/kg) [Urine:1800 (0.4 mL/kg/hr)]  Weight: 114.4 kg   I/O during current shift:  I/O this shift:  In: 530 [P.O.:480; IV Piggyback:50]  Out: 350 [Urine:350]    Temp (24hrs), Av.3 °C (97.4 °F), Min:35.7 °C (96.3 °F), Max:36.8 °C (98.2 °F)      Assessment/Plan    Pt did not receive HD on  but Below goal trough of 20-25. Giving 500mg x 1 dose.      The next level will be obtained on  at 1st am labs. May be obtained sooner if clinically indicated.   Will continue to monitor renal function daily while on vancomycin and order serum creatinine at least every 48 hours if not already ordered.  Follow for continued vancomycin needs, clinical response, and signs/symptoms of toxicity.       Shin Roberts, JacobD

## 2024-07-24 LAB
ALBUMIN SERPL BCP-MCNC: 2.9 G/DL (ref 3.4–5)
ALBUMIN SERPL BCP-MCNC: 3 G/DL (ref 3.4–5)
ALP SERPL-CCNC: 76 U/L (ref 33–120)
ALT SERPL W P-5'-P-CCNC: 25 U/L (ref 10–52)
ANION GAP SERPL CALC-SCNC: 18 MMOL/L (ref 10–20)
ANION GAP SERPL CALC-SCNC: 19 MMOL/L (ref 10–20)
AST SERPL W P-5'-P-CCNC: 24 U/L (ref 9–39)
BASOPHILS # BLD AUTO: 0.12 X10*3/UL (ref 0–0.1)
BASOPHILS NFR BLD AUTO: 1.3 %
BILIRUB SERPL-MCNC: 0.5 MG/DL (ref 0–1.2)
BUN SERPL-MCNC: 78 MG/DL (ref 6–23)
BUN SERPL-MCNC: 80 MG/DL (ref 6–23)
CALCIUM SERPL-MCNC: 8.3 MG/DL (ref 8.6–10.6)
CALCIUM SERPL-MCNC: 8.8 MG/DL (ref 8.6–10.6)
CHLORIDE SERPL-SCNC: 93 MMOL/L (ref 98–107)
CHLORIDE SERPL-SCNC: 95 MMOL/L (ref 98–107)
CO2 SERPL-SCNC: 24 MMOL/L (ref 21–32)
CO2 SERPL-SCNC: 24 MMOL/L (ref 21–32)
CREAT SERPL-MCNC: 10.17 MG/DL (ref 0.5–1.3)
CREAT SERPL-MCNC: 9.93 MG/DL (ref 0.5–1.3)
EGFRCR SERPLBLD CKD-EPI 2021: 6 ML/MIN/1.73M*2
EGFRCR SERPLBLD CKD-EPI 2021: 6 ML/MIN/1.73M*2
EOSINOPHIL # BLD AUTO: 0.48 X10*3/UL (ref 0–0.7)
EOSINOPHIL NFR BLD AUTO: 5.3 %
ERYTHROCYTE [DISTWIDTH] IN BLOOD BY AUTOMATED COUNT: 12.1 % (ref 11.5–14.5)
GLUCOSE SERPL-MCNC: 85 MG/DL (ref 74–99)
GLUCOSE SERPL-MCNC: 88 MG/DL (ref 74–99)
HCT VFR BLD AUTO: 26.9 % (ref 41–52)
HGB BLD-MCNC: 9.1 G/DL (ref 13.5–17.5)
IMM GRANULOCYTES # BLD AUTO: 0.26 X10*3/UL (ref 0–0.7)
IMM GRANULOCYTES NFR BLD AUTO: 2.8 % (ref 0–0.9)
LYMPHOCYTES # BLD AUTO: 1.51 X10*3/UL (ref 1.2–4.8)
LYMPHOCYTES NFR BLD AUTO: 16.5 %
MAGNESIUM SERPL-MCNC: 2.01 MG/DL (ref 1.6–2.4)
MAGNESIUM SERPL-MCNC: 2.18 MG/DL (ref 1.6–2.4)
MCH RBC QN AUTO: 29 PG (ref 26–34)
MCHC RBC AUTO-ENTMCNC: 33.8 G/DL (ref 32–36)
MCV RBC AUTO: 86 FL (ref 80–100)
MONOCYTES # BLD AUTO: 1.13 X10*3/UL (ref 0.1–1)
MONOCYTES NFR BLD AUTO: 12.4 %
NEUTROPHILS # BLD AUTO: 5.63 X10*3/UL (ref 1.2–7.7)
NEUTROPHILS NFR BLD AUTO: 61.7 %
NRBC BLD-RTO: 0 /100 WBCS (ref 0–0)
PHOSPHATE SERPL-MCNC: 6.4 MG/DL (ref 2.5–4.9)
PHOSPHATE SERPL-MCNC: 6.5 MG/DL (ref 2.5–4.9)
PLATELET # BLD AUTO: 441 X10*3/UL (ref 150–450)
POTASSIUM SERPL-SCNC: 3.6 MMOL/L (ref 3.5–5.3)
POTASSIUM SERPL-SCNC: 4.2 MMOL/L (ref 3.5–5.3)
PROT SERPL-MCNC: 6 G/DL (ref 6.4–8.2)
RBC # BLD AUTO: 3.14 X10*6/UL (ref 4.5–5.9)
SODIUM SERPL-SCNC: 131 MMOL/L (ref 136–145)
SODIUM SERPL-SCNC: 134 MMOL/L (ref 136–145)
VANCOMYCIN SERPL-MCNC: 21 UG/ML (ref 5–20)
WBC # BLD AUTO: 9.1 X10*3/UL (ref 4.4–11.3)

## 2024-07-24 PROCEDURE — 2500000004 HC RX 250 GENERAL PHARMACY W/ HCPCS (ALT 636 FOR OP/ED)

## 2024-07-24 PROCEDURE — 2500000002 HC RX 250 W HCPCS SELF ADMINISTERED DRUGS (ALT 637 FOR MEDICARE OP, ALT 636 FOR OP/ED)

## 2024-07-24 PROCEDURE — 97116 GAIT TRAINING THERAPY: CPT | Mod: GP,CQ

## 2024-07-24 PROCEDURE — 80202 ASSAY OF VANCOMYCIN: CPT

## 2024-07-24 PROCEDURE — 2500000001 HC RX 250 WO HCPCS SELF ADMINISTERED DRUGS (ALT 637 FOR MEDICARE OP)

## 2024-07-24 PROCEDURE — 84075 ASSAY ALKALINE PHOSPHATASE: CPT

## 2024-07-24 PROCEDURE — 36415 COLL VENOUS BLD VENIPUNCTURE: CPT

## 2024-07-24 PROCEDURE — 99232 SBSQ HOSP IP/OBS MODERATE 35: CPT | Performed by: INTERNAL MEDICINE

## 2024-07-24 PROCEDURE — 80069 RENAL FUNCTION PANEL: CPT | Mod: CCI

## 2024-07-24 PROCEDURE — 85025 COMPLETE CBC W/AUTO DIFF WBC: CPT

## 2024-07-24 PROCEDURE — 97110 THERAPEUTIC EXERCISES: CPT | Mod: GP,CQ

## 2024-07-24 PROCEDURE — 84100 ASSAY OF PHOSPHORUS: CPT

## 2024-07-24 PROCEDURE — 83735 ASSAY OF MAGNESIUM: CPT

## 2024-07-24 PROCEDURE — 2500000005 HC RX 250 GENERAL PHARMACY W/O HCPCS

## 2024-07-24 PROCEDURE — 1100000001 HC PRIVATE ROOM DAILY

## 2024-07-24 PROCEDURE — 99232 SBSQ HOSP IP/OBS MODERATE 35: CPT

## 2024-07-24 RX ORDER — POTASSIUM CHLORIDE 20 MEQ/1
40 TABLET, EXTENDED RELEASE ORAL ONCE
Status: COMPLETED | OUTPATIENT
Start: 2024-07-24 | End: 2024-07-24

## 2024-07-24 RX ORDER — HYDRALAZINE HYDROCHLORIDE 25 MG/1
25 TABLET, FILM COATED ORAL 3 TIMES DAILY
Status: DISCONTINUED | OUTPATIENT
Start: 2024-07-24 | End: 2024-07-27 | Stop reason: HOSPADM

## 2024-07-24 RX ORDER — ISOSORBIDE DINITRATE 10 MG/1
10 TABLET ORAL
Status: DISCONTINUED | OUTPATIENT
Start: 2024-07-24 | End: 2024-07-26

## 2024-07-24 ASSESSMENT — COGNITIVE AND FUNCTIONAL STATUS - GENERAL
DAILY ACTIVITIY SCORE: 24
MOBILITY SCORE: 18
TURNING FROM BACK TO SIDE WHILE IN FLAT BAD: A LITTLE
STANDING UP FROM CHAIR USING ARMS: A LITTLE
DAILY ACTIVITIY SCORE: 24
CLIMB 3 TO 5 STEPS WITH RAILING: A LITTLE
MOVING TO AND FROM BED TO CHAIR: A LITTLE
MOVING FROM LYING ON BACK TO SITTING ON SIDE OF FLAT BED WITH BEDRAILS: A LITTLE
CLIMB 3 TO 5 STEPS WITH RAILING: A LITTLE
MOBILITY SCORE: 23
MOBILITY SCORE: 22
WALKING IN HOSPITAL ROOM: A LITTLE
CLIMB 3 TO 5 STEPS WITH RAILING: A LITTLE
STANDING UP FROM CHAIR USING ARMS: A LITTLE

## 2024-07-24 ASSESSMENT — PAIN - FUNCTIONAL ASSESSMENT
PAIN_FUNCTIONAL_ASSESSMENT: 0-10
PAIN_FUNCTIONAL_ASSESSMENT: 0-10

## 2024-07-24 ASSESSMENT — PAIN SCALES - GENERAL
PAINLEVEL_OUTOF10: 0 - NO PAIN

## 2024-07-24 NOTE — PROGRESS NOTES
NEPHROLOGY FOLLOW UP NOTE    Miguel A Bender   43 y.o.    113kg  MRN/Room: [id number]/[id number]/5023-    Subjective: No acute overnight events. Patient continuing to make urine and output is being maintained. Patient is A&Ox3 and is mentating well. He is very awake and conversive today. Continuing to be hemodynamically stable.     Objective:     Meds:   beta carotene, 3,000 mcg, Daily  [Held by provider] bumetanide, 4 mg, Daily  [START ON 7/28/2024] cholecalciferol, 50,000 Units, Weekly  heparin (porcine), 5,000 Units, q8h CINDY  hydrALAZINE, 10 mg, TID  isosorbide dinitrate, 5 mg, TID  levETIRAcetam, 500 mg, Daily  lidocaine, 1 patch, Daily  multivitamin with minerals, 1 tablet, Daily  nadolol, 20 mg, Daily  oxygen, , Continuous - Inhalation  piperacillin-tazobactam, 2.25 g, q8h  polyethylene glycol, 17 g, Daily  potassium chloride CR, 40 mEq, Once         acetaminophen, 975 mg, q8h PRN  dextrose, 12.5 g, q15 min PRN  dextrose, 25 g, q15 min PRN  glucagon, 1 mg, q15 min PRN  glucagon, 1 mg, q15 min PRN  trimethobenzamide, 200 mg, q8h PRN  vancomycin, , Daily PRN        Vitals:    07/24/24 0722   BP: 141/85   Pulse: 57   Resp: 18   Temp: 36.3 °C (97.3 °F)   SpO2: 95%          Intake/Output Summary (Last 24 hours) at 7/24/2024 0817  Last data filed at 7/24/2024 0613  Gross per 24 hour   Intake 1210 ml   Output 1800 ml   Net -590 ml       General appearance: Awake and alert, oriented. No acute distress  HEENT: NC/AT, oral mucosa  Skin: no apparent rash  Heart: heart sounds 1 & 2 present and normal, no murmurs heard or rub  Lungs: Adequate air entry, breath sounds bilaterally.  No wheezing/crackles  Abdomen: soft, non tender  Extremities: No edema, no joint swelling  Neuro: No FND,  No asterixis   ACCESS: PIV, R. I J triple lumen     Blood Labs:  Results for orders placed or performed during the hospital encounter of 07/10/24 (from the past 24 hour(s))   Renal Function Panel   Result Value Ref Range    Glucose 99 74 - 99 mg/dL     Sodium 132 (L) 136 - 145 mmol/L    Potassium 4.0 3.5 - 5.3 mmol/L    Chloride 95 (L) 98 - 107 mmol/L    Bicarbonate 24 21 - 32 mmol/L    Anion Gap 17 10 - 20 mmol/L    Urea Nitrogen 79 (H) 6 - 23 mg/dL    Creatinine 10.29 (H) 0.50 - 1.30 mg/dL    eGFR 6 (L) >60 mL/min/1.73m*2    Calcium 8.3 (L) 8.6 - 10.6 mg/dL    Phosphorus 6.8 (H) 2.5 - 4.9 mg/dL    Albumin 2.8 (L) 3.4 - 5.0 g/dL   Magnesium   Result Value Ref Range    Magnesium 2.12 1.60 - 2.40 mg/dL   Magnesium   Result Value Ref Range    Magnesium 2.18 1.60 - 2.40 mg/dL   CBC and Auto Differential   Result Value Ref Range    WBC 9.1 4.4 - 11.3 x10*3/uL    nRBC 0.0 0.0 - 0.0 /100 WBCs    RBC 3.14 (L) 4.50 - 5.90 x10*6/uL    Hemoglobin 9.1 (L) 13.5 - 17.5 g/dL    Hematocrit 26.9 (L) 41.0 - 52.0 %    MCV 86 80 - 100 fL    MCH 29.0 26.0 - 34.0 pg    MCHC 33.8 32.0 - 36.0 g/dL    RDW 12.1 11.5 - 14.5 %    Platelets 441 150 - 450 x10*3/uL    Neutrophils % 61.7 40.0 - 80.0 %    Immature Granulocytes %, Automated 2.8 (H) 0.0 - 0.9 %    Lymphocytes % 16.5 13.0 - 44.0 %    Monocytes % 12.4 2.0 - 10.0 %    Eosinophils % 5.3 0.0 - 6.0 %    Basophils % 1.3 0.0 - 2.0 %    Neutrophils Absolute 5.63 1.20 - 7.70 x10*3/uL    Immature Granulocytes Absolute, Automated 0.26 0.00 - 0.70 x10*3/uL    Lymphocytes Absolute 1.51 1.20 - 4.80 x10*3/uL    Monocytes Absolute 1.13 (H) 0.10 - 1.00 x10*3/uL    Eosinophils Absolute 0.48 0.00 - 0.70 x10*3/uL    Basophils Absolute 0.12 (H) 0.00 - 0.10 x10*3/uL   Comprehensive Metabolic Panel   Result Value Ref Range    Glucose 85 74 - 99 mg/dL    Sodium 131 (L) 136 - 145 mmol/L    Potassium 3.6 3.5 - 5.3 mmol/L    Chloride 93 (L) 98 - 107 mmol/L    Bicarbonate 24 21 - 32 mmol/L    Anion Gap 18 10 - 20 mmol/L    Urea Nitrogen 78 (H) 6 - 23 mg/dL    Creatinine 9.93 (H) 0.50 - 1.30 mg/dL    eGFR 6 (L) >60 mL/min/1.73m*2    Calcium 8.3 (L) 8.6 - 10.6 mg/dL    Albumin 3.0 (L) 3.4 - 5.0 g/dL    Alkaline Phosphatase 76 33 - 120 U/L    Total Protein  6.0 (L) 6.4 - 8.2 g/dL    AST 24 9 - 39 U/L    Bilirubin, Total 0.5 0.0 - 1.2 mg/dL    ALT 25 10 - 52 U/L   Vancomycin   Result Value Ref Range    Vancomycin 21.0 (H) 5.0 - 20.0 ug/mL   Phosphorus   Result Value Ref Range    Phosphorus 6.5 (H) 2.5 - 4.9 mg/dL          ASSESSMENT:  Miguel A Bender is a  43 y.o.  year old , with PMHx of HTN, CHARLEEN, NSTEMI, and gastric bypass surgery admitted to [facility] on 7/10 with cardiac arrest. S/p left heart cath with Impella placement. Nephrology consulted for ANNIE with worsening UOP and renal function.      7/24 updates:  - 1.8L urine output in last 24 hours  - No plan for HD today, will continue to evaluate for dialysis needs   - Plan to keep patient euvolemic, hold Bumex for now   - Started HD on 7/19 with last session on 7/20     #ANNIE, non-oliguric (Baseline Cr 1.19)  - Etiology : Post cardiac arrest (Hemodynamic likely with ATN)  - Electrolytes stable , mild AGMA and NAGMA  - Likely progressed to ATN  - On HD for Overload and Uremia (Possibly uremic encephalopathy)     Recommendations:  - No need for dialysis at this time, will continue to evaluate for dialysis needs   - Plan to keep patient euvolemic, please hold Bumex for now   - Continue supportive management   - Will continue to follow  - Strict I/Os   - Avoid IV contrast & Nephrotoxics    Rossi Medel MD PGY-1  Nephrology Resident  24 hour Renal Pager - [phone]    Discussed with attending nephrologist Dr. Villalba.

## 2024-07-24 NOTE — CARE PLAN
Patient safe and stable throughout shift. Ambulated in michel. NPO at midnight for ICD placement tomorrow.       Problem: Arrythmia/Dysrhythmia  Goal: Lab values return to normal range  Outcome: Progressing  Goal: No evidence of post procedure complications  Outcome: Progressing  Goal: Promote self management  Outcome: Progressing  Goal: Verbalize understanding of procedures/devices  Outcome: Progressing  Goal: Vital signs return to baseline  Outcome: Progressing     Problem: Cardiac catheterization  Goal: Free from dysrhythmias  Outcome: Progressing  Goal: No evidence of post procedure complications  Outcome: Progressing  Goal: Promote self management  Outcome: Progressing  Goal: Verbalize understanding of procedure  Outcome: Progressing  Goal: Care and maintenance of device (specify)  Outcome: Progressing     Problem: Skin  Goal: Decreased wound size/increased tissue granulation at next dressing change  Outcome: Progressing  Goal: Participates in plan/prevention/treatment measures  Outcome: Progressing  Goal: Prevent/manage excess moisture  Outcome: Progressing  Goal: Prevent/minimize sheer/friction injuries  Outcome: Progressing  Goal: Promote/optimize nutrition  Outcome: Progressing  Goal: Promote skin healing  Outcome: Progressing     Problem: Safety - Medical Restraint  Goal: Remains free of injury from restraints (Restraint for Interference with Medical Device)  Outcome: Progressing  Goal: Free from restraint(s) (Restraint for Interference with Medical Device)  Outcome: Progressing     Problem: Pain - Adult  Goal: Verbalizes/displays adequate comfort level or baseline comfort level  Outcome: Progressing     Problem: Safety - Adult  Goal: Free from fall injury  Outcome: Progressing     Problem: Discharge Planning  Goal: Discharge to home or other facility with appropriate resources  Outcome: Progressing     Problem: Chronic Conditions and Co-morbidities  Goal: Patient's chronic conditions and co-morbidity  symptoms are monitored and maintained or improved  Outcome: Progressing     Problem: Fall/Injury  Goal: Not fall by end of shift  Outcome: Progressing  Goal: Be free from injury by end of the shift  Outcome: Progressing  Goal: Verbalize understanding of personal risk factors for fall in the hospital  Outcome: Progressing  Goal: Verbalize understanding of risk factor reduction measures to prevent injury from fall in the home  Outcome: Progressing  Goal: Use assistive devices by end of the shift  Outcome: Progressing  Goal: Pace activities to prevent fatigue by end of the shift  Outcome: Progressing

## 2024-07-24 NOTE — PROGRESS NOTES
Vancomycin Dosing by Pharmacy- FOLLOW UP    Miguel A Bender is a 43 y.o. year old male who Pharmacy has been consulted for vancomycin dosing for pneumonia. Based on the patient's indication and renal status this patient is being dosed based on a goal pre-HD level of 20-25.     Renal function is currently HD dependent.    Most recent random level: 21 mcg/mL    Visit Vitals  /85 (BP Location: Right arm, Patient Position: Lying)   Pulse 57   Temp 36.3 °C (97.3 °F) (Temporal)   Resp 18        Lab Results   Component Value Date    CREATININE 9.93 (H) 2024    CREATININE 10.29 (H) 2024    CREATININE 9.50 (H) 2024    CREATININE 10.61 (H) 2024        Patient weight is as follows:   Vitals:    24 0354   Weight: 113 kg (248 lb 7.3 oz)       Cultures:  Susceptibility data for the encounter in last 14 days.  Collected Specimen Info Organism   24 Blood culture from Arterial Line Micrococcus        I/O last 3 completed shifts:  In: 1260 (11.2 mL/kg) [P.O.:960; IV Piggyback:300]  Out: 2350 (20.9 mL/kg) [Urine:2350 (0.6 mL/kg/hr)]  Weight: 112.7 kg   I/O during current shift:  I/O this shift:  In: 720 [P.O.:720]  Out: -     Temp (24hrs), Av.6 °C (97.9 °F), Min:36.3 °C (97.3 °F), Max:36.9 °C (98.4 °F)      Assessment/Plan    Within goal random/trough range. Will redose based on iHD schedule.   The next level will be ordered once HD schedule determined. May be obtained sooner if clinically indicated.   Will continue to monitor renal function daily while on vancomycin and order serum creatinine at least every 48 hours if not already ordered.  Follow for continued vancomycin needs, clinical response, and signs/symptoms of toxicity.       Maral Connor, PharmD

## 2024-07-24 NOTE — PROGRESS NOTES
Physical Therapy    Physical Therapy Treatment    Patient Name: Miguel A Bender  MRN: [id number]  Today's Date: 7/24/2024  Time Calculation  Start Time: 1050  Stop Time: 1123  Time Calculation (min): 33 min    Assessment/Plan   PT Assessment  End of Session Communication: Bedside nurse  Assessment Comment: Pt. tolerated treatment well- No complaints Pt. able to amb. with and without walker this session however recommend using wh. walker at this time until consistently steady. Continue to recommend post acute therapy on an inpt. basis but will continue to assess.  End of Session Patient Position: Up in chair  PT Plan  Inpatient/Swing Bed or Outpatient: Inpatient  PT Plan  Treatment/Interventions: Bed mobility, Transfer training, Gait training, Stair training, Balance training, Neuromuscular re-education, Endurance training, Strengthening, Range of motion, Therapeutic exercise, Therapeutic activity  PT Plan: Ongoing PT  PT Frequency: 5 times per week  PT Discharge Recommendations: High intensity level of continued care  PT Recommended Transfer Status: Assist x2  PT - OK to Discharge: Yes      General Visit Information:   PT  Visit  PT Received On: 07/24/24  General  Reason for Referral: 44 y/o male admitted post Cardiact arrest with CPR. Found to be in V-fib with multiple shocks. Cardiogenic shock s/p femoral impella (removal 7/13), intubated (extubated 7/14)  Past Medical History Relevant to Rehab: Obesity s/p gastric bypass surgery 2 years ago, CHARLEEN, HTN  Missed Visit: No  Family/Caregiver Present: Yes  Caregiver Feedback: wife and son at bedside  Prior to Session Communication: Bedside nurse  Patient Position Received: Bed, 3 rail up  General Comment: Pt. supine in bed upon arrival. Pt. willing to participate. Wife and son in room supportive. Pt. has tele.    Subjective   Precautions:  Precautions  Medical Precautions: Fall precautions, Cardiac precautions  Vital Signs:  Vital Signs  Heart Rate: 55 (55 after rest and 64  directly after ther ex.)  SpO2: 100 %    Objective   Pain:  Pain Assessment  Pain Assessment: 0-10  0-10 (Numeric) Pain Score: 0 - No pain  Cognition:  Cognition  Overall Cognitive Status:  (Appears to be WNL)  Cognition Comments: Pt. followed all commands this session.  Coordination:     Postural Control:  Static Sitting Balance  Static Sitting-Balance Support: Feet supported  Static Sitting-Level of Assistance: Distant supervision  Static Standing Balance  Static Standing-Balance Support: Bilateral upper extremity supported  Static Standing-Level of Assistance:  (SBA)  Extremity/Trunk Assessments:                      Activity Tolerance:  Activity Tolerance  Endurance: Tolerates 10 - 20 min exercise with multiple rests  Treatments:  Therapeutic Exercise  Therapeutic Exercise Performed: Yes  Therapeutic Exercise Activity 1: Standing bilat le ex MARCHES, HEEL RAISES AND ABD X 15 REPS Unilateral ue support. (One seated rest break)         Bed Mobility  Bed Mobility: Yes  Bed Mobility 1  Bed Mobility 1:  (Pt. did not wait for therapist or rail to be lowered to sit up EOB)    Ambulation/Gait Training  Ambulation/Gait Training Performed: Yes  Ambulation/Gait Training 1  Surface 1:  (Pt. amb. 160' x 1 using a wh. walker and cga to steady and for cues to avoid picking up walker and whipping it around to turn. Visual demonstration given.)  Ambulation/Gait Training 2  Surface 2:  (Pt. amb. 80' x 1 using a wh. walker and sba- pt. followed instruction on safer turns.)  Ambulation/Gait Training 3  Surface 3:  (Pt. amb. 50' no device and light cga/close sba- no LOB noted.)  Transfers  Transfer: Yes  Transfer 1  Transfer From 1:  (Sit to stand and back - pt. pulls up on walker therefore cues to push up from surface)  Transfers 2  Transfer From 2: Stand to  Transfer to 2: Sit  Transfer Level of Assistance 2: Close supervision (cues to reach back.)    Stairs  Stairs: Yes  Stairs  Rails 1:  (Pt. ascended and descended 4 steps with  rail and hand held assist - instructed pt. to perform non reciprocally - pt. followed instruction.)    Outcome Measures:  [facility] Basic Mobility  Turning from your back to your side while in a flat bed without using bedrails: A little  Moving from lying on your back to sitting on the side of a flat bed without using bedrails: A little  Moving to and from bed to chair (including a wheelchair): A little  Standing up from a chair using your arms (e.g. wheelchair or bedside chair): A little  To walk in hospital room: A little  Climbing 3-5 steps with railing: A little  Basic Mobility - Total Score: 18    Education Documentation  Mobility Training, taught by Karmen Blanco PTA at 7/24/2024 11:41 AM.  Learner: Patient  Readiness: Acceptance  Method: Demonstration, Explanation  Response: Needs Reinforcement    Education Comments  No comments found.        OP EDUCATION:       Encounter Problems       Encounter Problems (Active)       Balance       Pt will perform 20 alternating marches with LRAD and CGA (Progressing)       Start:  07/15/24    Expected End:  07/29/24               Mobility       Patient will ambulate >50ft with LRAD and CGA (Progressing)       Start:  07/15/24    Expected End:  07/29/24               PT Transfers       Patient will perform bed mobility independently (Progressing)       Start:  07/15/24    Expected End:  07/29/24            Patient will transfer sit to and from stand LRAD and CGA (Progressing)       Start:  07/15/24    Expected End:  07/29/24               Pain - Adult

## 2024-07-24 NOTE — CARE PLAN
The patient's goals for the shift include sit up to chair and ambulate in room today    The clinical goals for the shift include Patient will have no fevers throughout shift.    Over the shift, the patient did not have any complaints, concerns or problems. Hoping dialysis treatments  will not be need much longer. Reports feeling better, a little stronger  each day.

## 2024-07-24 NOTE — PROGRESS NOTES
Subjective Data:  Doing well and happy with recovery.      Objective Data:  Last Recorded Vitals:  Vitals:    07/24/24 0722 07/24/24 1050 07/24/24 1152 07/24/24 1530   BP: 141/85  134/52 116/74   BP Location: Right arm  Right arm Right arm   Patient Position: Lying  Sitting Lying   Pulse: 57 55 60 60   Resp: 18  18 18   Temp: 36.3 °C (97.3 °F)  36.2 °C (97.2 °F) 36.2 °C (97.2 °F)   TempSrc: Temporal  Temporal Temporal   SpO2: 95% 100% 98% 98%   Weight:       Height:           Last Labs:  CBC - 7/24/2024:  4:50 AM  9.1 9.1 441    26.9      CMP - 7/24/2024:  4:50 AM  8.3 6.0 24 --- 0.5   6.5 3.0 25 76      PTT - 7/11/2024:  4:17 AM  1.4   15.7 37     TROPHS   Date/Time Value Ref Range Status   07/10/2024 12:13 PM 2,496 0 - 20 ng/L Final      Last I/O:  I/O last 3 completed shifts:  In: 1260 (11.2 mL/kg) [P.O.:960; IV Piggyback:300]  Out: 2350 (20.9 mL/kg) [Urine:2350 (0.6 mL/kg/hr)]  Weight: 112.7 kg     Past Cardiology Tests (Last 3 Years):  EKG:  ECG 12 lead 07/21/2024 (Preliminary)      ECG 12 Lead 07/21/2024      ECG 12 lead 07/16/2024      ECG 12 lead 07/15/2024      ECG 12 lead 07/14/2024 (Preliminary)      ECG 12 lead 07/13/2024      ECG 12 lead 07/12/2024 (Preliminary)      ECG 12 lead 07/12/2024 (Preliminary)      ECG 12 lead 07/12/2024 (Preliminary)      ECG 12 lead 07/11/2024      ECG 12 lead 07/11/2024      ECG 12 lead 07/11/2024      ECG 12 lead 07/11/2024      ECG 12 lead 07/11/2024      ECG 12 lead 07/11/2024      ECG 12 lead 07/11/2024      ECG 12 lead 07/11/2024      ECG 12 lead 07/11/2024    Echo:  Transthoracic Echo (TTE) Limited 07/12/2024      Transthoracic Echo (TTE) Complete 07/10/2024    Ejection Fractions:  EF   Date/Time Value Ref Range Status   07/12/2024 05:52 AM 30 %    07/10/2024 04:44 PM 10 %      Cath:  Cardiac Catheterization Procedure 07/10/2024    Stress Test:  No results found for this or any previous visit from the past 1095 days.    Cardiac Imaging:  MR cardiac morphology and  function w and wo IV contrast 07/15/2024      Inpatient Medications:  Scheduled medications   Medication Dose Route Frequency    beta carotene  3,000 mcg oral Daily    [Held by provider] bumetanide  4 mg oral Daily    [START ON 7/28/2024] cholecalciferol  50,000 Units oral Weekly    heparin (porcine)  5,000 Units subcutaneous q8h CINDY    hydrALAZINE  25 mg oral TID    isosorbide dinitrate  10 mg oral TID    levETIRAcetam  500 mg oral Daily    lidocaine  1 patch transdermal Daily    multivitamin with minerals  1 tablet oral Daily    nadolol  20 mg oral Daily    oxygen   inhalation Continuous - Inhalation    piperacillin-tazobactam  2.25 g intravenous q8h    polyethylene glycol  17 g oral Daily     PRN medications   Medication    acetaminophen    dextrose    dextrose    glucagon    glucagon    trimethobenzamide    vancomycin     Continuous Medications   Medication Dose Last Rate       Physical Exam:  General: Resting comfortably in bed.In no acute distress   Cardiovascular: RRR, no r/m/g  Pulmonary: improved air movement bilaterally. No wheezes/rales/rhonchi     Assessment/Plan   Miguel A Bender is a 43 y.o. male with a PMHx of obesity status post gastric bypass surgery 2 years ago, CHARLEEN, HTN transferred for cardiogenic shock s/p Impella after witnessed sudden V-fib arrest cardiac arrest.     #V-fib arrest  #C/F Torsades de pointes  The patient presented to the hospital after V-fib arrest, requiring multiple DCCV.  Coronary angiogram on 7/10/2024 showed normal coronaries.  During ICU stay, the patient developed multiple episodes of polymorphic VT, these episodes were preceded by PVCs along with underlying rhythm showing prolonged Qtc (590)  which raises concern for torsades de pointes however that was while the patient was on Amiodarone which can be a trigger for prolonged qtc. Other ddx includes ARVD, however on echo there was limited visualization of the right ventricle. Genetics consulted.  -Given the concern for  prolonged qt as a potential cause of polymorphic VT, recommend continuing Nadolol.   -NPO at midnight for possible dcICD tomorrow.     Peripheral IV 07/21/24 20 G Left Antecubital (Active)   Site Assessment Clean;Dry;Intact 07/24/24 0802   Dressing Type Transparent 07/24/24 0802   Line Status Flushed 07/24/24 0802   Dressing Status Clean;Dry;Occlusive 07/24/24 0802   Number of days: 3       Hemodialysis Cath 07/18/24 Triple lumen Left Non-tunneled catheter Jugular (Active)   Line Necessity Hemodialysis 07/24/24 0802   Proximal Lumen Status Blood return noted 07/24/24 0802   Medial Lumen Status Heparin locked 07/24/24 0802   Distal Lumen Status Heparin locked 07/24/24 0802   Line Care Connections checked and tightened 07/24/24 0802   Dressing Type Antimicrobial patch;Transparent 07/24/24 0802   Dressing Status Clean;Dry;Occlusive 07/24/24 0802   Number of days: 6       Code Status:  Full Code    This case was discussed with Dr. Del Valle and > 30 minutes was spent in the professional and overall care of this patient.      If further questions arise, please page the EP consult pager at [phone] on weekdays 7AM - 6PM and weekends 7AM - 2PM, or at [phone] at all other times. The EP device nurse can be reached at pager [phone] during regular business hours JAQUI Malhotra MD  Cardiology Fellow PGY5

## 2024-07-24 NOTE — PROGRESS NOTES
43-year-old male with a history of obesity status post gastric bypass surgery 2 years ago, CHARLEEN, HTN transferred for further management of VT arrest c/b cardiogenic shock s/p Impella placement which was subsequently removed . Hospital course c/b non-oliguric ANNIE and altered mentation concerning for uremic encephalopathy vs hospital delirium vs anoxic brain injury. Nephrology on board, dialysis initiated  with subsequent improvement in mentation. Pt had episodes of opisthotonus at [facility], w/o epileptiform activity, was put on Keppra with plan to continue as OP.  EP also consulted for VT arrest, cMRI notable for dilated RV c/f possible ARVC, for which genetics was also consulted. ARVC lab panel sent out . Pt transferred to telemetry floor on .      Subjective   No acute events overnight.    This AM, pt reports feeling well. Has some difficulty sleeping trying to process recent events, including his cardiac arrest.  Minimal cough. No sputum production.  Ambulates well.    Denies fever/chills/CP/SOB/palpitations/lightheadedness/dizziness/vision changes/abdominal pain       Objective   24 Hour Vitals  Temp:  [36.2 °C (97.2 °F)-36.7 °C (98.1 °F)] 36.2 °C (97.2 °F)  Heart Rate:  [55-61] 60  Resp:  [18] 18  BP: (122-153)/(52-95) 134/52    Temp (24hrs), Av.5 °C (97.7 °F), Min:36.2 °C (97.2 °F), Max:36.7 °C (98.1 °F)     24 hour Intake/Output    Intake/Output Summary (Last 24 hours) at 2024 1354  Last data filed at 2024 1152  Gross per 24 hour   Intake 1160 ml   Output 1850 ml   Net -690 ml        Labs  CBC  Results from last 72 hours   Lab Units 24  0450 24  0549 24  0758   WBC AUTO x10*3/uL 9.1 9.9 12.0*   HEMOGLOBIN g/dL 9.1* 9.6* 9.6*   HEMATOCRIT % 26.9* 28.3* 28.3*   PLATELETS AUTO x10*3/uL 441 442 415      BMP  Results from last 72 hours   Lab Units 24  0450 24  1916 24  0549 24  1811   SODIUM mmol/L 131* 132* 134* 134*   POTASSIUM mmol/L 3.6 4.0  3.4* 3.8   CHLORIDE mmol/L 93* 95* 94* 96*   BUN mg/dL 78* 79* 80* 75*   CREATININE mg/dL 9.93* 10.29* 9.50* 10.61*   MAGNESIUM mg/dL 2.18 2.12 2.11 2.25   PHOSPHORUS mg/dL 6.5* 6.8*  --  6.0*       Medications   Scheduled Medications  beta carotene, 3,000 mcg, oral, Daily  [Held by provider] bumetanide, 4 mg, oral, Daily  [START ON 7/28/2024] cholecalciferol, 50,000 Units, oral, Weekly  heparin (porcine), 5,000 Units, subcutaneous, q8h CINDY  hydrALAZINE, 25 mg, oral, TID  isosorbide dinitrate, 10 mg, oral, TID  levETIRAcetam, 500 mg, oral, Daily  lidocaine, 1 patch, transdermal, Daily  multivitamin with minerals, 1 tablet, oral, Daily  nadolol, 20 mg, oral, Daily  oxygen, , inhalation, Continuous - Inhalation  piperacillin-tazobactam, 2.25 g, intravenous, q8h  polyethylene glycol, 17 g, oral, Daily     Continuous Medications    PRN Medications  PRN medications: acetaminophen, dextrose, dextrose, glucagon, glucagon, trimethobenzamide, vancomycin     Blood Cx: NGTD    Tele: few NSR, few PVCs and non sustained Vtach    Physical Exam  General: Resting comfortably in bed. Well developed, obese. Appears stated age. In no acute distress   HEENT: Normocephalic and atraumatic. EOMI, sclera non-icteric, MMM, no JVD  Cardiovascular: RRR, no r/m/g  Pulmonary: improved air movement bilaterally. No wheezes/rales/rhonchi  GI: soft, non-tender, non-distended  : No suprapubic/ flank pain  Extremities: No LE edema, no asterixis  Skin: warm and dry.   Neurologic: CN II-XII grossly intact. No focal neurologic deficit   Psych: Pleasant. Appropriate mood and affect        Assessment/Plan      43-year-old male with a history of obesity status post gastric bypass surgery 2 years ago, CHARLEEN, HTN, s/p VT arrest c/b cardiogenic shock requiring Impella (removed 7/13), non oliguric ANNIE currently on dialysis, AMS likely d/t uremia.Pt had runs of polymorphic VT while in ICU. cMRI w/ dilated RV c/f Arrhythmogenic RV cardiomyopahty. Genetic testing  pending. EP rec. ICD placement.  Currently on Vanc&Zosyn w/ improvement in leukocytosis and cough     Updates:  7/24:  - increased hydralazine to 25 mg TID and Isordil to 10 mg TID  - continue to hold bumex and no HD per Nephrology    7/23:  - holding bumex per Nephrology recs  - leukocytosis resolved, minimal cough  - Cr down trending, but BUN rising    7/22:  - cough and leukocytosis improving, continue Vanc&Zosyn  - PT/OT    7/21:  - mild fever overnight, productive cough, crackles and decreased breath sounds on R side on PE => CXR, procal, blood cx, empiric Vanc&Zosyn initiated  - initiated hydralazine 10 mg and isordil 5 mg  - removed El per pt's request, strict I&Os, bladder scan, straight cath PRN  - added Tigan PRN for nausea    #Ventricular Fibrillation and Polymorphic Vtach  #Non-ischemic cardiomyopathy  #Cardiac arrest of unclear etiology  #Concern for Arrhythmogenic Right Ventricular Cardiomyopathy  ::LVH w/o obstructive CAD  ::Hx of prolonged Qtc in ICU, last QTc wnl  ::Was on amdiodarone on admission. Had polymorphic SVT with sinus pauses on night of 7/10, so amiodarone weaned off and switched to lidocaine  - EP, Genetics following  - Meets major criteria of RV dilation and arrhythmia for ARVC. No consistent V1-V3 TWI, epsilon wave, or delayed S wave upstroke. No family history of SCD  - continue Nadolol per EP recs  - ICD is recommended      #HFrEF  ::Most recent TTE with EF 30% prior to this admission, 10% on admission  -on Nadolol for BB per EP  -avoiding ACE/ARB iso acute renal failure  -avoiding MRA/SGLT2 given acute renal failure  -  hydralazine 25 mg TID, isordil 10 mg TID  - holding bumex per Nephrology recs     #Non oliguric acute renal failure  #uremic encephalopathy-resolved::Likely pre-renal i/s/o cardiogenic shock   - removed El 7/22, voiding w/o difficulty, no retention  - Nephrology following  - holding bumex since 7/23; no HD for now  - Tigan PRN nausea     #Acute metabolic  encephalopathy-resolved  #History of myoclonic jerks and ophisthotonos at outside hospital  #Concern for anoxic brain injury i/s/o cardiac arrest-resolved  - Reported opisthotonos at [facility]  -Video EEG has preliminary result of severe diffuse encephalopathy with no epileptiform activity  - Keppra 500 mg daily (renal dosing) will be continued until outpatient    #Productive Cough - improving  #Leukocytosis - resolved  #Single Episode of Mild fever the night of 7/19 - no recurrence  - CXR 7/20 w/ R>L bibasilar airspace opacity which may be due   to atelectasis, cannot exclude developing   infectious infiltrate. Small/trace right pleural effusion.   - blood Cx NGTD  - (+) MRSA swab   - continue Vanc&Zosyn (day 4)    #Obesity  #S/p Gastric bypass  - 19032 vit D weekly, vit A 3 g daily, multivitamin per pt's request (pt's home regimen)    Gabrielle Lopez MD  Anesthesiology PGY-1

## 2024-07-25 LAB
ALBUMIN SERPL BCP-MCNC: 2.8 G/DL (ref 3.4–5)
ANION GAP SERPL CALC-SCNC: 17 MMOL/L (ref 10–20)
BACTERIA BLD CULT: NORMAL
BACTERIA BLD CULT: NORMAL
BASOPHILS # BLD AUTO: 0.13 X10*3/UL (ref 0–0.1)
BASOPHILS NFR BLD AUTO: 1.7 %
BUN SERPL-MCNC: 78 MG/DL (ref 6–23)
CALCIUM SERPL-MCNC: 8.7 MG/DL (ref 8.6–10.6)
CHLORIDE SERPL-SCNC: 97 MMOL/L (ref 98–107)
CO2 SERPL-SCNC: 25 MMOL/L (ref 21–32)
CREAT SERPL-MCNC: 10.23 MG/DL (ref 0.5–1.3)
EGFRCR SERPLBLD CKD-EPI 2021: 6 ML/MIN/1.73M*2
EOSINOPHIL # BLD AUTO: 0.34 X10*3/UL (ref 0–0.7)
EOSINOPHIL NFR BLD AUTO: 4.6 %
ERYTHROCYTE [DISTWIDTH] IN BLOOD BY AUTOMATED COUNT: 12.2 % (ref 11.5–14.5)
GLUCOSE SERPL-MCNC: 90 MG/DL (ref 74–99)
HCT VFR BLD AUTO: 25.9 % (ref 41–52)
HGB BLD-MCNC: 8.7 G/DL (ref 13.5–17.5)
IMM GRANULOCYTES # BLD AUTO: 0.15 X10*3/UL (ref 0–0.7)
IMM GRANULOCYTES NFR BLD AUTO: 2 % (ref 0–0.9)
LYMPHOCYTES # BLD AUTO: 1.38 X10*3/UL (ref 1.2–4.8)
LYMPHOCYTES NFR BLD AUTO: 18.5 %
MAGNESIUM SERPL-MCNC: 2.06 MG/DL (ref 1.6–2.4)
MCH RBC QN AUTO: 28.7 PG (ref 26–34)
MCHC RBC AUTO-ENTMCNC: 33.6 G/DL (ref 32–36)
MCV RBC AUTO: 86 FL (ref 80–100)
MONOCYTES # BLD AUTO: 1.07 X10*3/UL (ref 0.1–1)
MONOCYTES NFR BLD AUTO: 14.4 %
NEUTROPHILS # BLD AUTO: 4.37 X10*3/UL (ref 1.2–7.7)
NEUTROPHILS NFR BLD AUTO: 58.8 %
NRBC BLD-RTO: 0 /100 WBCS (ref 0–0)
PHOSPHATE SERPL-MCNC: 7 MG/DL (ref 2.5–4.9)
PLATELET # BLD AUTO: 463 X10*3/UL (ref 150–450)
POTASSIUM SERPL-SCNC: 4 MMOL/L (ref 3.5–5.3)
RBC # BLD AUTO: 3.03 X10*6/UL (ref 4.5–5.9)
SODIUM SERPL-SCNC: 135 MMOL/L (ref 136–145)
WBC # BLD AUTO: 7.4 X10*3/UL (ref 4.4–11.3)

## 2024-07-25 PROCEDURE — 2500000004 HC RX 250 GENERAL PHARMACY W/ HCPCS (ALT 636 FOR OP/ED)

## 2024-07-25 PROCEDURE — 99232 SBSQ HOSP IP/OBS MODERATE 35: CPT | Performed by: INTERNAL MEDICINE

## 2024-07-25 PROCEDURE — 80069 RENAL FUNCTION PANEL: CPT

## 2024-07-25 PROCEDURE — 36415 COLL VENOUS BLD VENIPUNCTURE: CPT

## 2024-07-25 PROCEDURE — 2500000001 HC RX 250 WO HCPCS SELF ADMINISTERED DRUGS (ALT 637 FOR MEDICARE OP)

## 2024-07-25 PROCEDURE — 99233 SBSQ HOSP IP/OBS HIGH 50: CPT

## 2024-07-25 PROCEDURE — 1100000001 HC PRIVATE ROOM DAILY

## 2024-07-25 PROCEDURE — 83735 ASSAY OF MAGNESIUM: CPT

## 2024-07-25 PROCEDURE — 85025 COMPLETE CBC W/AUTO DIFF WBC: CPT

## 2024-07-25 ASSESSMENT — PAIN SCALES - GENERAL
PAINLEVEL_OUTOF10: 0 - NO PAIN
PAINLEVEL_OUTOF10: 0 - NO PAIN

## 2024-07-25 ASSESSMENT — PAIN SCALES - WONG BAKER
WONGBAKER_NUMERICALRESPONSE: NO HURT
WONGBAKER_NUMERICALRESPONSE: NO HURT

## 2024-07-25 NOTE — CARE PLAN
The patient's goals for the shift include sit up to chair and ambulate in room today    The clinical goals for the shift include Pt will remain free from falls    Over the shift, the patient did make progress toward the following goals.

## 2024-07-25 NOTE — PROGRESS NOTES
NEPHROLOGY FOLLOW UP NOTE    Miguel A Bender   43 y.o.    111kg  MRN/Room: [id number]-    Subjective: No acute overnight events. Patient continuing to make urine and has good urine output without Bumex. Patient is A&Ox3 and is mentating well. He is very awake and conversive today. Continuing to be hemodynamically stable.     Objective:     Meds:   beta carotene, 3,000 mcg, Daily  [Held by provider] bumetanide, 4 mg, Daily  [START ON 7/28/2024] cholecalciferol, 50,000 Units, Weekly  heparin (porcine), 5,000 Units, q8h CINDY  hydrALAZINE, 25 mg, TID  isosorbide dinitrate, 10 mg, TID  levETIRAcetam, 500 mg, Daily  lidocaine, 1 patch, Daily  multivitamin with minerals, 1 tablet, Daily  nadolol, 20 mg, Daily  oxygen, , Continuous - Inhalation  piperacillin-tazobactam, 2.25 g, q8h  polyethylene glycol, 17 g, Daily         acetaminophen, 975 mg, q8h PRN  dextrose, 12.5 g, q15 min PRN  dextrose, 25 g, q15 min PRN  glucagon, 1 mg, q15 min PRN  glucagon, 1 mg, q15 min PRN  trimethobenzamide, 200 mg, q8h PRN  vancomycin, , Daily PRN        Vitals:    07/25/24 0714   BP: 149/80   Pulse: 55   Resp: 18   Temp: 36.6 °C (97.9 °F)   SpO2: 96%          Intake/Output Summary (Last 24 hours) at 7/25/2024 0802  Last data filed at 7/25/2024 0356  Gross per 24 hour   Intake 1060 ml   Output 2700 ml   Net -1640 ml       General appearance: Awake and alert, oriented. No acute distress  HEENT: NC/AT, oral mucosa  Skin: no apparent rash  Heart: heart sounds 1 & 2 present and normal, no murmurs heard or rub  Lungs: Adequate air entry, breath sounds bilaterally.  No wheezing/crackles  Abdomen: soft, non tender  Extremities: No edema, no joint swelling  Neuro: No FND,  No asterixis   ACCESS: PIV, R. I J triple lumen     Blood Labs:  Results for orders placed or performed during the hospital encounter of 07/10/24 (from the past 24 hour(s))   Renal Function Panel   Result Value Ref Range    Glucose 88 74 - 99 mg/dL    Sodium 134 (L) 136 - 145  mmol/L    Potassium 4.2 3.5 - 5.3 mmol/L    Chloride 95 (L) 98 - 107 mmol/L    Bicarbonate 24 21 - 32 mmol/L    Anion Gap 19 10 - 20 mmol/L    Urea Nitrogen 80 (H) 6 - 23 mg/dL    Creatinine 10.17 (H) 0.50 - 1.30 mg/dL    eGFR 6 (L) >60 mL/min/1.73m*2    Calcium 8.8 8.6 - 10.6 mg/dL    Phosphorus 6.4 (H) 2.5 - 4.9 mg/dL    Albumin 2.9 (L) 3.4 - 5.0 g/dL   Magnesium   Result Value Ref Range    Magnesium 2.01 1.60 - 2.40 mg/dL   CBC and Auto Differential   Result Value Ref Range    WBC 7.4 4.4 - 11.3 x10*3/uL    nRBC 0.0 0.0 - 0.0 /100 WBCs    RBC 3.03 (L) 4.50 - 5.90 x10*6/uL    Hemoglobin 8.7 (L) 13.5 - 17.5 g/dL    Hematocrit 25.9 (L) 41.0 - 52.0 %    MCV 86 80 - 100 fL    MCH 28.7 26.0 - 34.0 pg    MCHC 33.6 32.0 - 36.0 g/dL    RDW 12.2 11.5 - 14.5 %    Platelets 463 (H) 150 - 450 x10*3/uL    Neutrophils % 58.8 40.0 - 80.0 %    Immature Granulocytes %, Automated 2.0 (H) 0.0 - 0.9 %    Lymphocytes % 18.5 13.0 - 44.0 %    Monocytes % 14.4 2.0 - 10.0 %    Eosinophils % 4.6 0.0 - 6.0 %    Basophils % 1.7 0.0 - 2.0 %    Neutrophils Absolute 4.37 1.20 - 7.70 x10*3/uL    Immature Granulocytes Absolute, Automated 0.15 0.00 - 0.70 x10*3/uL    Lymphocytes Absolute 1.38 1.20 - 4.80 x10*3/uL    Monocytes Absolute 1.07 (H) 0.10 - 1.00 x10*3/uL    Eosinophils Absolute 0.34 0.00 - 0.70 x10*3/uL    Basophils Absolute 0.13 (H) 0.00 - 0.10 x10*3/uL   Renal Function Panel   Result Value Ref Range    Glucose 90 74 - 99 mg/dL    Sodium 135 (L) 136 - 145 mmol/L    Potassium 4.0 3.5 - 5.3 mmol/L    Chloride 97 (L) 98 - 107 mmol/L    Bicarbonate 25 21 - 32 mmol/L    Anion Gap 17 10 - 20 mmol/L    Urea Nitrogen 78 (H) 6 - 23 mg/dL    Creatinine 10.23 (H) 0.50 - 1.30 mg/dL    eGFR 6 (L) >60 mL/min/1.73m*2    Calcium 8.7 8.6 - 10.6 mg/dL    Phosphorus 7.0 (H) 2.5 - 4.9 mg/dL    Albumin 2.8 (L) 3.4 - 5.0 g/dL   Magnesium   Result Value Ref Range    Magnesium 2.06 1.60 - 2.40 mg/dL        ASSESSMENT:  Miguel A Bender is a  43 y.o.  year old ,  with PMHx of HTN, CHARLEEN, NSTEMI, and gastric bypass surgery admitted to [facility] on 7/10 with cardiac arrest. S/p left heart cath with Impella placement. Nephrology consulted for ANNIE with worsening UOP and renal function.      7/25 updates:  - 2.7L urine output in last 24 hours  - No plan for HD today  - Can remove HD catheter   - Plan to keep patient euvolemic, hold Bumex for now   - Started HD on 7/19 with last session on 7/20     #ANNIE, non-oliguric (Baseline Cr 1.19)  - Etiology : Post cardiac arrest (Hemodynamic likely with ATN)  - Electrolytes stable , mild AGMA and NAGMA  - Likely progressed to ATN  - On HD for Overload and Uremia (Possibly uremic encephalopathy)     Recommendations:  - No need for dialysis at this time  - Can remove HD catheter  - Plan to keep patient euvolemic, please hold Bumex for now   - Continue supportive management   - Will continue to follow  - Strict I/Os   - Avoid IV contrast & Nephrotoxics    Rossi Medel MD PGY-1  Nephrology Resident  24 hour Renal Pager - [phone]    Discussed with attending nephrologist Dr. Villalba.

## 2024-07-25 NOTE — SIGNIFICANT EVENT
Miguel A Bender is a 43 y.o. male with a PMHx of obesity status post gastric bypass surgery 2 years ago, CHARLEEN, HTN transferred for cardiogenic shock s/p Impella after witnessed sudden V-fib arrest cardiac arrest.     #V-fib arrest  #C/F Torsades de pointes  The patient presented to the hospital after V-fib arrest, requiring multiple DCCV.  Coronary angiogram on 7/10/2024 showed normal coronaries.  During ICU stay, the patient developed multiple episodes of polymorphic VT, these episodes were preceded by PVCs along with underlying rhythm showing prolonged Qtc (590)  which raises concern for torsades de pointes however that was while the patient was on Amiodarone which can be a trigger for prolonged qtc. Other ddx includes ARVD, however on echo there was limited visualization of the right ventricle. Genetics consulted.  -Given the concern for prolonged qt as a potential cause of polymorphic VT, recommend continuing Nadolol.   -Due to scheduling availability, will postpone dc ICD placement to 7/26/24, please make patient NPO at midnight   - Please hold anti-coagulation including DVT PPX from tonight   - Please ensure there is CBC, CMP, PT/INR with AM labs

## 2024-07-25 NOTE — PROGRESS NOTES
43-year-old male with a history of obesity status post gastric bypass surgery 2 years ago, CHARLEEN, HTN transferred for further management of VT arrest c/b cardiogenic shock s/p Impella placement which was subsequently removed . Hospital course c/b non-oliguric ANNIE and altered mentation concerning for uremic encephalopathy vs hospital delirium vs anoxic brain injury. Nephrology on board, dialysis initiated  with subsequent improvement in mentation. Pt had episodes of opisthotonus at [facility], w/o epileptiform activity, was put on Keppra with plan to continue as OP.  EP also consulted for VT arrest, cMRI notable for dilated RV c/f possible ARVC, for which genetics was also consulted. ARVC lab panel sent out . Pt transferred to telemetry floor on .      Subjective   No acute events overnight.    This AM, pt resting comfortably in bed, reports feeling well. Cough has resolved. Notes good urinary output.    Denies fever/chills/CP/SOB/palpitations/lightheadedness/dizziness/vision changes/abdominal pain     Objective   24 Hour Vitals  Temp:  [36.1 °C (97 °F)-36.8 °C (98.2 °F)] 36.6 °C (97.9 °F)  Heart Rate:  [55-63] 55  Resp:  [18-20] 18  BP: (116-149)/(52-87) 149/80    Temp (24hrs), Av.4 °C (97.5 °F), Min:36.1 °C (97 °F), Max:36.8 °C (98.2 °F)     24 hour Intake/Output    Intake/Output Summary (Last 24 hours) at 2024 1106  Last data filed at 2024 0900  Gross per 24 hour   Intake 580 ml   Output 3100 ml   Net -2520 ml        Labs  CBC  Results from last 72 hours   Lab Units 24  0611 24  0450 24  0549   WBC AUTO x10*3/uL 7.4 9.1 9.9   HEMOGLOBIN g/dL 8.7* 9.1* 9.6*   HEMATOCRIT % 25.9* 26.9* 28.3*   PLATELETS AUTO x10*3/uL 463* 441 442      BMP  Results from last 72 hours   Lab Units 24  0611 24  1757 24  0450   SODIUM mmol/L 135* 134* 131*   POTASSIUM mmol/L 4.0 4.2 3.6   CHLORIDE mmol/L 97* 95* 93*   BUN mg/dL 78* 80* 78*   CREATININE mg/dL 10.23* 10.17* 9.93*    MAGNESIUM mg/dL 2.06 2.01 2.18   PHOSPHORUS mg/dL 7.0* 6.4* 6.5*       Medications   Scheduled Medications  beta carotene, 3,000 mcg, oral, Daily  [Held by provider] bumetanide, 4 mg, oral, Daily  [START ON 7/28/2024] cholecalciferol, 50,000 Units, oral, Weekly  heparin (porcine), 5,000 Units, subcutaneous, q8h CINDY  hydrALAZINE, 25 mg, oral, TID  isosorbide dinitrate, 10 mg, oral, TID  levETIRAcetam, 500 mg, oral, Daily  lidocaine, 1 patch, transdermal, Daily  multivitamin with minerals, 1 tablet, oral, Daily  nadolol, 20 mg, oral, Daily  oxygen, , inhalation, Continuous - Inhalation  piperacillin-tazobactam, 2.25 g, intravenous, q8h  polyethylene glycol, 17 g, oral, Daily     Continuous Medications    PRN Medications  PRN medications: acetaminophen, dextrose, dextrose, glucagon, glucagon, trimethobenzamide, vancomycin     Blood Cx: NGTD     Physical Exam  General: Resting comfortably in bed. Well developed, obese. Appears stated age. In no acute distress   HEENT: Normocephalic and atraumatic. EOMI, sclera non-icteric  Cardiovascular: RRR, no r/m/g  Pulmonary: lungs clear to auscultation bilaterally. No wheezes/rales/rhonchi  GI: soft, non-tender, non-distended  : No suprapubic/ flank pain  Extremities: No LE edema  Skin: warm and dry.   Neurologic: CN II-XII grossly intact. No focal neurologic deficit   Psych: Pleasant. Appropriate mood and affect        Assessment/Plan      43-year-old male with a history of obesity status post gastric bypass surgery 2 years ago, CHARLEEN, HTN, s/p VT arrest c/b cardiogenic shock requiring Impella (removed 7/13), non oliguric ANNIE currently on dialysis, AMS likely d/t uremia.Pt had runs of polymorphic VT while in ICU. cMRI w/ dilated RV c/f Arrhythmogenic RV cardiomyopahty. Genetic testing pending. EP rec. ICD placement.  Currently on Vanc&Zosyn w/ improvement in leukocytosis and cough     Updates:  7/25:  - no HD, continue to hold bumex as pt makes good urine, trialysis line  removed per Nephrology recommendations    7/24:  - increased hydralazine to 25 mg TID and Isordil to 10 mg TID  - continue to hold bumex and no HD per Nephrology    7/23:  - holding bumex per Nephrology recs  - leukocytosis resolved, minimal cough  - Cr down trending, but BUN rising    7/22:  - cough and leukocytosis improving, continue Vanc&Zosyn  - PT/OT    7/21:  - mild fever overnight, productive cough, crackles and decreased breath sounds on R side on PE => CXR, procal, blood cx, empiric Vanc&Zosyn initiated  - initiated hydralazine 10 mg and isordil 5 mg  - removed El per pt's request, strict I&Os, bladder scan, straight cath PRN  - added Tigan PRN for nausea    #Ventricular Fibrillation and Polymorphic Vtach  #Non-ischemic cardiomyopathy  #Cardiac arrest of unclear etiology  #Concern for Arrhythmogenic Right Ventricular Cardiomyopathy  ::LVH w/o obstructive CAD  ::Hx of prolonged Qtc in ICU, last QTc wnl  ::Was on amdiodarone on admission. Had polymorphic SVT with sinus pauses on night of 7/10, so amiodarone weaned off and switched to lidocaine  - EP, Genetics following  - Meets major criteria of RV dilation and arrhythmia for ARVC. No consistent V1-V3 TWI, epsilon wave, or delayed S wave upstroke. No family history of SCD  - continue Nadolol per EP recs  - ICD is recommended      #HFrEF  ::Most recent TTE with EF 30% prior to this admission, 10% on admission  -on Nadolol for BB per EP  -avoiding ACE/ARB iso acute renal failure  -avoiding MRA/SGLT2 given acute renal failure  - hydralazine 25 mg TID, isordil 10 mg TID  - continue to hold bumex per Nephrology recs     #Non oliguric acute renal failure  #uremic encephalopathy-resolved::Likely pre-renal i/s/o cardiogenic shock   - removed El 7/22, voiding w/o difficulty, no retention  - Nephrology following  - trialysis catheter removed 7/25  - holding bumex since 7/23  - Tigan PRN nausea     #Acute metabolic encephalopathy-resolved  #History of myoclonic  roya and ophisthotonos at outside hospital  #Concern for anoxic brain injury i/s/o cardiac arrest-resolved  - Reported op[facility]hotonos at [facility]  -Video EEG has preliminary result of severe diffuse encephalopathy with no epileptiform activity  - Keppra 500 mg daily (renal dosing) will be continued until outpatient    #Productive Cough - improving  #Leukocytosis - resolved  #Single Episode of Mild fever the night of 7/19 - no recurrence  - CXR 7/20 w/ R>L bibasilar airspace opacity which may be due   to atelectasis, cannot exclude developing   infectious infiltrate. Small/trace right pleural effusion.   - blood Cx NGTD  - (+) MRSA swab   - continue Vanc&Zosyn (day 5)    #Obesity  #S/p Gastric bypass  - 62264 vit D weekly, vit A 3 g daily, multivitamin per pt's request (pt's home regimen)    Gabrielle Lopez MD  Anesthesiology PGY-1

## 2024-07-26 ENCOUNTER — APPOINTMENT (OUTPATIENT)
Dept: CARDIOLOGY | Facility: HOSPITAL | Age: 44
DRG: 276 | End: 2024-07-26
Payer: COMMERCIAL

## 2024-07-26 ENCOUNTER — APPOINTMENT (OUTPATIENT)
Dept: RADIOLOGY | Facility: HOSPITAL | Age: 44
DRG: 276 | End: 2024-07-26
Payer: COMMERCIAL

## 2024-07-26 DIAGNOSIS — I46.9 CARDIAC ARREST (MULTI): Primary | ICD-10-CM

## 2024-07-26 DIAGNOSIS — I47.20 VT (VENTRICULAR TACHYCARDIA) (MULTI): ICD-10-CM

## 2024-07-26 LAB
ALBUMIN SERPL BCP-MCNC: 3.1 G/DL (ref 3.4–5)
ANION GAP SERPL CALC-SCNC: 19 MMOL/L (ref 10–20)
ATRIAL RATE: 67 BPM
ATRIAL RATE: 83 BPM
ATRIAL RATE: 86 BPM
BASOPHILS # BLD AUTO: 0.15 X10*3/UL (ref 0–0.1)
BASOPHILS NFR BLD AUTO: 2 %
BODY SURFACE AREA: 2.31 M2
BUN SERPL-MCNC: 73 MG/DL (ref 6–23)
CALCIUM SERPL-MCNC: 9.4 MG/DL (ref 8.6–10.6)
CHLORIDE SERPL-SCNC: 98 MMOL/L (ref 98–107)
CO2 SERPL-SCNC: 26 MMOL/L (ref 21–32)
CREAT SERPL-MCNC: 9.9 MG/DL (ref 0.5–1.3)
EGFRCR SERPLBLD CKD-EPI 2021: 6 ML/MIN/1.73M*2
EOSINOPHIL # BLD AUTO: 0.28 X10*3/UL (ref 0–0.7)
EOSINOPHIL NFR BLD AUTO: 3.8 %
ERYTHROCYTE [DISTWIDTH] IN BLOOD BY AUTOMATED COUNT: 12.3 % (ref 11.5–14.5)
GLUCOSE SERPL-MCNC: 85 MG/DL (ref 74–99)
HCT VFR BLD AUTO: 28.8 % (ref 41–52)
HGB BLD-MCNC: 9.7 G/DL (ref 13.5–17.5)
IMM GRANULOCYTES # BLD AUTO: 0.1 X10*3/UL (ref 0–0.7)
IMM GRANULOCYTES NFR BLD AUTO: 1.4 % (ref 0–0.9)
LYMPHOCYTES # BLD AUTO: 1.33 X10*3/UL (ref 1.2–4.8)
LYMPHOCYTES NFR BLD AUTO: 18.1 %
MAGNESIUM SERPL-MCNC: 2.23 MG/DL (ref 1.6–2.4)
MCH RBC QN AUTO: 29.2 PG (ref 26–34)
MCHC RBC AUTO-ENTMCNC: 33.7 G/DL (ref 32–36)
MCV RBC AUTO: 87 FL (ref 80–100)
MONOCYTES # BLD AUTO: 1.1 X10*3/UL (ref 0.1–1)
MONOCYTES NFR BLD AUTO: 15 %
NEUTROPHILS # BLD AUTO: 4.37 X10*3/UL (ref 1.2–7.7)
NEUTROPHILS NFR BLD AUTO: 59.7 %
NRBC BLD-RTO: 0 /100 WBCS (ref 0–0)
P AXIS: 27 DEGREES
P AXIS: 31 DEGREES
P AXIS: 40 DEGREES
P OFFSET: 211 MS
P OFFSET: 215 MS
P OFFSET: 216 MS
P ONSET: 154 MS
P ONSET: 159 MS
P ONSET: 163 MS
PHOSPHATE SERPL-MCNC: 7 MG/DL (ref 2.5–4.9)
PLATELET # BLD AUTO: 462 X10*3/UL (ref 150–450)
POTASSIUM SERPL-SCNC: 4.5 MMOL/L (ref 3.5–5.3)
PR INTERVAL: 126 MS
PR INTERVAL: 126 MS
PR INTERVAL: 134 MS
Q ONSET: 221 MS
Q ONSET: 222 MS
Q ONSET: 226 MS
QRS COUNT: 11 BEATS
QRS COUNT: 14 BEATS
QRS COUNT: 14 BEATS
QRS DURATION: 86 MS
QRS DURATION: 94 MS
QRS DURATION: 96 MS
QT INTERVAL: 390 MS
QT INTERVAL: 422 MS
QT INTERVAL: 444 MS
QTC CALCULATION(BAZETT): 458 MS
QTC CALCULATION(BAZETT): 469 MS
QTC CALCULATION(BAZETT): 504 MS
QTC FREDERICIA: 434 MS
QTC FREDERICIA: 461 MS
QTC FREDERICIA: 476 MS
R AXIS: 46 DEGREES
R AXIS: 74 DEGREES
R AXIS: 78 DEGREES
RBC # BLD AUTO: 3.32 X10*6/UL (ref 4.5–5.9)
SODIUM SERPL-SCNC: 138 MMOL/L (ref 136–145)
T AXIS: 12 DEGREES
T AXIS: 23 DEGREES
T AXIS: 9 DEGREES
T OFFSET: 421 MS
T OFFSET: 433 MS
T OFFSET: 443 MS
VANCOMYCIN SERPL-MCNC: 13.9 UG/ML (ref 5–20)
VENTRICULAR RATE: 67 BPM
VENTRICULAR RATE: 83 BPM
VENTRICULAR RATE: 86 BPM
WBC # BLD AUTO: 7.3 X10*3/UL (ref 4.4–11.3)

## 2024-07-26 PROCEDURE — 80202 ASSAY OF VANCOMYCIN: CPT

## 2024-07-26 PROCEDURE — 2750000001 HC OR 275 NO HCPCS: Performed by: STUDENT IN AN ORGANIZED HEALTH CARE EDUCATION/TRAINING PROGRAM

## 2024-07-26 PROCEDURE — 85025 COMPLETE CBC W/AUTO DIFF WBC: CPT

## 2024-07-26 PROCEDURE — 02H63KZ INSERTION OF DEFIBRILLATOR LEAD INTO RIGHT ATRIUM, PERCUTANEOUS APPROACH: ICD-10-PCS | Performed by: STUDENT IN AN ORGANIZED HEALTH CARE EDUCATION/TRAINING PROGRAM

## 2024-07-26 PROCEDURE — 2500000004 HC RX 250 GENERAL PHARMACY W/ HCPCS (ALT 636 FOR OP/ED): Performed by: STUDENT IN AN ORGANIZED HEALTH CARE EDUCATION/TRAINING PROGRAM

## 2024-07-26 PROCEDURE — 2550000001 HC RX 255 CONTRASTS: Performed by: STUDENT IN AN ORGANIZED HEALTH CARE EDUCATION/TRAINING PROGRAM

## 2024-07-26 PROCEDURE — 2500000001 HC RX 250 WO HCPCS SELF ADMINISTERED DRUGS (ALT 637 FOR MEDICARE OP)

## 2024-07-26 PROCEDURE — 2500000004 HC RX 250 GENERAL PHARMACY W/ HCPCS (ALT 636 FOR OP/ED)

## 2024-07-26 PROCEDURE — C1777 LEAD, AICD, ENDO SINGLE COIL: HCPCS | Performed by: STUDENT IN AN ORGANIZED HEALTH CARE EDUCATION/TRAINING PROGRAM

## 2024-07-26 PROCEDURE — 80069 RENAL FUNCTION PANEL: CPT

## 2024-07-26 PROCEDURE — C1781 MESH (IMPLANTABLE): HCPCS | Performed by: STUDENT IN AN ORGANIZED HEALTH CARE EDUCATION/TRAINING PROGRAM

## 2024-07-26 PROCEDURE — 33249 INSJ/RPLCMT DEFIB W/LEAD(S): CPT | Performed by: STUDENT IN AN ORGANIZED HEALTH CARE EDUCATION/TRAINING PROGRAM

## 2024-07-26 PROCEDURE — 2500000001 HC RX 250 WO HCPCS SELF ADMINISTERED DRUGS (ALT 637 FOR MEDICARE OP): Performed by: STUDENT IN AN ORGANIZED HEALTH CARE EDUCATION/TRAINING PROGRAM

## 2024-07-26 PROCEDURE — 99233 SBSQ HOSP IP/OBS HIGH 50: CPT

## 2024-07-26 PROCEDURE — 0JH608Z INSERTION OF DEFIBRILLATOR GENERATOR INTO CHEST SUBCUTANEOUS TISSUE AND FASCIA, OPEN APPROACH: ICD-10-PCS | Performed by: STUDENT IN AN ORGANIZED HEALTH CARE EDUCATION/TRAINING PROGRAM

## 2024-07-26 PROCEDURE — 02HK3KZ INSERTION OF DEFIBRILLATOR LEAD INTO RIGHT VENTRICLE, PERCUTANEOUS APPROACH: ICD-10-PCS | Performed by: STUDENT IN AN ORGANIZED HEALTH CARE EDUCATION/TRAINING PROGRAM

## 2024-07-26 PROCEDURE — 36415 COLL VENOUS BLD VENIPUNCTURE: CPT

## 2024-07-26 PROCEDURE — 83735 ASSAY OF MAGNESIUM: CPT

## 2024-07-26 PROCEDURE — 71045 X-RAY EXAM CHEST 1 VIEW: CPT

## 2024-07-26 PROCEDURE — 99152 MOD SED SAME PHYS/QHP 5/>YRS: CPT | Performed by: STUDENT IN AN ORGANIZED HEALTH CARE EDUCATION/TRAINING PROGRAM

## 2024-07-26 PROCEDURE — 99153 MOD SED SAME PHYS/QHP EA: CPT | Performed by: STUDENT IN AN ORGANIZED HEALTH CARE EDUCATION/TRAINING PROGRAM

## 2024-07-26 PROCEDURE — C1722 AICD, SINGLE CHAMBER: HCPCS | Performed by: STUDENT IN AN ORGANIZED HEALTH CARE EDUCATION/TRAINING PROGRAM

## 2024-07-26 PROCEDURE — 99232 SBSQ HOSP IP/OBS MODERATE 35: CPT | Performed by: INTERNAL MEDICINE

## 2024-07-26 PROCEDURE — 71045 X-RAY EXAM CHEST 1 VIEW: CPT | Performed by: RADIOLOGY

## 2024-07-26 PROCEDURE — C1892 INTRO/SHEATH,FIXED,PEEL-AWAY: HCPCS | Performed by: STUDENT IN AN ORGANIZED HEALTH CARE EDUCATION/TRAINING PROGRAM

## 2024-07-26 PROCEDURE — 2780000003 HC OR 278 NO HCPCS: Performed by: STUDENT IN AN ORGANIZED HEALTH CARE EDUCATION/TRAINING PROGRAM

## 2024-07-26 PROCEDURE — 2720000007 HC OR 272 NO HCPCS: Performed by: STUDENT IN AN ORGANIZED HEALTH CARE EDUCATION/TRAINING PROGRAM

## 2024-07-26 PROCEDURE — 1100000001 HC PRIVATE ROOM DAILY

## 2024-07-26 PROCEDURE — C1898 LEAD, PMKR, OTHER THAN TRANS: HCPCS | Performed by: STUDENT IN AN ORGANIZED HEALTH CARE EDUCATION/TRAINING PROGRAM

## 2024-07-26 DEVICE — IMPLANTABLE CARDIOVERTER DEFIBRILLATOR
Type: IMPLANTABLE DEVICE | Site: CHEST  WALL | Status: FUNCTIONAL
Brand: GALLANT™

## 2024-07-26 DEVICE — DEFIBRILLATION LEAD
Type: IMPLANTABLE DEVICE | Site: HEART | Status: FUNCTIONAL
Brand: DURATA™

## 2024-07-26 DEVICE — PACING LEAD
Type: IMPLANTABLE DEVICE | Site: HEART | Status: FUNCTIONAL
Brand: TENDRIL™

## 2024-07-26 RX ORDER — BUPIVACAINE HYDROCHLORIDE 5 MG/ML
INJECTION, SOLUTION EPIDURAL; INTRACAUDAL AS NEEDED
Status: DISCONTINUED | OUTPATIENT
Start: 2024-07-26 | End: 2024-07-26 | Stop reason: HOSPADM

## 2024-07-26 RX ORDER — MIDAZOLAM HYDROCHLORIDE 1 MG/ML
INJECTION, SOLUTION INTRAMUSCULAR; INTRAVENOUS AS NEEDED
Status: DISCONTINUED | OUTPATIENT
Start: 2024-07-26 | End: 2024-07-26 | Stop reason: HOSPADM

## 2024-07-26 RX ORDER — CEFAZOLIN SODIUM 2 G/50ML
SOLUTION INTRAVENOUS CONTINUOUS PRN
Status: DISCONTINUED | OUTPATIENT
Start: 2024-07-26 | End: 2024-07-26 | Stop reason: HOSPADM

## 2024-07-26 RX ORDER — FENTANYL CITRATE 50 UG/ML
INJECTION, SOLUTION INTRAMUSCULAR; INTRAVENOUS AS NEEDED
Status: DISCONTINUED | OUTPATIENT
Start: 2024-07-26 | End: 2024-07-26 | Stop reason: HOSPADM

## 2024-07-26 RX ORDER — VANCOMYCIN HYDROCHLORIDE 500 MG/100ML
INJECTION, SOLUTION INTRAVENOUS CONTINUOUS PRN
Status: DISCONTINUED | OUTPATIENT
Start: 2024-07-26 | End: 2024-07-26 | Stop reason: HOSPADM

## 2024-07-26 RX ORDER — VANCOMYCIN HYDROCHLORIDE 1 G/200ML
INJECTION, SOLUTION INTRAVENOUS CONTINUOUS PRN
Status: DISCONTINUED | OUTPATIENT
Start: 2024-07-26 | End: 2024-07-26 | Stop reason: HOSPADM

## 2024-07-26 RX ORDER — VANCOMYCIN HYDROCHLORIDE 500 MG/100ML
500 INJECTION, SOLUTION INTRAVENOUS ONCE
Status: COMPLETED | OUTPATIENT
Start: 2024-07-26 | End: 2024-07-26

## 2024-07-26 RX ORDER — CEPHALEXIN 500 MG/1
500 CAPSULE ORAL EVERY 8 HOURS SCHEDULED
Status: DISCONTINUED | OUTPATIENT
Start: 2024-07-26 | End: 2024-07-27 | Stop reason: HOSPADM

## 2024-07-26 ASSESSMENT — PAIN SCALES - WONG BAKER: WONGBAKER_NUMERICALRESPONSE: NO HURT

## 2024-07-26 ASSESSMENT — PAIN SCALES - GENERAL: PAINLEVEL_OUTOF10: 0 - NO PAIN

## 2024-07-26 NOTE — PROGRESS NOTES
NEPHROLOGY FOLLOW UP NOTE    Miguel A Bender   43 y.o.    108kg  MRN/Room: [id number]-    Subjective: No acute overnight events. Patient continuing to make urine and has good urine output without Bumex. Patient is A&Ox3 and is mentating well. He is very awake and conversive today. Continuing to be hemodynamically stable.     Objective:     Meds:   beta carotene, 3,000 mcg, Daily  [Held by provider] bumetanide, 4 mg, Daily  [START ON 7/28/2024] cholecalciferol, 50,000 Units, Weekly  [Held by provider] heparin (porcine), 5,000 Units, q8h CINDY  hydrALAZINE, 25 mg, TID  isosorbide dinitrate, 10 mg, TID  levETIRAcetam, 500 mg, Daily  lidocaine, 1 patch, Daily  multivitamin with minerals, 1 tablet, Daily  nadolol, 20 mg, Daily  oxygen, , Continuous - Inhalation  piperacillin-tazobactam, 2.25 g, q8h  polyethylene glycol, 17 g, Daily         acetaminophen, 975 mg, q8h PRN  dextrose, 12.5 g, q15 min PRN  dextrose, 25 g, q15 min PRN  glucagon, 1 mg, q15 min PRN  glucagon, 1 mg, q15 min PRN  trimethobenzamide, 200 mg, q8h PRN  vancomycin, , Daily PRN        Vitals:    07/26/24 0336   BP: 141/80   Pulse: 57   Resp: 20   Temp: 36.5 °C (97.7 °F)   SpO2: 96%          Intake/Output Summary (Last 24 hours) at 7/26/2024 0810  Last data filed at 7/26/2024 0629  Gross per 24 hour   Intake 460 ml   Output 3600 ml   Net -3140 ml       General appearance: Awake and alert, oriented. No acute distress  HEENT: NC/AT, oral mucosa  Skin: no apparent rash  Heart: heart sounds 1 & 2 present and normal, no murmurs heard or rub  Lungs: Adequate air entry, breath sounds bilaterally.  No wheezing/crackles  Abdomen: soft, non tender  Extremities: No edema, no joint swelling  Neuro: No FND,  No asterixis   ACCESS: PIV     Blood Labs:  Results for orders placed or performed during the hospital encounter of 07/10/24 (from the past 24 hour(s))   CBC and Auto Differential   Result Value Ref Range    WBC 7.3 4.4 - 11.3 x10*3/uL    nRBC 0.0 0.0 - 0.0  /100 WBCs    RBC 3.32 (L) 4.50 - 5.90 x10*6/uL    Hemoglobin 9.7 (L) 13.5 - 17.5 g/dL    Hematocrit 28.8 (L) 41.0 - 52.0 %    MCV 87 80 - 100 fL    MCH 29.2 26.0 - 34.0 pg    MCHC 33.7 32.0 - 36.0 g/dL    RDW 12.3 11.5 - 14.5 %    Platelets 462 (H) 150 - 450 x10*3/uL    Neutrophils % 59.7 40.0 - 80.0 %    Immature Granulocytes %, Automated 1.4 (H) 0.0 - 0.9 %    Lymphocytes % 18.1 13.0 - 44.0 %    Monocytes % 15.0 2.0 - 10.0 %    Eosinophils % 3.8 0.0 - 6.0 %    Basophils % 2.0 0.0 - 2.0 %    Neutrophils Absolute 4.37 1.20 - 7.70 x10*3/uL    Immature Granulocytes Absolute, Automated 0.10 0.00 - 0.70 x10*3/uL    Lymphocytes Absolute 1.33 1.20 - 4.80 x10*3/uL    Monocytes Absolute 1.10 (H) 0.10 - 1.00 x10*3/uL    Eosinophils Absolute 0.28 0.00 - 0.70 x10*3/uL    Basophils Absolute 0.15 (H) 0.00 - 0.10 x10*3/uL   Renal Function Panel   Result Value Ref Range    Glucose 85 74 - 99 mg/dL    Sodium 138 136 - 145 mmol/L    Potassium 4.5 3.5 - 5.3 mmol/L    Chloride 98 98 - 107 mmol/L    Bicarbonate 26 21 - 32 mmol/L    Anion Gap 19 10 - 20 mmol/L    Urea Nitrogen 73 (H) 6 - 23 mg/dL    Creatinine 9.90 (H) 0.50 - 1.30 mg/dL    eGFR 6 (L) >60 mL/min/1.73m*2    Calcium 9.4 8.6 - 10.6 mg/dL    Phosphorus 7.0 (H) 2.5 - 4.9 mg/dL    Albumin 3.1 (L) 3.4 - 5.0 g/dL   Magnesium   Result Value Ref Range    Magnesium 2.23 1.60 - 2.40 mg/dL   Vancomycin   Result Value Ref Range    Vancomycin 13.9 5.0 - 20.0 ug/mL          ASSESSMENT:  Miguel A Bender is a  43 y.o.  year old , with PMHx of HTN, CHARLEEN, NSTEMI, and gastric bypass surgery admitted to [facility] on 7/10 with cardiac arrest. S/p left heart cath with Impella placement. Nephrology consulted for ANNIE with worsening UOP and renal function.      7/26 updates:  - Line out yesterday   - 3.6L urine output in last 24 hours  - No plan for HD today  - Plan to keep patient euvolemic, hold Bumex for now   - Started HD on 7/19 with last session on 7/20  - will plan to watch labs from tomorrow       #ANNIE, non-oliguric (Baseline Cr 1.19)  - Etiology : Post cardiac arrest (Hemodynamic likely with ATN)  - Electrolytes stable , mild AGMA and NAGMA  - Likely progressed to ATN  - On HD for Overload and Uremia (Possibly uremic encephalopathy)     Recommendations:  - No need for dialysis at this time  - Plan to keep patient euvolemic, please hold Bumex for now   - Will plan on watching labs from tomorrow   - Continue supportive management   - Strict I/Os   - Avoid IV contrast & Nephrotoxics    Rossi Medel MD PGY-1  Nephrology Resident  24 hour Renal Pager - [phone]    Discussed with attending nephrologist Dr. Villalba.

## 2024-07-26 NOTE — CARE PLAN
The patient's goals for the shift include sit up to chair and ambulate in room today    The clinical goals for the shift include Removed HD Cath this shift

## 2024-07-26 NOTE — PROGRESS NOTES
Vancomycin Dosing by Pharmacy- FOLLOW UP    Miguel A Bender is a 43 y.o. year old male who Pharmacy has been consulted for vancomycin dosing for pneumonia. Based on the patient's indication and renal status this patient is being dosed based on a goal trough/random level of 15-20.     Renal function is currently slightly improving. Per nephrology patient is not getting HD anymore. Will dose by level     Patient was dose based on HD. The last vancomycin 500 mg dose was on     Most recent random level: 13.9 mcg/mL    Visit Vitals  /82 (BP Location: Right arm, Patient Position: Lying)   Pulse 52   Temp 36.6 °C (97.9 °F) (Temporal)   Resp 16        Lab Results   Component Value Date    CREATININE 9.90 (H) 2024    CREATININE 10.23 (H) 2024    CREATININE 10.17 (H) 2024    CREATININE 9.93 (H) 2024        Patient weight is as follows:   Vitals:    24 0336   Weight: 108 kg (238 lb 5.1 oz)       Cultures:  No results found for the encounter in last 14 days.       I/O last 3 completed shifts:  In: 700 (6.5 mL/kg) [P.O.:700]  Out: 5900 (54.6 mL/kg) [Urine:5900 (1.5 mL/kg/hr)]  Weight: 108.1 kg   I/O during current shift:  I/O this shift:  In: -   Out: 400 [Urine:400]    Temp (24hrs), Av.4 °C (97.5 °F), Min:36 °C (96.8 °F), Max:36.8 °C (98.2 °F)      Assessment/Plan    Below goal random/trough level. Orders placed for new vancomycin regimen of 500 mg once today at 1300.  The next level will be obtained on  at 1100. May be obtained sooner if clinically indicated.   Will continue to monitor renal function daily while on vancomycin and order serum creatinine at least every 48 hours if not already ordered.  Follow for continued vancomycin needs, clinical response, and signs/symptoms of toxicity.       Katie Vazquez, PharmD

## 2024-07-26 NOTE — SIGNIFICANT EVENT
Miguel A Bender is a 43 y.o. male with a PMHx of obesity status post gastric bypass surgery 2 years ago, CHARLEEN, HTN transferred for cardiogenic shock s/p Impella after witnessed sudden V-fib arrest cardiac arrest.     #V-fib arrest  #C/F Torsades de pointes  The patient presented to the hospital after V-fib arrest, requiring multiple DCCV.  Coronary angiogram on 7/10/2024 showed normal coronaries.  During ICU stay, the patient developed multiple episodes of polymorphic VT, these episodes were preceded by PVCs along with underlying rhythm showing prolonged Qtc (590)  which raises concern for torsades de pointes however that was while the patient was on Amiodarone which can be a trigger for prolonged qtc. Other ddx includes ARVD, however on echo there was limited visualization of the right ventricle. Genetics consulted.  -Given the concern for prolonged qt as a potential cause of polymorphic VT, recommend continuing Nadolol.   -Plan for dc ICD placement today 7/26/24  - Patient instructed he should avoid driving for 6 months given PMVT and arrest  - Post-procedure recs to follow later today

## 2024-07-26 NOTE — PROGRESS NOTES
43-year-old male with a history of obesity status post gastric bypass surgery 2 years ago, CHARLEEN, HTN transferred for further management of VT arrest c/b cardiogenic shock s/p Impella placement which was subsequently removed . Hospital course c/b non-oliguric ANNIE and altered mentation concerning for uremic encephalopathy vs hospital delirium vs anoxic brain injury. Nephrology on board, dialysis initiated  with subsequent improvement in mentation. Pt had episodes of opisthotonus at [facility], w/o epileptiform activity, was put on Keppra with plan to continue as OP.  EP also consulted for VT arrest, cMRI notable for dilated RV c/f possible ARVC, for which genetics was also consulted. ARVC lab panel sent out . Pt transferred to telemetry floor on .      Subjective   No acute events overnight.    This AM, pt reports feeling well. Notes several episodes of frontal and temporal HA after taking BP meds (Isordil and hydralazine). Reports good UO.    Denies fever/chills/CP/SOB/palpitations/lightheadedness/dizziness/vision changes/abdominal pain.     Objective   24 Hour Vitals  Temp:  [36 °C (96.8 °F)-36.8 °C (98.2 °F)] 36.6 °C (97.9 °F)  Heart Rate:  [52-69] 69  Resp:  [16-20] 16  BP: (131-148)/(72-89) 146/89    Temp (24hrs), Av.4 °C (97.5 °F), Min:36 °C (96.8 °F), Max:36.8 °C (98.2 °F)     24 hour Intake/Output    Intake/Output Summary (Last 24 hours) at 2024 1559  Last data filed at 2024 0913  Gross per 24 hour   Intake 240 ml   Output 3600 ml   Net -3360 ml        Labs  CBC  Results from last 72 hours   Lab Units 24  0849 24  0611 24  0450   WBC AUTO x10*3/uL 7.3 7.4 9.1   HEMOGLOBIN g/dL 9.7* 8.7* 9.1*   HEMATOCRIT % 28.8* 25.9* 26.9*   PLATELETS AUTO x10*3/uL 462* 463* 441      BMP  Results from last 72 hours   Lab Units 24  0849 24  0611 24  1757   SODIUM mmol/L 138 135* 134*   POTASSIUM mmol/L 4.5 4.0 4.2   CHLORIDE mmol/L 98 97* 95*   BUN mg/dL 73* 78* 80*    CREATININE mg/dL 9.90* 10.23* 10.17*   MAGNESIUM mg/dL 2.23 2.06 2.01   PHOSPHORUS mg/dL 7.0* 7.0* 6.4*       Medications   Scheduled Medications  beta carotene, 3,000 mcg, oral, Daily  [Held by provider] bumetanide, 4 mg, oral, Daily  [START ON 7/28/2024] cholecalciferol, 50,000 Units, oral, Weekly  [Held by provider] heparin (porcine), 5,000 Units, subcutaneous, q8h CINDY  hydrALAZINE, 25 mg, oral, TID  levETIRAcetam, 500 mg, oral, Daily  lidocaine, 1 patch, transdermal, Daily  multivitamin with minerals, 1 tablet, oral, Daily  nadolol, 20 mg, oral, Daily  oxygen, , inhalation, Continuous - Inhalation  piperacillin-tazobactam, 2.25 g, intravenous, q8h  polyethylene glycol, 17 g, oral, Daily     Continuous Medications    PRN Medications  PRN medications: acetaminophen, dextrose, dextrose, glucagon, glucagon, trimethobenzamide, vancomycin     Blood Cx: NGTD     Physical Exam  General: Resting comfortably in bed. Well developed, obese. Appears stated age. In no acute distress   HEENT: Normocephalic and atraumatic. EOMI, sclera non-icteric  Cardiovascular: RRR, no r/m/g  Pulmonary: lungs clear to auscultation bilaterally. No wheezes/rales/rhonchi  GI: soft, non-tender, non-distended  : No suprapubic/ flank pain  Extremities: No LE edema  Skin: warm and dry.   Neurologic: CN II-XII grossly intact. No focal neurologic deficit   Psych: Pleasant. Appropriate mood and affect        Assessment/Plan      43-year-old male with a history of obesity status post gastric bypass surgery 2 years ago, CHARLEEN, HTN, s/p VT arrest c/b cardiogenic shock requiring Impella (removed 7/13), non oliguric ANNIE currently on dialysis, AMS likely d/t uremia.Pt had runs of polymorphic VT while in ICU. cMRI w/ dilated RV c/f Arrhythmogenic RV cardiomyopahty. Genetic testing pending. EP rec. ICD placement.  Currently on Vanc&Zosyn w/ improvement in leukocytosis and cough     Updates:    7/26:   - discontinued isordil d/t HA  - ICD planned for  today  - continue w/o bumex per Nephrology    7/25:  - no HD, continue to hold bumex as pt makes good urine, trialysis line removed per Nephrology recommendations    7/24:  - increased hydralazine to 25 mg TID and Isordil to 10 mg TID  - continue to hold bumex and no HD per Nephrology    7/23:  - holding bumex per Nephrology recs  - leukocytosis resolved, minimal cough  - Cr down trending, but BUN rising    7/22:  - cough and leukocytosis improving, continue Vanc&Zosyn  - PT/OT    7/21:  - mild fever overnight, productive cough, crackles and decreased breath sounds on R side on PE => CXR, procal, blood cx, empiric Vanc&Zosyn initiated  - initiated hydralazine 10 mg and isordil 5 mg  - removed El per pt's request, strict I&Os, bladder scan, straight cath PRN  - added Tigan PRN for nausea    #Ventricular Fibrillation and Polymorphic Vtach  #Non-ischemic cardiomyopathy  #Cardiac arrest of unclear etiology  #Concern for Arrhythmogenic Right Ventricular Cardiomyopathy  ::LVH w/o obstructive CAD  ::Hx of prolonged Qtc in ICU, last QTc wnl  ::Was on amdiodarone on admission. Had polymorphic SVT with sinus pauses on night of 7/10, so amiodarone weaned off and switched to lidocaine  - EP, Genetics following  - Meets major criteria of RV dilation and arrhythmia for ARVC. No consistent V1-V3 TWI, epsilon wave, or delayed S wave upstroke. No family history of SCD  - continue Nadolol per EP recs  - ICD is recommended      #HFrEF  ::Most recent TTE with EF 30% prior to this admission, 10% on admission  -on Nadolol for BB per EP  -avoiding ACE/ARB iso acute renal failure  -avoiding MRA/SGLT2 given acute renal failure  - continue hydralazine 25 mg TID  - stopped isordil 10 mg TID d/t HAs  - continue to hold bumex per Nephrology recs     #Non oliguric acute renal failure  #uremic encephalopathy-resolved::Likely pre-renal i/s/o cardiogenic shock   - removed El 7/22, voiding w/o difficulty, no retention  - Nephrology  following  - trialysis catheter removed 7/25  - holding bumex since 7/23  - Tigan PRN nausea     #Acute metabolic encephalopathy-resolved  #History of myoclonic jerks and ophisthotonos at outside hospital  #Concern for anoxic brain injury i/s/o cardiac arrest-resolved  - Reported opisthotonos at [facility]  -Video EEG has preliminary result of severe diffuse encephalopathy with no epileptiform activity  - Keppra 500 mg daily (renal dosing) will be continued until outpatient    #Productive Cough - improving  #Leukocytosis - resolved  #Single Episode of Mild fever the night of 7/19 - no recurrence  - CXR 7/20 w/ R>L bibasilar airspace opacity which may be due   to atelectasis, cannot exclude developing   infectious infiltrate. Small/trace right pleural effusion.   - blood Cx NGTD  - (+) MRSA swab   - continue Vanc&Zosyn (day 6)    #Obesity  #S/p Gastric bypass  - 39877 vit D weekly, vit A 3 g daily, multivitamin per pt's request (pt's home regimen)    Gabrielle Lopez MD  Anesthesiology PGY-1

## 2024-07-27 ENCOUNTER — PHARMACY VISIT (OUTPATIENT)
Dept: PHARMACY | Facility: CLINIC | Age: 44
End: 2024-07-27

## 2024-07-27 ENCOUNTER — APPOINTMENT (OUTPATIENT)
Dept: RADIOLOGY | Facility: HOSPITAL | Age: 44
DRG: 276 | End: 2024-07-27
Payer: COMMERCIAL

## 2024-07-27 VITALS
RESPIRATION RATE: 18 BRPM | SYSTOLIC BLOOD PRESSURE: 147 MMHG | HEIGHT: 70 IN | TEMPERATURE: 97.5 F | BODY MASS INDEX: 33.87 KG/M2 | WEIGHT: 236.55 LBS | DIASTOLIC BLOOD PRESSURE: 84 MMHG | OXYGEN SATURATION: 98 % | HEART RATE: 61 BPM

## 2024-07-27 LAB
ALBUMIN SERPL BCP-MCNC: 3.5 G/DL (ref 3.4–5)
ANION GAP SERPL CALC-SCNC: 17 MMOL/L (ref 10–20)
BASOPHILS # BLD AUTO: 0.18 X10*3/UL (ref 0–0.1)
BASOPHILS NFR BLD AUTO: 1.6 %
BUN SERPL-MCNC: 69 MG/DL (ref 6–23)
CALCIUM SERPL-MCNC: 10.7 MG/DL (ref 8.6–10.6)
CHLORIDE SERPL-SCNC: 96 MMOL/L (ref 98–107)
CO2 SERPL-SCNC: 27 MMOL/L (ref 21–32)
CREAT SERPL-MCNC: 9.78 MG/DL (ref 0.5–1.3)
EGFRCR SERPLBLD CKD-EPI 2021: 6 ML/MIN/1.73M*2
EOSINOPHIL # BLD AUTO: 0.34 X10*3/UL (ref 0–0.7)
EOSINOPHIL NFR BLD AUTO: 3 %
ERYTHROCYTE [DISTWIDTH] IN BLOOD BY AUTOMATED COUNT: 12.3 % (ref 11.5–14.5)
GLUCOSE SERPL-MCNC: 107 MG/DL (ref 74–99)
HCT VFR BLD AUTO: 29.3 % (ref 41–52)
HGB BLD-MCNC: 9.8 G/DL (ref 13.5–17.5)
IMM GRANULOCYTES # BLD AUTO: 0.1 X10*3/UL (ref 0–0.7)
IMM GRANULOCYTES NFR BLD AUTO: 0.9 % (ref 0–0.9)
LYMPHOCYTES # BLD AUTO: 1.38 X10*3/UL (ref 1.2–4.8)
LYMPHOCYTES NFR BLD AUTO: 12.3 %
MAGNESIUM SERPL-MCNC: 2.15 MG/DL (ref 1.6–2.4)
MCH RBC QN AUTO: 28.7 PG (ref 26–34)
MCHC RBC AUTO-ENTMCNC: 33.4 G/DL (ref 32–36)
MCV RBC AUTO: 86 FL (ref 80–100)
MONOCYTES # BLD AUTO: 1.51 X10*3/UL (ref 0.1–1)
MONOCYTES NFR BLD AUTO: 13.4 %
NEUTROPHILS # BLD AUTO: 7.75 X10*3/UL (ref 1.2–7.7)
NEUTROPHILS NFR BLD AUTO: 68.8 %
NRBC BLD-RTO: 0 /100 WBCS (ref 0–0)
PHOSPHATE SERPL-MCNC: 7.3 MG/DL (ref 2.5–4.9)
PLATELET # BLD AUTO: 508 X10*3/UL (ref 150–450)
POTASSIUM SERPL-SCNC: 4.8 MMOL/L (ref 3.5–5.3)
RBC # BLD AUTO: 3.42 X10*6/UL (ref 4.5–5.9)
SODIUM SERPL-SCNC: 135 MMOL/L (ref 136–145)
WBC # BLD AUTO: 11.3 X10*3/UL (ref 4.4–11.3)

## 2024-07-27 PROCEDURE — 36415 COLL VENOUS BLD VENIPUNCTURE: CPT

## 2024-07-27 PROCEDURE — 99239 HOSP IP/OBS DSCHRG MGMT >30: CPT | Performed by: INTERNAL MEDICINE

## 2024-07-27 PROCEDURE — 80069 RENAL FUNCTION PANEL: CPT

## 2024-07-27 PROCEDURE — 83735 ASSAY OF MAGNESIUM: CPT

## 2024-07-27 PROCEDURE — RXMED WILLOW AMBULATORY MEDICATION CHARGE

## 2024-07-27 PROCEDURE — 85025 COMPLETE CBC W/AUTO DIFF WBC: CPT

## 2024-07-27 PROCEDURE — 71046 X-RAY EXAM CHEST 2 VIEWS: CPT | Performed by: RADIOLOGY

## 2024-07-27 PROCEDURE — 2500000001 HC RX 250 WO HCPCS SELF ADMINISTERED DRUGS (ALT 637 FOR MEDICARE OP): Performed by: STUDENT IN AN ORGANIZED HEALTH CARE EDUCATION/TRAINING PROGRAM

## 2024-07-27 PROCEDURE — 2500000001 HC RX 250 WO HCPCS SELF ADMINISTERED DRUGS (ALT 637 FOR MEDICARE OP)

## 2024-07-27 PROCEDURE — 2500000004 HC RX 250 GENERAL PHARMACY W/ HCPCS (ALT 636 FOR OP/ED)

## 2024-07-27 PROCEDURE — 71046 X-RAY EXAM CHEST 2 VIEWS: CPT

## 2024-07-27 RX ORDER — NADOLOL 20 MG/1
20 TABLET ORAL DAILY
Qty: 30 TABLET | Refills: 1 | Status: SHIPPED | OUTPATIENT
Start: 2024-07-28 | End: 2024-09-26

## 2024-07-27 RX ORDER — FUROSEMIDE 40 MG/1
40 TABLET ORAL DAILY PRN
Qty: 30 TABLET | Refills: 1 | Status: SHIPPED | OUTPATIENT
Start: 2024-07-27 | End: 2024-09-25

## 2024-07-27 RX ORDER — LEVETIRACETAM 500 MG/1
500 TABLET ORAL DAILY
Qty: 30 TABLET | Refills: 1 | Status: SHIPPED | OUTPATIENT
Start: 2024-07-28 | End: 2024-09-26

## 2024-07-27 RX ORDER — HYDRALAZINE HYDROCHLORIDE 25 MG/1
25 TABLET, FILM COATED ORAL 3 TIMES DAILY
Qty: 90 TABLET | Refills: 1 | Status: SHIPPED | OUTPATIENT
Start: 2024-07-27 | End: 2024-09-25

## 2024-07-27 RX ORDER — CEPHALEXIN 500 MG/1
500 CAPSULE ORAL EVERY 8 HOURS SCHEDULED
Qty: 21 CAPSULE | Refills: 0 | Status: SHIPPED | OUTPATIENT
Start: 2024-07-27 | End: 2024-08-03

## 2024-07-27 SDOH — ECONOMIC STABILITY: FOOD INSECURITY: WITHIN THE PAST 12 MONTHS, YOU WORRIED THAT YOUR FOOD WOULD RUN OUT BEFORE YOU GOT MONEY TO BUY MORE.: NEVER TRUE

## 2024-07-27 SDOH — ECONOMIC STABILITY: INCOME INSECURITY: IN THE LAST 12 MONTHS, WAS THERE A TIME WHEN YOU WERE NOT ABLE TO PAY THE MORTGAGE OR RENT ON TIME?: NO

## 2024-07-27 SDOH — ECONOMIC STABILITY: FOOD INSECURITY: WITHIN THE PAST 12 MONTHS, YOU WORRIED THAT YOUR FOOD WOULD RUN OUT BEFORE YOU GOT THE MONEY TO BUY MORE.: NEVER TRUE

## 2024-07-27 SDOH — ECONOMIC STABILITY: FOOD INSECURITY: WITHIN THE PAST 12 MONTHS, THE FOOD YOU BOUGHT JUST DIDN'T LAST AND YOU DIDN'T HAVE MONEY TO GET MORE.: NEVER TRUE

## 2024-07-27 SDOH — ECONOMIC STABILITY: HOUSING INSECURITY: IN THE PAST 12 MONTHS HAS THE ELECTRIC, GAS, OIL, OR WATER COMPANY THREATENED TO SHUT OFF SERVICES IN YOUR HOME?: NO

## 2024-07-27 SDOH — ECONOMIC STABILITY: GENERAL

## 2024-07-27 SDOH — ECONOMIC STABILITY: TRANSPORTATION INSECURITY

## 2024-07-27 SDOH — ECONOMIC STABILITY: FOOD INSECURITY

## 2024-07-27 SDOH — ECONOMIC STABILITY: HOUSING INSECURITY

## 2024-07-27 SDOH — ECONOMIC STABILITY: HOUSING INSECURITY: IN THE LAST 12 MONTHS, WAS THERE A TIME WHEN YOU WERE NOT ABLE TO PAY THE MORTGAGE OR RENT ON TIME?: NO

## 2024-07-27 SDOH — ECONOMIC STABILITY: HOUSING INSECURITY: IN THE LAST 12 MONTHS, HOW MANY PLACES HAVE YOU LIVED?: 1

## 2024-07-27 SDOH — ECONOMIC STABILITY: TRANSPORTATION INSECURITY: IN THE PAST 12 MONTHS, HAS LACK OF TRANSPORTATION KEPT YOU FROM MEDICAL APPOINTMENTS OR FROM GETTING MEDICATIONS?: NO

## 2024-07-27 SDOH — ECONOMIC STABILITY: HOUSING INSECURITY
IN THE LAST 12 MONTHS, WAS THERE A TIME WHEN YOU DID NOT HAVE A STEADY PLACE TO SLEEP OR SLEPT IN A SHELTER (INCLUDING NOW)?: NO

## 2024-07-27 ASSESSMENT — ACTIVITIES OF DAILY LIVING (ADL): LACK_OF_TRANSPORTATION: NO

## 2024-07-27 ASSESSMENT — PAIN - FUNCTIONAL ASSESSMENT: PAIN_FUNCTIONAL_ASSESSMENT: 0-10

## 2024-07-27 ASSESSMENT — SOCIAL DETERMINANTS OF HEALTH (SDOH): IN THE PAST 12 MONTHS, HAS THE ELECTRIC, GAS, OIL, OR WATER COMPANY THREATENED TO SHUT OFF SERVICE IN YOUR HOME?: NO

## 2024-07-27 ASSESSMENT — PAIN SCALES - GENERAL: PAINLEVEL_OUTOF10: 0 - NO PAIN

## 2024-07-27 NOTE — DISCHARGE INSTRUCTIONS
Dear Mr. Bender, you were admitted to the hospital for cardiac arrest, you were admitted to the ICU, had renal failure initially requiring dialysis. Your condition improved and you eventually were weaned off dialysis and had an implantable cardioverter defibrillator implanted.     Please abstain from driving for at least 6 months    Follow-up:  [ ] you will follow-up with Dr. Salguero (cardiology/heart failure)  [ ] follow request for an appointment with Dr. Ch (heart rhythm specialist) was requested  [ ] follow request for an appointment with nephrology (kidney specialists) was requested  [ ] follow request for an appointment with Dr. Guzman (genetics specialist) was requested  [ ] follow request for an appointment with neurology for seizure medications was requested    Patient Instructions:  Please do not lift left arm above shoulder level for 4-5 weeks  No repetitive motion or lifting heavy weight for 4-5 weeks  No driving, alcohol or making legal decisions for 24 hours.  Keep wound completely dry for 7 days; may shower but keep bandage as dry as possible.  No soaking in hot tubs or baths for 10 days. May sponge bath  Keep bandage on incision until seen in the office in 1 week.  Allow steristrips underneath to fall off naturally.       Follow up:   The patient should be alert for bleeding, swelling, or signs of infection. The patient should call the electrophysiologist immediately if symptoms recur, or for any problems. The patient and family (with HIPPA consent)  have been instructed accordingly.   Follow up in Clinic in 1-2 weeks for wound check. 4-6 weeks for routine device analysis and reprogramming if necessary. Remote monitoring will be instituted if possible.     Discharge Instructions for your Cardiac Device   CARDIAC DEVICE CLINIC  [phone]              Incision:   1.  Keep your incision clean and dry for 1 week.  2. May shower 7 days after the procedure. Do not submerge the incision in a tub,  pool, hot tub, or lake for 4 weeks.  3. Your incision should look better each day. If you notice unusual swelling, redness, drainage or fever greater than 100 degrees, please call the Device Clinic or your Doctor's office.  4. Avoid using deodorants, powders, creams, lotions, etc on your incision for 4 weeks.   5. There are no stitches to be removed.  If you received a “glue” closure this may appear purple-gray and does not get removed but wears away slowly on its own.  Steri-strips (small white bandages) may be removed in one week or they may fall off on their own earlier.  Pain:  1. It is normal for the area around the incision to be tender for a few weeks following surgery. Pain relievers such as Tylenol or Motrin (whichever you can take) are usually sufficient for pain relief. If the pain gets worse instead of better than please call the Device Clinic or your Doctor.  Activity:  1. If you have a new device and new leads placed than avoid raising your arm above shoulder level for 4 weeks. Do no  anything weighing more than 15 pounds.   2. Avoid exercising with the arm on the side of your pacemaker.  So no golf, swimming, tennis, bowling etc for 4 weeks.      3. Driving: If you were driving prior to your procedure, you may resume driving in 1 week. If you experienced a loss of consciousness prior to your procedure, you should verify with your Doctor when you are able to resume driving.  ID CARD:  1. It is important to carry your device ID card with you at all times.   2. Inform doctors and health care providers that you have a pacemaker or defibrillator.  Electromagnetic Interference:  1. Microwave ovens are safe to use.  2. Cellular phones should be held to the opposite ear from your device. Do not carry your phone in your shirt pocket. Some i-phones that self-charge can interfere with your device so be sure to keep it away from your pacemaker/defibrillator.   3. Read the Patient Booklet for more  information. You may call either the Device Clinic [phone]  or the patient services of the device  with questions about specific electrical appliances and interference problems.    IT IS YOUR RESPONSIBILITY TO MAKE AND KEEP APPOINTMENTS.   Please refer to your Device clinic handout.

## 2024-07-27 NOTE — DISCHARGE SUMMARY
Discharge Diagnosis  Cardiac arrest (Multi)    Issues Requiring Follow-Up  Acute metabolic encephalopathy  ANNIE  HFrEF  LQTS vs ARVC  Cardiac arrest     Discharge Meds     Your medication list        START taking these medications        Instructions Last Dose Given Next Dose Due   cephalexin 500 mg capsule  Commonly known as: Keflex      Take 1 capsule (500 mg) by mouth every 8 hours for 7 days.       furosemide 40 mg tablet  Commonly known as: Lasix      Take 1 tablet (40 mg) by mouth once daily as needed (for increased leg swelling, or weight kajal of >4lbs over 48 hours).       hydrALAZINE 25 mg tablet  Commonly known as: Apresoline      Take 1 tablet (25 mg) by mouth 3 times a day.       levETIRAcetam 500 mg tablet  Commonly known as: Keppra  Start taking on: July 28, 2024      Take 1 tablet (500 mg) by mouth once daily.       nadolol 20 mg tablet  Commonly known as: Corgard  Start taking on: July 28, 2024      Take 1 tablet (20 mg) by mouth once daily.              CONTINUE taking these medications        Instructions Last Dose Given Next Dose Due   beta carotene 3,000 mcg (10,000 unit) capsule  Commonly known as: vitamin A           ergocalciferol 1.25 MG (07037 UT) capsule  Commonly known as: Vitamin D-2           multivitamin tablet                  STOP taking these medications      amLODIPine 2.5 mg tablet  Commonly known as: Norvasc                  Where to Get Your Medications        These medications were sent to [facility]  [address], [address], [place] [id number]      Hours: 8AM to 6PM Mon-Fri, 8AM to 4PM Sat, 9AM to 1PM Sun Phone: [phone]   cephalexin 500 mg capsule  furosemide 40 mg tablet  hydrALAZINE 25 mg tablet  levETIRAcetam 500 mg tablet  nadolol 20 mg tablet         Test Results Pending At Discharge  Pending Labs       Order Current Status    Arrythmia and cardiomyopathy comprehensive panel; [facility] - Miscellaneous Genetics Test In [facility]  Course  Miguel A Bender is a 43 y.o. male with PMHx of obesity status post gastric bypass surgery 2 years ago, CHARLEEN, HTN transferred for cardiogenic shock s/p Impella after witnessed sudden cardiac arrest.     Patient presented after a witnessed cardiac arrest, reportedly witnessed arrest at work approximately 15-20 minutes prior to arrival. CPR started by coworkers. Was found to be in ventricular fibrillation on EMS arrival, defibrillated several times and received epinephrine and amio bolus and intubated by the time he arrived to ED. nitial CBC showing white count 12.2, hemoglobin over hematocrit 14.9/45.1, platelet count 230.  Metabolic panel showed serum glucose 138 sodium 143 potassium 3.0 chloride 106 bicarbonate 24 BUN 12 creatinine 1.01.  Initial high-sensitivity cardiac troponin elevated at 22.  Lactic acid elevated at 8.1. Initial EKG Post ROSC shows NSR with RBBB. Cardiology was consulted and loaded with Amiodarone, [facility] with normal coronary arteries, CI 1.9, Impella placed. Neurology was consulted, patient having episodes of spontaneous eye opening, pulling at restraints, episthotonos, and occasional myoclonic jerking. He was loaded with levetiracetam and started on EEG. Prior to transfer, lactate still high at 7.1, pH 7.18.     Upon admission on 7/11, patient was intubated with Impella on P8, on norepinephrine 0.07 and epi 0.03, on amio and heparin drip. On propofol 50. Telemetry showing multiple episodes of polymorphic VT episodes few seconds each and EKG concerning for prolonged QTc. Decision was to start lidocaine bolus and infusion with weaning off amiodarone on night of 7/11. Started patient on Vanc/Zosyn. Gave a liter of LR. Repleted electrolytes (K 3.1 hemolyzed and Mg 1.6). Lactate 7.3 on ABG on admission improved to 5.0. PA catheter numbers: CVP 9, PAP 39/20 (27), CO 7.7, CI 3.3, , SVO2 80% on P8 Impella.     On the night of 7/11, he had a few episodes of polymorphic VT for a few seconds but  reverted back to normal sinus rhythm. He had no other telemetry abnormalities after that. EP consulted, and recommended a cardiac MRI with ICD placement while continuing on lidocaine.  He came off pressors by the morning of 7/11, and was weaned down to P2 Impella on 7/12, with Impella removed on 7/13. He was extubated on 7/14.  Hospital course here complicated by an oliguric ANNIE with creatinine worsening from 2.45, to 12.61 on 7/16.  Nephrology consulted, recommended aggressive diuresis. Cardiac MRI performed 7/15; it shows recovered EF to 47% in left ventricle but poor EF of 20% in right ventricle. Given RV dilation and ventricular arrhythmia, we were concerned about arrhythmogenic right ventricular dysplasia and genetics was consulted for further workup, with ARVC lab panel sent out on 7/18. Given ongoing encephalopathy with concern for possible uremic cause, pt had trialysis line placed and underwent dialysis on 7/18 with subsequent improvement in encephalopathy. Following discussions with EP, determined pt could be transitioned from lidocaine infusion to nadolol 20mg daily.   Pt was transferred to the telemetry floor on 7/20. The night of 7/20 had mild fever 37.4, c/o cough productive of yellow sputum since 7/18.   On 7/21, CXR, sputum and blood Cx x 2 ordered, Vanc&Zosyn initiated. Transitioned pt's IV Keppra and bumex to PO. Started pt on isordil 5 mg and hydralazine 10 mg. Removed El per pt's request, strict I&Os, and bladder scan ordered.  7/22: Voiding well, w/o retention since El removal. MRSA swab (+), blood Cx NGTD, leukocytosis and cough improved, continuing Vanc&Zosyn. Several non sustained VT on telemetry.  7/23: Continue full course of Vanc &Zosyn. Cough is minimal, w/o sputum. Bumex is held per Nephrology recommendations.  7/24: Increased Isordil to 10 mg TID, and hydralazine 25 mg TID. Continue to hold Bumex and no HD per Nephrology.  7/25: Trialysis line removed, no HD per Nephrology. Pt has  great urinary output. Holding bumex.   7/26: Discontinued isordil d/t headaches shortly after pt takes his BP meds. ICD placement scheduled for today.  7/27: Discontinued vancomycin, outpatient consultations scheduled.    Pertinent Physical Exam At Time of Discharge  Physical Exam  Constitutional:       Appearance: Normal appearance. He is obese.   HENT:      Head: Normocephalic and atraumatic.   Cardiovascular:      Rate and Rhythm: Normal rate and regular rhythm.      Pulses: Normal pulses.      Heart sounds: Normal heart sounds.   Pulmonary:      Effort: Pulmonary effort is normal.      Breath sounds: Normal breath sounds.   Abdominal:      General: Abdomen is flat. Bowel sounds are normal.      Palpations: Abdomen is soft.   Musculoskeletal:         General: Swelling present.   Skin:     General: Skin is warm.   Neurological:      General: No focal deficit present.      Mental Status: He is alert and oriented to person, place, and time. Mental status is at baseline.   Psychiatric:         Mood and Affect: Mood normal.         Behavior: Behavior normal.         Outpatient Follow-Up  Future Appointments   Date Time Provider Department Center   8/6/2024  1:00 PM Rafal Ch MD [id number] [place]   8/14/2024  9:30 AM Ollie Ware MD [id number] [facility]   8/19/2024  9:30 AM [facility] [id number] Solomon Perez MD

## 2024-07-27 NOTE — PROGRESS NOTES
Subjective Data:  Doing well and happy with recovery. Mild soreness device site     Objective Data:  Last Recorded Vitals:  Vitals:    07/27/24 0445 07/27/24 0804 07/27/24 0810 07/27/24 1130   BP: 135/84 153/79  147/84   BP Location: Right arm Right arm  Right arm   Patient Position: Lying Lying  Lying   Pulse: 73 66  61   Resp: 19 18  18   Temp: 37.1 °C (98.8 °F) 36.3 °C (97.3 °F)  36.4 °C (97.5 °F)   TempSrc: Temporal Temporal  Temporal   SpO2: 97% 96% 96% 98%   Weight: 107 kg (236 lb 8.9 oz)      Height:           Last Labs:  CBC - 7/27/2024:  8:18 AM  11.3 9.8 508    29.3      CMP - 7/27/2024:  8:18 AM  10.7 6.0 24 --- 0.5   7.3 3.5 25 76      PTT - 7/11/2024:  4:17 AM  1.4   15.7 37     TROPHS   Date/Time Value Ref Range Status   07/10/2024 12:13 PM 2,496 0 - 20 ng/L Final      Last I/O:  I/O last 3 completed shifts:  In: 240 (2.2 mL/kg) [P.O.:240]  Out: 6320 (58.9 mL/kg) [Urine:6300 (1.6 mL/kg/hr); Blood:20]  Weight: 107.3 kg   Ejection Fractions:  EF   Date/Time Value Ref Range Status   07/12/2024 05:52 AM 30 %    07/10/2024 04:44 PM 10 %      Cath:  Inpatient Medications:  Scheduled medications   Medication Dose Route Frequency    beta carotene  3,000 mcg oral Daily    [Held by provider] bumetanide  4 mg oral Daily    cephalexin  500 mg oral q8h CINDY    [START ON 7/28/2024] cholecalciferol  50,000 Units oral Weekly    hydrALAZINE  25 mg oral TID    levETIRAcetam  500 mg oral Daily    lidocaine  1 patch transdermal Daily    multivitamin with minerals  1 tablet oral Daily    nadolol  20 mg oral Daily    oxygen   inhalation Continuous - Inhalation    polyethylene glycol  17 g oral Daily     PRN medications   Medication    acetaminophen    dextrose    dextrose    glucagon    glucagon    trimethobenzamide     Continuous Medications   Medication Dose Last Rate       Physical Exam:    Skin - ICD site well-approximated, no hematoma, minimal ecchymosis    Constitutional: well appearing, no distress  Eyes: no  conjunctival injection  ENT: moist mucous , no apparent injury  Respiratory/Thorax: normal work of breathing on room air  Cardiovascular: normal rate, regular rhythm, extremities warm, JVP not elevated  Extremities: warm, intact      Assessment/Plan       43M hx obesity s/p gastric bypass 2y ago p/w out of hospital VF arrest at work s/p 15-20 minutes ACLS c/b cardiogenic shock s/p Impella x3d now in well-copensated HF and s/p secondary prevention [employer] dcICD 7/26     #out of hospital VF arrest s/p 15-20 minute downtime   #RV failure with RVEF 20% on CMR post-arrest day - ddx ARVC vs. Delayed stunning post-arrest  #s/p secondary-prevention [employer] dcICD 7/26  #prolonged QT post-arrest with several episodes brief NSVT    Workup:  Cath 7/10/2024 - coronaries OK, RHC consistent with cardiogenic shock -> Impella x72h  CMR - LVEF high 47% and LV not dilated, RVEF 20s and dilated, ECV 32%, no significant valvular pathology  Admission ECG - prolonged  / QTc to 600 at HR 68 immediately post-arrest on amio, bifid T-waves        Tele in CICU - multiple episodes of brief PMVT, resolved with time off amiodarone  Genetic testing 7/2024 - pending    Unclear etiology of arrest - admission ECG with QT markedly prolonged with bifid T-waves suspicious for LQTS2, though with RV failure and prolonged arrest could also be a consequence of myocardial stunning 2/2 low-flow + metabolic stressors. Thankfully his cardiogenic shock has recovered, he appears well compensated without significant ventricular arrhythmias for several days, and he is s/p secondary prevention dcICD. Family history not clearly contributory, he currently doesn't remember much of what happened pre-arrest but there is no clear electrolyte abnormality (at risk given gastric bypass) or other reversible issue. Will treat with nadolol for now for possible long QT (can do propranolol if cost prohibitive) while workup is ongoing, with close EP and HF follow-up.     7/27  - ICD pocket, telemetry, device check, and CXR appear appropriate - OK for discharge from EP standpoint.    Recommendations:  -OK to discharge from EP standpoint  -HF follow-up scheduled with Dr. Brown  -f/up genetic testing  -follow-up with EP Dr. Ch at [place] in 4-8 weeks, consider further workup re: ?LQTS vs. ARVC  -counseled not to drive for 6 months tentatively - revisit as outpt if RVEF improves and no significant ventricular arrhythmias on device checks and depending on workup if felt to be lower arrhythmic risk  -counseled re: post-device activity limitations  -avoid QT-prolonging medications  -will sign off, please call if questions      Thank you for the opportunity to contribute to the care of this patient. Above recommendations discussed with Dr. Del Valle . If further questions arise, please page the EP consult pager at [phone] on weekdays 7AM - 6PM and weekends 7AM - 2PM, or at [phone] at all other times. The EP device nurse can be reached at pager [phone] during regular business hours M-F.       Peripheral IV 07/21/24 20 G Left Antecubital (Active)   Site Assessment Clean;Dry;Intact 07/24/24 0802   Dressing Type Transparent 07/24/24 0802   Line Status Flushed 07/24/24 0802   Dressing Status Clean;Dry;Occlusive 07/24/24 0802   Number of days: 3       Hemodialysis Cath 07/18/24 Triple lumen Left Non-tunneled catheter Jugular (Active)   Line Necessity Hemodialysis 07/24/24 0802   Proximal Lumen Status Blood return noted 07/24/24 0802   Medial Lumen Status Heparin locked 07/24/24 0802   Distal Lumen Status Heparin locked 07/24/24 0802   Line Care Connections checked and tightened 07/24/24 0802   Dressing Type Antimicrobial patch;Transparent 07/24/24 0802   Dressing Status Clean;Dry;Occlusive 07/24/24 0802   Number of days: 6       Code Status:  Full Code      Krishan Albert MD

## 2024-07-27 NOTE — CARE PLAN
The patient's goals for the shift include sit up to chair and ambulate in room today    The clinical goals for the shift include pt remain hds throughout entire shift

## 2024-07-27 NOTE — CARE PLAN
The patient's goals for the shift include sit up to chair and ambulate in room today    The clinical goals for the shift include Patient's heart rate will remain controlled and less than 100 bpm throughout shift.    Patient remained safe and free of falls throughout shift.  HR remained controlled 61-66 bpm.  Patient discharged home with no home health needs.  Patient given and explained discharge instructions and verbalized understanding.  IV removed. Tip intact. Tele discontinued.  Patient [facility] from [facility].  Patient's family walked patient downstairs in stable condition and drove him home.

## 2024-07-28 LAB
ATRIAL RATE: 56 BPM
P AXIS: 16 DEGREES
P OFFSET: 209 MS
P ONSET: 151 MS
PR INTERVAL: 132 MS
Q ONSET: 217 MS
QRS COUNT: 9 BEATS
QRS DURATION: 88 MS
QT INTERVAL: 450 MS
QTC CALCULATION(BAZETT): 434 MS
QTC FREDERICIA: 440 MS
R AXIS: 12 DEGREES
T AXIS: 18 DEGREES
T OFFSET: 442 MS
VENTRICULAR RATE: 56 BPM

## 2024-07-29 LAB
ATRIAL RATE: 91 BPM
P AXIS: 27 DEGREES
P OFFSET: 203 MS
P ONSET: 141 MS
PR INTERVAL: 142 MS
Q ONSET: 212 MS
QRS COUNT: 15 BEATS
QRS DURATION: 90 MS
QT INTERVAL: 378 MS
QTC CALCULATION(BAZETT): 464 MS
QTC FREDERICIA: 434 MS
R AXIS: 15 DEGREES
T AXIS: -13 DEGREES
T OFFSET: 401 MS
VENTRICULAR RATE: 91 BPM

## 2024-07-30 ENCOUNTER — HOSPITAL ENCOUNTER (OUTPATIENT)
Dept: CARDIOLOGY | Facility: CLINIC | Age: 44
Discharge: HOME | End: 2024-07-30
Payer: COMMERCIAL

## 2024-07-30 DIAGNOSIS — I46.9 CARDIAC ARREST (MULTI): ICD-10-CM

## 2024-07-30 DIAGNOSIS — I47.20 VT (VENTRICULAR TACHYCARDIA) (MULTI): ICD-10-CM

## 2024-07-30 LAB — SCAN RESULT: NORMAL

## 2024-08-01 ENCOUNTER — HOSPITAL ENCOUNTER (OUTPATIENT)
Dept: CARDIOLOGY | Facility: CLINIC | Age: 44
Discharge: HOME | End: 2024-08-01
Payer: COMMERCIAL

## 2024-08-01 DIAGNOSIS — I47.20 VT (VENTRICULAR TACHYCARDIA) (MULTI): ICD-10-CM

## 2024-08-01 DIAGNOSIS — I46.9 CARDIAC ARREST (MULTI): ICD-10-CM

## 2024-08-05 ENCOUNTER — OFFICE VISIT (OUTPATIENT)
Dept: NEUROLOGY | Facility: CLINIC | Age: 44
End: 2024-08-05
Payer: COMMERCIAL

## 2024-08-05 VITALS
DIASTOLIC BLOOD PRESSURE: 60 MMHG | BODY MASS INDEX: 32.07 KG/M2 | HEART RATE: 57 BPM | WEIGHT: 224 LBS | RESPIRATION RATE: 18 BRPM | SYSTOLIC BLOOD PRESSURE: 101 MMHG

## 2024-08-05 DIAGNOSIS — R56.9 SEIZURE-LIKE ACTIVITY (MULTI): Primary | ICD-10-CM

## 2024-08-05 DIAGNOSIS — R40.20: ICD-10-CM

## 2024-08-05 PROCEDURE — 99214 OFFICE O/P EST MOD 30 MIN: CPT | Performed by: NURSE PRACTITIONER

## 2024-08-05 PROCEDURE — 3078F DIAST BP <80 MM HG: CPT | Performed by: NURSE PRACTITIONER

## 2024-08-05 PROCEDURE — 3074F SYST BP LT 130 MM HG: CPT | Performed by: NURSE PRACTITIONER

## 2024-08-05 PROCEDURE — 1036F TOBACCO NON-USER: CPT | Performed by: NURSE PRACTITIONER

## 2024-08-05 ASSESSMENT — PAIN SCALES - GENERAL: PAINLEVEL: 0-NO PAIN

## 2024-08-05 NOTE — PROGRESS NOTES
[facility]   Epilepsy    Patient ID: Miguel A Bender 43 y.o.male presenting in follow-up for seizure like event  Patient of:  hospital follow up     HPI   pmhx of obesity status post gastric bypass surgery 2 years ago, CHARLEEN, HTN     Hospital Course 7/10/24:  patient had a witnessed cardiac arrest this morning. CPR was started in the field. [place] [facility] [place] where he had five shocks and several doses of epinephrine with ROSC. He was admitted to the ICU and there, he had a couple of episodes of spontaneous eye opening, pulling at restraints, episthotonos, and occasional myoclonic jerking. He was loaded with levetiracetam and EEG placed. cvEEG indicative of severe diffuse encephalopathy. No epileptiform discharges or lateralizing signs are seen. Received dialysis while admitted as well as ICD placement 7/26. genetics was consulted for further workup, with ARVC lab panel sent out on 7/18 . Discharged on LEV 500mg once daily     RESULTS:  CT head wo IV contrast    Result Date: 7/10/2024  Negative exam.   MACRO: None   Signed by: Vaibhav Brunner 7/10/2024 3:33 PM Dictation workstation:   [id number]      No CT head results found for the past 14 days             EEG    Result Date: 7/14/2024  IMPRESSION Impression This vEEG is indicative of mild diffuse encephalopathy. No epileptic discharges or seizure episodes were recorded. A full report will be scanned into the patient's chart at a later time. This report has been interpreted and electronically signed by                 No EEG results found for the past 14 days                         No MRI head results found for the past 12 months      EEG:  EEG    Result Date: 7/14/2024  IMPRESSION Impression This vEEG is indicative of mild diffuse encephalopathy. No epileptic discharges or seizure episodes were recorded. A full report will be scanned into the patient's chart at a later time. This report has been interpreted and electronically signed by                  PRESENT CONCERNS:  Miguel A presents with his wife today . He is doing well since hospital discharge for cardiac arrest. patient denies any auras, shaking, jerking, convulsions, loss of time, zoning out, waking up with tongue or cheek biting, incontinence or unexplained bruising. Wife has never witnessed any of these symptoms. Has had 1 acute seizure at age 16 immediately following concussion. Keppra is making him sleepy. No family history of seizures.       Review of Systems  All other systems reviewed and negative unless otherwise stated above    CONTROLLED SUBSTANCE  N/A    Vitals:  Vitals:    08/05/24 0916   BP: 101/60   Pulse: 57   Resp: 18       PHYSICAL EXAM:  Neurologic Exam   The patient was alert and oriented to person, time and place. Language, concentration and memory were intact. Affect was normal.   Eye movements were intact. Muscles of mastication and facial expression moved normally. Hearing was normal bilaterally. There was no dysarthria.  Coordination in the arms and legs was intact  Involuntary movements: none.  Standard gait was normal      ASSESSMENT & PLAN:   43 y.o. male presenting in follow-up for seizure like event    Problem List Items Addressed This Visit       Coma (Multi)    Seizure-like activity (Multi) - Primary    Relevant Orders    EEG     Miguel A was recently hospitalized for witnessed cardiac arrest s/p ICD placement. Upon admission there was concern for myoclonic jerks. vEEG did not show epileptiform discharge. He doesn't need long term Anti-seizure medication management and is currently only taking LEV 500mg daily which is likely non therapeutic.     Will decrease LEV from 500mg daily to 250mg daily for 1 week then stop   Will get routine EEG after stopping LEV   Will follow up with results via [employer]       Call me with any seizures prior to your next appointment   Given my contact information/instructions for [facility]

## 2024-08-05 NOTE — PATIENT INSTRUCTIONS
"Thank you for coming to the [facility]psy [facility] today.    -If you have any sudden new, concerning or worsening symptoms, call 911 and go to the Emergency Room. Otherwise, it was good seeing you today-  -  Your seizures are well controlled on the current medication. Additional prescription refills was sent to the pharmacy.    As we discussed, because you have not had any events or seizures where you lose awareness or control of your actions you have no restrictions at this time. However, if you have an event of seizure where this were to occur, you must inform our office, and we will reassess things. In this event, you stop driving, using heavy machinery, climbing heights, or engaging in any other activity that would cause harm/death to yourself or others were you to lose awareness/consciousness and have a seizure. These restrictions would need to stay in place until at least 6 months of seizure freedom and clearance your physician.    -HOW TO CONTACT ABDI PARKER EPILEPSY NURSE PRACTITIONER ([phone]).   Instructed to call in the event of seizure, medication refills, or any questions  *Please allow 24-48 hours for non-urgent responses*.  For emergency concerns, please dial 911 or present to the nearest emergency room.  For concerns after business hours (8am-4:30pm) or on weekends please call [phone]  To call and schedule a follow up appointment please call [phone]  -Paperwork may take up to 3 business days to complete-    Every attempt is made to run on time for your appointment, if you are 15 minutes or later for your appoinement you may be asked to reschedule    -Compliance education: It is important to continue to try and achieve seizure control because of the potential for injury and illness due to seizures. In a very small minority of patients with generalized tonic clonic seizures (\"grand mal\"), breathing or heart function can stop during a seizure and result in demise (sudden unexpected death in " epilepsy or SUDEP). Freedom from seizures prevents this kind of outcome-     I have orderd a routine eeg. This is an outpatient procedure that will last approximately 1 hour.  If you have not recieved a phone call to schedule this test in 7-10 business days please call [phone]

## 2024-08-05 NOTE — PROGRESS NOTES
Cardiac Electrophysiology Office Visit     Referred by Bhavya Mendez MD for   Chief Complaint   Patient presents with    New Patient Visit     ICD implant on 7/26/24     HPI:  Miguel A Bender is a 43 y.o. year old male patient with h/o CHARLEEN, HTN, obesity s/p gastric bypass surgery (2022), cardiac arrest complicated by cardiogenic shock s/p Impella s/p ICD presenting today to establish care    Objective  Current Outpatient Medications   Medication Instructions    beta carotene (VITAMIN A) 10,000 Units, oral, Daily    ergocalciferol (VITAMIN D-2) 1.25 mg, oral, Once Weekly    furosemide (LASIX) 40 mg, oral, Daily PRN    hydrALAZINE (APRESOLINE) 25 mg, oral, 3 times daily    levETIRAcetam (KEPPRA) 500 mg, oral, Daily    multivitamin tablet 1 tablet, oral, Daily    nadolol (CORGARD) 20 mg, oral, Daily         Visit Vitals  /68 (BP Location: Right arm, Patient Position: Sitting)   Pulse 57   Resp 16   Wt 102 kg (225 lb)   SpO2 98%   BMI 32.21 kg/m²   Smoking Status Never   BSA 2.25 m²      Physical Exam  Constitutional:       Appearance: Normal appearance.   HENT:      Head: Normocephalic.   Cardiovascular:      Rate and Rhythm: Normal rate and regular rhythm.      Pulses: Normal pulses.      Heart sounds: No murmur heard.     Comments: left sided implant healed well, no ecchymosis, hematoma or drainage noted  Pulmonary:      Effort: Pulmonary effort is normal. No respiratory distress.   Musculoskeletal:         General: No swelling.   Skin:     General: Skin is warm and dry.   Neurological:      Mental Status: He is alert.   Psychiatric:         Mood and Affect: Mood normal.         My Interpretation of Reviewed Study(s):  Echo (July 2024): Moderately decreased LV function with an EF of 30%.  Normal RV function.  Cardiac MRI (July 2024): Mildly reduced LVEF of 47%.  RV dilated moderate to severe reduction in RV EF quantitative RVEF 20%.  Mildly increased ECV of 32%.  T2 values of lateral wall mildly  "increased.  No definite evidence of myocarditis    Assessment/Plan   #VF arrest s/p ICD  # QT prolongation  Patient presented to the hospital after VF arrest requiring multiple cardioversions.  Coronary angiography done at that time showed normal coronaries.  During her ICU stay patient developed multiple episodes of polymorphic VT episodes were preceded by PVCs along with underlying QT prolongation.  Unclear etiology of QT prolongation no clear family history patient was started on nadolol for presumptive long QT syndrome.  Genetic testing reviewed and showed no abnormal mutations in the 168 gene variants tested for.  This includes common genetic testing for ARVC and LQTS1/2/3  Patient has a [employer] Dual chamber ICD.  Anticipated battery longevity 9.9yrs (as of 7/31/24).    RA pacing <1%, RV pacing <1%.   Arrhythmias noted on interrogation: None  Lead parameters stable with steady impedance and thresholds noted: Stable  c/t to follow with device clinic as scheduled  C/w Nadalol 20mg daily - QT normalized on ?medication  Agree with scheduled follow-ups with nephrology, genetics, heart failure  Counseled patient again on not driving for 6 months postcardiac arrest even with an ICD  We had a very detailed conversation about psychological counseling patient appears to be very stressed and anxious about going back to \"normal\" and I did recommend that he speak to primary care about therapy.    Return to Clinic: Patient should return to the EP Clinic in 6 months    Rafal Ch MD [facility]  Cardiac Electrophysiology  [email or web address]    **Disclaimer: This note was dictated by speech recognition, and every effort has been made to prevent any error in transcription, however minor errors may be present**     "

## 2024-08-06 ENCOUNTER — APPOINTMENT (OUTPATIENT)
Dept: CARDIOLOGY | Facility: CLINIC | Age: 44
End: 2024-08-06
Payer: COMMERCIAL

## 2024-08-06 VITALS
RESPIRATION RATE: 16 BRPM | BODY MASS INDEX: 32.21 KG/M2 | SYSTOLIC BLOOD PRESSURE: 110 MMHG | HEART RATE: 57 BPM | OXYGEN SATURATION: 98 % | DIASTOLIC BLOOD PRESSURE: 68 MMHG | WEIGHT: 225 LBS

## 2024-08-06 DIAGNOSIS — R57.0 CARDIOGENIC SHOCK (MULTI): Primary | ICD-10-CM

## 2024-08-06 DIAGNOSIS — Z95.810 ICD (IMPLANTABLE CARDIOVERTER-DEFIBRILLATOR) IN PLACE: ICD-10-CM

## 2024-08-06 DIAGNOSIS — I47.20 VT (VENTRICULAR TACHYCARDIA) (MULTI): ICD-10-CM

## 2024-08-06 DIAGNOSIS — I46.9 CARDIAC ARREST (MULTI): ICD-10-CM

## 2024-08-06 DIAGNOSIS — I10 ESSENTIAL HYPERTENSION: ICD-10-CM

## 2024-08-06 PROCEDURE — 3078F DIAST BP <80 MM HG: CPT | Performed by: STUDENT IN AN ORGANIZED HEALTH CARE EDUCATION/TRAINING PROGRAM

## 2024-08-06 PROCEDURE — 93010 ELECTROCARDIOGRAM REPORT: CPT | Performed by: STUDENT IN AN ORGANIZED HEALTH CARE EDUCATION/TRAINING PROGRAM

## 2024-08-06 PROCEDURE — 99214 OFFICE O/P EST MOD 30 MIN: CPT | Performed by: STUDENT IN AN ORGANIZED HEALTH CARE EDUCATION/TRAINING PROGRAM

## 2024-08-06 PROCEDURE — 3074F SYST BP LT 130 MM HG: CPT | Performed by: STUDENT IN AN ORGANIZED HEALTH CARE EDUCATION/TRAINING PROGRAM

## 2024-08-06 NOTE — LETTER
August 7, 2024     Ollie Ware MD  [address]  [place] [address]    Patient: Miguel A Bender   YOB: 1980   Date of Visit: 8/6/2024       Dear Dr. Ollie Ware MD:    Thank you for referring Miguel A Bender to me for evaluation. Below are my notes for this consultation.  If you have questions, please do not hesitate to call me. I look forward to following your patient along with you.       Sincerely,     Rafal Ch MD      CC: No Recipients  ______________________________________________________________________________________        Cardiac Electrophysiology Office Visit     Referred by Bhavya Mendez MD for   Chief Complaint   Patient presents with   • New Patient Visit     ICD implant on 7/26/24     HPI:  Miguel A Bender is a 43 y.o. year old male patient with h/o CHARLEEN, HTN, obesity s/p gastric bypass surgery (2022), cardiac arrest complicated by cardiogenic shock s/p Impella s/p ICD presenting today to establish care    Objective  Current Outpatient Medications   Medication Instructions   • beta carotene (VITAMIN A) 10,000 Units, oral, Daily   • ergocalciferol (VITAMIN D-2) 1.25 mg, oral, Once Weekly   • furosemide (LASIX) 40 mg, oral, Daily PRN   • hydrALAZINE (APRESOLINE) 25 mg, oral, 3 times daily   • levETIRAcetam (KEPPRA) 500 mg, oral, Daily   • multivitamin tablet 1 tablet, oral, Daily   • nadolol (CORGARD) 20 mg, oral, Daily         Visit Vitals  /68 (BP Location: Right arm, Patient Position: Sitting)   Pulse 57   Resp 16   Wt 102 kg (225 lb)   SpO2 98%   BMI 32.21 kg/m²   Smoking Status Never   BSA 2.25 m²      Physical Exam  Constitutional:       Appearance: Normal appearance.   HENT:      Head: Normocephalic.   Cardiovascular:      Rate and Rhythm: Normal rate and regular rhythm.      Pulses: Normal pulses.      Heart sounds: No murmur heard.     Comments: left sided implant healed well, no ecchymosis, hematoma or drainage noted  Pulmonary:      Effort:  "Pulmonary effort is normal. No respiratory distress.   Musculoskeletal:         General: No swelling.   Skin:     General: Skin is warm and dry.   Neurological:      Mental Status: He is alert.   Psychiatric:         Mood and Affect: Mood normal.         My Interpretation of Reviewed Study(s):  Echo (July 2024): Moderately decreased LV function with an EF of 30%.  Normal RV function.  Cardiac MRI (July 2024): Mildly reduced LVEF of 47%.  RV dilated moderate to severe reduction in RV EF quantitative RVEF 20%.  Mildly increased ECV of 32%.  T2 values of lateral wall mildly increased.  No definite evidence of myocarditis    Assessment/Plan   #VF arrest s/p ICD  # QT prolongation  Patient presented to the hospital after VF arrest requiring multiple cardioversions.  Coronary angiography done at that time showed normal coronaries.  During her ICU stay patient developed multiple episodes of polymorphic VT episodes were preceded by PVCs along with underlying QT prolongation.  Unclear etiology of QT prolongation no clear family history patient was started on nadolol for presumptive long QT syndrome.  Genetic testing reviewed and showed no abnormal mutations in the 168 gene variants tested for.  This includes common genetic testing for ARVC and LQTS1/2/3  Patient has a [email or web address] Dual chamber ICD.  Anticipated battery longevity 9.9yrs (as of 7/31/24).    RA pacing <1%, RV pacing <1%.   Arrhythmias noted on interrogation: None  Lead parameters stable with steady impedance and thresholds noted: Stable  c/t to follow with device clinic as scheduled  C/w Nadalol 20mg daily - QT normalized on ?medication  Agree with scheduled follow-ups with nephrology, genetics, heart failure  Counseled patient again on not driving for 6 months postcardiac arrest even with an ICD  We had a very detailed conversation about psychological counseling patient appears to be very stressed and anxious about going back to \"normal\" and I did recommend that " he speak to primary care about therapy.    Return to Clinic: Patient should return to the EP Clinic in 6 months    Rafal Ch MD [facility]  Cardiac Electrophysiology  [email or web address]    **Disclaimer: This note was dictated by speech recognition, and every effort has been made to prevent any error in transcription, however minor errors may be present**

## 2024-08-09 ENCOUNTER — LAB (OUTPATIENT)
Dept: LAB | Facility: LAB | Age: 44
End: 2024-08-09
Payer: COMMERCIAL

## 2024-08-09 ENCOUNTER — HOSPITAL ENCOUNTER (OUTPATIENT)
Dept: CARDIOLOGY | Facility: CLINIC | Age: 44
Discharge: HOME | End: 2024-08-09
Payer: COMMERCIAL

## 2024-08-09 ENCOUNTER — OFFICE VISIT (OUTPATIENT)
Dept: NEPHROLOGY | Facility: CLINIC | Age: 44
End: 2024-08-09
Payer: COMMERCIAL

## 2024-08-09 VITALS
WEIGHT: 223 LBS | HEIGHT: 70 IN | HEART RATE: 53 BPM | SYSTOLIC BLOOD PRESSURE: 99 MMHG | DIASTOLIC BLOOD PRESSURE: 63 MMHG | BODY MASS INDEX: 31.92 KG/M2

## 2024-08-09 DIAGNOSIS — I46.9 CARDIAC ARREST (MULTI): ICD-10-CM

## 2024-08-09 DIAGNOSIS — I47.20 VT (VENTRICULAR TACHYCARDIA) (MULTI): ICD-10-CM

## 2024-08-09 DIAGNOSIS — N17.9 AKI (ACUTE KIDNEY INJURY) (CMS-HCC): Primary | ICD-10-CM

## 2024-08-09 DIAGNOSIS — N17.9 AKI (ACUTE KIDNEY INJURY) (CMS-HCC): ICD-10-CM

## 2024-08-09 DIAGNOSIS — Z95.810 ICD (IMPLANTABLE CARDIOVERTER-DEFIBRILLATOR) IN PLACE: ICD-10-CM

## 2024-08-09 DIAGNOSIS — I47.20 VT (VENTRICULAR TACHYCARDIA) (MULTI): Primary | ICD-10-CM

## 2024-08-09 LAB
ANION GAP SERPL CALC-SCNC: 13 MMOL/L (ref 10–20)
BODY SURFACE AREA: 2.24 M2
BUN SERPL-MCNC: 50 MG/DL (ref 6–23)
CALCIUM SERPL-MCNC: 9.5 MG/DL (ref 8.6–10.3)
CHLORIDE SERPL-SCNC: 101 MMOL/L (ref 98–107)
CO2 SERPL-SCNC: 26 MMOL/L (ref 21–32)
CREAT SERPL-MCNC: 2.74 MG/DL (ref 0.5–1.3)
EGFRCR SERPLBLD CKD-EPI 2021: 28 ML/MIN/1.73M*2
GLUCOSE SERPL-MCNC: 85 MG/DL (ref 74–99)
POTASSIUM SERPL-SCNC: 4.9 MMOL/L (ref 3.5–5.3)
SODIUM SERPL-SCNC: 135 MMOL/L (ref 136–145)

## 2024-08-09 PROCEDURE — 99214 OFFICE O/P EST MOD 30 MIN: CPT | Performed by: INTERNAL MEDICINE

## 2024-08-09 PROCEDURE — 3078F DIAST BP <80 MM HG: CPT | Performed by: INTERNAL MEDICINE

## 2024-08-09 PROCEDURE — 36415 COLL VENOUS BLD VENIPUNCTURE: CPT

## 2024-08-09 PROCEDURE — 3008F BODY MASS INDEX DOCD: CPT | Performed by: INTERNAL MEDICINE

## 2024-08-09 PROCEDURE — 3074F SYST BP LT 130 MM HG: CPT | Performed by: INTERNAL MEDICINE

## 2024-08-09 PROCEDURE — 1036F TOBACCO NON-USER: CPT | Performed by: INTERNAL MEDICINE

## 2024-08-09 PROCEDURE — 80048 BASIC METABOLIC PNL TOTAL CA: CPT

## 2024-08-09 NOTE — PROGRESS NOTES
Miguel A Bender   43 y.o.    @@  [facility]/Room: [id number]/Room/bed info not found    Subjective:   The patient is being seen for a routine clinic follow-up of chronic kidney disease. Recently, the disease has been stable. Disease complications:  No hyperkalemia, no hypocalcemia, no hyperphosphatemia, no metabolic acidosis, no coagulopathy, no uremic encephalopathy, no neuropathy and no renal osteodystrophy. The patient is currently asymptomatic. No associated symptoms are reported.       Meds:   Current Outpatient Medications   Medication Sig Dispense Refill    beta carotene (vitamin A) 3,000 mcg (10,000 unit) capsule Take 1 capsule (10,000 Units) by mouth once daily.      ergocalciferol (Vitamin D-2) 1.25 MG (74503 UT) capsule Take 1 capsule (1,250 mcg) by mouth 1 (one) time per week.      hydrALAZINE (Apresoline) 25 mg tablet Take 1 tablet (25 mg) by mouth 3 times a day. 90 tablet 1    levETIRAcetam (Keppra) 500 mg tablet Take 1 tablet (500 mg) by mouth once daily. (Patient taking differently: Take 0.5 tablets (250 mg) by mouth once daily.) 30 tablet 1    multivitamin tablet Take 1 tablet by mouth once daily.      nadolol (Corgard) 20 mg tablet Take 1 tablet (20 mg) by mouth once daily. 30 tablet 1     No current facility-administered medications for this visit.          ROS:  The patient is awake and oriented. No dizziness or lightheadedness. No chills and no fever. No headaches. No nausea and no vomiting. No shortness of breath. No cough. No sputum. No chest pain. No chest tightness. No abdominal pain. No diarrhea and no constipation. No hematemesis or hemoptysis. No hematuria. No rectal bleeding. No melena. No epistaxis. No urinary symptoms. No flank pain. No leg edema. No leg pain. No weakness. No itching. Overall, the rest of the review of systems is also negative.  12 point review of systems otherwise negative as stated in HPI.        Physical Examination:        Vitals:    08/09/24 1212   BP: 99/63   Pulse: 53      General: The patient is awake, oriented, and is not in any distress.  Head and Neck: Normocephalic. No periorbital edema.  Eyes: non-icteric  Respiratory: Symmetric air entry. Symmetric chest expansion.No respiratory distress.  Cardiovascular: Symmetric peripheral pulses.  Skin: No maculopapular rash.  Abdomen: soft, nt/nd  Musculoskeletal: No peripheral edema in both left and right upper extremities.  No edema in either left or right lower extremities.  Neuro Exam: Speech is fluent. Moves extremities.    Imaging:         Blood Labs:  Results for orders placed or performed during the hospital encounter of 08/09/24 (from the past 24 hour(s))   Cardiac device check - Remote   Result Value Ref Range    BSA 2.24 m2      Lab Results   Component Value Date    PHOS 7.3 (H) 07/27/2024      Lab Results   Component Value Date    GLUCOSE 107 (H) 07/27/2024    CALCIUM 10.7 (H) 07/27/2024     (L) 07/27/2024    K 4.8 07/27/2024    CO2 27 07/27/2024    CL 96 (L) 07/27/2024    BUN 69 (H) 07/27/2024    CREATININE 9.78 (H) 07/27/2024         Assessment and Plan:  1 acute kidney injury.  The patient had a recent admission with cardiac arrest following which she developed acute kidney injury.  He required 3 dialysis treatments and later he was taken off dialysis.  His last creatinine level is 9.7 from July 27, 2024.  He is nonoliguric and he says he makes lots of urine.  His blood pressure is on low side and sometimes he feels lightheaded on standing.  He is on hydralazine 25 mg 3 times daily.  I stopped his hydralazine.  Basic metabolic panel will be checked today and also in about 10 days.  Volume status is good.  No uremic signs or symptoms.  Potassium level is normal.    I will see him in about 2 weeks for follow-up.          Abdon Villanueva MD  Senior Attending Physician  Director of Onco-Nephrology Program  Division of Nephrology & Hypertension  [facility]

## 2024-08-14 ENCOUNTER — OFFICE VISIT (OUTPATIENT)
Dept: CARDIOLOGY | Facility: CLINIC | Age: 44
End: 2024-08-14
Payer: COMMERCIAL

## 2024-08-14 VITALS
WEIGHT: 221.4 LBS | HEIGHT: 68 IN | BODY MASS INDEX: 33.56 KG/M2 | HEART RATE: 52 BPM | SYSTOLIC BLOOD PRESSURE: 116 MMHG | OXYGEN SATURATION: 100 % | DIASTOLIC BLOOD PRESSURE: 71 MMHG

## 2024-08-14 DIAGNOSIS — R34 ACUTE RENAL FAILURE WITH OLIGURIA (CMS-HCC): ICD-10-CM

## 2024-08-14 DIAGNOSIS — I10 ESSENTIAL HYPERTENSION: ICD-10-CM

## 2024-08-14 DIAGNOSIS — G47.33 OBSTRUCTIVE SLEEP APNEA SYNDROME: ICD-10-CM

## 2024-08-14 DIAGNOSIS — E78.2 MIXED HYPERLIPIDEMIA: ICD-10-CM

## 2024-08-14 DIAGNOSIS — I47.20 VT (VENTRICULAR TACHYCARDIA) (MULTI): Primary | ICD-10-CM

## 2024-08-14 DIAGNOSIS — R57.0 CARDIOGENIC SHOCK (MULTI): ICD-10-CM

## 2024-08-14 DIAGNOSIS — I46.9 CARDIAC ARREST (MULTI): ICD-10-CM

## 2024-08-14 DIAGNOSIS — N17.9 ACUTE RENAL FAILURE WITH OLIGURIA (CMS-HCC): ICD-10-CM

## 2024-08-14 PROCEDURE — 99215 OFFICE O/P EST HI 40 MIN: CPT | Performed by: INTERNAL MEDICINE

## 2024-08-14 RX ORDER — NADOLOL 20 MG/1
20 TABLET ORAL DAILY
Qty: 90 TABLET | Refills: 3 | Status: SHIPPED | OUTPATIENT
Start: 2024-08-14 | End: 2025-08-14

## 2024-08-14 NOTE — PATIENT INSTRUCTIONS
To reach Dr. Ware's office please call [phone] ([place]). Fax [phone]. Call [phone] to schedule an appointment. You may also contact the HF RNs at [email or web address]     Thank you for coming to your appointment today. If you have any questions or need cardiac medication refills, please call the Heart Failure Office at [phone] option [phone].      No medication changes today  Schedule echocardiogram for December Return 1/20 ([facility])

## 2024-08-14 NOTE — PROGRESS NOTES
43 year old male here for hospital [facility].     Social Hx: Denies smoking, ETOH, illicits  Family Hx: Father has heart murmur. Paternal grandmother  of CHF and heart attack.     Interval Hx:     Currently denies chest pain, palpitations, shortness of breath, dyspnea on exertion, orthopnea, PND. No edema noted in BLE. Patient denies headaches or recent falls.      Patient has sleep apnea and is compliant with CPAP.     Patient reports some dizziness.     Patient states he was weak but it gets better everyday.     Hospitalizations: 7/10/2024-2024 for cardiac arrest   Medication adherence: Patient states yes  Diet adherence: Low sodium  Exercise: Walking    Echo 2024:    1. Left ventricular ejection fraction is moderately decreased, by visual estimate at 30%.   2. There is normal right ventricular global systolic function.   3. Compared with study dated 7/10/2024, the EF has improved from 10% to 30%. There is now an Impella device visualized in the LV.

## 2024-08-14 NOTE — PROGRESS NOTES
Advanced Heart Failure Clinic Note    CHIEF COMPLAINT:    Chief Complaint   Patient presents with    New Patient Visit     [facility]         Primary Care Physician: Chilango Dangelo MD  Cardiologist- Dima      HISTORY OF PRESENT ILLNESS:   Miguel A Bender is a 43 y.o. male who presents as a new patient    Briefly he has hypertension (on amlodipine since 2018) when he had ED visit, with elevated troponin in 2018 normal arteries, started on lisinopril/bisoprolol then had coronary CTA in 2020 showing before his gastric bypass in 2021. He moved from [facility] due to a promotion, moved in 2023 and was only taking amlodipine. Troponin checked in 2019 and 2020 saw cardiology and felt to be doing well    He was doing very well until he had cardiac arrest 7/10/2024, with some ongoing VT, but resolved, had Impella, repeated coronary angiogram without disease, intubated and ANNIE requiring 2 sessions of HD, received ICD and discharged on 7/27, he was dischaged on Keppra for possible seizures but that has now been stopped.  There was some concern about ARVC based on his imaging but his genetic testing has returned negative.  His kidney function is slowly improved but is still not normalized he continues to follow with nephrology was discharged on hydralazine and isosorbide but these have been stopped due to hypotension    He is currently on nadolol given a concern of long QT syndrome and he had his follow-up with Dr. Ch.  He is aware not to drive and does have some anxiety about the entire episode although also has some amnesia    He has minimal other medical history, does still use CPAP unclear if he still needs it after weight loss    Family history of heart murmur in his father, overweight but overall ok, as well as 2 paternal aunts with murmur, CHF in maternal GM- but she was also diagnosed with     Never smoked, ETOH none since surgery. Rare caffeine before this.     Appetite only fair, still feels weak. He is not back  to work, planning for end of the month.     ICD issues : no  Monitors/restricts salt/fluid : no trying to drink fluid  Has scale and weighs self daily: lost weight   Has BP cuff and BPs at home:  110-120s      Allergies   Allergen Reactions    Iodine Rash    Shellfish Containing Products Rash       CURRENT MEDICATIONS:    Current Outpatient Medications   Medication Sig Dispense Refill    beta carotene (vitamin A) 3,000 mcg (10,000 unit) capsule Take 1 capsule (10,000 Units) by mouth once daily.      ergocalciferol (Vitamin D-2) 1.25 MG (16271 UT) capsule Take 1 capsule (1,250 mcg) by mouth 1 (one) time per week.      multivitamin tablet Take 1 tablet by mouth once daily.      nadolol (Corgard) 20 mg tablet Take 1 tablet (20 mg) by mouth once daily. 90 tablet 3     No current facility-administered medications for this visit.         Objective    Problems:   Patient Active Problem List   Diagnosis    Cardiac arrest (Multi)    Anxiety    Essential hypertension    Gastric bypass status for obesity    Mixed hyperlipidemia    Obstructive sleep apnea syndrome    Cardiogenic shock (Multi)    Acute hypoxemic respiratory failure (Multi)    Acute renal failure with oliguria (CMS-[facility])    Seizure-like activity (Multi)    ICD (implantable cardioverter-defibrillator) in place     Medical History:   History reviewed. No pertinent past medical history.   has no past medical history on file.  Surgical Hx:   Past Surgical History:   Procedure Laterality Date    CARDIAC CATHETERIZATION N/A 7/10/2024    Procedure: Left Heart Cath, No LV;  Surgeon: Bhavya Restrepo MD;  Location: [facility] Cardiac Cath Lab;  Service: Cardiovascular;  Laterality: N/A;    CARDIAC CATHETERIZATION N/A 7/10/2024    Procedure: PCI;  Surgeon: Bhavya Restrepo MD;  Location: [facility] Cardiac Cath Lab;  Service: Cardiovascular;  Laterality: N/A;    CARDIAC ELECTROPHYSIOLOGY PROCEDURE N/A 7/26/2024    Procedure: ICD Dual Implant;  Surgeon: Bravo Felton MD;  [facility]  [place];  Service: Electrophysiology;  Laterality: N/A;       has a past surgical history that includes Cardiac catheterization (N/A, 7/10/2024); Cardiac catheterization (N/A, 7/10/2024); and Cardiac electrophysiology procedure (N/A, 7/26/2024).  Social Hx:   Tobacco Use: Low Risk  (8/14/2024)    Patient History     Smoking Tobacco Use: Never     Smokeless Tobacco Use: Never     Passive Exposure: Not on file     Alcohol Use: Not At Risk (7/12/2024)    AUDIT-C     Frequency of Alcohol Consumption: Never     Average Number of Drinks: Patient does not drink     Frequency of Binge Drinking: Never      reports that he has never smoked. He has never used smokeless tobacco. He reports that he does not drink alcohol and does not use drugs.   Family Hx:   No family history on file.  family history is not on file.  Allergies:   Allergies   Allergen Reactions    Iodine Rash    Shellfish Containing Products Rash          PHYSICAL EXAM:   Body mass index is 33.66 kg/m².    TRENDS:   Vitals:       7/27/2024     4:45 AM 7/27/2024     8:04 AM 7/27/2024    11:30 AM 8/5/2024     9:16 AM 8/6/2024    12:50 PM 8/9/2024    12:12 PM 8/14/2024     9:24 AM   Vitals   Systolic 135 153 147 101 110 99 116   Diastolic 84 79 84 60 68 63 71   Heart Rate 73 66 61 57 57 53 52   Temp 37.1 °C (98.8 °F) 36.3 °C (97.3 °F) 36.4 °C (97.5 °F)       Resp 19 18 18 18 16     Height (in)      1.778 m (5' 10\") 1.727 m (5' 8\")   Weight (lb) 236.55   224 225 223 221.4   BMI 33.87 kg/m2   32.07 kg/m2 32.21 kg/m2 32 kg/m2 33.66 kg/m2   BSA (m2) 2.3 m2   2.25 m2 2.25 m2 2.23 m2 2.19 m2   Visit Report    Report Report Report Report     WEIGHT TREND:   Wt Readings from Last 5 Encounters:   08/14/24 100 kg (221 lb 6.4 oz)   08/09/24 101 kg (223 lb)   08/06/24 102 kg (225 lb)   08/05/24 102 kg (224 lb)   07/27/24 107 kg (236 lb 8.9 oz)       Objective      43 y.o.year old male, awake alert orient X 3 in NAD CVP not elevated heart is regular " "without murmurs lungs are clear no edema    LABS:    RESUFAST(CHOL:1,HDL:1,LDLF:1,TRI): No results found for: \"CHOL\", \"HDL\", \"LDLF\", \"TRIG\"  GETLABS(6M,2):   Hospital Outpatient Visit on 2024   Component Date Value    BSA 2024 2.24    Lab on 2024   Component Date Value    Glucose 2024 85     Sodium 2024 135 (L)     Potassium 2024 4.9     Chloride 2024 101     Bicarbonate 2024 26     Anion Gap 2024 13     Urea Nitrogen 2024 50 (H)     Creatinine 2024 2.74 (H)     eGFR 2024 28 (L)     Calcium 2024 9.5    No results displayed because visit has over 200 results.      Admission on 07/10/2024, Discharged on 07/10/2024   Component Date Value    Glucose 07/10/2024 228 (H)     Sodium 07/10/2024 142     Potassium 07/10/2024 2.6 (LL)     Chloride 07/10/2024 103     Bicarbonate 07/10/2024 23     Anion Gap 07/10/2024 19     Urea Nitrogen 07/10/2024 15     Creatinine 07/10/2024 1.19     eGFR 07/10/2024 77     Calcium 07/10/2024 8.5 (L)     Phosphorus 07/10/2024 4.0     Albumin 07/10/2024 4.2     POCT pH, Arterial 07/10/2024 7.19 (LL)     POCT pCO2, Arterial 07/10/2024 62 (H)     POCT pO2, Arterial 07/10/2024 76 (L)     POCT SO2, Arterial 07/10/2024 92 (L)     POCT Oxy Hemoglobin, Art* 07/10/2024 91.8 (L)     POCT Hematocrit Calculat* 07/10/2024 49.0     POCT Sodium, Arterial 07/10/2024 138     POCT Potassium, Arterial 07/10/2024 3.1 (L)     POCT Chloride, Arterial 07/10/2024 100     POCT Ionized Calcium, Ar* 07/10/2024 1.17     POCT Glucose, Arterial 07/10/2024 264 (H)     POCT Lactate, Arterial 07/10/2024 5.2 (HH)     POCT Base Excess, Arteri* 07/10/2024 -5.9 (L)     POCT HCO3 Calculated, Ar* 07/10/2024 23.7     POCT Hemoglobin, Arterial 07/10/2024 16.2     POCT Anion Gap, Arterial 07/10/2024 17     Patient Temperature 07/10/2024 37.0     FiO2 07/10/2024 100     Site of Arterial Puncture 07/10/2024 Brachial Left     Extra Tube 07/10/2024 Hold for " add-ons.     Extra Tube 07/10/2024 Hold for add-ons.     Extra Tube 07/10/2024 Hold for add-ons.     AV pk pratima 07/10/2024 0.53     AV mn grad 07/10/2024 0.6     MV E/A ratio 07/10/2024 0.99     LV EF 07/10/2024 10     LVIDd 07/10/2024 4.77     AV pk grad 07/10/2024 1.1     LV A4C EF 07/10/2024 6.9     Troponin I, High Sensiti* 07/10/2024 2,496 (HH)     POCT pH, Arterial 07/10/2024 7.15 (LL)     POCT pCO2, Arterial 07/10/2024 56 (H)     POCT pO2, Arterial 07/10/2024 123 (H)     POCT SO2, Arterial 07/10/2024 96     POCT Oxy Hemoglobin, Art* 07/10/2024 95.5     POCT Hematocrit Calculat* 07/10/2024 48.0     POCT Sodium, Arterial 07/10/2024 138     POCT Potassium, Arterial 07/10/2024 3.5     POCT Chloride, Arterial 07/10/2024 103     POCT Ionized Calcium, Ar* 07/10/2024 1.17     POCT Glucose, Arterial 07/10/2024 172 (H)     POCT Lactate, Arterial 07/10/2024 4.7 (HH)     POCT Base Excess, Arteri* 07/10/2024 -10.0 (L)     POCT HCO3 Calculated, Ar* 07/10/2024 19.5 (L)     POCT Hemoglobin, Arterial 07/10/2024 16.0     POCT Anion Gap, Arterial 07/10/2024 19     Patient Temperature 07/10/2024 37.0     FiO2 07/10/2024 100     POCT pH, Arterial 07/10/2024 7.18 (LL)     POCT pCO2, Arterial 07/10/2024 45 (H)     POCT pO2, Arterial 07/10/2024 73 (L)     POCT SO2, Arterial 07/10/2024 92 (L)     POCT Oxy Hemoglobin, Art* 07/10/2024 90.9 (L)     POCT Hematocrit Calculat* 07/10/2024 44.0     POCT Sodium, Arterial 07/10/2024 137     POCT Potassium, Arterial 07/10/2024 3.5     POCT Chloride, Arterial 07/10/2024 103     POCT Ionized Calcium, Ar* 07/10/2024 1.05 (L)     POCT Glucose, Arterial 07/10/2024 203 (H)     POCT Lactate, Arterial 07/10/2024 7.1 (HH)     POCT Base Excess, Arteri* 07/10/2024 -11.4 (L)     POCT HCO3 Calculated, Ar* 07/10/2024 16.8 (L)     POCT Hemoglobin, Arterial 07/10/2024 14.5     POCT Anion Gap, Arterial 07/10/2024 21     Patient Temperature 07/10/2024 37.0     FiO2 07/10/2024 100    Admission on 07/10/2024,  Discharged on 07/10/2024   Component Date Value    POCT Glucose 07/10/2024 136 (H)     WBC 07/10/2024 12.2 (H)     nRBC 07/10/2024 0.0     RBC 07/10/2024 4.95     Hemoglobin 07/10/2024 14.9     Hematocrit 07/10/2024 45.1     MCV 07/10/2024 91     MCH 07/10/2024 30.1     MCHC 07/10/2024 33.0     RDW 07/10/2024 13.5     Platelets 07/10/2024 230     Glucose 07/10/2024 138 (H)     Sodium 07/10/2024 143     Potassium 07/10/2024 3.0 (L)     Chloride 07/10/2024 106     Bicarbonate 07/10/2024 24     Anion Gap 07/10/2024 16     Urea Nitrogen 07/10/2024 12     Creatinine 07/10/2024 1.01     eGFR 07/10/2024 57 (L)     Calcium 07/10/2024 7.9 (L)     Albumin 07/10/2024 3.5     Alkaline Phosphatase 07/10/2024 82     Total Protein 07/10/2024 5.5 (L)     AST 07/10/2024 60 (H)     Bilirubin, Total 07/10/2024 1.9 (H)     ALT 07/10/2024 79 (H)     Lactate 07/10/2024 8.1 (HH)     POCT pH, Venous 07/10/2024 7.12 (LL)     POCT pCO2, Venous 07/10/2024 66 (H)     POCT pO2, Venous 07/10/2024 26 (L)     POCT SO2, Venous 07/10/2024 22 (L)     POCT Oxy Hemoglobin, Kostas* 07/10/2024 21.6 (L)     POCT Hematocrit Calculat* 07/10/2024 42.0     POCT Sodium, Venous 07/10/2024 137     POCT Potassium, Venous 07/10/2024 2.5 (LL)     POCT Chloride, Venous 07/10/2024 106     POCT Ionized Calicum, Ve* 07/10/2024 1.11     POCT Glucose, Venous 07/10/2024 157 (H)     POCT Lactate, Venous 07/10/2024 8.6 (HH)     POCT Base Excess, Venous 07/10/2024 -8.9 (L)     POCT HCO3 Calculated, Ve* 07/10/2024 21.5 (L)     POCT Hemoglobin, Venous 07/10/2024 14.1     POCT Anion Gap, Venous 07/10/2024 12.0     Patient Temperature 07/10/2024 37.0     FiO2 07/10/2024 21     Ventricular Rate 07/10/2024 77     QRS Duration 07/10/2024 158     QT Interval 07/10/2024 376     QTC Calculation(Bazett) 07/10/2024 425     R Axis 07/10/2024 238     T Axis 07/10/2024 -17     QRS Count 07/10/2024 13     Q Onset 07/10/2024 219     T Offset 07/10/2024 407     QTC Fredericia 07/10/2024 408      Troponin I, High Sensiti* 07/10/2024 22 (H)     POCT pH, Arterial 07/10/2024 7.10 (LL)     POCT pCO2, Arterial 07/10/2024 57 (H)     POCT pO2, Arterial 07/10/2024 78 (L)     POCT SO2, Arterial 07/10/2024 92 (L)     POCT Oxy Hemoglobin, Art* 07/10/2024 89.8 (L)     POCT Hematocrit Calculat* 07/10/2024 46.0     POCT Sodium, Arterial 07/10/2024 137     POCT Potassium, Arterial 07/10/2024 2.8 (LL)     POCT Chloride, Arterial 07/10/2024 98     POCT Ionized Calcium, Ar* 07/10/2024 1.14     POCT Glucose, Arterial 07/10/2024 299 (H)     POCT Lactate, Arterial 07/10/2024 10.4 (HH)     POCT Base Excess, Arteri* 07/10/2024 -12.6 (L)     POCT HCO3 Calculated, Ar* 07/10/2024 17.7 (L)     POCT Hemoglobin, Arterial 07/10/2024 15.4     POCT Anion Gap, Arterial 07/10/2024 24     Patient Temperature 07/10/2024 37.0     FiO2 07/10/2024 100     Tidal Volume 07/10/2024 600      LABBRIEF(HGB:3)   Lab Results   Component Value Date    HGB 9.8 (L) 07/27/2024    HGB 9.7 (L) 07/26/2024    HGB 8.7 (L) 07/25/2024       CMP:  Recent Labs     08/09/24  1243 07/27/24  0818 07/26/24  0849 07/25/24  0611 07/24/24  1757 07/24/24  0450   * 135* 138 135* 134* 131*   K 4.9 4.8 4.5 4.0 4.2 3.6    96* 98 97* 95* 93*   CO2 26 27 26 25 24 24   ANIONGAP 13 17 19 17 19 18   BUN 50* 69* 73* 78* 80* 78*   CREATININE 2.74* 9.78* 9.90* 10.23* 10.17* 9.93*   EGFR 28* 6* 6* 6* 6* 6*   MG  --  2.15 2.23 2.06 2.01 2.18     Recent Labs     07/27/24  0818 07/26/24  0849 07/25/24  0611 07/24/24  1757 07/24/24  0450 07/23/24  1916 07/23/24  0549 07/22/24  1811 07/22/24  0758 07/21/24  1826 07/21/24  0818 07/20/24  0537   ALBUMIN 3.5 3.1* 2.8* 2.9* 3.0*   < > 2.9*   < > 2.8*   < > 2.7* 2.7*   ALKPHOS  --   --   --   --  76  --  81  --  84  --  87 88   ALT  --   --   --   --  25  --  24  --  27  --  28 28   AST  --   --   --   --  24  --  21  --  32  --  24 22   BILITOT  --   --   --   --  0.5  --  0.6  --  0.5  --  0.5 0.5    < > = values in this interval not  "displayed.     CBC:  Recent Labs     07/27/24  0818 07/26/24  0849 07/25/24  0611 07/24/24  0450 07/23/24  0549   WBC 11.3 7.3 7.4 9.1 9.9   HGB 9.8* 9.7* 8.7* 9.1* 9.6*   HCT 29.3* 28.8* 25.9* 26.9* 28.3*   * 462* 463* 441 442   MCV 86 87 86 86 86     HEME/ENDO:No results for input(s): \"FERRITIN\", \"IRONSAT\", \"TSH\", \"HGBA1C\" in the last 29942 hours.   CARDIAC:   Recent Labs     07/13/24  0111 07/11/24  0417 07/10/24  2335 07/10/24  1213 07/10/24  0957   * 699* 606*  --   --    TROPHS  --   --   --  2,496* 22*   No results for input(s): \"CHOL\", \"LDLF\", \"HDL\", \"TRIG\" in the last 18762 hours.    No results found for: \"BNP\"      DIAGNOSTIC STUDIES REVIEWED:        EKG:    Encounter Date: 08/06/24   ECG 12 lead (Clinic Performed)    Narrative    Sinus rhythm ventricular rate 56 QRS 84  QTc 405    *Please refer to scanned ECG for final report*         ECHO 7/2024     1. Left ventricular ejection fraction is moderately decreased, by visual estimate at 30%.   2. There is normal right ventricular global systolic function.   3. Compared with study dated 7/10/2024, the EF has improved from 10% to 30%. There is now an Impella device visualized in the LV.    Cardiac MRI 7/2024    Mildly reduced LVEF. Quantitative LVEF 47 %.  No evidence of infarct/scar.  RV is dilated with moderate to severe reduction in RVEF. Quantitative  RVEF 20 %. Mildly increased ECV of 32%. T2 values in the lateral wall  are mildly increased (52-53 ms). No definitive evidence of  myocarditis by [place] Criteria.  CORONARY ANGIOGRAM   1. Normal coronary arteries.   RA 5  PA 42/11 mean 21  PCWP 8  CO/CI ([facility]) 4.5/1.9 on epi and norepi  PA sat 59      ASSESSMENT AND PLAN:   Patient Active Problem List   Diagnosis    Cardiac arrest (Multi)    Anxiety    Essential hypertension    Gastric bypass status for obesity    Mixed hyperlipidemia    Obstructive sleep apnea syndrome    Cardiogenic shock (Multi)    Acute hypoxemic respiratory " failure (Multi)    Acute renal failure with oliguria (CMS-HCC)    Seizure-like activity (Multi)    ICD (implantable cardioverter-defibrillator) in place     Acute systolic heart failure/possible chronic cardiomyopathy euvolemic and well-perfused he is now off diuretics ejection fraction is back to low normal difficult to determine if there was any chronic component versus the result of sudden cardiac death reasonable to continue to follow him via echocardiogram we will set up a repeat towards the end of the year since we do not have a baseline echo after recovery    Currently not able to use ACE/ARB/MRA due to renal function he was also taken off hydralazine nitrate due to hypotension does not appear to require diuretic.  Nadolol not an evidence-based beta-blocker for systolic heart failure but reasonable to continue as long as his ejection fraction remains in this range or better    Regarding chronicity although his troponin was elevated in 2018 it was just over the reference value of normal and was negative x3 in 2020, BNP was also normal in 2018    Sudden cardiac death we reviewed negative genetic testing, no specific diagnosis but would still be reasonable for his son to have evaluation by pediatric cardiology    Regardless of etiology he currently has ICD     Hypertension although he was previously on medication, BP had been lower     ANNIE creatinine now down to 2.7, agree with more permissive BP management to allow for renal recovery, He continues to follow with nephrology    Hardy remains on CPAP although he is not sure he still needs      I discussed heart failure therapeutic options, including: medical therapy, salt and fluid restriction, the need to monitor BP and weight, exercise and weight loss, need to control hypertension, sleep apnea, , follow up and medication changes.      I spent  65 minutes coordinating care, reviewing echo/cath/MRI and discussing options and adjusting medications.     I discussed  with patient family and other providers    I spent >50 percent of the time counseling the patient and discussing the diagnosis, general prognosis and plan of care with the patient       Orders placed during the encounter:   Orders Placed This Encounter   Procedures    Transthoracic echo (TTE) complete      Followup Appts:  Future Appointments   Date Time Provider Department [place]   8/19/2024  9:30 AM [facility] CARDIAC DEVICE CLINIC [id number] [facility]   8/22/2024  8:30 AM Debra Dominguez MD [id number] [place]   8/27/2024  4:30 PM Abdon Villanueva MD [id number] [place]   8/28/2024 11:00 AM PAR NEURO EEG ROOM [facility]   10/10/2024  9:40 AM Av Amaya MD [id number] [facility]   12/17/2024  8:00 AM MED ECHO/STRESS [id number] [facility]   12/17/2024  9:00 AM Rafal Ch MD [id number] [place]   1/20/2025  8:30 AM Ollie Ware MD [id number] [place]       Ollie Rosenbaum IV, MD  Heart Failure, Heart Transplant and  Mechanical Circulatory Support

## 2024-08-15 DIAGNOSIS — Z95.810 CARDIAC DEFIBRILLATOR IN PLACE: Primary | ICD-10-CM

## 2024-08-15 DIAGNOSIS — I42.9 CARDIOMYOPATHY, UNSPECIFIED TYPE (MULTI): ICD-10-CM

## 2024-08-15 PROBLEM — Z99.2 ESRD (END STAGE RENAL DISEASE) ON DIALYSIS (MULTI): Status: RESOLVED | Noted: 2024-07-20 | Resolved: 2024-08-15

## 2024-08-15 PROBLEM — R40.20: Status: RESOLVED | Noted: 2024-07-13 | Resolved: 2024-08-15

## 2024-08-15 PROBLEM — N18.6 ESRD (END STAGE RENAL DISEASE) ON DIALYSIS (MULTI): Status: RESOLVED | Noted: 2024-07-20 | Resolved: 2024-08-15

## 2024-08-19 ENCOUNTER — TELEPHONE (OUTPATIENT)
Dept: NEPHROLOGY | Facility: CLINIC | Age: 44
End: 2024-08-19

## 2024-08-19 ENCOUNTER — HOSPITAL ENCOUNTER (OUTPATIENT)
Dept: CARDIOLOGY | Facility: CLINIC | Age: 44
Discharge: HOME | End: 2024-08-19
Payer: COMMERCIAL

## 2024-08-19 DIAGNOSIS — I46.9 CARDIAC ARREST (MULTI): ICD-10-CM

## 2024-08-19 DIAGNOSIS — N17.9 AKI (ACUTE KIDNEY INJURY) (CMS-HCC): Primary | ICD-10-CM

## 2024-08-19 DIAGNOSIS — I47.20 VT (VENTRICULAR TACHYCARDIA) (MULTI): ICD-10-CM

## 2024-08-19 PROCEDURE — 93283 PRGRMG EVAL IMPLANTABLE DFB: CPT

## 2024-08-19 PROCEDURE — 93283 PRGRMG EVAL IMPLANTABLE DFB: CPT | Performed by: NURSE PRACTITIONER

## 2024-08-19 NOTE — TELEPHONE ENCOUNTER
Patient called & said that he was supposed to get his BW redone 10 days after his last BW. There are not currently any orders in his chart to get his BW re-done. If you would like him to do this BW please but the order in the chart.

## 2024-08-20 ENCOUNTER — LAB (OUTPATIENT)
Dept: LAB | Facility: LAB | Age: 44
End: 2024-08-20
Payer: COMMERCIAL

## 2024-08-20 DIAGNOSIS — N17.9 AKI (ACUTE KIDNEY INJURY) (CMS-HCC): ICD-10-CM

## 2024-08-20 LAB
ANION GAP SERPL CALC-SCNC: 9 MMOL/L (ref 10–20)
BUN SERPL-MCNC: 26 MG/DL (ref 6–23)
CALCIUM SERPL-MCNC: 9.2 MG/DL (ref 8.6–10.6)
CHLORIDE SERPL-SCNC: 110 MMOL/L (ref 98–107)
CO2 SERPL-SCNC: 25 MMOL/L (ref 21–32)
CREAT SERPL-MCNC: 2.05 MG/DL (ref 0.5–1.3)
EGFRCR SERPLBLD CKD-EPI 2021: 40 ML/MIN/1.73M*2
GLUCOSE SERPL-MCNC: 59 MG/DL (ref 74–99)
POTASSIUM SERPL-SCNC: 4.7 MMOL/L (ref 3.5–5.3)
SODIUM SERPL-SCNC: 139 MMOL/L (ref 136–145)

## 2024-08-20 PROCEDURE — 80048 BASIC METABOLIC PNL TOTAL CA: CPT

## 2024-08-20 PROCEDURE — 36415 COLL VENOUS BLD VENIPUNCTURE: CPT

## 2024-08-22 ENCOUNTER — APPOINTMENT (OUTPATIENT)
Dept: GENETICS | Facility: CLINIC | Age: 44
End: 2024-08-22
Payer: COMMERCIAL

## 2024-08-22 DIAGNOSIS — I46.9 CARDIAC ARREST (MULTI): Primary | ICD-10-CM

## 2024-08-22 DIAGNOSIS — Z95.810 ICD (IMPLANTABLE CARDIOVERTER-DEFIBRILLATOR) IN PLACE: ICD-10-CM

## 2024-08-22 PROCEDURE — 99212 OFFICE O/P EST SF 10 MIN: CPT | Performed by: MEDICAL GENETICS

## 2024-08-22 NOTE — PROGRESS NOTES
Subjective   Patient ID: Miguel A Bender is a 43 y.o. 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 Dominguez at her office.    The patient consented to the visit and understands the limitations of the visit, that they will not be physically examined and all issues may not be able to be addressed.    BRADLEY Grady is a 43 year old male with a history of obesity s/p gastric bypass surgery 2 years ago, CHARLEEN, HTN, and cardiac shock. He was last seen by genetics for a consult when he was admitted in the hospital. Miguel A was consulted by genetics (with Resident Clint Nathan, and consulting attending Dr. Dominguez) on 07/18/24, due to concerns for genetic etiology of AVRC. We recommended the [facility] Arrythmia and Cardiomyopathy Comprehensive Panel. Blood draw order was placed. Miguel A returns to discuss the results.    Interval History:  - Miguel A reports feeling better   - Has a defibrillator placed  - Has an upcoming EEG in the month      Previous Specialties/Evaluations:    Nephrology - Dr. Villanueva, 08/09/24. Per note, “1 acute kidney injury.  The patient had a recent admission with cardiac arrest following which she developed acute kidney injury.  He required 3 dialysis treatments and later he was taken off dialysis.  His last creatinine level is 9.7 from July 27, 2024.  He is nonoliguric and he says he makes lots of urine.  His blood pressure is on low side and sometimes he feels lightheaded on standing.  He is on hydralazine 25 mg 3 times daily.  I stopped his hydralazine.  Basic metabolic panel will be checked today and also in about 10 days.  Volume status is good.  No uremic signs or symptoms.  Potassium level is normal.” F/u in 2 weeks.      Cardiology- Electrophysiology - Dr. Ch, 08/06/24. Per note, “Patient presented to the hospital after VF arrest requiring multiple cardioversions.  Coronary angiography done at that time showed normal coronaries.  During his ICU stay patient developed multiple episodes of polymorphic VT episodes were preceded by PVCs along with underlying QT prolongation.  Unclear etiology of QT prolongation no clear family history patient was started on nadolol for presumptive long QT syndrome.  Genetic testing reviewed and showed no abnormal mutations in the 168 gene variants tested for.  This includes common genetic testing for ARVC and LQTS1/2/3  Patient has a [employer] Dual chamber ICD.  Anticipated battery longevity 9.9yrs (as of 7/31/24).” Plan: cont to follow w/nephrology, genetics, heart failure, speak to PCP about therapy, and f/u in 6 months.     Neurology - Flory Martinez, APRN-CNP, 08/05/24. Per note, “Miguel A was recently hospitalized for witnessed cardiac arrest s/p ICD placement. Upon admission there was concern for myoclonic jerks. vEEG did not show epileptiform discharge. He doesn't need long term Anti-seizure medication management and is currently only taking LEV 500mg daily which is likely non therapeutic. Will decrease LEV from 500mg daily to 250mg daily for 1 week then stop. Will get routine EEG after stopping LEV. [facility]     Surgeries/Hospitalizations:  - left heart catheter  -  cardiac electrophysiology procedure   - Admission date: 07/10/24. Discharge date: 07/27/24. Admission reason: cardiogenic shock s/p Impella placement   - Admission date: 03/23/18. Discharge date: 03/23/18. Admission reason: chest pain, left arm numbness and nausea.    Imaging:   (07/27/24) XR chest 2 views. Impression: per note, “1.  No evidence of acute cardiopulmonary process. No pneumothorax.  2. Status post left chest wall pacer/ICD placement.”  (07/26/24) XR chest 1 view. Impression: per  note, “1. Mild bibasilar atelectasis. No pneumothorax, pleural effusion, or focal consolidation.  2. Interval placement of left-sided chest wall AICD/pacemaker with leads projecting over the right atrium and right ventricle.”    Objective   RESULTS:  [facility] / Arrythmia and Cardiomyopathy Comprehensive Panel / Results: Negative         Assessment/Plan   Problem List Items Addressed This Visit             ICD-10-CM    Cardiac arrest (Multi) - Primary I46.9    ICD (implantable cardioverter-defibrillator) in place Z95.810     Today we met with Miguel A Bender to discuss the results of his [facility] Arrythmia and Cardiomyopathy Comprehensive Panel.     We reviewed results of the [facility] Arrythmia and Cardiomyopathy Comprehensive Panel done to try and find a underlying genetic explanation for Miguel A's medical issues. Results of testing came back Negative, meaning there was no genes (of the 168 read) identified with a mistake/misspelling (variant).     We reviewed the limitations to this testing and the possible reasons as to why testing did not find an answer, which include: some genes are not examined in as much detail as others in the setting of this test, certain types of gene changes are not detectable by this test, the understanding of all of the symptoms specific gene changes can cause are limited, and the test is not designed to diagnose disorder caused by changes in multiple genes or genes and environmental factors together. We do not know the function of most of our genes so there may be a change in gene that is not yet known to cause human disease. Occasionally, we are contacted by the lab months or years after testing is completed and told that a gene change has been identified that we did not know about at the time of initial testing.     There is further testing available if Miguel A is interested. We can do broader genetic testing, however the likelihood we will find an answer is low since we just did a test with  the highly possible (specific) answers. I do recommend a follow-up in genetics in 2-3 years to see if new testing is available that he could do.     I recommend Miguel A continues to see cardiology periodically in order to continue evaluating his heart.     First degree relatives are at risk and should be seen by cardiology. We do not have any genetic testing for them as you results were negative.     Plan:  1. Continue seeing Cardiology  2. Follow-up in genetics in 2-3 years  3. Family members at risk and should be seen by cardiology    Genetic counseling was provided.     Debra Dominguez MD.     Board Certified Medical Geneticist.      Scribe Attestation    This note is prepared by Nicki Cruz acting as Debra Dominguez MD.   All medical record entries made by the Scribe were at my direction and personally dictated by me. I have reviewed the chart and agree that the record accurately reflects my personal performance of the history, physical exam, assessment, plan, and diagnosis. I have also personally directed, reviewed, and agree with the discharge instructions.   Debra Dominguez MD.

## 2024-08-27 ENCOUNTER — APPOINTMENT (OUTPATIENT)
Dept: NEPHROLOGY | Facility: CLINIC | Age: 44
End: 2024-08-27
Payer: COMMERCIAL

## 2024-08-27 VITALS
WEIGHT: 221 LBS | HEIGHT: 68 IN | DIASTOLIC BLOOD PRESSURE: 73 MMHG | BODY MASS INDEX: 33.49 KG/M2 | SYSTOLIC BLOOD PRESSURE: 112 MMHG | HEART RATE: 54 BPM

## 2024-08-27 DIAGNOSIS — N17.9 AKI (ACUTE KIDNEY INJURY) (CMS-HCC): Primary | ICD-10-CM

## 2024-08-27 PROCEDURE — 3078F DIAST BP <80 MM HG: CPT | Performed by: INTERNAL MEDICINE

## 2024-08-27 PROCEDURE — 99213 OFFICE O/P EST LOW 20 MIN: CPT | Performed by: INTERNAL MEDICINE

## 2024-08-27 PROCEDURE — 1036F TOBACCO NON-USER: CPT | Performed by: INTERNAL MEDICINE

## 2024-08-27 PROCEDURE — 3008F BODY MASS INDEX DOCD: CPT | Performed by: INTERNAL MEDICINE

## 2024-08-27 PROCEDURE — 3074F SYST BP LT 130 MM HG: CPT | Performed by: INTERNAL MEDICINE

## 2024-08-27 NOTE — PROGRESS NOTES
Miguel A Bender   43 y.o.    @@  [facility]/Room: [id number]/Room/bed info not found    Subjective:   The patient is being seen for a routine clinic follow-up of chronic kidney disease. Recently, the disease has been stable. Disease complications:  No hyperkalemia, no hypocalcemia, no hyperphosphatemia, no metabolic acidosis, no coagulopathy, no uremic encephalopathy, no neuropathy and no renal osteodystrophy. The patient is currently asymptomatic. No associated symptoms are reported.       Meds:   Current Outpatient Medications   Medication Sig Dispense Refill    beta carotene (vitamin A) 3,000 mcg (10,000 unit) capsule Take 1 capsule (10,000 Units) by mouth once daily.      ergocalciferol (Vitamin D-2) 1.25 MG (55375 UT) capsule Take 1 capsule (1,250 mcg) by mouth 1 (one) time per week.      multivitamin tablet Take 1 tablet by mouth once daily.      nadolol (Corgard) 20 mg tablet Take 1 tablet (20 mg) by mouth once daily. 90 tablet 3     No current facility-administered medications for this visit.          ROS:  The patient is awake and oriented. No dizziness or lightheadedness. No chills and no fever. No headaches. No nausea and no vomiting. No shortness of breath. No cough. No sputum. No chest pain. No chest tightness. No abdominal pain. No diarrhea and no constipation. No hematemesis or hemoptysis. No hematuria. No rectal bleeding. No melena. No epistaxis. No urinary symptoms. No flank pain. No leg edema. No leg pain. No weakness. No itching. Overall, the rest of the review of systems is also negative.  12 point review of systems otherwise negative as stated in HPI.        Physical Examination:        Vitals:    08/27/24 1639   BP: 112/73   Pulse: 54     General: The patient is awake, oriented, and is not in any distress.  Head and Neck: Normocephalic. No periorbital edema.  Eyes: non-icteric  Respiratory: Symmetric air entry. Symmetric chest expansion.No respiratory distress.  Cardiovascular: Symmetric peripheral  pulses.  Skin: No maculopapular rash.  Abdomen: soft, nt/nd  Musculoskeletal: No peripheral edema in both left and right upper extremities.  No edema in either left or right lower extremities.  Neuro Exam: Speech is fluent. Moves extremities.    Imaging:         Blood Labs:  No results found for this or any previous visit (from the past 24 hour(s)).   Lab Results   Component Value Date    PHOS 7.3 (H) 07/27/2024      Lab Results   Component Value Date    GLUCOSE 59 (L) 08/20/2024    CALCIUM 9.2 08/20/2024     08/20/2024    K 4.7 08/20/2024    CO2 25 08/20/2024     (H) 08/20/2024    BUN 26 (H) 08/20/2024    CREATININE 2.05 (H) 08/20/2024         Assessment and Plan:  1 acute kidney injury.  The patient had a recent admission with cardiac arrest following which she developed acute kidney injury.  He required 3 dialysis treatments and later he was taken off dialysis.  His last creatinine level is 2.05 He is nonoliguric and he says he makes lots of urine.  His blood pressure is good.  Basic metabolic panel will be checked today and also in about 10 days.  Volume status is good.  No uremic signs or symptoms.  Potassium level is normal.     I will see him in about 3 months for follow-up.          Abdon Villanueva MD  Senior Attending Physician  Director of Onco-Nephrology Program  Division of Nephrology & Hypertension  [facility]

## 2024-08-28 ENCOUNTER — HOSPITAL ENCOUNTER (OUTPATIENT)
Dept: NEUROLOGY | Facility: HOSPITAL | Age: 44
Discharge: HOME | End: 2024-08-28
Payer: COMMERCIAL

## 2024-08-28 DIAGNOSIS — R56.9 SEIZURE-LIKE ACTIVITY (MULTI): ICD-10-CM

## 2024-08-28 PROCEDURE — 95819 EEG AWAKE AND ASLEEP: CPT

## 2024-08-28 PROCEDURE — 95819 EEG AWAKE AND ASLEEP: CPT | Performed by: PSYCHIATRY & NEUROLOGY

## 2024-08-29 ENCOUNTER — APPOINTMENT (OUTPATIENT)
Dept: NEPHROLOGY | Facility: CLINIC | Age: 44
End: 2024-08-29
Payer: COMMERCIAL

## 2024-10-09 NOTE — PROGRESS NOTES
Subjective   Patient ID: Miguel A Bender is a 43 y.o. male who presents to establish relationship with new PCP    HPI   Since the patient's ICD placement in late July, the patient reports a history of intermittent episodes of mild discomfort located at the ICD incision site.  He reports no precipitating or exacerbating factors.  He reports no radiation of discomfort.  He reports no other associated symptoms.    Since hydralazine was discontinued 2 months ago, he reports no recurrent episodes of presyncope.  He reports no other complaints.  Review of Systems    Objective   There were no vitals taken for this visit.    Physical Exam    Lungs-clear.  Cardiac-rate normal, rhythm regular, no murmurs, no JVD.  Abdomen-soft, nondistended. Normal active bowel sounds. Palpation revealed no tenderness or masses..  Extremities-no peripheral edema.  Musculoskeletal  Chest-no erythema or swelling.  Full range of motion in all directions of motion with no chest discomfort.  Palpation of the left upper chest region just above the midportion of the ICD reproduce the patient's chest discomfort, no increase in warmth  Assessment/Plan   Problem List Items Addressed This Visit             ICD-10-CM    Essential hypertension I10    Mixed hyperlipidemia E78.2    Cardiogenic shock (Multi) - Primary R57.0    ICD (implantable cardioverter-defibrillator) in place Z95.810        Assessment  Occasional episodes of discomfort located at the ICD incision site-May be secondary to neuromuscular chest discomfort  HFrEF  V-fib cardiac arrest complicated by cardiogenic shock July 10, 2024, status post ICD placement July 26, 2024  Long QT syndrome  Hypertension-blood pressure at goal  Acute kidney injury secondary to cardiac arrest  Chronic kidney disease  Status post gastric bypass surgery 2022  Biceps tendon tear left upper extremity status post repair 2012 or 2014   Mild diffuse encephalopathy July 14, 2024    Plan  Continue current medication  regimen for now.  The patient will return for a follow-up visit in 3 months

## 2024-10-10 ENCOUNTER — APPOINTMENT (OUTPATIENT)
Dept: PRIMARY CARE | Facility: CLINIC | Age: 44
End: 2024-10-10
Payer: COMMERCIAL

## 2024-10-10 VITALS
SYSTOLIC BLOOD PRESSURE: 110 MMHG | DIASTOLIC BLOOD PRESSURE: 68 MMHG | HEART RATE: 60 BPM | WEIGHT: 235.8 LBS | BODY MASS INDEX: 35.85 KG/M2

## 2024-10-10 DIAGNOSIS — E78.2 MIXED HYPERLIPIDEMIA: ICD-10-CM

## 2024-10-10 DIAGNOSIS — R57.0 CARDIOGENIC SHOCK (MULTI): Primary | ICD-10-CM

## 2024-10-10 DIAGNOSIS — I10 ESSENTIAL HYPERTENSION: ICD-10-CM

## 2024-10-10 DIAGNOSIS — Z95.810 ICD (IMPLANTABLE CARDIOVERTER-DEFIBRILLATOR) IN PLACE: ICD-10-CM

## 2024-10-10 PROCEDURE — 3078F DIAST BP <80 MM HG: CPT | Performed by: INTERNAL MEDICINE

## 2024-10-10 PROCEDURE — 99202 OFFICE O/P NEW SF 15 MIN: CPT | Performed by: INTERNAL MEDICINE

## 2024-10-10 PROCEDURE — 3074F SYST BP LT 130 MM HG: CPT | Performed by: INTERNAL MEDICINE

## 2024-11-04 ENCOUNTER — HOSPITAL ENCOUNTER (OUTPATIENT)
Dept: CARDIOLOGY | Facility: CLINIC | Age: 44
Discharge: HOME | End: 2024-11-04
Payer: COMMERCIAL

## 2024-11-04 DIAGNOSIS — Z95.810 ICD (IMPLANTABLE CARDIOVERTER-DEFIBRILLATOR) IN PLACE: ICD-10-CM

## 2024-11-04 DIAGNOSIS — I47.20 VT (VENTRICULAR TACHYCARDIA) (MULTI): ICD-10-CM

## 2024-11-12 ENCOUNTER — LAB (OUTPATIENT)
Dept: LAB | Facility: LAB | Age: 44
End: 2024-11-12
Payer: COMMERCIAL

## 2024-11-12 DIAGNOSIS — Z95.810 CARDIAC DEFIBRILLATOR IN PLACE: Primary | ICD-10-CM

## 2024-11-12 DIAGNOSIS — I42.9 CARDIOMYOPATHY, UNSPECIFIED TYPE (MULTI): ICD-10-CM

## 2024-11-12 DIAGNOSIS — N17.9 AKI (ACUTE KIDNEY INJURY) (CMS-HCC): ICD-10-CM

## 2024-11-12 LAB
ANION GAP SERPL CALC-SCNC: 7 MMOL/L (ref 10–20)
BUN SERPL-MCNC: 15 MG/DL (ref 6–23)
CALCIUM SERPL-MCNC: 9.2 MG/DL (ref 8.6–10.6)
CHLORIDE SERPL-SCNC: 106 MMOL/L (ref 98–107)
CO2 SERPL-SCNC: 35 MMOL/L (ref 21–32)
CREAT SERPL-MCNC: 1.24 MG/DL (ref 0.5–1.3)
EGFRCR SERPLBLD CKD-EPI 2021: 73 ML/MIN/1.73M*2
GLUCOSE SERPL-MCNC: 86 MG/DL (ref 74–99)
POTASSIUM SERPL-SCNC: 4.5 MMOL/L (ref 3.5–5.3)
SODIUM SERPL-SCNC: 143 MMOL/L (ref 136–145)

## 2024-11-12 PROCEDURE — 36415 COLL VENOUS BLD VENIPUNCTURE: CPT

## 2024-11-12 PROCEDURE — 80048 BASIC METABOLIC PNL TOTAL CA: CPT

## 2024-11-14 ENCOUNTER — APPOINTMENT (OUTPATIENT)
Dept: NEPHROLOGY | Facility: CLINIC | Age: 44
End: 2024-11-14
Payer: COMMERCIAL

## 2024-11-14 VITALS
BODY MASS INDEX: 36.71 KG/M2 | DIASTOLIC BLOOD PRESSURE: 75 MMHG | HEART RATE: 55 BPM | SYSTOLIC BLOOD PRESSURE: 117 MMHG | HEIGHT: 68 IN | WEIGHT: 242.2 LBS

## 2024-11-14 DIAGNOSIS — N17.9 AKI (ACUTE KIDNEY INJURY) (CMS-HCC): Primary | ICD-10-CM

## 2024-11-14 PROCEDURE — 3078F DIAST BP <80 MM HG: CPT | Performed by: INTERNAL MEDICINE

## 2024-11-14 PROCEDURE — 1036F TOBACCO NON-USER: CPT | Performed by: INTERNAL MEDICINE

## 2024-11-14 PROCEDURE — 3008F BODY MASS INDEX DOCD: CPT | Performed by: INTERNAL MEDICINE

## 2024-11-14 PROCEDURE — 99213 OFFICE O/P EST LOW 20 MIN: CPT | Performed by: INTERNAL MEDICINE

## 2024-11-14 PROCEDURE — 3074F SYST BP LT 130 MM HG: CPT | Performed by: INTERNAL MEDICINE

## 2024-11-18 ENCOUNTER — APPOINTMENT (OUTPATIENT)
Dept: CARDIOLOGY | Facility: CLINIC | Age: 44
End: 2024-11-18
Payer: COMMERCIAL

## 2024-11-25 PROBLEM — E66.9 OBESITY: Status: ACTIVE | Noted: 2019-12-18

## 2024-12-02 DIAGNOSIS — E55.9 VITAMIN D DEFICIENCY: Primary | ICD-10-CM

## 2024-12-02 RX ORDER — ERGOCALCIFEROL 1.25 MG/1
1.25 CAPSULE ORAL
Qty: 12 CAPSULE | Refills: 3 | Status: SHIPPED | OUTPATIENT
Start: 2024-12-02

## 2024-12-06 DIAGNOSIS — Z95.810 CARDIAC DEFIBRILLATOR IN PLACE: Primary | ICD-10-CM

## 2024-12-06 DIAGNOSIS — I42.9 CARDIOMYOPATHY, UNSPECIFIED TYPE (MULTI): ICD-10-CM

## 2024-12-16 ENCOUNTER — HOSPITAL ENCOUNTER (OUTPATIENT)
Dept: CARDIOLOGY | Facility: CLINIC | Age: 44
Discharge: HOME | End: 2024-12-16
Payer: COMMERCIAL

## 2024-12-16 DIAGNOSIS — I42.9 CARDIOMYOPATHY, UNSPECIFIED TYPE (MULTI): ICD-10-CM

## 2024-12-16 DIAGNOSIS — Z95.810 CARDIAC DEFIBRILLATOR IN PLACE: ICD-10-CM

## 2024-12-16 PROCEDURE — 93283 PRGRMG EVAL IMPLANTABLE DFB: CPT

## 2024-12-16 PROCEDURE — 93283 PRGRMG EVAL IMPLANTABLE DFB: CPT | Performed by: NURSE PRACTITIONER

## 2024-12-17 ENCOUNTER — APPOINTMENT (OUTPATIENT)
Dept: CARDIOLOGY | Facility: CLINIC | Age: 44
End: 2024-12-17
Payer: COMMERCIAL

## 2024-12-17 ENCOUNTER — HOSPITAL ENCOUNTER (OUTPATIENT)
Dept: CARDIOLOGY | Facility: CLINIC | Age: 44
Discharge: HOME | End: 2024-12-17
Payer: COMMERCIAL

## 2024-12-17 DIAGNOSIS — I47.20 VT (VENTRICULAR TACHYCARDIA) (MULTI): ICD-10-CM

## 2024-12-17 DIAGNOSIS — R57.0 CARDIOGENIC SHOCK (MULTI): ICD-10-CM

## 2024-12-17 LAB
AORTIC VALVE PEAK VELOCITY: 1.35 M/S
AV PEAK GRADIENT: 7 MMHG
AVA (PEAK VEL): 3.51 CM2
EJECTION FRACTION APICAL 4 CHAMBER: 49.3
EJECTION FRACTION: 53 %
LEFT ATRIUM VOLUME AREA LENGTH INDEX BSA: 42.5 ML/M2
LEFT VENTRICLE INTERNAL DIMENSION DIASTOLE: 5.48 CM (ref 3.5–6)
LEFT VENTRICULAR OUTFLOW TRACT DIAMETER: 2.4 CM
MITRAL VALVE E/A RATIO: 1.43
RIGHT VENTRICLE FREE WALL PEAK S': 10.73 CM/S
TRICUSPID ANNULAR PLANE SYSTOLIC EXCURSION: 2 CM

## 2024-12-17 PROCEDURE — 93306 TTE W/DOPPLER COMPLETE: CPT | Performed by: STUDENT IN AN ORGANIZED HEALTH CARE EDUCATION/TRAINING PROGRAM

## 2024-12-17 PROCEDURE — 93306 TTE W/DOPPLER COMPLETE: CPT

## 2025-01-12 NOTE — PROGRESS NOTES
Subjective   Patient ID: Miguel A Bender is a 44 y.o. male who presents for 3-month follow-up visit    HPI   The patient reports the presence of his cyst located midportion left upper parietal region a few weeks ago.  A few weeks ago, he reports that his wife popped the area and noted purulent drainage with disappearance of the cyst.  He reports no associated pain.  No other associated symptoms.    Over the past 3 months, the patient reports continued chronic intermittent episodes of mild discomfort located at the ICD incision site.  He reports that over the past few months the episodes may be precipitated by the patient leaning on his left arm.  He reports no radiation of discomfort.  He reports no other associated symptoms.  Over the past 3 months, the patient reports a decrease in the frequency and intensity of the episodes.  No other new complaints.  Review of Systems    Objective   There were no vitals taken for this visit.    Physical Exam  Lungs-clear.  Cardiac-bradycardic, rhythm regular, no murmurs, no JVD.  Abdomen-soft, nondistended. Normal active bowel sounds. Palpation revealed no tenderness or masses..  Extremities-no peripheral edema.  Musculoskeletal  Chest-no erythema or swelling.  Full range of motion in all directions of motion with no chest discomfort.  Palpation of the chest did not reproduce the patient's chest discomfort  Assessment/Plan   Problem List Items Addressed This Visit             ICD-10-CM    Essential hypertension I10    Relevant Orders    CBC and Auto Differential    Comprehensive Metabolic Panel    C-Reactive Protein, High Sensitivity    Lipid Panel    Prostate Specific Antigen    Thyroid Stimulating Hormone    Vitamin B12    Vitamin D 25-Hydroxy,Total (for eval of Vitamin D levels)    Gastric bypass status for obesity Z98.84    Relevant Orders    CBC and Auto Differential    Comprehensive Metabolic Panel    C-Reactive Protein, High Sensitivity    Lipid Panel    Prostate Specific  Antigen    Thyroid Stimulating Hormone    Vitamin B12    Vitamin D 25-Hydroxy,Total (for eval of Vitamin D levels)    Mixed hyperlipidemia E78.2    Relevant Orders    CBC and Auto Differential    Comprehensive Metabolic Panel    C-Reactive Protein, High Sensitivity    Lipid Panel    Prostate Specific Antigen    Thyroid Stimulating Hormone    Vitamin B12    Vitamin D 25-Hydroxy,Total (for eval of Vitamin D levels)    Obstructive sleep apnea syndrome G47.33    Relevant Orders    CBC and Auto Differential    Comprehensive Metabolic Panel    C-Reactive Protein, High Sensitivity    Lipid Panel    Prostate Specific Antigen    Thyroid Stimulating Hormone    Vitamin B12    Vitamin D 25-Hydroxy,Total (for eval of Vitamin D levels)    ICD (implantable cardioverter-defibrillator) in place - Primary Z95.810    Relevant Orders    CBC and Auto Differential    Comprehensive Metabolic Panel    C-Reactive Protein, High Sensitivity    Lipid Panel    Prostate Specific Antigen    Thyroid Stimulating Hormone    Vitamin B12    Vitamin D 25-Hydroxy,Total (for eval of Vitamin D levels)        Assessment  Chronic occasional episodes of discomfort located at the ICD incision site-May be secondary to neuromuscular chest discomfort  HFrEF  V-fib cardiac arrest complicated by cardiogenic shock July 10, 2024, status post ICD placement July 26, 2024  Long QT syndrome  Hypertension-blood pressure at goal  Acute kidney injury secondary to cardiac arrest  Status post gastric bypass surgery 2022  Biceps tendon tear left upper extremity status post repair 2012 or 2014   History of presence of a cyst located midportion left upper parietal region-may have represented an inflamed epidermal cyst  Mild diffuse encephalopathy July 14, 2024    Plan  Continue current medication and supplement regimen for now.  I told the patient that if the cyst should reoccur, that he could call me and I would refer him to a surgeon for drainage and/or removal.  The patient  will return for a complete physical examination in 6 months

## 2025-01-13 ENCOUNTER — APPOINTMENT (OUTPATIENT)
Dept: PRIMARY CARE | Facility: CLINIC | Age: 45
End: 2025-01-13
Payer: COMMERCIAL

## 2025-01-13 VITALS
BODY MASS INDEX: 37.37 KG/M2 | SYSTOLIC BLOOD PRESSURE: 110 MMHG | DIASTOLIC BLOOD PRESSURE: 80 MMHG | WEIGHT: 245.8 LBS | HEART RATE: 56 BPM

## 2025-01-13 DIAGNOSIS — Z98.84 GASTRIC BYPASS STATUS FOR OBESITY: ICD-10-CM

## 2025-01-13 DIAGNOSIS — G47.33 OBSTRUCTIVE SLEEP APNEA SYNDROME: ICD-10-CM

## 2025-01-13 DIAGNOSIS — E78.2 MIXED HYPERLIPIDEMIA: ICD-10-CM

## 2025-01-13 DIAGNOSIS — Z95.810 ICD (IMPLANTABLE CARDIOVERTER-DEFIBRILLATOR) IN PLACE: Primary | ICD-10-CM

## 2025-01-13 DIAGNOSIS — I10 ESSENTIAL HYPERTENSION: ICD-10-CM

## 2025-01-13 PROCEDURE — 3074F SYST BP LT 130 MM HG: CPT | Performed by: INTERNAL MEDICINE

## 2025-01-13 PROCEDURE — 99213 OFFICE O/P EST LOW 20 MIN: CPT | Performed by: INTERNAL MEDICINE

## 2025-01-13 PROCEDURE — 3079F DIAST BP 80-89 MM HG: CPT | Performed by: INTERNAL MEDICINE

## 2025-01-20 ENCOUNTER — OFFICE VISIT (OUTPATIENT)
Dept: CARDIOLOGY | Facility: CLINIC | Age: 45
End: 2025-01-20
Payer: COMMERCIAL

## 2025-01-20 VITALS
HEART RATE: 58 BPM | DIASTOLIC BLOOD PRESSURE: 73 MMHG | SYSTOLIC BLOOD PRESSURE: 119 MMHG | HEIGHT: 70 IN | WEIGHT: 250 LBS | OXYGEN SATURATION: 98 % | BODY MASS INDEX: 35.79 KG/M2

## 2025-01-20 DIAGNOSIS — Z98.84 GASTRIC BYPASS STATUS FOR OBESITY: ICD-10-CM

## 2025-01-20 DIAGNOSIS — I10 ESSENTIAL HYPERTENSION: Primary | ICD-10-CM

## 2025-01-20 DIAGNOSIS — E78.2 MIXED HYPERLIPIDEMIA: ICD-10-CM

## 2025-01-20 DIAGNOSIS — Z95.810 ICD (IMPLANTABLE CARDIOVERTER-DEFIBRILLATOR) IN PLACE: ICD-10-CM

## 2025-01-20 PROCEDURE — G2211 COMPLEX E/M VISIT ADD ON: HCPCS | Performed by: INTERNAL MEDICINE

## 2025-01-20 PROCEDURE — 99214 OFFICE O/P EST MOD 30 MIN: CPT | Performed by: INTERNAL MEDICINE

## 2025-01-20 PROCEDURE — 3074F SYST BP LT 130 MM HG: CPT | Performed by: INTERNAL MEDICINE

## 2025-01-20 PROCEDURE — 1036F TOBACCO NON-USER: CPT | Performed by: INTERNAL MEDICINE

## 2025-01-20 PROCEDURE — 3008F BODY MASS INDEX DOCD: CPT | Performed by: INTERNAL MEDICINE

## 2025-01-20 PROCEDURE — 3078F DIAST BP <80 MM HG: CPT | Performed by: INTERNAL MEDICINE

## 2025-01-20 ASSESSMENT — ENCOUNTER SYMPTOMS
OCCASIONAL FEELINGS OF UNSTEADINESS: 0
LOSS OF SENSATION IN FEET: 0

## 2025-01-20 NOTE — PROGRESS NOTES
Advanced Heart Failure Clinic Note    CHIEF COMPLAINT:    No chief complaint on file.       Primary Care Physician: Av Amaya MD  Cardiologist- Dima      HISTORY OF PRESENT ILLNESS:   Miguel A Bender is a 44 y.o. male who returns for follow up    Briefly he has hypertension (on amlodipine since 2018) when he had ED visit, with elevated troponin in 2018 normal arteries, had coronary CTA (took medications for hypertension until he had weight loss surgery and blood pressure improved) but doing well until he had cardiac arrest 7/10/2024, with VT, transiently required Impella and ANNIE requiring dialysis, he was eventually discharged received an ICD with subsequent improvement of his kidney function and ejection fraction although there was initial concern about ARVC his imaging has not supported that his genetic testing was negative    He was initially on hydralazine with nitrate but had to stop these due to ongoing hypotension with orthostasis.  He was taken off of antiseizure medicine with no recurrence    He is currently on nadolol given a concern of long QT syndrome and he had his follow-up with Dr. Ch.  He has minimal other medical history, does still use CPAP unclear if he still needs it after weight loss    He is now back to work full time without any issues. He had a phone visit with genetics, testing was negative, his son also had a visit with a pediatric cardiologist no evidence of cardiac disease in his family    We repeated his echocardiogram since last visit that was reviewed today. He had follow up with nephology, kidney function continues to improve, he was discharged by the nephrologist.     ICD issues : no arrhythmias  Monitors/restricts salt/fluid : no trying to drink fluid  Has scale and weighs self daily: lost weight   Has BP cuff and BPs at home:  110-120s      Allergies   Allergen Reactions    Iodine Rash    Shellfish Containing Products Rash       CURRENT MEDICATIONS:    Current Outpatient  Medications   Medication Sig Dispense Refill    beta carotene (vitamin A) 3,000 mcg (10,000 unit) capsule Take 1 capsule (10,000 Units) by mouth once daily.      ergocalciferol (Vitamin D-2) 1.25 MG (98184 UT) capsule Take 1 capsule (1,250 mcg) by mouth 1 (one) time per week. 12 capsule 3    multivitamin tablet Take 1 tablet by mouth once daily.      nadolol (Corgard) 20 mg tablet Take 1 tablet (20 mg) by mouth once daily. 90 tablet 3     No current facility-administered medications for this visit.         Objective    Problems:   Patient Active Problem List   Diagnosis    Cardiac arrest    Anxiety    Essential hypertension    Gastric bypass status for obesity    Mixed hyperlipidemia    Obstructive sleep apnea syndrome    Cardiogenic shock (Multi)    Acute hypoxemic respiratory failure (Multi)    Acute renal failure with oliguria (CMS-HCC)    Seizure-like activity (Multi)    ICD (implantable cardioverter-defibrillator) in place    Obesity     Medical History:   No past medical history on file.   has no past medical history on file.  Surgical Hx:   Past Surgical History:   Procedure Laterality Date    CARDIAC CATHETERIZATION N/A 7/10/2024    Procedure: Left Heart Cath, No LV;  Surgeon: Bhavya Restrepo MD;  Location: [facility] Cardiac Cath Lab;  Service: Cardiovascular;  Laterality: N/A;    CARDIAC CATHETERIZATION N/A 7/10/2024    Procedure: PCI;  Surgeon: Bhavya Restrepo MD;  Location: [facility] Cardiac Cath Lab;  Service: Cardiovascular;  Laterality: N/A;    CARDIAC ELECTROPHYSIOLOGY PROCEDURE N/A 7/26/2024    Procedure: ICD Dual Implant;  Surgeon: Bravo Felton MD;  Location: [place] [address] Cardiac Cath Lab;  Service: Electrophysiology;  Laterality: N/A;       has a past surgical history that includes Cardiac catheterization (N/A, 7/10/2024); Cardiac catheterization (N/A, 7/10/2024); and Cardiac electrophysiology procedure (N/A, 7/26/2024).  Social Hx:   Tobacco Use: Low Risk  (11/14/2024)    Patient History     Smoking  "Tobacco Use: Never     Smokeless Tobacco Use: Never     Passive Exposure: Not on file     Alcohol Use: Not At Risk (2024)    AUDIT-C     Frequency of Alcohol Consumption: Never     Average Number of Drinks: Patient does not drink     Frequency of Binge Drinking: Never      reports that he has never smoked. He has never used smokeless tobacco. He reports that he does not drink alcohol and does not use drugs.   Family Hx:   No family history on file.  family history is not on file.  Allergies:   Allergies   Allergen Reactions    Iodine Rash    Shellfish Containing Products Rash          PHYSICAL EXAM:   There is no height or weight on file to calculate BMI.    TRENDS:   Vitals:       2024    12:50 PM 2024    12:12 PM 2024     9:24 AM 2024     4:39 PM 10/10/2024    10:06 AM 2024     9:25 AM 2025     8:27 AM   Vitals   Systolic 110 99 116 112 110 117 110   Diastolic 68 63 71 73 68 75 80   BP Location Right arm Right arm  Left arm Left arm Left arm Left arm   Heart Rate 57 53 52 54 60 55 56   Resp 16         Height  1.778 m (5' 10\") 1.727 m (5' 8\") 1.727 m (5' 8\")  1.727 m (5' 8\")    Weight (lb) 225 223 221.4 221 235.8 242.2 245.8   BMI 32.21 kg/m2 32 kg/m2 33.66 kg/m2 33.6 kg/m2 35.85 kg/m2 36.83 kg/m2 37.37 kg/m2   BSA (m2) 2.25 m2 2.23 m2 2.19 m2 2.19 m2 2.27 m2 2.3 m2 2.31 m2   Visit Report Report Report Report Report Report Report Report     WEIGHT TREND:   Wt Readings from Last 5 Encounters:   25 111 kg (245 lb 12.8 oz)   24 110 kg (242 lb 3.2 oz)   10/10/24 107 kg (235 lb 12.8 oz)   24 100 kg (221 lb)   24 100 kg (221 lb 6.4 oz)       Objective      44 y.o.year old male, awake alert orient X 3 in NAD CVP not elevated heart is regular without murmurs lungs are clear no edema    LABS:    RESUFAST(CHOL:1,HDL:1,LDLF:1,TRI): No results found for: \"CHOL\", \"HDL\", \"LDLF\", \"TRIG\"  GETLABS(6M,2):   Hospital Outpatient Visit on 2024   Component Date Value    " AV pk pratima 12/17/2024 1.35     LVOT diam 12/17/2024 2.40     MV E/A ratio 12/17/2024 1.43     LA vol index A/L 12/17/2024 42.5     Tricuspid annular plane * 12/17/2024 2.0     LV EF 12/17/2024 53     RV free wall pk S' 12/17/2024 10.73     LVIDd 12/17/2024 5.48     Aortic Valve Area by Con* 12/17/2024 3.51     AV pk grad 12/17/2024 7     LV A4C EF 12/17/2024 49.3    Lab on 11/12/2024   Component Date Value    Glucose 11/12/2024 86     Sodium 11/12/2024 143     Potassium 11/12/2024 4.5     Chloride 11/12/2024 106     Bicarbonate 11/12/2024 35 (H)     Anion Gap 11/12/2024 7 (L)     Urea Nitrogen 11/12/2024 15     Creatinine 11/12/2024 1.24     eGFR 11/12/2024 73     Calcium 11/12/2024 9.2    Lab on 08/20/2024   Component Date Value    Glucose 08/20/2024 59 (L)     Sodium 08/20/2024 139     Potassium 08/20/2024 4.7     Chloride 08/20/2024 110 (H)     Bicarbonate 08/20/2024 25     Anion Gap 08/20/2024 9 (L)     Urea Nitrogen 08/20/2024 26 (H)     Creatinine 08/20/2024 2.05 (H)     eGFR 08/20/2024 40 (L)     Calcium 08/20/2024 9.2    Lab on 08/09/2024   Component Date Value    Glucose 08/09/2024 85     Sodium 08/09/2024 135 (L)     Potassium 08/09/2024 4.9     Chloride 08/09/2024 101     Bicarbonate 08/09/2024 26     Anion Gap 08/09/2024 13     Urea Nitrogen 08/09/2024 50 (H)     Creatinine 08/09/2024 2.74 (H)     eGFR 08/09/2024 28 (L)     Calcium 08/09/2024 9.5    No results displayed because visit has over 200 results.        LABBRIEF(HGB:3)   Lab Results   Component Value Date    HGB 9.8 (L) 07/27/2024    HGB 9.7 (L) 07/26/2024    HGB 8.7 (L) 07/25/2024       CMP:  Recent Labs     11/12/24  0803 08/20/24  0907 08/09/24  1243 07/27/24  0818 07/26/24  0849 07/25/24  0611 07/24/24  1757 07/24/24  0450    139 135* 135* 138 135* 134* 131*   K 4.5 4.7 4.9 4.8 4.5 4.0 4.2 3.6    110* 101 96* 98 97* 95* 93*   CO2 35* 25 26 27 26 25 24 24   ANIONGAP 7* 9* 13 17 19 17 19 18   BUN 15 26* 50* 69* 73* 78* 80* 78*  "  CREATININE 1.24 2.05* 2.74* 9.78* 9.90* 10.23* 10.17* 9.93*   EGFR 73 40* 28* 6* 6* 6* 6* 6*   MG  --   --   --  2.15 2.23 2.06 2.01 2.18     Recent Labs     07/27/24  0818 07/26/24  0849 07/25/24  0611 07/24/24  1757 07/24/24  0450 07/23/24  1916 07/23/24  0549 07/22/24  1811 07/22/24  0758 07/21/24  1826 07/21/24  0818 07/20/24  0537   ALBUMIN 3.5 3.1* 2.8* 2.9* 3.0*   < > 2.9*   < > 2.8*   < > 2.7* 2.7*   ALKPHOS  --   --   --   --  76  --  81  --  84  --  87 88   ALT  --   --   --   --  25  --  24  --  27  --  28 28   AST  --   --   --   --  24  --  21  --  32  --  24 22   BILITOT  --   --   --   --  0.5  --  0.6  --  0.5  --  0.5 0.5    < > = values in this interval not displayed.     CBC:  Recent Labs     07/27/24  0818 07/26/24  0849 07/25/24  0611 07/24/24  0450 07/23/24  0549   WBC 11.3 7.3 7.4 9.1 9.9   HGB 9.8* 9.7* 8.7* 9.1* 9.6*   HCT 29.3* 28.8* 25.9* 26.9* 28.3*   * 462* 463* 441 442   MCV 86 87 86 86 86     HEME/ENDO:No results for input(s): \"FERRITIN\", \"IRONSAT\", \"TSH\", \"HGBA1C\" in the last 31103 hours.   CARDIAC:   Recent Labs     07/13/24  0111 07/11/24  0417 07/10/24  2335 07/10/24  1213 07/10/24  0957   * 699* 606*  --   --    TROPHS  --   --   --  2,496* 22*   No results for input(s): \"CHOL\", \"LDLF\", \"HDL\", \"TRIG\" in the last 72363 hours.    No results found for: \"BNP\"      DIAGNOSTIC STUDIES REVIEWED:        EKG:    Encounter Date: 08/06/24   ECG 12 lead (Clinic Performed)    Narrative    Sinus rhythm ventricular rate 56 QRS 84  QTc 405    *Please refer to scanned ECG for final report*         ECHO 7/2024     1. Left ventricular ejection fraction is moderately decreased, by visual estimate at 30%.   2. There is normal right ventricular global systolic function.   3. Compared with study dated 7/10/2024, the EF has improved from 10% to 30%. There is now an Impella device visualized in the LV.    TTE 12/2024     1. The left ventricular systolic function is low normal, with a " visually estimated ejection fraction of 50-55%.   2. There is normal right ventricular global systolic function.   3. The left atrium is mildly dilated.    Cardiac MRI 7/2024    Mildly reduced LVEF. Quantitative LVEF 47 %.  No evidence of infarct/scar.  RV is dilated with moderate to severe reduction in RVEF. Quantitative  RVEF 20 %. Mildly increased ECV of 32%. T2 values in the lateral wall  are mildly increased (52-53 ms). No definitive evidence of  myocarditis by Bala Criteria.    CORONARY ANGIOGRAM   1. Normal coronary arteries.   RA 5  PA 42/11 mean 21  PCWP 8  CO/CI ([facility]) 4.5/1.9 on epi and norepi  PA sat 59      ASSESSMENT AND PLAN:   Patient Active Problem List   Diagnosis    Cardiac arrest    Anxiety    Essential hypertension    Gastric bypass status for obesity    Mixed hyperlipidemia    Obstructive sleep apnea syndrome    Cardiogenic shock (Multi)    Acute hypoxemic respiratory failure (Multi)    Acute renal failure with oliguria (CMS-HCC)    Seizure-like activity (Multi)    ICD (implantable cardioverter-defibrillator) in place    Obesity     Acute systolic heart failure (recovered LVEF)  euvolemic and well-perfused, no diuretics we reviewed his echocardiogram showing that his ejection fraction is now to the low normal range he has no specific indication for medical therapy beyond beta-blocker (nadolol is reasonable given his history of questionable long QT) of note he was previously unable to use ACE/ARB/MRA due to his renal function but was intolerant of even low-dose hydralazine due to orthostatic hypotension    I recommended he return in 1 year no specific need to repeat echo when he returns but would continue surveillance at least for a bit    I did recommend he increase his activity level he does seem a bit anxious to exercise, clarified he has no specific restrictions with EP, I offered ETT for some reassurance but he declined at this time    Sudden cardiac death negative genetic testing,  no specific diagnosis his son saw a pediatric cardiologist who did not find any evidence of cardiac disease. Regardless of etiology he currently has ICD     Hypertension although he was previously on medication, BP has been lower since weight loss, intolerant of hydralazine     ANNIE creatinine now down to 1.2, he was discharged by nephrology     Hardy remains on CPAP although he is not sure he still needs      I discussed heart failure therapeutic options, including: medical therapy, salt and fluid restriction, the need to monitor BP and weight, exercise and weight loss, need to control hypertension, sleep apnea, , follow up and medication changes.      I spent  32  minutes coordinating care, reviewing echo/cath/MRI and discussing options and adjusting medications.     I discussed with patient family and other providers    I spent >50 percent of the time counseling the patient and discussing the diagnosis, general prognosis and plan of care with the patient       Orders placed during the encounter:   No orders of the defined types were placed in this encounter.     Followup Appts:  Future Appointments   Date Time Provider Department Center   1/20/2025  8:30 AM Ollie Ware MD [id number] [place]   2/18/2025  9:45 AM Rafal Ch MD [id number] [place]   7/17/2025  8:40 AM Av Amaya MD [id number] [facility]       Ollie Rosenbaum IV, MD  Heart Failure, Heart Transplant and  Mechanical Circulatory Support

## 2025-01-20 NOTE — PROGRESS NOTES
Currently denies chest pain, palpitations, shortness of breath, dyspnea on exertion, orthopnea, PND. No edema noted in BLE. Patient denies headaches, dizziness or recent falls.     Hospitalizations: Patient denies

## 2025-01-20 NOTE — PATIENT INSTRUCTIONS
To reach Dr. Ware's office please call [place]: [phone]. Fax [phone]. Call [phone] to schedule an appointment. You may also contact the HF RNs at [email or web address]      No changes today you are ok to exercise  Return in 1 year

## 2025-01-22 ENCOUNTER — HOSPITAL ENCOUNTER (OUTPATIENT)
Dept: CARDIOLOGY | Facility: CLINIC | Age: 45
Discharge: HOME | End: 2025-01-22
Payer: COMMERCIAL

## 2025-01-22 DIAGNOSIS — I46.9 CARDIAC ARREST, CAUSE UNSPECIFIED: ICD-10-CM

## 2025-01-22 DIAGNOSIS — Z95.810 PRESENCE OF AUTOMATIC (IMPLANTABLE) CARDIAC DEFIBRILLATOR: ICD-10-CM

## 2025-01-22 PROCEDURE — 93296 REM INTERROG EVL PM/IDS: CPT

## 2025-01-29 PROBLEM — E66.812 CLASS 2 OBESITY WITH BODY MASS INDEX (BMI) OF 35.0 TO 35.9 IN ADULT: Status: ACTIVE | Noted: 2019-12-18

## 2025-02-18 ENCOUNTER — OFFICE VISIT (OUTPATIENT)
Dept: CARDIOLOGY | Facility: CLINIC | Age: 45
End: 2025-02-18
Payer: COMMERCIAL

## 2025-02-18 VITALS
WEIGHT: 250 LBS | BODY MASS INDEX: 35.87 KG/M2 | SYSTOLIC BLOOD PRESSURE: 132 MMHG | HEART RATE: 50 BPM | OXYGEN SATURATION: 97 % | DIASTOLIC BLOOD PRESSURE: 66 MMHG | RESPIRATION RATE: 16 BRPM

## 2025-02-18 DIAGNOSIS — I48.91 ATRIAL FIBRILLATION, UNSPECIFIED TYPE (MULTI): ICD-10-CM

## 2025-02-18 PROCEDURE — 93010 ELECTROCARDIOGRAM REPORT: CPT | Performed by: STUDENT IN AN ORGANIZED HEALTH CARE EDUCATION/TRAINING PROGRAM

## 2025-02-18 PROCEDURE — 99213 OFFICE O/P EST LOW 20 MIN: CPT | Mod: 25 | Performed by: STUDENT IN AN ORGANIZED HEALTH CARE EDUCATION/TRAINING PROGRAM

## 2025-02-18 PROCEDURE — 3075F SYST BP GE 130 - 139MM HG: CPT | Performed by: STUDENT IN AN ORGANIZED HEALTH CARE EDUCATION/TRAINING PROGRAM

## 2025-02-18 PROCEDURE — 93005 ELECTROCARDIOGRAM TRACING: CPT | Performed by: STUDENT IN AN ORGANIZED HEALTH CARE EDUCATION/TRAINING PROGRAM

## 2025-02-18 PROCEDURE — 99213 OFFICE O/P EST LOW 20 MIN: CPT | Performed by: STUDENT IN AN ORGANIZED HEALTH CARE EDUCATION/TRAINING PROGRAM

## 2025-02-18 PROCEDURE — 3078F DIAST BP <80 MM HG: CPT | Performed by: STUDENT IN AN ORGANIZED HEALTH CARE EDUCATION/TRAINING PROGRAM

## 2025-02-18 NOTE — PROGRESS NOTES
Cardiac Electrophysiology Office Visit     Referred by Rafal Mays MD for   Chief Complaint   Patient presents with    Follow-up    Atrial Fibrillation     HPI:  Miguel A Bender is a 44 y.o. year old male patient with h/o CHARLEEN, HTN, obesity s/p gastric bypass surgery (2022), cardiac arrest complicated by cardiogenic shock s/p Impella s/p ICD presenting today for follow up    Objective  Current Outpatient Medications   Medication Instructions    beta carotene (VITAMIN A) 10,000 Units, Daily    ergocalciferol (VITAMIN D-2) 1,250 mcg, oral, Once Weekly    multivitamin tablet 1 tablet, Daily    nadolol (CORGARD) 20 mg, oral, Daily         Visit Vitals  /66   Pulse 50   Resp 16   Wt 113 kg (250 lb)   SpO2 97%   BMI 35.87 kg/m²   Smoking Status Never   BSA 2.36 m²      Physical Exam  Constitutional:       Appearance: Normal appearance.   HENT:      Head: Normocephalic.   Cardiovascular:      Rate and Rhythm: Normal rate and regular rhythm.      Pulses: Normal pulses.      Heart sounds: No murmur heard.     Comments: left sided implant healed well, no ecchymosis, hematoma or drainage noted  Pulmonary:      Effort: Pulmonary effort is normal. No respiratory distress.   Musculoskeletal:         General: No swelling.   Skin:     General: Skin is warm and dry.   Neurological:      Mental Status: He is alert.   Psychiatric:         Mood and Affect: Mood normal.         My Interpretation of Reviewed Study(s):  Echo (July 2024): Moderately decreased LV function with an EF of 30%.  Normal RV function.  Cardiac MRI (July 2024): Mildly reduced LVEF of 47%.  RV dilated moderate to severe reduction in RV EF quantitative RVEF 20%.  Mildly increased ECV of 32%.  T2 values of lateral wall mildly increased.  No definite evidence of myocarditis    Assessment/Plan   #VF arrest s/p ICD  #QT prolongation  Patient presented to the hospital after VF arrest requiring multiple cardioversions.  Coronary angiography done at that time  showed normal coronaries.  During her ICU stay patient developed multiple episodes of polymorphic VT episodes were preceded by PVCs along with underlying QT prolongation.  Unclear etiology of QT prolongation no clear family history patient was started on nadolol for presumptive long QT syndrome.  Genetic testing reviewed and showed no abnormal mutations in the 168 gene variants tested for.  This includes common genetic testing for ARVC and LQTS1/2/3  Patient has a [employer] Dual chamber ICD.  Anticipated battery longevity 8.3-9.4yrs (as of 1/21/25).    RA pacing 26%, RV pacing <1%.   Arrhythmias noted on interrogation: None  Lead parameters stable with steady impedance and thresholds noted: Stable  c/t to follow with device clinic as scheduled  C/w Nadalol 20mg daily - QT normalized on ?medication    Return to Clinic: Patient should return to the EP Clinic in 1 year    Rafal Ch MD [facility]  Cardiac Electrophysiology  [email or web address]    **Disclaimer: This note was dictated by speech recognition, and every effort has been made to prevent any error in transcription, however minor errors may be present**

## 2025-02-19 LAB
ATRIAL RATE: 50 BPM
P AXIS: 48 DEGREES
P OFFSET: 204 MS
P ONSET: 149 MS
PR INTERVAL: 170 MS
Q ONSET: 221 MS
QRS COUNT: 8 BEATS
QRS DURATION: 82 MS
QT INTERVAL: 472 MS
QTC CALCULATION(BAZETT): 430 MS
QTC FREDERICIA: 444 MS
R AXIS: 23 DEGREES
T AXIS: 21 DEGREES
T OFFSET: 457 MS
VENTRICULAR RATE: 50 BPM

## 2025-02-28 ENCOUNTER — HOSPITAL ENCOUNTER (OUTPATIENT)
Dept: CARDIOLOGY | Facility: CLINIC | Age: 45
Discharge: HOME | End: 2025-02-28
Payer: COMMERCIAL

## 2025-02-28 DIAGNOSIS — I47.20 VT (VENTRICULAR TACHYCARDIA) (MULTI): ICD-10-CM

## 2025-02-28 DIAGNOSIS — Z95.810 ICD (IMPLANTABLE CARDIOVERTER-DEFIBRILLATOR) IN PLACE: ICD-10-CM

## 2025-03-14 ENCOUNTER — OFFICE VISIT (OUTPATIENT)
Dept: URGENT CARE | Age: 45
End: 2025-03-14

## 2025-03-14 VITALS
DIASTOLIC BLOOD PRESSURE: 71 MMHG | OXYGEN SATURATION: 96 % | RESPIRATION RATE: 16 BRPM | HEART RATE: 90 BPM | WEIGHT: 245 LBS | SYSTOLIC BLOOD PRESSURE: 112 MMHG | TEMPERATURE: 97.1 F | BODY MASS INDEX: 35.15 KG/M2

## 2025-03-14 DIAGNOSIS — R61 DIAPHORESIS: ICD-10-CM

## 2025-03-14 DIAGNOSIS — R10.31 RIGHT LOWER QUADRANT ABDOMINAL PAIN: Primary | ICD-10-CM

## 2025-03-14 DIAGNOSIS — R17 JAUNDICE: ICD-10-CM

## 2025-03-14 DIAGNOSIS — R31.29 OTHER MICROSCOPIC HEMATURIA: ICD-10-CM

## 2025-03-14 LAB
POC APPEARANCE, URINE: CLEAR
POC BILIRUBIN, URINE: NEGATIVE
POC BLOOD, URINE: ABNORMAL
POC COLOR, URINE: YELLOW
POC GLUCOSE, URINE: NEGATIVE MG/DL
POC KETONES, URINE: NEGATIVE MG/DL
POC LEUKOCYTES, URINE: NEGATIVE
POC NITRITE,URINE: NEGATIVE
POC PH, URINE: 6 PH
POC PROTEIN, URINE: ABNORMAL MG/DL
POC SPECIFIC GRAVITY, URINE: >=1.03
POC UROBILINOGEN, URINE: 0.2 EU/DL

## 2025-03-14 ASSESSMENT — ENCOUNTER SYMPTOMS
ARTHRALGIAS: 0
BELCHING: 0
WEIGHT LOSS: 0
FATIGUE: 1
FREQUENCY: 0
SHORTNESS OF BREATH: 0
HEMATURIA: 0
WHEEZING: 0
HEADACHES: 0
ANOREXIA: 0
ABDOMINAL PAIN: 1
FEVER: 1
COUGH: 0
VOMITING: 1
CONSTIPATION: 0
FLATUS: 1
CHILLS: 1
PALPITATIONS: 0
DIZZINESS: 0
LIGHT-HEADEDNESS: 0
HEMATOCHEZIA: 0
MYALGIAS: 1
DIARRHEA: 0
DYSURIA: 0
NAUSEA: 1

## 2025-03-14 ASSESSMENT — CROHNS DISEASE ACTIVITY INDEX (CDAI): CDAI SCORE: 0

## 2025-03-14 NOTE — PROGRESS NOTES
Subjective   Patient ID: Miguel A Bender is a 44 y.o. male. They present today with a chief complaint of Abdominal Pain (Rt LQ, flank and back, 7/10. Episode of vomiting last night, lightheaded (comes and goes), nausea ).    History of Present Illness  44 year old male presents with spouse with complaint of abdominal pain, nausea, vomiting, and feeling light headed. Symptoms started last night. Has not taken medications or tried any treatments for this complaint. PMH includes but not limited to: MI, gastric bypass, acute kidney injury, CHARLEEN, ICD.         Abdominal Pain  This is a new problem. The current episode started yesterday. The onset quality is sudden. The problem occurs constantly. The most recent episode lasted 1 day. The problem has been gradually worsening. The pain is located in the RLQ. The pain is at a severity of 6/10. The pain is moderate. The quality of the pain is aching. The abdominal pain does not radiate. Associated symptoms include a fever, flatus, myalgias, nausea and vomiting. Pertinent negatives include no anorexia, arthralgias, belching, constipation, diarrhea, dysuria, frequency, headaches, hematochezia, hematuria, melena or weight loss. The pain is aggravated by movement and palpation. The pain is relieved by Nothing. He has tried nothing for the symptoms. His past medical history is significant for abdominal surgery. There is no history of colon cancer, Crohn's disease, gallstones, GERD, irritable bowel syndrome, pancreatitis, PUD or ulcerative colitis.       Past Medical History  Allergies as of 03/14/2025 - Reviewed 03/14/2025   Allergen Reaction Noted    Iodine Rash 03/23/2018    Shellfish containing products Rash 03/23/2018       (Not in a hospital admission)       History reviewed. No pertinent past medical history.    Past Surgical History:   Procedure Laterality Date    CARDIAC CATHETERIZATION N/A 7/10/2024    Procedure: Left Heart Cath, No LV;  Surgeon: Bhavya Restrepo MD;   Location: [facility] Cardiac Cath Lab;  Service: Cardiovascular;  Laterality: N/A;    CARDIAC CATHETERIZATION N/A 7/10/2024    Procedure: PCI;  Surgeon: Bhavya Restrepo MD;  Location: [facility] Cardiac Cath Lab;  Service: Cardiovascular;  Laterality: N/A;    CARDIAC ELECTROPHYSIOLOGY PROCEDURE N/A 7/26/2024    Procedure: ICD Dual Implant;  Surgeon: Bravo Felton MD;  Location: [place] [address] Cardiac Cath Lab;  Service: Electrophysiology;  Laterality: N/A;        reports that he has never smoked. He has never used smokeless tobacco. He reports that he does not drink alcohol and does not use drugs.    Review of Systems  Review of Systems   Constitutional:  Positive for chills, fatigue and fever. Negative for weight loss.   Respiratory:  Negative for cough, shortness of breath and wheezing.    Cardiovascular:  Negative for chest pain and palpitations.   Gastrointestinal:  Positive for abdominal pain, flatus, nausea and vomiting. Negative for anorexia, constipation, diarrhea, hematochezia and melena.   Genitourinary:  Negative for dysuria, frequency and hematuria.   Musculoskeletal:  Positive for myalgias. Negative for arthralgias.   Skin:  Negative for rash.   Neurological:  Negative for dizziness, light-headedness and headaches.   All other systems reviewed and are negative.          Objective    Vitals:    03/14/25 1423   BP: 112/71   Pulse: 90   Resp: 16   Temp: 36.2 °C (97.1 °F)   SpO2: 96%   Weight: 111 kg (245 lb)     No LMP for male patient.    Physical Exam  Vitals and nursing note reviewed.   Constitutional:       Appearance: Normal appearance. He is ill-appearing.      Comments: Diaphoretic    HENT:      Mouth/Throat:      Mouth: Mucous membranes are moist.   Eyes:      Pupils: Pupils are equal, round, and reactive to light.   Cardiovascular:      Rate and Rhythm: Normal rate and regular rhythm.      Pulses: Normal pulses.      Heart sounds: Normal heart sounds. No murmur heard.  Pulmonary:      Effort: Pulmonary  effort is normal. No respiratory distress.      Breath sounds: Normal breath sounds.   Abdominal:      General: Abdomen is protuberant. Bowel sounds are normal. There is no distension.      Palpations: Abdomen is soft. Hepatomegaly: unable to fully palpate liver due to pain with light palpationd.      Tenderness: There is abdominal tenderness. There is right CVA tenderness and guarding. There is no left CVA tenderness or rebound. Negative signs include Grover's sign, Rovsing's sign, McBurney's sign, psoas sign and obturator sign.   Musculoskeletal:         General: Normal range of motion.      Cervical back: Normal range of motion and neck supple.   Skin:     General: Skin is warm and dry.      Coloration: Skin is jaundiced.   Neurological:      Mental Status: He is alert and oriented to person, place, and time.   Psychiatric:         Mood and Affect: Mood normal.         Behavior: Behavior normal.         Procedures    Point of Care Test & Imaging Results from this visit  Results for orders placed or performed in visit on 03/14/25   POCT UA Automated manually resulted   Result Value Ref Range    POC Color, Urine Yellow Straw, Yellow, Light-Yellow    POC Appearance, Urine Clear Clear    POC Glucose, Urine NEGATIVE NEGATIVE mg/dl    POC Bilirubin, Urine NEGATIVE NEGATIVE    POC Ketones, Urine NEGATIVE NEGATIVE mg/dl    POC Specific Gravity, Urine >=1.030 1.005 - 1.035    POC Blood, Urine LARGE (3+) (A) NEGATIVE    POC PH, Urine 6.0 No Reference Range Established PH    POC Protein, Urine 30 (1+) (A) NEGATIVE mg/dl    POC Urobilinogen, Urine 0.2 0.2, 1.0 EU/DL    Poc Nitrite, Urine NEGATIVE NEGATIVE    POC Leukocytes, Urine NEGATIVE NEGATIVE      No results found.    Diagnostic study results (if any) were reviewed by TERRY Diaz.    Assessment/Plan   Allergies, medications, history, and pertinent labs/EKGs/Imaging reviewed by TERRY Diaz.     Medical Decision Making  Patient with signs and  symptoms consistent with abdominal pain that cannot be properly assessed in office. Pt was in acute pain, diaphoretic and jaundice; referred to ED via EMS for further evaluation and testing. Differential Dx include but not limited to: hepatitis, appendicitis, bowel obstruction, ANNIE, etc. This case was discussed with Dr. Acuna       Orders and Diagnoses  Diagnoses and all orders for this visit:  Right lower quadrant abdominal pain  -     POCT UA Automated manually resulted  Jaundice  Diaphoresis  Other microscopic hematuria      Medical Admin Record      Patient disposition: Home    Electronically signed by TERRY Diaz  4:14 PM

## 2025-03-15 ENCOUNTER — HOSPITAL ENCOUNTER (INPATIENT)
Facility: HOSPITAL | Age: 45
DRG: 419 | End: 2025-03-15
Attending: SURGERY | Admitting: SURGERY
Payer: COMMERCIAL

## 2025-03-15 DIAGNOSIS — K81.9 CHOLECYSTITIS: Primary | ICD-10-CM

## 2025-03-15 PROCEDURE — 1100000001 HC PRIVATE ROOM DAILY

## 2025-03-15 SDOH — ECONOMIC STABILITY: FOOD INSECURITY

## 2025-03-15 SDOH — ECONOMIC STABILITY: FOOD INSECURITY: WITHIN THE PAST 12 MONTHS, YOU WORRIED THAT YOUR FOOD WOULD RUN OUT BEFORE YOU GOT MONEY TO BUY MORE.: NEVER TRUE

## 2025-03-15 SDOH — ECONOMIC STABILITY: HOUSING INSECURITY: IN THE LAST 12 MONTHS, WAS THERE A TIME WHEN YOU WERE NOT ABLE TO PAY THE MORTGAGE OR RENT ON TIME?: NO

## 2025-03-15 SDOH — ECONOMIC STABILITY: TRANSPORTATION INSECURITY

## 2025-03-15 SDOH — ECONOMIC STABILITY: TRANSPORTATION INSECURITY: IN THE PAST 12 MONTHS, HAS LACK OF TRANSPORTATION KEPT YOU FROM MEDICAL APPOINTMENTS OR FROM GETTING MEDICATIONS?: NO

## 2025-03-15 SDOH — ECONOMIC STABILITY: INCOME INSECURITY: IN THE LAST 12 MONTHS, WAS THERE A TIME WHEN YOU WERE NOT ABLE TO PAY THE MORTGAGE OR RENT ON TIME?: NO

## 2025-03-15 SDOH — SOCIAL STABILITY: SOCIAL INSECURITY: HAVE YOU HAD ANY THOUGHTS OF HARMING ANYONE ELSE?: NO

## 2025-03-15 SDOH — ECONOMIC STABILITY: GENERAL

## 2025-03-15 SDOH — SOCIAL STABILITY: SOCIAL INSECURITY: ARE YOU OR HAVE YOU BEEN THREATENED OR ABUSED PHYSICALLY, EMOTIONALLY, OR SEXUALLY BY ANYONE?: NO

## 2025-03-15 SDOH — SOCIAL STABILITY: SOCIAL INSECURITY: HAVE YOU HAD THOUGHTS OF HARMING ANYONE ELSE?: NO

## 2025-03-15 SDOH — SOCIAL STABILITY: SOCIAL INSECURITY: ARE THERE ANY APPARENT SIGNS OF INJURIES/BEHAVIORS THAT COULD BE RELATED TO ABUSE/NEGLECT?: NO

## 2025-03-15 SDOH — ECONOMIC STABILITY: HOUSING INSECURITY: IN THE PAST 12 MONTHS HAS THE ELECTRIC, GAS, OIL, OR WATER COMPANY THREATENED TO SHUT OFF SERVICES IN YOUR HOME?: NO

## 2025-03-15 SDOH — ECONOMIC STABILITY: FOOD INSECURITY: WITHIN THE PAST 12 MONTHS, THE FOOD YOU BOUGHT JUST DIDN'T LAST AND YOU DIDN'T HAVE MONEY TO GET MORE.: NEVER TRUE

## 2025-03-15 SDOH — SOCIAL STABILITY: SOCIAL INSECURITY: ABUSE: ADULT

## 2025-03-15 SDOH — ECONOMIC STABILITY: HOUSING INSECURITY

## 2025-03-15 SDOH — ECONOMIC STABILITY: FOOD INSECURITY: WITHIN THE PAST 12 MONTHS, YOU WORRIED THAT YOUR FOOD WOULD RUN OUT BEFORE YOU GOT THE MONEY TO BUY MORE.: NEVER TRUE

## 2025-03-15 SDOH — SOCIAL STABILITY: SOCIAL INSECURITY: HAS ANYONE EVER THREATENED TO HURT YOUR FAMILY OR YOUR PETS?: NO

## 2025-03-15 SDOH — ECONOMIC STABILITY: HOUSING INSECURITY: IN THE LAST 12 MONTHS, HOW MANY PLACES HAVE YOU LIVED?: 1

## 2025-03-15 SDOH — SOCIAL STABILITY: SOCIAL INSECURITY: DOES ANYONE TRY TO KEEP YOU FROM HAVING/CONTACTING OTHER FRIENDS OR DOING THINGS OUTSIDE YOUR HOME?: NO

## 2025-03-15 SDOH — SOCIAL STABILITY: SOCIAL INSECURITY: DO YOU FEEL ANYONE HAS EXPLOITED OR TAKEN ADVANTAGE OF YOU FINANCIALLY OR OF YOUR PERSONAL PROPERTY?: NO

## 2025-03-15 SDOH — SOCIAL STABILITY: SOCIAL INSECURITY: WERE YOU ABLE TO COMPLETE ALL THE BEHAVIORAL HEALTH SCREENINGS?: YES

## 2025-03-15 SDOH — SOCIAL STABILITY: SOCIAL INSECURITY: DO YOU FEEL UNSAFE GOING BACK TO THE PLACE WHERE YOU ARE LIVING?: NO

## 2025-03-15 ASSESSMENT — ACTIVITIES OF DAILY LIVING (ADL)
HOW_WELL_CAN_YOU_DRESS_YOURSELF: INDEPENDENTLY
LACK_OF_TRANSPORTATION: NO
BATHING: INDEPENDENT
HEARING_RIGHT_EAR: NO PROBLEMS
FEEDING YOURSELF: INDEPENDENT
GROOMING: INDEPENDENT
ADEQUATE_TO_COMPLETE_ADL: YES
ADEQUATE_TO_COMPLETE_ADL: YES
HEARING - LEFT EAR: FUNCTIONAL
ADEQUATE_TO_COMPLETE_ADL: NO
HOW_WELL_CAN_YOU_FEED_YOURSELF: INDEPENDENTLY
ADL_BEFORE_ADMISSION: LEFT
HOW_WELL_CAN_YOU_COMPLETE_GROOMING_TASKS: INDEPENDENTLY
FEEDING: INDEPENDENT
BATHING: INDEPENDENT
DRESSING: INDEPENDENT
HOW_WELL_CAN_YOU_BATHE_YOURSELF: INDEPENDENTLY
HEARING_LEFT_EAR: NO PROBLEMS
JUDGMENT_ADEQUATE_SAFELY_COMPLETE_DAILY_ACTIVITIES: NO
HOW_WELL_CAN_YOU_USE_BATHROOM_BY_YOURSELF: INDEPENDENTLY
PATIENT'S MEMORY ADEQUATE TO SAFELY COMPLETE DAILY ACTIVITIES?: NO
HEARING - RIGHT EAR: FUNCTIONAL
WALKS_IN_HOME: INDEPENDENTLY
DRESSING YOURSELF: INDEPENDENT
ADL_BEFORE_ADMISSION: INDEPENDENTLY
TOILETING: INDEPENDENT
TOILETING: INDEPENDENT

## 2025-03-15 ASSESSMENT — COGNITIVE AND FUNCTIONAL STATUS - GENERAL
PATIENT BASELINE BEDBOUND: NO
MOBILITY SCORE: 24
DAILY ACTIVITIY SCORE: 24

## 2025-03-15 ASSESSMENT — SOCIAL DETERMINANTS OF HEALTH (SDOH): IN THE PAST 12 MONTHS, HAS THE ELECTRIC, GAS, OIL, OR WATER COMPANY THREATENED TO SHUT OFF SERVICE IN YOUR HOME?: NO

## 2025-03-15 ASSESSMENT — PATIENT HEALTH QUESTIONNAIRE - PHQ9
2. FEELING DOWN, DEPRESSED OR HOPELESS: NOT AT ALL
SUM OF ALL RESPONSES TO PHQ9 QUESTIONS 1 & 2: 0
1. LITTLE INTEREST OR PLEASURE IN DOING THINGS: NOT AT ALL

## 2025-03-15 ASSESSMENT — LIFESTYLE VARIABLES
HOW MANY STANDARD DRINKS CONTAINING ALCOHOL DO YOU HAVE ON A TYPICAL DAY: PATIENT DOES NOT DRINK
SKIP TO QUESTIONS 9-10: 1
AUDIT-C TOTAL SCORE: 0
HOW OFTEN DO YOU HAVE 6 OR MORE DRINKS ON ONE OCCASION: NEVER
HOW OFTEN DO YOU HAVE A DRINK CONTAINING ALCOHOL: NEVER
AUDIT-C TOTAL SCORE: 0

## 2025-03-15 ASSESSMENT — COLUMBIA-SUICIDE SEVERITY RATING SCALE - C-SSRS
2. HAVE YOU ACTUALLY HAD ANY THOUGHTS OF KILLING YOURSELF?: NO
6. HAVE YOU EVER DONE ANYTHING, STARTED TO DO ANYTHING, OR PREPARED TO DO ANYTHING TO END YOUR LIFE?: NO
1. IN THE PAST MONTH, HAVE YOU WISHED YOU WERE DEAD OR WISHED YOU COULD GO TO SLEEP AND NOT WAKE UP?: NO

## 2025-03-16 ENCOUNTER — ANESTHESIA (OUTPATIENT)
Dept: OPERATING ROOM | Facility: HOSPITAL | Age: 45
DRG: 419 | End: 2025-03-16
Payer: COMMERCIAL

## 2025-03-16 ENCOUNTER — ANESTHESIA EVENT (OUTPATIENT)
Dept: OPERATING ROOM | Facility: HOSPITAL | Age: 45
DRG: 419 | End: 2025-03-16
Payer: COMMERCIAL

## 2025-03-16 VITALS
RESPIRATION RATE: 12 BRPM | BODY MASS INDEX: 37.01 KG/M2 | SYSTOLIC BLOOD PRESSURE: 132 MMHG | OXYGEN SATURATION: 94 % | HEART RATE: 55 BPM | WEIGHT: 244.2 LBS | HEIGHT: 68 IN | TEMPERATURE: 96.4 F | DIASTOLIC BLOOD PRESSURE: 84 MMHG

## 2025-03-16 PROBLEM — K81.9 CHOLECYSTITIS: Status: ACTIVE | Noted: 2025-03-16

## 2025-03-16 LAB
ABO GROUP (TYPE) IN BLOOD: NORMAL
ALBUMIN SERPL BCP-MCNC: 3.6 G/DL (ref 3.4–5)
ALP SERPL-CCNC: 120 U/L (ref 33–120)
ALT SERPL W P-5'-P-CCNC: 84 U/L (ref 10–52)
ANION GAP SERPL CALC-SCNC: 9 MMOL/L (ref 10–20)
ANTIBODY SCREEN: NORMAL
APTT PPP: 32 SECONDS (ref 26–36)
AST SERPL W P-5'-P-CCNC: 36 U/L (ref 9–39)
BILIRUB DIRECT SERPL-MCNC: 0.5 MG/DL (ref 0–0.3)
BILIRUB SERPL-MCNC: 3.3 MG/DL (ref 0–1.2)
BUN SERPL-MCNC: 17 MG/DL (ref 6–23)
CALCIUM SERPL-MCNC: 9.1 MG/DL (ref 8.6–10.6)
CHLORIDE SERPL-SCNC: 101 MMOL/L (ref 98–107)
CO2 SERPL-SCNC: 29 MMOL/L (ref 21–32)
CREAT SERPL-MCNC: 1.28 MG/DL (ref 0.5–1.3)
EGFRCR SERPLBLD CKD-EPI 2021: 70 ML/MIN/1.73M*2
ERYTHROCYTE [DISTWIDTH] IN BLOOD BY AUTOMATED COUNT: 13.2 % (ref 11.5–14.5)
GLUCOSE BLD MANUAL STRIP-MCNC: 86 MG/DL (ref 74–99)
GLUCOSE BLD MANUAL STRIP-MCNC: 90 MG/DL (ref 74–99)
GLUCOSE BLD MANUAL STRIP-MCNC: 92 MG/DL (ref 74–99)
GLUCOSE BLD MANUAL STRIP-MCNC: 93 MG/DL (ref 74–99)
GLUCOSE SERPL-MCNC: 91 MG/DL (ref 74–99)
HCT VFR BLD AUTO: 40.5 % (ref 41–52)
HGB BLD-MCNC: 13.2 G/DL (ref 13.5–17.5)
INR PPP: 1.8 (ref 0.9–1.1)
MAGNESIUM SERPL-MCNC: 2.02 MG/DL (ref 1.6–2.4)
MCH RBC QN AUTO: 28.9 PG (ref 26–34)
MCHC RBC AUTO-ENTMCNC: 32.6 G/DL (ref 32–36)
MCV RBC AUTO: 89 FL (ref 80–100)
NRBC BLD-RTO: 0 /100 WBCS (ref 0–0)
PHOSPHATE SERPL-MCNC: 2.2 MG/DL (ref 2.5–4.9)
PLATELET # BLD AUTO: 119 X10*3/UL (ref 150–450)
POTASSIUM SERPL-SCNC: 3.4 MMOL/L (ref 3.5–5.3)
PROT SERPL-MCNC: 6.7 G/DL (ref 6.4–8.2)
PROTHROMBIN TIME: 19.9 SECONDS (ref 9.8–12.4)
RBC # BLD AUTO: 4.57 X10*6/UL (ref 4.5–5.9)
RH FACTOR (ANTIGEN D): NORMAL
SODIUM SERPL-SCNC: 136 MMOL/L (ref 136–145)
WBC # BLD AUTO: 16.1 X10*3/UL (ref 4.4–11.3)

## 2025-03-16 PROCEDURE — 82248 BILIRUBIN DIRECT: CPT

## 2025-03-16 PROCEDURE — 2500000001 HC RX 250 WO HCPCS SELF ADMINISTERED DRUGS (ALT 637 FOR MEDICARE OP): Performed by: STUDENT IN AN ORGANIZED HEALTH CARE EDUCATION/TRAINING PROGRAM

## 2025-03-16 PROCEDURE — 2500000004 HC RX 250 GENERAL PHARMACY W/ HCPCS (ALT 636 FOR OP/ED)

## 2025-03-16 PROCEDURE — G0378 HOSPITAL OBSERVATION PER HR: HCPCS

## 2025-03-16 PROCEDURE — 2500000005 HC RX 250 GENERAL PHARMACY W/O HCPCS: Performed by: ANESTHESIOLOGY

## 2025-03-16 PROCEDURE — A47563 PR LAP,CHOLECYSTECTOMY/GRAPH: Performed by: NURSE ANESTHETIST, CERTIFIED REGISTERED

## 2025-03-16 PROCEDURE — 3700000001 HC GENERAL ANESTHESIA TIME - INITIAL BASE CHARGE: Performed by: SURGERY

## 2025-03-16 PROCEDURE — 2500000001 HC RX 250 WO HCPCS SELF ADMINISTERED DRUGS (ALT 637 FOR MEDICARE OP)

## 2025-03-16 PROCEDURE — 99140 ANES COMP EMERGENCY COND: CPT | Performed by: ANESTHESIOLOGY

## 2025-03-16 PROCEDURE — 2500000004 HC RX 250 GENERAL PHARMACY W/ HCPCS (ALT 636 FOR OP/ED): Performed by: NURSE ANESTHETIST, CERTIFIED REGISTERED

## 2025-03-16 PROCEDURE — 7100000001 HC RECOVERY ROOM TIME - INITIAL BASE CHARGE: Performed by: SURGERY

## 2025-03-16 PROCEDURE — 85730 THROMBOPLASTIN TIME PARTIAL: CPT

## 2025-03-16 PROCEDURE — 86850 RBC ANTIBODY SCREEN: CPT

## 2025-03-16 PROCEDURE — 99223 1ST HOSP IP/OBS HIGH 75: CPT | Performed by: SURGERY

## 2025-03-16 PROCEDURE — 85027 COMPLETE CBC AUTOMATED: CPT

## 2025-03-16 PROCEDURE — 2500000004 HC RX 250 GENERAL PHARMACY W/ HCPCS (ALT 636 FOR OP/ED): Performed by: STUDENT IN AN ORGANIZED HEALTH CARE EDUCATION/TRAINING PROGRAM

## 2025-03-16 PROCEDURE — 7100000002 HC RECOVERY ROOM TIME - EACH INCREMENTAL 1 MINUTE: Performed by: SURGERY

## 2025-03-16 PROCEDURE — 80053 COMPREHEN METABOLIC PANEL: CPT

## 2025-03-16 PROCEDURE — 3600000004 HC OR TIME - INITIAL BASE CHARGE - PROCEDURE LEVEL FOUR: Performed by: SURGERY

## 2025-03-16 PROCEDURE — 2720000007 HC OR 272 NO HCPCS: Performed by: SURGERY

## 2025-03-16 PROCEDURE — 3700000002 HC GENERAL ANESTHESIA TIME - EACH INCREMENTAL 1 MINUTE: Performed by: SURGERY

## 2025-03-16 PROCEDURE — 2500000001 HC RX 250 WO HCPCS SELF ADMINISTERED DRUGS (ALT 637 FOR MEDICARE OP): Performed by: ANESTHESIOLOGY

## 2025-03-16 PROCEDURE — 83735 ASSAY OF MAGNESIUM: CPT

## 2025-03-16 PROCEDURE — 3600000009 HC OR TIME - EACH INCREMENTAL 1 MINUTE - PROCEDURE LEVEL FOUR: Performed by: SURGERY

## 2025-03-16 PROCEDURE — 84100 ASSAY OF PHOSPHORUS: CPT

## 2025-03-16 PROCEDURE — 47562 LAPAROSCOPIC CHOLECYSTECTOMY: CPT | Performed by: SURGERY

## 2025-03-16 PROCEDURE — 36415 COLL VENOUS BLD VENIPUNCTURE: CPT

## 2025-03-16 PROCEDURE — A47563 PR LAP,CHOLECYSTECTOMY/GRAPH: Performed by: ANESTHESIOLOGY

## 2025-03-16 PROCEDURE — 1200000002 HC GENERAL ROOM WITH TELEMETRY DAILY

## 2025-03-16 PROCEDURE — 82947 ASSAY GLUCOSE BLOOD QUANT: CPT

## 2025-03-16 PROCEDURE — 2500000005 HC RX 250 GENERAL PHARMACY W/O HCPCS: Performed by: SURGERY

## 2025-03-16 RX ORDER — NALOXONE HYDROCHLORIDE 0.4 MG/ML
0.2 INJECTION, SOLUTION INTRAMUSCULAR; INTRAVENOUS; SUBCUTANEOUS EVERY 5 MIN PRN
Status: DISCONTINUED | OUTPATIENT
Start: 2025-03-16 | End: 2025-03-16

## 2025-03-16 RX ORDER — MIDAZOLAM HYDROCHLORIDE 1 MG/ML
INJECTION INTRAMUSCULAR; INTRAVENOUS AS NEEDED
Status: DISCONTINUED | OUTPATIENT
Start: 2025-03-16 | End: 2025-03-16

## 2025-03-16 RX ORDER — ONDANSETRON HYDROCHLORIDE 2 MG/ML
4 INJECTION, SOLUTION INTRAVENOUS EVERY 8 HOURS PRN
Status: DISCONTINUED | OUTPATIENT
Start: 2025-03-16 | End: 2025-03-17 | Stop reason: HOSPADM

## 2025-03-16 RX ORDER — HYDROMORPHONE HYDROCHLORIDE 0.2 MG/ML
0.2 INJECTION INTRAMUSCULAR; INTRAVENOUS; SUBCUTANEOUS EVERY 5 MIN PRN
Status: DISCONTINUED | OUTPATIENT
Start: 2025-03-16 | End: 2025-03-16 | Stop reason: HOSPADM

## 2025-03-16 RX ORDER — LIDOCAINE HYDROCHLORIDE 10 MG/ML
0.1 INJECTION, SOLUTION INFILTRATION; PERINEURAL ONCE
Status: DISCONTINUED | OUTPATIENT
Start: 2025-03-16 | End: 2025-03-16 | Stop reason: HOSPADM

## 2025-03-16 RX ORDER — KETOROLAC TROMETHAMINE 30 MG/ML
INJECTION, SOLUTION INTRAMUSCULAR; INTRAVENOUS AS NEEDED
Status: DISCONTINUED | OUTPATIENT
Start: 2025-03-16 | End: 2025-03-16

## 2025-03-16 RX ORDER — MIDAZOLAM HYDROCHLORIDE 1 MG/ML
INJECTION, SOLUTION INTRAMUSCULAR; INTRAVENOUS AS NEEDED
Status: DISCONTINUED | OUTPATIENT
Start: 2025-03-16 | End: 2025-03-16

## 2025-03-16 RX ORDER — ONDANSETRON 4 MG/1
4 TABLET, ORALLY DISINTEGRATING ORAL EVERY 8 HOURS PRN
Status: DISCONTINUED | OUTPATIENT
Start: 2025-03-16 | End: 2025-03-17 | Stop reason: HOSPADM

## 2025-03-16 RX ORDER — ACETAMINOPHEN 650 MG/1
650 SUPPOSITORY RECTAL EVERY 6 HOURS
Status: DISCONTINUED | OUTPATIENT
Start: 2025-03-16 | End: 2025-03-17 | Stop reason: HOSPADM

## 2025-03-16 RX ORDER — SODIUM CHLORIDE 0.9 G/100ML
INJECTION, SOLUTION IRRIGATION AS NEEDED
Status: DISCONTINUED | OUTPATIENT
Start: 2025-03-16 | End: 2025-03-16 | Stop reason: HOSPADM

## 2025-03-16 RX ORDER — ENOXAPARIN SODIUM 100 MG/ML
40 INJECTION SUBCUTANEOUS EVERY 24 HOURS
Status: DISCONTINUED | OUTPATIENT
Start: 2025-03-16 | End: 2025-03-16

## 2025-03-16 RX ORDER — NADOLOL 20 MG/1
20 TABLET ORAL DAILY
Status: DISCONTINUED | OUTPATIENT
Start: 2025-03-16 | End: 2025-03-17 | Stop reason: HOSPADM

## 2025-03-16 RX ORDER — ACETAMINOPHEN 325 MG/1
650 TABLET ORAL EVERY 6 HOURS
Status: DISCONTINUED | OUTPATIENT
Start: 2025-03-16 | End: 2025-03-17 | Stop reason: HOSPADM

## 2025-03-16 RX ORDER — PROPOFOL 10 MG/ML
INJECTION, EMULSION INTRAVENOUS AS NEEDED
Status: DISCONTINUED | OUTPATIENT
Start: 2025-03-16 | End: 2025-03-16

## 2025-03-16 RX ORDER — HEPARIN SODIUM 5000 [USP'U]/ML
5000 INJECTION, SOLUTION INTRAVENOUS; SUBCUTANEOUS EVERY 8 HOURS
Status: DISCONTINUED | OUTPATIENT
Start: 2025-03-16 | End: 2025-03-17 | Stop reason: HOSPADM

## 2025-03-16 RX ORDER — OXYCODONE HYDROCHLORIDE 5 MG/1
10 TABLET ORAL EVERY 4 HOURS PRN
Status: DISCONTINUED | OUTPATIENT
Start: 2025-03-16 | End: 2025-03-16

## 2025-03-16 RX ORDER — FENTANYL CITRATE 50 UG/ML
INJECTION, SOLUTION INTRAMUSCULAR; INTRAVENOUS AS NEEDED
Status: DISCONTINUED | OUTPATIENT
Start: 2025-03-16 | End: 2025-03-16

## 2025-03-16 RX ORDER — NALOXONE HYDROCHLORIDE 0.4 MG/ML
0.2 INJECTION, SOLUTION INTRAMUSCULAR; INTRAVENOUS; SUBCUTANEOUS EVERY 5 MIN PRN
Status: DISCONTINUED | OUTPATIENT
Start: 2025-03-16 | End: 2025-03-17 | Stop reason: HOSPADM

## 2025-03-16 RX ORDER — OXYCODONE HYDROCHLORIDE 5 MG/1
5 TABLET ORAL EVERY 6 HOURS PRN
Status: DISCONTINUED | OUTPATIENT
Start: 2025-03-16 | End: 2025-03-16

## 2025-03-16 RX ORDER — ONDANSETRON HYDROCHLORIDE 2 MG/ML
4 INJECTION, SOLUTION INTRAVENOUS ONCE AS NEEDED
Status: DISCONTINUED | OUTPATIENT
Start: 2025-03-16 | End: 2025-03-16 | Stop reason: HOSPADM

## 2025-03-16 RX ORDER — ONDANSETRON HYDROCHLORIDE 2 MG/ML
INJECTION, SOLUTION INTRAVENOUS AS NEEDED
Status: DISCONTINUED | OUTPATIENT
Start: 2025-03-16 | End: 2025-03-16

## 2025-03-16 RX ORDER — SODIUM CHLORIDE, SODIUM LACTATE, POTASSIUM CHLORIDE, CALCIUM CHLORIDE 600; 310; 30; 20 MG/100ML; MG/100ML; MG/100ML; MG/100ML
75 INJECTION, SOLUTION INTRAVENOUS CONTINUOUS
Status: DISCONTINUED | OUTPATIENT
Start: 2025-03-16 | End: 2025-03-17 | Stop reason: HOSPADM

## 2025-03-16 RX ORDER — ACETAMINOPHEN 325 MG/1
650 TABLET ORAL EVERY 4 HOURS
Status: DISCONTINUED | OUTPATIENT
Start: 2025-03-16 | End: 2025-03-16

## 2025-03-16 RX ORDER — SODIUM CHLORIDE, SODIUM LACTATE, POTASSIUM CHLORIDE, CALCIUM CHLORIDE 600; 310; 30; 20 MG/100ML; MG/100ML; MG/100ML; MG/100ML
100 INJECTION, SOLUTION INTRAVENOUS CONTINUOUS
Status: DISCONTINUED | OUTPATIENT
Start: 2025-03-16 | End: 2025-03-16 | Stop reason: HOSPADM

## 2025-03-16 RX ORDER — SUCCINYLCHOLINE CHLORIDE 100 MG/5ML
SYRINGE (ML) INTRAVENOUS AS NEEDED
Status: DISCONTINUED | OUTPATIENT
Start: 2025-03-16 | End: 2025-03-16

## 2025-03-16 RX ORDER — SODIUM CHLORIDE, SODIUM LACTATE, POTASSIUM CHLORIDE, CALCIUM CHLORIDE 600; 310; 30; 20 MG/100ML; MG/100ML; MG/100ML; MG/100ML
100 INJECTION, SOLUTION INTRAVENOUS CONTINUOUS
Status: DISCONTINUED | OUTPATIENT
Start: 2025-03-16 | End: 2025-03-16

## 2025-03-16 RX ORDER — HYDROMORPHONE HYDROCHLORIDE 1 MG/ML
INJECTION, SOLUTION INTRAMUSCULAR; INTRAVENOUS; SUBCUTANEOUS AS NEEDED
Status: DISCONTINUED | OUTPATIENT
Start: 2025-03-16 | End: 2025-03-16

## 2025-03-16 RX ORDER — OXYCODONE HYDROCHLORIDE 5 MG/1
10 TABLET ORAL EVERY 4 HOURS PRN
Status: DISCONTINUED | OUTPATIENT
Start: 2025-03-16 | End: 2025-03-17 | Stop reason: HOSPADM

## 2025-03-16 RX ORDER — ROCURONIUM BROMIDE 10 MG/ML
INJECTION, SOLUTION INTRAVENOUS AS NEEDED
Status: DISCONTINUED | OUTPATIENT
Start: 2025-03-16 | End: 2025-03-16

## 2025-03-16 RX ORDER — HEPARIN SODIUM 5000 [USP'U]/ML
5000 INJECTION, SOLUTION INTRAVENOUS; SUBCUTANEOUS EVERY 8 HOURS
Status: DISCONTINUED | OUTPATIENT
Start: 2025-03-16 | End: 2025-03-16

## 2025-03-16 RX ORDER — ACETAMINOPHEN 160 MG/5ML
650 SOLUTION ORAL EVERY 6 HOURS
Status: DISCONTINUED | OUTPATIENT
Start: 2025-03-16 | End: 2025-03-17 | Stop reason: HOSPADM

## 2025-03-16 RX ORDER — OXYCODONE HYDROCHLORIDE 5 MG/1
5 TABLET ORAL EVERY 4 HOURS PRN
Status: DISCONTINUED | OUTPATIENT
Start: 2025-03-16 | End: 2025-03-16 | Stop reason: HOSPADM

## 2025-03-16 RX ORDER — LIDOCAINE HYDROCHLORIDE 20 MG/ML
INJECTION, SOLUTION INFILTRATION; PERINEURAL AS NEEDED
Status: DISCONTINUED | OUTPATIENT
Start: 2025-03-16 | End: 2025-03-16

## 2025-03-16 RX ORDER — OXYCODONE HYDROCHLORIDE 5 MG/1
5 TABLET ORAL EVERY 4 HOURS PRN
Status: DISCONTINUED | OUTPATIENT
Start: 2025-03-16 | End: 2025-03-17 | Stop reason: HOSPADM

## 2025-03-16 RX ORDER — POLYETHYLENE GLYCOL 3350 17 G/17G
17 POWDER, FOR SOLUTION ORAL DAILY
Status: DISCONTINUED | OUTPATIENT
Start: 2025-03-17 | End: 2025-03-17 | Stop reason: HOSPADM

## 2025-03-16 RX ORDER — BUPIVACAINE HCL/EPINEPHRINE 0.5-1:200K
VIAL (ML) INJECTION AS NEEDED
Status: DISCONTINUED | OUTPATIENT
Start: 2025-03-16 | End: 2025-03-16 | Stop reason: HOSPADM

## 2025-03-16 RX ORDER — CEFAZOLIN 1 G/1
INJECTION, POWDER, FOR SOLUTION INTRAVENOUS AS NEEDED
Status: DISCONTINUED | OUTPATIENT
Start: 2025-03-16 | End: 2025-03-16

## 2025-03-16 RX ADMIN — NADOLOL 20 MG: 20 TABLET ORAL at 08:20

## 2025-03-16 RX ADMIN — SUGAMMADEX 400 MG: 100 INJECTION, SOLUTION INTRAVENOUS at 19:46

## 2025-03-16 RX ADMIN — PIPERACILLIN SODIUM AND TAZOBACTAM SODIUM 3.38 G: 3; .375 INJECTION, SOLUTION INTRAVENOUS at 01:36

## 2025-03-16 RX ADMIN — CEFAZOLIN 2 G: 1 INJECTION, POWDER, FOR SOLUTION INTRAMUSCULAR; INTRAVENOUS at 16:42

## 2025-03-16 RX ADMIN — SODIUM CHLORIDE, POTASSIUM CHLORIDE, SODIUM LACTATE AND CALCIUM CHLORIDE 75 ML/HR: 600; 310; 30; 20 INJECTION, SOLUTION INTRAVENOUS at 21:27

## 2025-03-16 RX ADMIN — FENTANYL CITRATE 100 MCG: 50 INJECTION, SOLUTION INTRAMUSCULAR; INTRAVENOUS at 16:30

## 2025-03-16 RX ADMIN — Medication 140 MG: at 16:30

## 2025-03-16 RX ADMIN — LIDOCAINE HYDROCHLORIDE 100 MG: 20 INJECTION, SOLUTION INFILTRATION; PERINEURAL at 16:30

## 2025-03-16 RX ADMIN — ACETAMINOPHEN 650 MG: 325 TABLET ORAL at 01:36

## 2025-03-16 RX ADMIN — ACETAMINOPHEN 650 MG: 325 TABLET ORAL at 21:49

## 2025-03-16 RX ADMIN — ONDANSETRON 4 MG: 2 INJECTION INTRAMUSCULAR; INTRAVENOUS at 19:22

## 2025-03-16 RX ADMIN — ROCURONIUM 20 MG: 50 INJECTION, SOLUTION INTRAVENOUS at 18:24

## 2025-03-16 RX ADMIN — SODIUM CHLORIDE, SODIUM LACTATE, POTASSIUM CHLORIDE, AND CALCIUM CHLORIDE: 600; 310; 30; 20 INJECTION, SOLUTION INTRAVENOUS at 18:32

## 2025-03-16 RX ADMIN — KETOROLAC TROMETHAMINE 15 MG: 30 INJECTION, SOLUTION INTRAMUSCULAR; INTRAVENOUS at 19:22

## 2025-03-16 RX ADMIN — HYDROMORPHONE HYDROCHLORIDE 0.4 MG: 1 INJECTION, SOLUTION INTRAMUSCULAR; INTRAVENOUS; SUBCUTANEOUS at 18:10

## 2025-03-16 RX ADMIN — ACETAMINOPHEN 650 MG: 325 TABLET ORAL at 12:20

## 2025-03-16 RX ADMIN — HEPARIN SODIUM 5000 UNITS: 5000 INJECTION, SOLUTION INTRAVENOUS; SUBCUTANEOUS at 08:20

## 2025-03-16 RX ADMIN — ACETAMINOPHEN 650 MG: 325 TABLET ORAL at 08:20

## 2025-03-16 RX ADMIN — HYDROMORPHONE HYDROCHLORIDE 0.4 MG: 1 INJECTION, SOLUTION INTRAMUSCULAR; INTRAVENOUS; SUBCUTANEOUS at 19:35

## 2025-03-16 RX ADMIN — PIPERACILLIN SODIUM AND TAZOBACTAM SODIUM 3.38 G: 3; .375 INJECTION, SOLUTION INTRAVENOUS at 12:20

## 2025-03-16 RX ADMIN — PIPERACILLIN SODIUM AND TAZOBACTAM SODIUM 3.38 G: 3; .375 INJECTION, SOLUTION INTRAVENOUS at 08:20

## 2025-03-16 RX ADMIN — PROPOFOL 200 MG: 10 INJECTION, EMULSION INTRAVENOUS at 16:30

## 2025-03-16 RX ADMIN — ROCURONIUM 10 MG: 50 INJECTION, SOLUTION INTRAVENOUS at 19:07

## 2025-03-16 RX ADMIN — MIDAZOLAM HYDROCHLORIDE 2 MG: 1 INJECTION, SOLUTION INTRAMUSCULAR; INTRAVENOUS at 16:30

## 2025-03-16 RX ADMIN — SODIUM CHLORIDE, POTASSIUM CHLORIDE, SODIUM LACTATE AND CALCIUM CHLORIDE 100 ML/HR: 600; 310; 30; 20 INJECTION, SOLUTION INTRAVENOUS at 01:36

## 2025-03-16 RX ADMIN — HYDROMORPHONE HYDROCHLORIDE 0.2 MG: 1 INJECTION, SOLUTION INTRAMUSCULAR; INTRAVENOUS; SUBCUTANEOUS at 19:41

## 2025-03-16 RX ADMIN — SODIUM CHLORIDE, SODIUM LACTATE, POTASSIUM CHLORIDE, AND CALCIUM CHLORIDE: 600; 310; 30; 20 INJECTION, SOLUTION INTRAVENOUS at 16:29

## 2025-03-16 RX ADMIN — ROCURONIUM 50 MG: 50 INJECTION, SOLUTION INTRAVENOUS at 16:30

## 2025-03-16 RX ADMIN — HEPARIN SODIUM 5000 UNITS: 5000 INJECTION, SOLUTION INTRAVENOUS; SUBCUTANEOUS at 01:36

## 2025-03-16 RX ADMIN — OXYCODONE 5 MG: 5 TABLET ORAL at 20:32

## 2025-03-16 RX ADMIN — SODIUM CHLORIDE, SODIUM LACTATE, POTASSIUM CHLORIDE, AND CALCIUM CHLORIDE: 600; 310; 30; 20 INJECTION, SOLUTION INTRAVENOUS at 15:52

## 2025-03-16 RX ADMIN — HYDROMORPHONE HYDROCHLORIDE 0.4 MG: 1 INJECTION, SOLUTION INTRAMUSCULAR; INTRAVENOUS; SUBCUTANEOUS at 19:45

## 2025-03-16 RX ADMIN — Medication 6 L/MIN: at 19:55

## 2025-03-16 SDOH — ECONOMIC STABILITY: INCOME INSECURITY: IN THE PAST 12 MONTHS HAS THE ELECTRIC, GAS, OIL, OR WATER COMPANY THREATENED TO SHUT OFF SERVICES IN YOUR HOME?: NO

## 2025-03-16 SDOH — SOCIAL STABILITY: SOCIAL INSECURITY: WITHIN THE LAST YEAR, HAVE YOU BEEN HUMILIATED OR EMOTIONALLY ABUSED IN OTHER WAYS BY YOUR PARTNER OR EX-PARTNER?: NO

## 2025-03-16 SDOH — ECONOMIC STABILITY: HOUSING INSECURITY: IN THE PAST 12 MONTHS, HOW MANY TIMES HAVE YOU MOVED WHERE YOU WERE LIVING?: 0

## 2025-03-16 SDOH — SOCIAL STABILITY: SOCIAL INSECURITY: WITHIN THE LAST YEAR, HAVE YOU BEEN AFRAID OF YOUR PARTNER OR EX-PARTNER?: NO

## 2025-03-16 SDOH — ECONOMIC STABILITY: FOOD INSECURITY: WITHIN THE PAST 12 MONTHS, THE FOOD YOU BOUGHT JUST DIDN'T LAST AND YOU DIDN'T HAVE MONEY TO GET MORE.: NEVER TRUE

## 2025-03-16 SDOH — HEALTH STABILITY: PHYSICAL HEALTH: ON AVERAGE, HOW MANY MINUTES DO YOU ENGAGE IN EXERCISE AT THIS LEVEL?: 0 MIN

## 2025-03-16 SDOH — ECONOMIC STABILITY: HOUSING INSECURITY: AT ANY TIME IN THE PAST 12 MONTHS, WERE YOU HOMELESS OR LIVING IN A SHELTER (INCLUDING NOW)?: NO

## 2025-03-16 SDOH — SOCIAL STABILITY: SOCIAL INSECURITY
WITHIN THE LAST YEAR, HAVE YOU BEEN RAPED OR FORCED TO HAVE ANY KIND OF SEXUAL ACTIVITY BY YOUR PARTNER OR EX-PARTNER?: NO

## 2025-03-16 SDOH — ECONOMIC STABILITY: TRANSPORTATION INSECURITY: IN THE PAST 12 MONTHS, HAS LACK OF TRANSPORTATION KEPT YOU FROM MEDICAL APPOINTMENTS OR FROM GETTING MEDICATIONS?: NO

## 2025-03-16 SDOH — ECONOMIC STABILITY: FOOD INSECURITY: WITHIN THE PAST 12 MONTHS, YOU WORRIED THAT YOUR FOOD WOULD RUN OUT BEFORE YOU GOT THE MONEY TO BUY MORE.: NEVER TRUE

## 2025-03-16 SDOH — ECONOMIC STABILITY: FOOD INSECURITY: HOW HARD IS IT FOR YOU TO PAY FOR THE VERY BASICS LIKE FOOD, HOUSING, MEDICAL CARE, AND HEATING?: NOT HARD AT ALL

## 2025-03-16 SDOH — ECONOMIC STABILITY: HOUSING INSECURITY: IN THE LAST 12 MONTHS, WAS THERE A TIME WHEN YOU WERE NOT ABLE TO PAY THE MORTGAGE OR RENT ON TIME?: NO

## 2025-03-16 SDOH — HEALTH STABILITY: PHYSICAL HEALTH: ON AVERAGE, HOW MANY DAYS PER WEEK DO YOU ENGAGE IN MODERATE TO STRENUOUS EXERCISE (LIKE A BRISK WALK)?: 0 DAYS

## 2025-03-16 SDOH — HEALTH STABILITY: MENTAL HEALTH: CURRENT SMOKER: 0

## 2025-03-16 ASSESSMENT — COGNITIVE AND FUNCTIONAL STATUS - GENERAL
DAILY ACTIVITIY SCORE: 24
MOBILITY SCORE: 24
DAILY ACTIVITIY SCORE: 24
MOBILITY SCORE: 24

## 2025-03-16 ASSESSMENT — PAIN SCALES - GENERAL
PAINLEVEL_OUTOF10: 5 - MODERATE PAIN
PAINLEVEL_OUTOF10: 5 - MODERATE PAIN
PAINLEVEL_OUTOF10: 2
PAINLEVEL_OUTOF10: 0 - NO PAIN

## 2025-03-16 ASSESSMENT — PAIN DESCRIPTION - LOCATION
LOCATION: ABDOMEN
LOCATION: ABDOMEN

## 2025-03-16 ASSESSMENT — PAIN DESCRIPTION - ORIENTATION: ORIENTATION: RIGHT;MID

## 2025-03-16 ASSESSMENT — PAIN - FUNCTIONAL ASSESSMENT
PAIN_FUNCTIONAL_ASSESSMENT: 0-10

## 2025-03-16 ASSESSMENT — PAIN DESCRIPTION - DESCRIPTORS: DESCRIPTORS: ACHING;DISCOMFORT

## 2025-03-16 ASSESSMENT — ACTIVITIES OF DAILY LIVING (ADL): LACK_OF_TRANSPORTATION: NO

## 2025-03-16 NOTE — PROGRESS NOTES
"Pharmacy Medication History Review    Miguel A Bender is a 44 y.o. male admitted for Cholecystitis. Pharmacy reviewed the patient's [id number] medications and allergies for accuracy.    Medications ADDED:  N/A  Medications CHANGED:  N/A  Medications REMOVED:   N/A     The list below reflects the updated PTA list.   Prior to Admission Medications   Prescriptions Last Dose Informant   beta carotene (vitamin A) 3,000 mcg (10,000 unit) capsule  Self   Sig: Take 1 capsule (10,000 Units) by mouth once daily.   ergocalciferol (Vitamin D-2) 1.25 MG (14607 UT) capsule  Self   Sig: Take 1 capsule (1,250 mcg) by mouth 1 (one) time per week.  Patient takes every Sunday    multivitamin tablet  Self   Sig: Take 1 tablet by mouth once daily.   nadolol (Corgard) 20 mg tablet  Self   Sig: Take 1 tablet (20 mg) by mouth once daily.      Facility-Administered Medications: None        The list below reflects the updated allergy list. Please review each documented allergy for additional clarification and justification.  Allergies  Reviewed by Kelby Norton on 3/16/2025        Severity Reactions Comments    Iodine Low Rash     Shellfish Containing Products Low Rash             Patient accepts M2B at discharge.     Sources:   [facility]  Pharmacy dispense history  Patient Interview Good historian     Additional Comments:  N/A      KELBY NORTON  Pharmacy Technician  03/16/25     Secure Chat preferred   If no response call [phone] or Adzuna \"Med Rec\"   "

## 2025-03-16 NOTE — ANESTHESIA PREPROCEDURE EVALUATION
Patient: Miguel A Bender    Procedure Information       Anesthesia Start Date/Time: 03/16/25 1629    Procedure: CHOLECYSTECTOMY, LAPAROSCOPIC, WITH CHOLANGIOGRAM    Location: [facility] OR 11 / Virtual [facility] OR    Surgeons: Nancy Bond MD            Relevant Problems   Cardiac   (+) Cardiac arrest   (+) Essential hypertension   (+) ICD (implantable cardioverter-defibrillator) in place   (+) Mixed hyperlipidemia      Neuro   (+) Anxiety      Liver   (+) Cholecystitis      Endocrine   (+) Class 2 obesity with body mass index (BMI) of 35.0 to 35.9 in adult       Clinical information reviewed:    Allergies  Meds               NPO Detail:  No data recorded     Physical Exam    Airway  Mallampati: I  TM distance: >3 FB  Neck ROM: full     Cardiovascular - normal exam     Dental - normal exam     Pulmonary - normal exam     Abdominal - normal exam         Anesthesia Plan    History of general anesthesia?: yes  History of complications of general anesthesia?: no    ASA 2 - emergent     general     The patient is not a current smoker.  Patient was not previously instructed to abstain from smoking on day of procedure.  Patient did not smoke on day of procedure.    intravenous induction   Postoperative administration of opioids is intended.  Anesthetic plan and risks discussed with patient.  Use of blood products discussed with patient who.    Plan discussed with CRNA.

## 2025-03-16 NOTE — H&P
[facility]  ACUTE CARE SURGERY - HISTORY AND PHYSICAL    Patient Name: Miguel A Bender  MRN: [id number]  Admit Date: 3/15/2025  : 1980  AGE: 44 y.o.   GENDER: male  ==============================================================================  TODAY'S ASSESSMENT AND PLAN OF CARE:  ASSESSMENT:  Miguel A Bender is a 44 y.o. male with history of VT cardiac arrest (s/p dual chamber ICD), hx of Akash en Y, seizures, CHARLEEN, and HTN  who initially presented to [place] for RLQ pain that radiates up since Thursday (3/13). He was transferred to [facility] [place] due to cardiac comorbidities and surgical history at . OSH CT c/f cholecystitis. Patient is in stable condition.     Full set of labs  Device check in AM  Home nadolol  NPO   OR plan pending labs  Zosyn    Discussed with Dr. Queen.    Donis López MD  PGY-1 General Surgery  Acute Care Surgery [id number]    Senior resident addendum:  45 yo M with prior cardiac arrest s/p DC ICD and history of RNYGB admitted with acute cholecystitis. Imaging at OSH demonstrating moderately thickened gallbladder wall and mild degree of pericholecystic fluid with gallbladder sludge and normal caliber CBD. T. Bili up to 3.6. RUQ pain has markedly improved but is still present and constant. Suspect he may have passed a stone and remains high risk for choledocholithiasis or that he has august acute calculous cholecystitis. Operative plan pending repeat labs. Starting Zosyn now. Will need ICD check prior to OR.    Steven Ma MD  General Surgery PGY5  ACS [id number]          Subjective   ==============================================================================  CHIEF COMPLAINT/REASON FOR CONSULT:  Chief Complaint: RLQ abdominal pain    HPI:  Miguel A Bender is a 44 y.o. male with history of VT cardiac arrest (s/p dual chamber ICD), hx of Akash en Y, seizures, CHARLEEN, and HTN who initially presented to [place] for RLQ pain that radiates up since Thursday (3/13). He was transferred  to [facility] due to cardiac comorbidities and surgical history at .     Pt states abdominal pain started on Thursday. Pain localizes to RLQ and radiates up. Pain became worse and more sharp on Friday. Pain is always there but intermittently becomes worse. He had a couple episodes of emesis and was febrile at OSH. Pain is currently improved.     He had the Akash en Y in 2022.     Objective   PAST MEDICAL HISTORY:   PMH:   VT arrest  ICD (dual chamber)  CHARLEEN  Seizure like activity    PSH:   Past Surgical History:   Procedure Laterality Date    CARDIAC CATHETERIZATION N/A 7/10/2024    Procedure: Left Heart Cath, No LV;  Surgeon: Bhavya Restrepo MD;  Location: [facility] Cardiac Cath Lab;  Service: Cardiovascular;  Laterality: N/A;    CARDIAC CATHETERIZATION N/A 7/10/2024    Procedure: PCI;  Surgeon: Bhavya Restrepo MD;  Location: [facility] Cardiac Cath Lab;  Service: Cardiovascular;  Laterality: N/A;    CARDIAC ELECTROPHYSIOLOGY PROCEDURE N/A 7/26/2024    Procedure: ICD Dual Implant;  Surgeon: Bravo Felton MD;  Location: [place] [address] Cardiac Cath Lab;  Service: Electrophysiology;  Laterality: N/A;     FH:   Noncontributory    SOCIAL HISTORY:    Smoking:    Social History     Tobacco Use   Smoking Status Never   Smokeless Tobacco Never       Alcohol:    Social History     Substance and Sexual Activity   Alcohol Use Never       MEDICATIONS:   Prior to Admission medications    Medication Sig Start Date End Date Taking? Authorizing Provider   beta carotene (vitamin A) 3,000 mcg (10,000 unit) capsule Take 1 capsule (10,000 Units) by mouth once daily.    Historical Provider, MD   ergocalciferol (Vitamin D-2) 1.25 MG (43535 UT) capsule Take 1 capsule (1,250 mcg) by mouth 1 (one) time per week. 12/2/24   Av Amaya MD   multivitamin tablet Take 1 tablet by mouth once daily.    Historical Provider, MD   nadolol (Corgard) 20 mg tablet Take 1 tablet (20 mg) by mouth once daily. 8/14/24 8/14/25  Ollie Ware MD     ALLERGIES:    Allergies   Allergen Reactions    Iodine Rash    Shellfish Containing Products Rash       REVIEW OF SYSTEMS:  A 12-point ROS was performed and was unremarkable except as above.    PHYSICAL EXAM:  GEN: No acute distress. Alert, awake and conversant.  HEENT: Sclera anicteric. Moist mucous membranes.  RESP: Breathing non-labored, equal chest rise. On RA.  CV: Regular rate, normotensive  GI: Abdomen soft, nondistended, tender to palpation, especially RUQ, Grover's sign negative, no rebound or guarding, several well-healed lap incision scars.   : Voiding spontaneously.  MSK: No gross deformities. Moves all extremities spontaneously.  NEURO: Alert and oriented x3. No focal deficits.  PSYCH: Appropriate mood and affect.  SKIN: No rashes or lesions.    IMAGING SUMMARY:   US gallbladder    Result Date: 3/15/2025  * * *Final Report* * * DATE OF EXAM: Mar 14 2025 11:05PM   MDU   1032  -  US ABD RIGHT UPPER QUADRANT  / ACCESSION #  [id number] PROCEDURE REASON: RUQ pain, fever, elev WBC, Grover's sign      * * * * Physician Interpretation * * * *  EXAMINATION:   RIGHT UPPER QUADRANT ULTRASOUND CLINICAL HISTORY: Right upper quadrant abdominal pain. TECHNIQUE:  Sonography of the right upper quadrant  was performed.   Images were obtained and stored in a permanent archive. MQ:  URUQ_2 COMPARISON: CT abdomen pelvis 03/14/2025 RESULT: Pancreas: Obscured secondary to adjacent bowel gas. Liver:      Echotexture:  Normal, homogeneous.      Echogenicity:  Normal      Surface contour:  Smooth      Lesions:  None. Biliary: No intrahepatic biliary duct dilation.      CBD: 0.4 cm,0.4 cm at the hilum.      Gallbladder: Normal caliber           -Contents:  Cholelithiasis and sludge present           -Wall:  Gallbladder wall thickening measuring up to 0.6 cm in diameter.           -Other:  Positive sonographic Grover's sign noted. Right Kidney: No hydronephrosis. Ascites: None.    IMPRESSION: Cholelithiasis with gallbladder wall thickening  and positive sonographic Grover's sign suggestive of acute cholecystitis and correlating with same-day CT findings. : PSCB   Transcribe Date/Time: Mar 15 2025  1:43P Dictated by : GIOVANNI LANDEROS MD This examination was interpreted and the report reviewed and electronically signed by: GIOVANNI LANDEROS MD on Mar 15 2025  1:47PM  EST    CT abdomen pelvis wo IV contrast    Result Date: 3/14/2025  * * *Final Report* * * DATE OF EXAM: Mar 14 2025  4:26PM   [place]   0531  -  CT ABD/PEL WO IVCON  / ACCESSION #  [id number] PROCEDURE REASON: right sided abdominal pain      * * * * Physician Interpretation * * * *  EXAMINATION:  CT ABDOMEN AND PELVIS WITHOUT IV CONTRAST CLINICAL HISTORY: Right-sided abdominal pain TECHNIQUE: Non-IV contrast imaging of the abdomen and pelvis was performed using standard technique, scanning from just above the dome of the diaphragm to the symphysis pubis.  Unenhanced imaging is limited for the evaluation of some intra-abdominal and pelvic pathology. MQ:  CTAPWO_3 Contrast: IV: None : ml of CT Radiation dose: Integrated Dose-length product (DLP) for this visit =   1122 mGy*cm. CT Dose Reduction Employed: Automated exposure control(AEC) and iterative recon COMPARISON: None. RESULT: Abdomen / Pelvis: Liver: Unremarkable. Biliary: The gallbladder is distended and thick-walled.  Pericholecystic inflammatory change and fluid is seen.  Dependent stones and/or debris within it.  Common duct appears normal in caliber .Spleen: No splenomegaly. Pancreas: Unremarkable. Adrenals: No mass. Kidneys: No calculus, hydronephrosis or finding to suggest a cyst or mass   in the unenhanced kidney. GI Tract: No bowel dilation.  The appendix appears normal .  Prior gastric surgery Lymph Nodes: No lymphadenopathy. Mesentery/peritoneum: No ascites. Retroperitoneum: No mass. Vasculature: No abdominal aortic or iliac artery aneurysm. Pelvis: No mass or ascites. Bones/Soft Tissues: No acute abnormality. Lower thorax:  Minimal atelectasis at the lung bases.  Cardiomegaly Localizer images: No additional findings.    IMPRESSION: The gallbladder is distended and thick-walled.  Pericholecystic inflammatory change and fluid is seen.  Dependent stones and/or debris within it.  Common duct appears normal in caliber Findings as are consistent with acute cholecystitis. Minimal basal atelectasis.  Cardiomegaly ACTIONABLE RESULT: FOLLOW-UP Acuity: Actionable Findings: Pancreas/Biliary Routing Code:  PB_1 Recommendation: Unlisted Recommendation (see report) Time Frame: At the discretion of the clinical team. COMMUNICATION: Results will be communicated with the ordering provider via Epic staff message or phone message by Imaging Support Services within 2 business days of report finalization. --END OF FINDING-- : DAKSHA    Transcribe Date/Time: Mar 14 2025  4:33P Dictated by : ANKITA COLÓN MD This examination was interpreted and the report reviewed and electronically signed by: ANKITA COLÓN MD on Mar 14 2025  4:42PM  EST    XR chest 1 view    Result Date: 3/14/2025  * * *Final Report* * * DATE OF EXAM: Mar 14 2025  4:19PM   MDX   5376  -  XR CHEST 1V FRONTAL PORT  / ACCESSION #  [id number] PROCEDURE REASON: Fatigue and malaise      * * * * Physician Interpretation * * * *  EXAMINATION:  CHEST RADIOGRAPH (PORTABLE SINGLE VIEW AP) Exam Date/Time:  3/14/2025 4:19 PM CLINICAL HISTORY: Fatigue and malaise, Fatigue and malaise MQ:  XCPR_5 Comparison:  No previous exams RESULT: Lines, tubes, and devices:  Bipolar pacemaker leads entering from left subclavian region. One superimposes the right atrium and the other the left ventricle. Cardiac monitoring leads also present Lungs and pleura:  No consolidation atelectasis or pleural effusions identified. No evidence of pneumothorax. Pulmonary vascularity normal Cardiomediastinal silhouette:  Cardiomegaly. Other: Bony thorax intact.    IMPRESSION: 1. Cardiomegaly without evidence of CHF.  2. No evidence of pneumonia : DAKSHA   Transcribe Date/Time: Mar 14 2025  4:26P Dictated by : LON MCLEAN MD This examination was interpreted and the report reviewed and electronically signed by: LON MCLEAN MD on Mar 14 2025  4:28PM  EST    I have reviewed the imaging above as it pertains to the patient's surgical concerns and agree with the radiologist's interpretation.  LABS:  No results found for this or any previous visit (from the past 24 hours).  I/O past 24h:  No intake/output data recorded.    I have reviewed all laboratory and imaging results ordered/pertinent for this encounter.     I saw and evaluated the patient. I personally obtained the key and critical portions of the history and physical exam. I reviewed the resident’s documentation and discussed the patient with the resident. I agree with the resident’s medical decision making as documented in the resident’s note.    44M with history of duodenal switch, and Long QT syndrome c/b V fib arrest s/p AICD who presented to an OSH with right upper quadrant pain and was diagnosed with acute cholecystitis. On my evaluation pt reports persistent, sharp, constant right upper quadrant pain that radiates to the back. He is non-toxic. Tender in the right upper quadrant. Labs notable for wbc 16, Tbili 3.3 (direct 0.5). CT a/p (images in PACS) with cholelithiasis and distended and thick-walled gallbladder with small amount of pericholecystitic fluid. US with cholelithiasis, wall thickening, sonographic murphys.     Admit to floor with iv zosyn for acute cholecystitis with tentative plan for laparoscopic cholecystectomy. Primarily indirect hyperbilirubinemia less likely related to choledocolithiasis. However, given the duodenal switch anatomy and technical difficulty of clearing the common bile duct if needed, will order MRCP to evaluate. He will also need a device check of the AICD.    Daniel Queen MD  Trauma, Critical Care, and Acute Care  Surgery  Pager: [phone]

## 2025-03-16 NOTE — ANESTHESIA PROCEDURE NOTES
Airway  Date/Time: 3/16/2025 4:40 PM  Urgency: elective    Airway not difficult    Staffing  Performed: CRNA   Authorized by: Eliot Garsia MD    Performed by: HANNAH Younger-TALHA  Patient location during procedure: OR    Indications and Patient Condition  Indications for airway management: anesthesia and airway protection  Spontaneous ventilation: present  Sedation level: deep  Preoxygenated: yes  Patient position: sniffing  Mask difficulty assessment: 1 - vent by mask    Final Airway Details  Final airway type: endotracheal airway      Successful airway: ETT  Cuffed: yes   Successful intubation technique: direct laryngoscopy  Facilitating devices/methods: intubating stylet  Endotracheal tube insertion site: oral  Blade: Ruiz  Blade size: #4  ETT size (mm): 7.5  Cormack-Lehane Classification: grade I - full view of glottis  Placement verified by: chest auscultation and capnometry   Measured from: lips  Number of attempts at approach: 1  Ventilation between attempts: BVM  Number of other approaches attempted: 0

## 2025-03-16 NOTE — SIGNIFICANT EVENT
Asked to comment on raul-operative management of CIED given planned cholecystectomy with anticipated TABATHA above umbilicus.    He has a secondary prevention Tol[place]ino dcICD implanted 7/2024 after out-of-hospital VF storm c/b cardiogenic shock requiring Impella, with workup showing normal coronaries, reduced biV function with MRI showing no definite myocarditis, genetic testing without pathogenic mutation, and now recovered LVEF on nadolol for possible long QT (though genotype negative for LQTS 1/2/3 and QT normalized).      He has a recent remote device interrogation 2/28/2025 with appropriate lead parameters and device function without recent ventricular arhythmia. He is not pacemaker dependant. Given planned TABATHA above umbilicus in non-dependant patient, recommend use of magnet to disable tachytherapies during case.       Recommendations:  -anesthesiology to tape magnet over ICD pulse generator (left upper chest) for duration of case and to remove when done with case  -placement of electrocautery patch away from left shoulder (so that CIED is far away from electrocautery circuit)  -if develops sustained ventricular arrhythmias during the case, remove magnet so that device will detect and treat tachytherapies (not anticipated)  -avoid QT prolonging medications given hx QT prolongation, now on nadolol      Thank you for the opportunity to contribute to the care of this patient. If further questions arise, please page the EP consult pager at [phone] on weekdays 7AM - 6PM and weekends 7AM - 2PM, or at [phone] at all other times. The EP device nurse can be reached at pager [phone] during regular business hours M-F.        2024 Cardiology perioperative guidelines, Figure 3

## 2025-03-16 NOTE — CARE PLAN
Problem: Pain - Adult  Goal: Verbalizes/displays adequate comfort level or baseline comfort level  Outcome: Progressing     Problem: Safety - Adult  Goal: Free from fall injury  Outcome: Progressing     Problem: Discharge Planning  Goal: Discharge to home or other facility with appropriate resources  Outcome: Progressing     Problem: Chronic Conditions and Co-morbidities  Goal: Patient's chronic conditions and co-morbidity symptoms are monitored and maintained or improved  Outcome: Progressing     Problem: Nutrition  Goal: Nutrient intake appropriate for maintaining nutritional needs  Outcome: Progressing     Problem: Skin  Goal: Participates in plan/prevention/treatment measures  Outcome: Progressing     Problem: Fall/Injury  Goal: Not fall by end of shift  Outcome: Progressing     Problem: Pain  Goal: Performs ADL's with improved pain control throughout shift  Outcome: Progressing   The patient's goals for the shift include      The clinical goals for the shift include      O

## 2025-03-17 ENCOUNTER — PHARMACY VISIT (OUTPATIENT)
Dept: PHARMACY | Facility: CLINIC | Age: 45
End: 2025-03-17
Payer: COMMERCIAL

## 2025-03-17 VITALS
OXYGEN SATURATION: 94 % | HEIGHT: 68 IN | DIASTOLIC BLOOD PRESSURE: 81 MMHG | TEMPERATURE: 94.1 F | HEART RATE: 61 BPM | WEIGHT: 244.2 LBS | RESPIRATION RATE: 16 BRPM | SYSTOLIC BLOOD PRESSURE: 120 MMHG | BODY MASS INDEX: 37.01 KG/M2

## 2025-03-17 LAB
ALBUMIN SERPL BCP-MCNC: 3.1 G/DL (ref 3.4–5)
ALP SERPL-CCNC: 107 U/L (ref 33–120)
ALT SERPL W P-5'-P-CCNC: 62 U/L (ref 10–52)
ANION GAP SERPL CALC-SCNC: 12 MMOL/L (ref 10–20)
AST SERPL W P-5'-P-CCNC: 49 U/L (ref 9–39)
BILIRUB DIRECT SERPL-MCNC: 0 MG/DL (ref 0–0.3)
BILIRUB SERPL-MCNC: 1.3 MG/DL (ref 0–1.2)
BUN SERPL-MCNC: 19 MG/DL (ref 6–23)
CALCIUM SERPL-MCNC: 8.6 MG/DL (ref 8.6–10.6)
CHLORIDE SERPL-SCNC: 100 MMOL/L (ref 98–107)
CO2 SERPL-SCNC: 29 MMOL/L (ref 21–32)
CREAT SERPL-MCNC: 1.31 MG/DL (ref 0.5–1.3)
EGFRCR SERPLBLD CKD-EPI 2021: 68 ML/MIN/1.73M*2
ERYTHROCYTE [DISTWIDTH] IN BLOOD BY AUTOMATED COUNT: 13.5 % (ref 11.5–14.5)
GLUCOSE SERPL-MCNC: 73 MG/DL (ref 74–99)
HCT VFR BLD AUTO: 38.5 % (ref 41–52)
HGB BLD-MCNC: 12.8 G/DL (ref 13.5–17.5)
MAGNESIUM SERPL-MCNC: 1.74 MG/DL (ref 1.6–2.4)
MCH RBC QN AUTO: 29.2 PG (ref 26–34)
MCHC RBC AUTO-ENTMCNC: 33.2 G/DL (ref 32–36)
MCV RBC AUTO: 88 FL (ref 80–100)
NRBC BLD-RTO: 0 /100 WBCS (ref 0–0)
PHOSPHATE SERPL-MCNC: 3.4 MG/DL (ref 2.5–4.9)
PLATELET # BLD AUTO: 135 X10*3/UL (ref 150–450)
POTASSIUM SERPL-SCNC: 3.3 MMOL/L (ref 3.5–5.3)
PROT SERPL-MCNC: 6 G/DL (ref 6.4–8.2)
RBC # BLD AUTO: 4.38 X10*6/UL (ref 4.5–5.9)
SODIUM SERPL-SCNC: 138 MMOL/L (ref 136–145)
WBC # BLD AUTO: 13.8 X10*3/UL (ref 4.4–11.3)

## 2025-03-17 PROCEDURE — 36415 COLL VENOUS BLD VENIPUNCTURE: CPT | Performed by: STUDENT IN AN ORGANIZED HEALTH CARE EDUCATION/TRAINING PROGRAM

## 2025-03-17 PROCEDURE — 80053 COMPREHEN METABOLIC PANEL: CPT | Performed by: STUDENT IN AN ORGANIZED HEALTH CARE EDUCATION/TRAINING PROGRAM

## 2025-03-17 PROCEDURE — 85027 COMPLETE CBC AUTOMATED: CPT | Performed by: STUDENT IN AN ORGANIZED HEALTH CARE EDUCATION/TRAINING PROGRAM

## 2025-03-17 PROCEDURE — 2500000001 HC RX 250 WO HCPCS SELF ADMINISTERED DRUGS (ALT 637 FOR MEDICARE OP): Performed by: STUDENT IN AN ORGANIZED HEALTH CARE EDUCATION/TRAINING PROGRAM

## 2025-03-17 PROCEDURE — G0378 HOSPITAL OBSERVATION PER HR: HCPCS

## 2025-03-17 PROCEDURE — 84100 ASSAY OF PHOSPHORUS: CPT | Performed by: STUDENT IN AN ORGANIZED HEALTH CARE EDUCATION/TRAINING PROGRAM

## 2025-03-17 PROCEDURE — 99238 HOSP IP/OBS DSCHRG MGMT 30/<: CPT | Performed by: PHYSICIAN ASSISTANT

## 2025-03-17 PROCEDURE — RXMED WILLOW AMBULATORY MEDICATION CHARGE

## 2025-03-17 PROCEDURE — 2500000004 HC RX 250 GENERAL PHARMACY W/ HCPCS (ALT 636 FOR OP/ED): Performed by: STUDENT IN AN ORGANIZED HEALTH CARE EDUCATION/TRAINING PROGRAM

## 2025-03-17 PROCEDURE — 83735 ASSAY OF MAGNESIUM: CPT | Performed by: STUDENT IN AN ORGANIZED HEALTH CARE EDUCATION/TRAINING PROGRAM

## 2025-03-17 RX ORDER — OXYCODONE HYDROCHLORIDE 5 MG/1
5 TABLET ORAL EVERY 4 HOURS PRN
Qty: 20 TABLET | Refills: 0 | Status: SHIPPED | OUTPATIENT
Start: 2025-03-17

## 2025-03-17 RX ORDER — ACETAMINOPHEN 325 MG/1
650 TABLET ORAL EVERY 6 HOURS PRN
Qty: 112 TABLET | Refills: 0 | Status: SHIPPED | OUTPATIENT
Start: 2025-03-17 | End: 2025-03-31

## 2025-03-17 RX ADMIN — POLYETHYLENE GLYCOL 3350 17 G: 17 POWDER, FOR SOLUTION ORAL at 08:42

## 2025-03-17 RX ADMIN — ACETAMINOPHEN 650 MG: 160 SOLUTION ORAL at 08:42

## 2025-03-17 RX ADMIN — HEPARIN SODIUM 5000 UNITS: 5000 INJECTION, SOLUTION INTRAVENOUS; SUBCUTANEOUS at 04:30

## 2025-03-17 RX ADMIN — OXYCODONE 5 MG: 5 TABLET ORAL at 04:17

## 2025-03-17 RX ADMIN — NADOLOL 20 MG: 20 TABLET ORAL at 08:42

## 2025-03-17 RX ADMIN — ACETAMINOPHEN 650 MG: 325 TABLET ORAL at 04:17

## 2025-03-17 ASSESSMENT — COGNITIVE AND FUNCTIONAL STATUS - GENERAL
DAILY ACTIVITIY SCORE: 24
MOBILITY SCORE: 24
DAILY ACTIVITIY SCORE: 24
DAILY ACTIVITIY SCORE: 24

## 2025-03-17 ASSESSMENT — PAIN SCALES - GENERAL: PAINLEVEL_OUTOF10: 5 - MODERATE PAIN

## 2025-03-17 NOTE — BRIEF OP NOTE
Date: 3/16/2025  OR Location: [facility] OR    Name: Miguel A Bender, : 1980, Age: 44 y.o., MRN: [id number], Sex: male    Diagnosis  Pre-op Diagnosis      * Cholecystitis [K81.9] Post-op Diagnosis     * Cholecystitis [K81.9]     Procedures  CHOLECYSTECTOMY, LAPAROSCOPIC, WITH CHOLANGIOGRAM  71309 - ND LAPS SURG CHOLECYSTECTOMY W/CHOLANGIOGRAPHY      Surgeons      * Nancy Bond - Primary    Resident/Fellow/Other Assistant:  Surgeons and Role:     * José Manuel Bass MD - Resident - Assisting    Staff:   Circulator: Marceloris  Scrub Person: Karena Boyer Circulator: Tatum Boyer Scrub: Ginger    Anesthesia Staff: Anesthesiologist: Amelia Vizcarra MD; Eliot Garsia MD  CRNA: HANNAH Younger-CRNA  Anesthesia Resident: Chayito Brown MD    Procedure Summary  Anesthesia: General  ASA: II  Estimated Blood Loss: 25mL  Intra-op Medications:   Administrations occurring from 1555 to 1950 on 25:   Medication Name Total Dose   sodium chloride 0.9 % irrigation solution 1,000 mL   BUPivacaine-EPINEPHrine (Marcaine w/EPI) 0.5 %-1:200,000 injection 30 mL   acetaminophen (Tylenol) tablet 650 mg Cannot be calculated   heparin (porcine) injection 5,000 Units Cannot be calculated   naloxone (Narcan) injection 0.2 mg Cannot be calculated   oxyCODONE (Roxicodone) immediate release tablet 10 mg Cannot be calculated   oxyCODONE (Roxicodone) immediate release tablet 5 mg Cannot be calculated   piperacillin-tazobactam (Zosyn) 3.375 g in dextrose (iso) IV 50 mL Cannot be calculated   ceFAZolin (Ancef) vial 1 g 2 g   fentaNYL PF 0.05 mg/mL 100 mcg   HYDROmorphone (Dilaudid) injection 1 mg/mL 1.4 mg   ketorolac (Toradol) 15 mg 15 mg   LR bolus Cannot be calculated   lidocaine (Xylocaine) injection 2 % 100 mg   midazolam PF (Versed) injection 1 mg/mL 2 mg   ondansetron 2 mg/mL 4 mg   propofol (Diprivan) injection 10 mg/mL 200 mg   rocuronium (ZeMuron) 50 mg/5 mL injection 80 mg   succinylcholine 100 mg/5 mL syringe 140 mg    sugammadex (Bridion) 200 mg/2 mL injection 400 mg              Anesthesia Record               Intraprocedure I/O Totals          Intake    LR bolus 1700.00 mL    Total Intake 1700 mL          Specimen:   ID Type Source Tests Collected by Time   1 : Gallbladder Tissue GALLBLADDER CHOLECYSTECTOMY SURGICAL PATHOLOGY EXAM Nancy Bond MD 3/16/2025 1905                  Findings: Mild adhesion from prior surgery. Significantly inflamed gallbladder with thick rind and adhesion to surrounding liver, intestine and omentum consistent with cholecystitis. Cystic duct and artery each double clipped.     Complications:  None; patient tolerated the procedure well.     Disposition: PACU - hemodynamically stable.  Condition: stable  Specimens Collected:   ID Type Source Tests Collected by Time   1 : Gallbladder Tissue GALLBLADDER CHOLECYSTECTOMY SURGICAL PATHOLOGY EXAM Nancy Bond MD 3/16/2025 1905     Attending Attestation:     Nancy Bond  Phone Number: [phone]

## 2025-03-17 NOTE — CARE PLAN
The patient's goals for the shift include      The clinical goals for the shift include patient reportes adequate pain control during shift    Problem: Pain - Adult  Goal: Verbalizes/displays adequate comfort level or baseline comfort level  Outcome: Progressing     Problem: Safety - Adult  Goal: Free from fall injury  Outcome: Progressing     Problem: Discharge Planning  Goal: Discharge to home or other facility with appropriate resources  Outcome: Progressing     Problem: Nutrition  Goal: Nutrient intake appropriate for maintaining nutritional needs  Outcome: Progressing     Problem: Skin  Goal: Prevent/minimize sheer/friction injuries  Outcome: Progressing     Problem: Fall/Injury  Goal: Not fall by end of shift  Outcome: Progressing     Problem: Fall/Injury  Goal: Be free from injury by end of the shift  Outcome: Progressing     Problem: Pain  Goal: Turns in bed with improved pain control throughout the shift  Outcome: Progressing     Problem: Pain  Goal: Performs ADL's with improved pain control throughout shift  Outcome: Progressing

## 2025-03-17 NOTE — ANESTHESIA POSTPROCEDURE EVALUATION
Patient: Miguel A Bender    Procedure Summary       Date: 03/16/25 Room / Location: [facility] OR 11 / Virtual [facility] OR    Anesthesia Start: 1629 Anesthesia Stop: 2000    Procedure: CHOLECYSTECTOMY, LAPAROSCOPIC, WITH CHOLANGIOGRAM (Abdomen) Diagnosis:       Cholecystitis      (Cholecystitis [K81.9])    Surgeons: Nancy Bond MD Responsible Provider: Amelia Vizcarra MD    Anesthesia Type: general ASA Status: 2 - Emergent            Anesthesia Type: general    Vitals Value Taken Time   /71 03/16/25 1954   Temp 35.5 03/16/25 2000   Pulse 61 03/16/25 1958   Resp 17 03/16/25 1958   SpO2 98 % 03/16/25 1958   Vitals shown include unfiled device data.    Anesthesia Post Evaluation    Patient location during evaluation: PACU  Patient participation: complete - patient participated  Level of consciousness: awake and alert  Pain management: adequate  Airway patency: patent  Cardiovascular status: acceptable  Respiratory status: acceptable  Hydration status: acceptable  Postoperative Nausea and Vomiting: none        No notable events documented.

## 2025-03-17 NOTE — DISCHARGE INSTRUCTIONS
YOU ARE GOING HOME WITH A DRAIN, PLEASE EMPTY AND RECORD YOUR DRAIN OUTPUT DAILY  BRING THIS RECORD TO YOUR CLINIC APPOINTMENT.       Please follow up with your primary care provider at next available appointment following your hospital stay.     To schedule a follow up appointment with the Acute Care Surgery/ Trauma clinic, please call [phone] to schedule follow up appointment. This is not an emergency number, so if you have an emergency, please go to the Emergency Room. However, if you have questions regarding your care, upcoming appointments, etc, please do not hesitate to call the above number. Thank you!     Please go to the nearest hospital emergency room if you are experiencing: worsening abdominal pain, increasing abdominal distention, fevers, inability to tolerate food and drink (vomit every time you eat), nausea/vomiting, loss of bowel function (unable to pass gas or have bowel movements).     If you notice increased redness, tenderness or drainage around your surgical sites/wounds, or if you notice foul smelling drainage from your wounds please call the trauma clinic or go to the nearest to the emergency room.          Wound Care: Laparoscopic incisions    Keep the area dry:  Do not let the Dermabond get wet for the first 24-48 hours. After that, it's typically okay to gently wash the area with mild soap and water, but avoid scrubbing directly on the incision site.  Avoid soaking in water (like swimming, hot tubs, or baths) for at least 1-2 weeks or as directed by your surgeon.  Avoid pulling or stretching the skin:  Try not to put excessive tension on the skin around the incision (for example, by lifting heavy objects or stretching during activities).  Do not pick at or scratch the Dermabond. Let it naturally slough off as it starts to peel away (usually within 5-10 days).  Monitor for signs of infection:  Watch for redness, warmth, swelling, or increased pain around the incision site.  Clear or  light drainage is normal in the first few days, but if you notice pus, foul odor, or excessive drainage, contact our clinic.   If you develop a fever or feel unwell, contact our clinic.    No direct sun exposure:  Avoid exposing the incision area to direct sunlight for several months to prevent scarring.  If the incision site is exposed to the sun, apply sunscreen once the Dermabond has fallen off.  Avoid tight clothing over the incisions:  Tight clothes or bands can rub on the Dermabond and may cause irritation or premature removal. Wear loose, comfortable clothing that won't disturb the incision site.  Do not apply ointments, creams, or other products to the incision unless directed by your doctor:  Avoid using anything like Neosporin, alcohol, or hydrogen peroxide on the Dermabond, as these can disrupt the healing process.  What to expect:  Over time, the Dermabond will start to peel or flake off naturally. It usually comes off in 5-10 days.  Follow-up care:  Attend scheduled follow-up appointments to ensure that the wound is healing properly and to address any concerns.  If you notice any abnormal changes or have questions, don't hesitate to reach out to our office, [phone].  When to Seek Immediate Medical Attention  Severe redness, warmth, or swelling at the incision site  Excessive pain that is not relieved by prescribed pain medications  Signs of infection like pus or foul-smelling drainage  Fever above 100.4°F (38°C)

## 2025-03-17 NOTE — PROGRESS NOTES
03/17/25 1027   Discharge Planning   Living Arrangements Spouse/significant other   Support Systems Spouse/significant other   Assistance Needed None   Home or Post Acute Services None   Expected Discharge Disposition Home   Does the patient need discharge transport arranged? Yes   RoundTrip coordination needed? Yes   Has discharge transport been arranged? No   Financial Resource Strain   How hard is it for you to pay for the very basics like food, housing, medical care, and heating? Not very   Housing Stability   In the last 12 months, was there a time when you were not able to pay the mortgage or rent on time? N   Transportation Needs   In the past 12 months, has lack of transportation kept you from medical appointments or from getting medications? no       DC Planning:  Went in and met with the pt, confirmed demographics.     Transitional Care Coordination Progress Note:  Patient discussed during interdisciplinary rounds.     Plan per Medical/Surgical team:    Home Care choice for home going needs, [place] [address]  Home. No home going needs anticipated for this pt at this time.        Discharge disposition: Home. No home going needs anticipated for this pt at this time.      Potential Barriers: None    ADOD:  3/17    This TCC will continue to follow for home going needs and safe DC plan.      Eli Lee. PINEDA. TCC.

## 2025-03-17 NOTE — SIGNIFICANT EVENT
POST OP CHECK NOTE    Subjective  Miguel A Bender is a  44 y.o. male now POD 0 s/p laparoscopic cholecystectomy with cholangiogram.. Pt tolerated the procedure well and was extubated prior to being brought to PACU    Pain is well controlled on current pain regimen. No complaints of headaches, N/V, chest pain, SOB.     Drains: RLQ CELIA drain  PIV: x2  Drips: LR at 75 ml/hr    Objective  Vitals:    03/16/25 2114   BP: 132/84   Pulse: 55   Resp: 12   Temp: 35.8 °C (96.4 °F)   SpO2: 94%       Physical Exam:  General: laying bed, in NAD  CV/Resp: regular rate, breathing comfortably on room air, no accessory muscle use  Abdomen/GI: Soft, mildly distended, mildly tender. Dermabond over port sites. CELIA serosanguinous.  Neuro: MAEX4    Assessment/Plan:  Miguel A Bender is a  44 y.o. male now POD 0 s/p laparoscopic cholecystectomy with cholangiogram.    - Multimodal pain control   - Diet: Regular  - Morning labs: ordered  -Q4 vitals checks   -strict I/Os  -DVT Ppx:  SCDs (SQH to be started in the AM)     Will continue to monitor  overnight.     Graciela Doe MD PGY-1  General Surgery Night Float  Surgical Oncology Georgina/Dewayne: [id number]  Acute Care Surgery: [id number]  Abdominal Transplant Surgery: [id number]    I am the night resident, I can only be reached 6pm-6am. Epic chat preferred.   If no response, for emergencies, please page the appropriate pager.

## 2025-03-17 NOTE — OP NOTE
CHOLECYSTECTOMY, LAPAROSCOPIC, WITH CHOLANGIOGRAM Operative Note     Date: 3/16/2025  OR Location: [facility] OR    Name: Miguel A Bender, : 1980, Age: 44 y.o., MRN: [id number], Sex: male    Diagnosis  Pre-op Diagnosis      * Cholecystitis [K81.9] Post-op Diagnosis     * Cholecystitis [K81.9]     Procedures  CHOLECYSTECTOMY, LAPAROSCOPIC, WITH CHOLANGIOGRAM  89579 - [place] LAPS SURG CHOLECYSTECTOMY W/CHOLANGIOGRAPHY      Surgeons      * Nancy Bond - Primary    Resident/Fellow/Other Assistant:  Surgeons and Role:     * José Manuel Bass MD - Resident - Assisting    Staff:   Circulator: Marceloris  Scrub Person: Karena  Relief Circulator: Tatum Boyer Scrub: Ginger    Anesthesia Staff: Anesthesiologist: Amelia Vizcarra MD; Eliot Garsia MD  CRNA: HANNAH Younger-CRNA  Anesthesia Resident: Chayito Brown MD    Procedure Summary  Anesthesia: General  ASA: II  Estimated Blood Loss: 20mL  Intra-op Medications:   Administrations occurring from 1555 to 1950 on 25:   Medication Name Total Dose   sodium chloride 0.9 % irrigation solution 1,000 mL   BUPivacaine-EPINEPHrine (Marcaine w/EPI) 0.5 %-1:200,000 injection 30 mL   acetaminophen (Tylenol) tablet 650 mg Cannot be calculated   heparin (porcine) injection 5,000 Units Cannot be calculated   naloxone (Narcan) injection 0.2 mg Cannot be calculated   oxyCODONE (Roxicodone) immediate release tablet 10 mg Cannot be calculated   oxyCODONE (Roxicodone) immediate release tablet 5 mg Cannot be calculated   piperacillin-tazobactam (Zosyn) 3.375 g in dextrose (iso) IV 50 mL Cannot be calculated   ceFAZolin (Ancef) vial 1 g 2 g   fentaNYL PF 0.05 mg/mL 100 mcg   HYDROmorphone (Dilaudid) injection 1 mg/mL 1.4 mg   ketorolac (Toradol) 15 mg 15 mg   LR bolus Cannot be calculated   lidocaine (Xylocaine) injection 2 % 100 mg   midazolam PF (Versed) injection 1 mg/mL 2 mg   ondansetron 2 mg/mL 4 mg   propofol (Diprivan) injection 10 mg/mL 200 mg   rocuronium (ZeMuron) 50  mg/5 mL injection 80 mg   succinylcholine 100 mg/5 mL syringe 140 mg   sugammadex (Bridion) 200 mg/2 mL injection 400 mg              Anesthesia Record               Intraprocedure I/O Totals          Intake    LR bolus 1700.00 mL    Total Intake 1700 mL          Specimen:   ID Type Source Tests Collected by Time   1 : Gallbladder Tissue GALLBLADDER CHOLECYSTECTOMY SURGICAL PATHOLOGY EXAM Nancy Bond MD 3/16/2025 1905                 Drains and/or Catheters:   Closed/Suction Drain 1 RLQ Bulb 19 Fr. (Active)   Site Description Unable to view 03/16/25 2000   Dressing Status Clean;Dry 03/16/25 2000   Drainage Appearance Serous 03/16/25 2000   Status To bulb suction 03/16/25 2000   Output (mL) 20 mL 03/16/25 2000       Findings: Gallbladder with severe inflammation consistent with cholecystitis.    Indications: Miguel A Bender is an 44 y.o. male who is having surgery for Cholecystitis [K81.9].  Patient was initially evaluated at [facility] in [facility] where he was diagnosed with acute cholecystitis.  He was subsequently transferred to [facility] for further management given his history of prolonged QTc resulting in prior cardiac arrest as well as his prior bariatric procedures.  Given the patient's right upper quadrant abdominal pain, worsening leukocytosis, and mild total hyperbilirubinemia, we proceeded with operative intervention to mitigate his risk of an open procedure and subsequent complications.  Consent was obtained from the patient with all potential risks and complications explained.    The patient was seen in the preoperative area. The risks, benefits, complications, treatment options, non-operative alternatives, expected recovery and outcomes were discussed with the patient. The possibilities of reaction to medication, pulmonary aspiration, injury to surrounding structures, bleeding, recurrent infection, the need for additional procedures, failure to diagnose a condition, and creating a complication requiring  transfusion or operation were discussed with the patient. The patient concurred with the proposed plan, giving informed consent.  The site of surgery was properly noted/marked if necessary per policy. The patient has been actively warmed in preoperative area. Preoperative antibiotics have been ordered and given within 1 hours of incision. Venous thrombosis prophylaxis have been ordered including bilateral sequential compression devices    Procedure Details: Patient was taken to the operative suite where he was placed in supine position and underwent induction of general anesthesia without complications.  He was then prepped and draped in the typical sterile fashion.  Prior to incision, timeout was observed by the surgical team verifying the patient's identity, positioning, and type of procedure to be performed.  Local anesthetic was injected into the skin and subcutaneous tissues immediately superior to the umbilicus.  A 15 blade scalpel was used to make a transverse incision overlying a prior surgical scar.  The underlying subcutaneous tissues and fascia were subsequently divided in order to accommodate a Bernard trocar which was inserted into the peritoneal cavity using open technique.  The peritoneal cavity was subsequently inflated to an intra-abdominal pressure of 15 mmHg.  Next, a 10 mm 30 degree laparoscope was inserted through this port in order to inspect the abdominal cavity and its contents.  We immediately appreciated significant inflammatory changes in the right upper quadrant consistent with the patient's preoperative diagnosis.  Following inspection, 3 additional trocars were inserted under direct visualization: A 12 mm port in the epigastrium followed by two 5 mm ports in the right upper quadrant of the anterior abdominal wall. Two atraumatic graspers were inserted through the 5 mm ports and were used to peel the omentum and rind which had developed over the dome of the gallbladder.  The gallbladder  itself was erythematous and edematous with a moderate amount of free fluid.  Additional adhesions were taken down gently in order to facilitate mobilization of the gallbladder laterally and superiorly for further dissection.    After obtaining adequate surgical exposure, a [place] dissector was used to skeletonized the cystic duct.  This took a fair amount of time given the degree of inflammation involving the neck of the gallbladder and surrounding adhesions.  Once complete, the cystic artery was noted to be slightly posterior and medial to the cystic duct.  After completing our critical view, three 10 mm clips were applied to the cystic duct and it was divided in standard fashion.  The cystic artery was subsequently clipped as well and divided.  The gallbladder was then removed from the cystic plate using electrocautery.  It should be noted that the gallbladder was quite friable and multiple gallstones were released into the surgical field during this portion of the procedure.  The stones were removed via a stone scoop.  Once the gallbladder was released from the liver bed, it was collected via an Endo Catch specimen bag and removed from the abdominal cavity.  The surgical field was  inspected for hemostasis and a small amount of bleeding noted along the liver bed was easily cauterized.  Next, the surgical field was thoroughly irrigated with approximately 3 L of saline in order to facilitate an adequate washout as well as retrieval of the gallstones which had been lost.  Once confident all of the stones had been retrieved, a 19 Mozambican round helpless Thai drain was inserted into the abdominal cavity within the gallbladder fossa and brought out through the lower 5 mm surgical incision.  It was secured with a 2-0 nylon suture.  1 last inspection was performed.  Satisfied with the degree of hemostasis, all of the ports were removed and the pneumoperitoneum released.  The larger fascial defects were closed with  running 0 Vicryl suture.  All skin incisions were closed with 4-0 Monocryl.  Skin glue was placed on all of the open incisional wounds and a drain sponge was applied around the drains insertion site.  Upon completion of the procedure, the patient was revived from general anesthesia and transferred off of the operating room table onto his bed for transport to the PACU.  He tolerated the procedure well.    Complications:  None; patient tolerated the procedure well.    Disposition: PACU - hemodynamically stable.  Condition: stable     Attending Attestation: I was present and scrubbed for the entire procedure.    Nancy Bond  Phone Number: [phone]

## 2025-03-18 ENCOUNTER — PATIENT OUTREACH (OUTPATIENT)
Dept: PRIMARY CARE | Facility: CLINIC | Age: 45
End: 2025-03-18
Payer: COMMERCIAL

## 2025-03-18 NOTE — PROGRESS NOTES
Discharge Facility:[facility]   Discharge Diagnosis:Cholecystitis   Admission Date:3/16/25  Discharge Date: 3/17/25    PCP Appointment Date:  Specialist Appointment Date: Neurology and General Surgery   Hospital Encounter and Summary Linked: Yes  See discharge assessment below for further details  Admission (Discharged) with Nancy Bond MD (03/15/2025)     Wrap Up  Wrap Up Additional Comments: This CM spoke with pt via phone. Pt reports doing well at home since discharge. New meds reviewed. Pt denies CP and SOB. Patient denies any further discharge questions/needs at this time. Emphasized that Follow up is needed after discharge to review the hospital recommendations, assess your response to your treatment. Pt aware of my availability for non-emergent concerns. Contact info provided to patient. (3/18/2025  1:00 PM)    Engagement  Call Start Time: 1300 (3/18/2025  1:00 PM)    Medications  Medications reviewed with patient/caregiver?: Yes (3/18/2025  1:00 PM)  Is the patient having any side effects they believe may be caused by any medication additions or changes?: No (3/18/2025  1:00 PM)  Does the patient have all medications ordered at discharge?: Yes (3/18/2025  1:00 PM)  Prescription Comments: START taking: acetaminophen (Tylenol) oxyCODONE (Roxicodone) (3/18/2025  1:00 PM)  Is the patient taking all medications as directed (includes completed medication regime)?: Yes (3/18/2025  1:00 PM)  Medication Comments: see med list (3/18/2025  1:00 PM)    Appointments  Does the patient have a primary care provider?: Yes (3/18/2025  1:00 PM)  Care Management Interventions: Educated patient on importance of making appointment; Advised patient to make appointment (3/18/2025  1:00 PM)  Has the patient kept scheduled appointments due by today?: Yes (3/18/2025  1:00 PM)  Care Management Interventions: Advised to schedule with specialist (General Surgery and Neurology) (3/18/2025  1:00 PM)    Patient Teaching  Does the patient  have access to their discharge instructions?: Yes (3/18/2025  1:00 PM)  Care Management Interventions: Reviewed instructions with patient (3/18/2025  1:00 PM)  What is the patient's perception of their health status since discharge?: Improving (3/18/2025  1:00 PM)  Is the patient/caregiver able to teach back the hierarchy of who to call/visit for symptoms/problems? PCP, Specialist, Home Health nurse, Urgent Care, ED, 911: Yes (3/18/2025  1:00 PM)          Cyndee Callaway LPN

## 2025-03-24 NOTE — PROGRESS NOTES
[facility]  TRAUMA CLINIC PROGRESS NOTE    Patient Name: Miguel A Bender  MRN: [id number]  Admit Date:   : 1980  AGE: 44 y.o.   GENDER: male  ==============================================================================  CHIEF COMPLAINT:   Cholecystitis (3/15/2025 - 3/17/2025)    OTHER MEDICAL PROBLEMS:  VT cardiac arrest (s/p dual chamber ICD)  hx of Akash en Y  seizures  CHARLEEN  HTN     INCIDENTAL FINDINGS:  none    PROCEDURES:  2025: lap cholecystectomy     PATHOLOGY:  FINAL DIAGNOSIS   A. Gallbladder, Subtotal Cholecystectomy:   -- Gangrenous (acute) cholecystitis with cholelithiasis.   -- One benign lymph node.        ==============================================================================  TODAY'S ASSESSMENT AND PLAN OF CARE:  DRAIN REMOVAL  - drain removed without issue  - ok to shower, wash with warm,soapy water and pat dry  - you may continue to have some drainage from this site, therefore you should keep the area covered  - once the drainage discontinues, ok to keep open to air  - if you notice creamy/foul smelling drainage, increased pain, increased redness, please return to the clinic     WOUND CARE  - Take daily showers  - Allow warm, soapy water to wash over wound  - Do not scrub at the wound  - When out of the shower, gently pat the wound dry.  - Do not apply lotions, ointments or creams  - Avoid soaking in bodies of water (bathtub, hot tubs, pools, lakes, etc) until wound is completely healed  - No heavy lifting > 15 lbs until 6 weeks after surgery    FOLLOW UP/CALL  - No trauma/ACS follow up   - May return to work or school on patient states he is back to work  - Return to clinic or ER sooner if pt. has any development of erythema, drainage, swelling, pain, fevers, or chills  - If you have questions or concerns that are not urgent, please feel free to call  [phone].  - Call [phone] to make additional appointment(s) as needed if unable to  reschedule in office today    ==============================================================================  HISTORY OF PRESENT ILLNESS  Patient is a 44 yom history of VT cardiac arrest (s/p dual chamber ICD), hx of Akash en Y, seizures, CHARLEEN, and HTN who initially presented to [place] for RLQ pain that radiates up since Thursday (3/13). He was transferred to [facility] due to cardiac comorbidities and surgical history at . OSH CT c/f cholecystitis. He went to the OR with Dr Bond for lap cholecystectomy , drain placed, skin closed with tissue glue. He was discharged home. Since discharge he has been doing well. Reports minimal output from drain.   Patient is eating, drinking, voiding and having flatus, bowel movements.     MEDICAL HISTORY / ROS:  Admission history and ROS reviewed.   Patient denies:  fevers; chills; headache;  dizziness; chest pain; shortness of breath; nausea/vomiting/diarrhea/constipation; new/worsening abdominal pain or numbness/tingling/weakness of extremities.   Pertinent changes as follows:  NA    PHYSICAL EXAM:  GCS 15, A+OX3, RRR, S1, S2, CTA=, no increased WOB. Abd soft, nt, nd. MAEx4, TAHMINA 5/5 x4, no extremity edema noted. 2+pp. Well approximated lap sites x3 noted, with dermabond. CELIA drain from RLQ removed without issue.      LABS:  No results found for this or any previous visit (from the past 24 hours).  MEDICATIONS:  Current Outpatient Medications   Medication Sig Dispense Refill    acetaminophen (Tylenol) 325 mg tablet Take 2 tablets (650 mg) by mouth every 6 hours if needed for mild pain (1 - 3) for up to 14 days. 112 tablet 0    beta carotene (vitamin A) 3,000 mcg (10,000 unit) capsule Take 1 capsule (10,000 Units) by mouth once daily.      ergocalciferol (Vitamin D-2) 1.25 MG (67898 UT) capsule Take 1 capsule (1,250 mcg) by mouth 1 (one) time per week. 12 capsule 3    multivitamin tablet Take 1 tablet by mouth once daily.      nadolol (Corgard) 20 mg tablet Take 1 tablet (20 mg) by  mouth once daily. 90 tablet 3    oxyCODONE (Roxicodone) 5 mg immediate release tablet Take 1 tablet (5 mg) by mouth every 4 hours if needed for severe pain (7 - 10). 20 tablet 0     No current facility-administered medications for this visit.       IMAGING SUMMARY:  (summary of new imaging findings, not a copy of dictation)  NA    I have reviewed all laboratory and imaging results ordered/pertinent for today's encounter.

## 2025-03-25 LAB
LABORATORY COMMENT REPORT: NORMAL
PATH REPORT.FINAL DX SPEC: NORMAL
PATH REPORT.GROSS SPEC: NORMAL
PATH REPORT.RELEVANT HX SPEC: NORMAL
PATH REPORT.TOTAL CANCER: NORMAL

## 2025-04-02 ENCOUNTER — APPOINTMENT (OUTPATIENT)
Dept: SURGERY | Facility: CLINIC | Age: 45
End: 2025-04-02
Payer: COMMERCIAL

## 2025-04-02 VITALS
DIASTOLIC BLOOD PRESSURE: 80 MMHG | HEART RATE: 70 BPM | BODY MASS INDEX: 37.24 KG/M2 | WEIGHT: 244.9 LBS | SYSTOLIC BLOOD PRESSURE: 136 MMHG | TEMPERATURE: 97.5 F

## 2025-04-02 DIAGNOSIS — Z09 POSTOP CHECK: Primary | ICD-10-CM

## 2025-04-02 PROCEDURE — 99024 POSTOP FOLLOW-UP VISIT: CPT | Performed by: PHYSICIAN ASSISTANT

## 2025-04-02 ASSESSMENT — PAIN SCALES - GENERAL: PAINLEVEL_OUTOF10: 4

## 2025-04-03 ENCOUNTER — PATIENT OUTREACH (OUTPATIENT)
Dept: PRIMARY CARE | Facility: CLINIC | Age: 45
End: 2025-04-03
Payer: COMMERCIAL

## 2025-04-03 NOTE — PROGRESS NOTES
Call regarding appt. with PCP on or after hospitalization.  At this time no follow up appt was made.  At time of outreach call the patient feels as if their condition has improved since last visit.  No questions or concerns at this time.

## 2025-04-28 ENCOUNTER — HOSPITAL ENCOUNTER (OUTPATIENT)
Dept: CARDIOLOGY | Facility: CLINIC | Age: 45
Discharge: HOME | End: 2025-04-28
Payer: COMMERCIAL

## 2025-04-28 DIAGNOSIS — Z95.810 ICD (IMPLANTABLE CARDIOVERTER-DEFIBRILLATOR) IN PLACE: ICD-10-CM

## 2025-04-28 DIAGNOSIS — I47.20 VT (VENTRICULAR TACHYCARDIA) (MULTI): ICD-10-CM

## 2025-04-28 PROCEDURE — 93296 REM INTERROG EVL PM/IDS: CPT

## 2025-04-28 PROCEDURE — 93295 DEV INTERROG REMOTE 1/2/MLT: CPT | Performed by: STUDENT IN AN ORGANIZED HEALTH CARE EDUCATION/TRAINING PROGRAM

## 2025-05-15 ENCOUNTER — PATIENT OUTREACH (OUTPATIENT)
Dept: PRIMARY CARE | Facility: CLINIC | Age: 45
End: 2025-05-15
Payer: COMMERCIAL

## 2025-06-13 ENCOUNTER — PATIENT OUTREACH (OUTPATIENT)
Dept: PRIMARY CARE | Facility: CLINIC | Age: 45
End: 2025-06-13
Payer: COMMERCIAL

## 2025-07-16 ASSESSMENT — PROMIS GLOBAL HEALTH SCALE
EMOTIONAL_PROBLEMS: SOMETIMES
RATE_QUALITY_OF_LIFE: GOOD
RATE_SOCIAL_SATISFACTION: GOOD
RATE_MENTAL_HEALTH: GOOD
CARRYOUT_SOCIAL_ACTIVITIES: GOOD
RATE_AVERAGE_FATIGUE: MILD
RATE_GENERAL_HEALTH: GOOD
RATE_PHYSICAL_HEALTH: FAIR
RATE_AVERAGE_PAIN: 0
CARRYOUT_PHYSICAL_ACTIVITIES: MODERATELY

## 2025-07-16 NOTE — PROGRESS NOTES
Subjective   Patient ID: Miguel A Bender is a 44 y.o. male who presents for CPE    HPI   Over the past 6 months, the patient reports continued chronic occasional episodes of discomfort located at the ICD incision site.  He reports no precipitating, exacerbating, or relieving factors..  He reports no radiation of discomfort.  He reports no other associated symptoms.  Over the past 6 months, he reports a decrease in the frequency and intensity of the episodes.  No other new complaints.  Review of Systems  All systems have been reviewed and are normal except as previously noted  Objective   There were no vitals taken for this visit.    Physical Exam  Head-palpation revealed no tenderness over the maxillary or frontal sinuses  Eyes-extraocular movements intact pupils equal and reactive to light; fundi revealed good retinal color no hemorrhages or exudates  Ears-palpation of pinnas and traguses revealed no tenderness. External auditory canals not erythematous or swollen.  Left TM clear; right TM not visualized secondary to impacted cerumen  Nose-turbinates not erythematous or swollen ; nasal septal deviation noted to the left.  Mouth-posterior pharynx is not erythematous or swollen. Tonsillar pillars appeared normal no exudates  Neck no lymphadenopathy. Thyroid gland not enlarged. , No bruits  Breasts-bilateral gynecomastia noted.  Lungs-clear to auscultation bilaterally  Cardiac-rate normal rhythm regular no murmurs no JVD  Abdomen-soft, mildly obese.  Normal active bowel sounds. Palpation revealed no tenderness or masses  Pulses 2+ bilaterally in the upper extremities; 0 bilaterally in the lower extremities  Extremities-no peripheral edema  Musculoskeletal  Chest-no erythema or swelling, Full range of motion in all directions of motion with no pain. Palpation revealed no tenderness or increase in warmth  Neurologic  Mental status-alert and oriented x3   Cranial nerves-2 through 12 grossly intact, no visual field  abnormalities  Motor-no pronator drift noted, strength-5/5 in all muscle groups tested, , no tremor noted.  No bradykinesia noted.  No rigidity noted.  Negative pull test  Sensory-Light touch sensation fully intact  Pinprick sensation fully intact  Vibratory sensation fully intact  Cerebellar-no truncal ataxia, good coordination finger-nose testing,, good coordination heel-to-shin testing, normal rapid alternating movements  Romberg negative, no abnormality in tandem gait  Reflexes-1+/4 bilaterally  Assessment/Plan   Problem List Items Addressed This Visit           ICD-10-CM    Essential hypertension I10    Relevant Orders    CBC and Auto Differential    Comprehensive Metabolic Panel    C-Reactive Protein, High Sensitivity    Lipid Panel    Prostate Specific Antigen    Thyroid Stimulating Hormone    Vitamin B12    Vitamin D 25-Hydroxy,Total (for eval of Vitamin D levels)    Mixed hyperlipidemia E78.2    Relevant Orders    CBC and Auto Differential    Comprehensive Metabolic Panel    C-Reactive Protein, High Sensitivity    Lipid Panel    Prostate Specific Antigen    Thyroid Stimulating Hormone    Vitamin B12    Vitamin D 25-Hydroxy,Total (for eval of Vitamin D levels)    Obstructive sleep apnea syndrome G47.33    Relevant Orders    CBC and Auto Differential    Comprehensive Metabolic Panel    C-Reactive Protein, High Sensitivity    Lipid Panel    Prostate Specific Antigen    Thyroid Stimulating Hormone    Vitamin B12    Vitamin D 25-Hydroxy,Total (for eval of Vitamin D levels)    ICD (implantable cardioverter-defibrillator) in place Z95.810    Relevant Orders    CBC and Auto Differential    Comprehensive Metabolic Panel    C-Reactive Protein, High Sensitivity    Lipid Panel    Prostate Specific Antigen    Thyroid Stimulating Hormone    Vitamin B12    Vitamin D 25-Hydroxy,Total (for eval of Vitamin D levels)     Other Visit Diagnoses         Codes      Routine general medical examination at a health care facility     -  Primary Z00.00    Relevant Orders    CBC and Auto Differential    Comprehensive Metabolic Panel    C-Reactive Protein, High Sensitivity    Lipid Panel    Prostate Specific Antigen    Thyroid Stimulating Hormone    Vitamin B12    Vitamin D 25-Hydroxy,Total (for eval of Vitamin D levels)          Assessment  Chronic occasional episodes of discomfort located at the ICD incision site-May be secondary to neuromuscular chest discomfort  HFrEF  V-fib cardiac arrest complicated by cardiogenic shock July 10, 2024, status post ICD placement July 26, 2024  Long QT syndrome  Hypertension-diastolic blood pressure on first measurement borderline.  Acute kidney injury secondary to cardiac arrest  Cholecystitis status post laparoscopic cholecystectomy March 16, 2025  Status post gastric bypass surgery 2022  Biceps tendon tear left upper extremity status post repair 2012 or 2014   History of presence of a cyst located midportion left upper parietal region-may have represented an inflamed epidermal cyst  Mild diffuse encephalopathy July 14, 2024       Plan  Obtain CBC differential, CMP, fasting lipid profile, TSH, vitamin D level, vitamin B12 level, PSA, cardiac CRP as soon as possible.  Obtain urinalysis today.  I will refer the patient for a screening colonoscopy.  I believe that the patient would probably be a good candidate for initiation of therapy with a GLP-1 agonist  He will continue his current dosages of vitamin D and nadolol at this time.  He will return for a blood pressure check in 6 to 8 weeks

## 2025-07-17 ENCOUNTER — APPOINTMENT (OUTPATIENT)
Dept: PRIMARY CARE | Facility: CLINIC | Age: 45
End: 2025-07-17
Payer: COMMERCIAL

## 2025-07-17 VITALS
SYSTOLIC BLOOD PRESSURE: 120 MMHG | DIASTOLIC BLOOD PRESSURE: 90 MMHG | BODY MASS INDEX: 38.16 KG/M2 | HEART RATE: 60 BPM | WEIGHT: 251 LBS

## 2025-07-17 DIAGNOSIS — Z99.11 DEPENDENCE ON RESPIRATOR (VENTILATOR) STATUS (MULTI): ICD-10-CM

## 2025-07-17 DIAGNOSIS — R56.9 SEIZURE-LIKE ACTIVITY (MULTI): ICD-10-CM

## 2025-07-17 DIAGNOSIS — E78.2 MIXED HYPERLIPIDEMIA: ICD-10-CM

## 2025-07-17 DIAGNOSIS — I10 ESSENTIAL HYPERTENSION: ICD-10-CM

## 2025-07-17 DIAGNOSIS — G47.33 OBSTRUCTIVE SLEEP APNEA SYNDROME: ICD-10-CM

## 2025-07-17 DIAGNOSIS — Z95.810 ICD (IMPLANTABLE CARDIOVERTER-DEFIBRILLATOR) IN PLACE: ICD-10-CM

## 2025-07-17 DIAGNOSIS — Z00.00 ROUTINE GENERAL MEDICAL EXAMINATION AT A HEALTH CARE FACILITY: Primary | ICD-10-CM

## 2025-07-17 LAB
POC APPEARANCE, URINE: CLEAR
POC BILIRUBIN, URINE: NEGATIVE
POC BLOOD, URINE: ABNORMAL
POC COLOR, URINE: YELLOW
POC GLUCOSE, URINE: NEGATIVE MG/DL
POC KETONES, URINE: NEGATIVE MG/DL
POC LEUKOCYTES, URINE: NEGATIVE
POC NITRITE,URINE: NEGATIVE
POC PH, URINE: 5.5 PH
POC PROTEIN, URINE: ABNORMAL MG/DL
POC SPECIFIC GRAVITY, URINE: 1.02
POC UROBILINOGEN, URINE: 0.2 EU/DL

## 2025-07-17 PROCEDURE — 81002 URINALYSIS NONAUTO W/O SCOPE: CPT | Performed by: INTERNAL MEDICINE

## 2025-07-17 PROCEDURE — 1036F TOBACCO NON-USER: CPT | Performed by: INTERNAL MEDICINE

## 2025-07-17 PROCEDURE — 3079F DIAST BP 80-89 MM HG: CPT | Performed by: INTERNAL MEDICINE

## 2025-07-17 PROCEDURE — 99396 PREV VISIT EST AGE 40-64: CPT | Performed by: INTERNAL MEDICINE

## 2025-07-17 PROCEDURE — 3074F SYST BP LT 130 MM HG: CPT | Performed by: INTERNAL MEDICINE

## 2025-07-19 LAB
25(OH)D3+25(OH)D2 SERPL-MCNC: 32 NG/ML (ref 30–100)
ALBUMIN SERPL-MCNC: 4.1 G/DL (ref 3.6–5.1)
ALP SERPL-CCNC: 122 U/L (ref 36–130)
ALT SERPL-CCNC: 19 U/L (ref 9–46)
ANION GAP SERPL CALCULATED.4IONS-SCNC: 6 MMOL/L (CALC) (ref 7–17)
AST SERPL-CCNC: 19 U/L (ref 10–40)
BASOPHILS # BLD AUTO: 90 CELLS/UL (ref 0–200)
BASOPHILS NFR BLD AUTO: 1.3 %
BILIRUB SERPL-MCNC: 1.8 MG/DL (ref 0.2–1.2)
BUN SERPL-MCNC: 15 MG/DL (ref 7–25)
CALCIUM SERPL-MCNC: 9.1 MG/DL (ref 8.6–10.3)
CHLORIDE SERPL-SCNC: 104 MMOL/L (ref 98–110)
CHOLEST SERPL-MCNC: 137 MG/DL
CHOLEST/HDLC SERPL: 2.8 (CALC)
CO2 SERPL-SCNC: 30 MMOL/L (ref 20–32)
CREAT SERPL-MCNC: 1.08 MG/DL (ref 0.6–1.29)
CRP SERPL HS-MCNC: NORMAL MG/L
EGFRCR SERPLBLD CKD-EPI 2021: 86 ML/MIN/1.73M2
EOSINOPHIL # BLD AUTO: 228 CELLS/UL (ref 15–500)
EOSINOPHIL NFR BLD AUTO: 3.3 %
ERYTHROCYTE [DISTWIDTH] IN BLOOD BY AUTOMATED COUNT: 13.1 % (ref 11–15)
GLUCOSE SERPL-MCNC: 76 MG/DL (ref 65–99)
HCT VFR BLD AUTO: 44.9 % (ref 38.5–50)
HDLC SERPL-MCNC: 49 MG/DL
HGB BLD-MCNC: 14.5 G/DL (ref 13.2–17.1)
LDLC SERPL CALC-MCNC: 72 MG/DL (CALC)
LYMPHOCYTES # BLD AUTO: 1725 CELLS/UL (ref 850–3900)
LYMPHOCYTES NFR BLD AUTO: 25 %
MCH RBC QN AUTO: 29.4 PG (ref 27–33)
MCHC RBC AUTO-ENTMCNC: 32.3 G/DL (ref 32–36)
MCV RBC AUTO: 90.9 FL (ref 80–100)
MONOCYTES # BLD AUTO: 573 CELLS/UL (ref 200–950)
MONOCYTES NFR BLD AUTO: 8.3 %
NEUTROPHILS # BLD AUTO: 4285 CELLS/UL (ref 1500–7800)
NEUTROPHILS NFR BLD AUTO: 62.1 %
NONHDLC SERPL-MCNC: 88 MG/DL (CALC)
PLATELET # BLD AUTO: 206 THOUSAND/UL (ref 140–400)
PMV BLD REES-ECKER: 11.2 FL (ref 7.5–12.5)
POTASSIUM SERPL-SCNC: 4.2 MMOL/L (ref 3.5–5.3)
PROT SERPL-MCNC: 6.6 G/DL (ref 6.1–8.1)
PSA SERPL-MCNC: 0.42 NG/ML
RBC # BLD AUTO: 4.94 MILLION/UL (ref 4.2–5.8)
SODIUM SERPL-SCNC: 140 MMOL/L (ref 135–146)
TRIGL SERPL-MCNC: 78 MG/DL
TSH SERPL-ACNC: 1.98 MIU/L (ref 0.4–4.5)
VIT B12 SERPL-MCNC: 785 PG/ML (ref 200–1100)
WBC # BLD AUTO: 6.9 THOUSAND/UL (ref 3.8–10.8)

## 2025-07-21 LAB
25(OH)D3+25(OH)D2 SERPL-MCNC: 32 NG/ML (ref 30–100)
ALBUMIN SERPL-MCNC: 4.1 G/DL (ref 3.6–5.1)
ALP SERPL-CCNC: 122 U/L (ref 36–130)
ALT SERPL-CCNC: 19 U/L (ref 9–46)
ANION GAP SERPL CALCULATED.4IONS-SCNC: 6 MMOL/L (CALC) (ref 7–17)
AST SERPL-CCNC: 19 U/L (ref 10–40)
BASOPHILS # BLD AUTO: 90 CELLS/UL (ref 0–200)
BASOPHILS NFR BLD AUTO: 1.3 %
BILIRUB SERPL-MCNC: 1.8 MG/DL (ref 0.2–1.2)
BUN SERPL-MCNC: 15 MG/DL (ref 7–25)
CALCIUM SERPL-MCNC: 9.1 MG/DL (ref 8.6–10.3)
CHLORIDE SERPL-SCNC: 104 MMOL/L (ref 98–110)
CHOLEST SERPL-MCNC: 137 MG/DL
CHOLEST/HDLC SERPL: 2.8 (CALC)
CO2 SERPL-SCNC: 30 MMOL/L (ref 20–32)
CREAT SERPL-MCNC: 1.08 MG/DL (ref 0.6–1.29)
CRP SERPL HS-MCNC: 0.6 MG/L
EGFRCR SERPLBLD CKD-EPI 2021: 86 ML/MIN/1.73M2
EOSINOPHIL # BLD AUTO: 228 CELLS/UL (ref 15–500)
EOSINOPHIL NFR BLD AUTO: 3.3 %
ERYTHROCYTE [DISTWIDTH] IN BLOOD BY AUTOMATED COUNT: 13.1 % (ref 11–15)
GLUCOSE SERPL-MCNC: 76 MG/DL (ref 65–99)
HCT VFR BLD AUTO: 44.9 % (ref 38.5–50)
HDLC SERPL-MCNC: 49 MG/DL
HGB BLD-MCNC: 14.5 G/DL (ref 13.2–17.1)
LDLC SERPL CALC-MCNC: 72 MG/DL (CALC)
LYMPHOCYTES # BLD AUTO: 1725 CELLS/UL (ref 850–3900)
LYMPHOCYTES NFR BLD AUTO: 25 %
MCH RBC QN AUTO: 29.4 PG (ref 27–33)
MCHC RBC AUTO-ENTMCNC: 32.3 G/DL (ref 32–36)
MCV RBC AUTO: 90.9 FL (ref 80–100)
MONOCYTES # BLD AUTO: 573 CELLS/UL (ref 200–950)
MONOCYTES NFR BLD AUTO: 8.3 %
NEUTROPHILS # BLD AUTO: 4285 CELLS/UL (ref 1500–7800)
NEUTROPHILS NFR BLD AUTO: 62.1 %
NONHDLC SERPL-MCNC: 88 MG/DL (CALC)
PLATELET # BLD AUTO: 206 THOUSAND/UL (ref 140–400)
PMV BLD REES-ECKER: 11.2 FL (ref 7.5–12.5)
POTASSIUM SERPL-SCNC: 4.2 MMOL/L (ref 3.5–5.3)
PROT SERPL-MCNC: 6.6 G/DL (ref 6.1–8.1)
PSA SERPL-MCNC: 0.42 NG/ML
RBC # BLD AUTO: 4.94 MILLION/UL (ref 4.2–5.8)
SODIUM SERPL-SCNC: 140 MMOL/L (ref 135–146)
TRIGL SERPL-MCNC: 78 MG/DL
TSH SERPL-ACNC: 1.98 MIU/L (ref 0.4–4.5)
VIT B12 SERPL-MCNC: 785 PG/ML (ref 200–1100)
WBC # BLD AUTO: 6.9 THOUSAND/UL (ref 3.8–10.8)

## 2025-07-28 ENCOUNTER — HOSPITAL ENCOUNTER (OUTPATIENT)
Dept: CARDIOLOGY | Facility: CLINIC | Age: 45
Discharge: HOME | End: 2025-07-28
Payer: COMMERCIAL

## 2025-07-28 DIAGNOSIS — I47.20 VT (VENTRICULAR TACHYCARDIA) (MULTI): ICD-10-CM

## 2025-07-28 DIAGNOSIS — Z95.810 ICD (IMPLANTABLE CARDIOVERTER-DEFIBRILLATOR) IN PLACE: ICD-10-CM

## 2025-07-28 PROCEDURE — 93295 DEV INTERROG REMOTE 1/2/MLT: CPT | Performed by: STUDENT IN AN ORGANIZED HEALTH CARE EDUCATION/TRAINING PROGRAM

## 2025-07-28 PROCEDURE — 93296 REM INTERROG EVL PM/IDS: CPT

## 2025-08-04 DIAGNOSIS — Z00.00 ROUTINE GENERAL MEDICAL EXAMINATION AT A HEALTH CARE FACILITY: Primary | ICD-10-CM

## 2025-08-04 RX ORDER — VITAMIN A 3000 MCG
3000 CAPSULE ORAL DAILY
Qty: 90 CAPSULE | Refills: 2 | Status: SHIPPED | OUTPATIENT
Start: 2025-08-04

## 2025-08-23 DIAGNOSIS — I47.20 VT (VENTRICULAR TACHYCARDIA) (MULTI): ICD-10-CM

## 2025-08-25 ENCOUNTER — HOSPITAL ENCOUNTER (OUTPATIENT)
Dept: RADIOLOGY | Facility: CLINIC | Age: 45
Discharge: HOME | End: 2025-08-25
Payer: COMMERCIAL

## 2025-08-25 ENCOUNTER — APPOINTMENT (OUTPATIENT)
Dept: PRIMARY CARE | Facility: CLINIC | Age: 45
End: 2025-08-25
Payer: COMMERCIAL

## 2025-08-25 VITALS
DIASTOLIC BLOOD PRESSURE: 80 MMHG | HEIGHT: 68 IN | SYSTOLIC BLOOD PRESSURE: 108 MMHG | TEMPERATURE: 97.6 F | WEIGHT: 252.3 LBS | HEART RATE: 60 BPM | BODY MASS INDEX: 38.24 KG/M2

## 2025-08-25 DIAGNOSIS — Z95.810 ICD (IMPLANTABLE CARDIOVERTER-DEFIBRILLATOR) IN PLACE: ICD-10-CM

## 2025-08-25 DIAGNOSIS — R07.89 OTHER CHEST PAIN: Primary | ICD-10-CM

## 2025-08-25 DIAGNOSIS — I10 ESSENTIAL HYPERTENSION: ICD-10-CM

## 2025-08-25 DIAGNOSIS — M54.12 LEFT CERVICAL RADICULOPATHY: ICD-10-CM

## 2025-08-25 DIAGNOSIS — I47.20 VT (VENTRICULAR TACHYCARDIA) (MULTI): ICD-10-CM

## 2025-08-25 DIAGNOSIS — E55.9 VITAMIN D DEFICIENCY: ICD-10-CM

## 2025-08-25 PROCEDURE — 72050 X-RAY EXAM NECK SPINE 4/5VWS: CPT | Performed by: RADIOLOGY

## 2025-08-25 PROCEDURE — 3008F BODY MASS INDEX DOCD: CPT | Performed by: INTERNAL MEDICINE

## 2025-08-25 PROCEDURE — 99213 OFFICE O/P EST LOW 20 MIN: CPT | Performed by: INTERNAL MEDICINE

## 2025-08-25 PROCEDURE — 3074F SYST BP LT 130 MM HG: CPT | Performed by: INTERNAL MEDICINE

## 2025-08-25 PROCEDURE — 3079F DIAST BP 80-89 MM HG: CPT | Performed by: INTERNAL MEDICINE

## 2025-08-25 PROCEDURE — 72050 X-RAY EXAM NECK SPINE 4/5VWS: CPT

## 2025-08-25 RX ORDER — NADOLOL 20 MG/1
20 TABLET ORAL DAILY
Qty: 90 TABLET | Refills: 3 | Status: SHIPPED | OUTPATIENT
Start: 2025-08-25

## 2025-08-25 RX ORDER — NADOLOL 20 MG/1
20 TABLET ORAL DAILY
Qty: 90 TABLET | Refills: 0 | Status: SHIPPED | OUTPATIENT
Start: 2025-08-25 | End: 2025-08-25 | Stop reason: SDUPTHER

## 2025-08-25 RX ORDER — METHYLPREDNISOLONE 4 MG/1
TABLET ORAL
Qty: 21 TABLET | Refills: 0 | Status: SHIPPED | OUTPATIENT
Start: 2025-08-25 | End: 2025-08-31

## 2025-08-25 RX ORDER — ERGOCALCIFEROL 1.25 MG/1
1.25 CAPSULE ORAL
Qty: 12 CAPSULE | Refills: 3 | Status: SHIPPED | OUTPATIENT
Start: 2025-08-25

## 2026-02-02 ENCOUNTER — APPOINTMENT (OUTPATIENT)
Dept: CARDIOLOGY | Facility: CLINIC | Age: 46
End: 2026-02-02
Payer: COMMERCIAL

## 2026-07-23 ENCOUNTER — APPOINTMENT (OUTPATIENT)
Dept: PRIMARY CARE | Facility: CLINIC | Age: 46
End: 2026-07-23
Payer: COMMERCIAL

## 2026-07-29 ENCOUNTER — APPOINTMENT (OUTPATIENT)
Dept: PRIMARY CARE | Facility: CLINIC | Age: 46
End: 2026-07-29
Payer: COMMERCIAL

## (undated) DEVICE — CATHETER, ANGIO, IMPULSE, PIG 155 DEG, 5 FR X 110 CM

## (undated) DEVICE — Device

## (undated) DEVICE — GLIDESHEATH, SLENDER 6FR, 10CM, NITINOL KIT

## (undated) DEVICE — TUBE SET, PNEUMOCLEAR, SMOKE EVACU, HIGH-FLOW

## (undated) DEVICE — DEVICE, CLOSURE, PENCLOSE, PROGLIDE 6FR

## (undated) DEVICE — CABLE, SURGICAL, SM CLIP

## (undated) DEVICE — SHEATH, PINNACLE, 10 CM,  4FR INTRODUCER, 4FR DIA, 2.5 CM DIALATOR

## (undated) DEVICE — INTRODUCER, MICRO, 4FR, 7CM ECHO

## (undated) DEVICE — GOWN, SURGICAL, SMARTGOWN, XLARGE, STERILE

## (undated) DEVICE — ENVELOPE, ANTIBACTERIAL, AIGIS RX TYRX, ABSORBABLE, LRG

## (undated) DEVICE — CATHETER, PIGTAIL 155 MOD DIAGNOSTIC, 4 FR (110 CM)

## (undated) DEVICE — COVER, CART, 45 X 27 X 48 IN, CLEAR

## (undated) DEVICE — GUIDEWIRE, AMPLATZ SUPER STIFF, STR, .035 IN X 75CM

## (undated) DEVICE — INTRODUCER SYSTEM, PRELUDE SNAP, SPLITTABLE, HEMOSTATIC, 7FR

## (undated) DEVICE — CATHETER, THERMODILUTION, SWAN GANZ, VIP, 5 LUMEN, 7.5 FR, 110 CM

## (undated) DEVICE — SUTURE, MONOCRYL, 4-0, 18 IN, PS2, UNDYED

## (undated) DEVICE — SHEATH, PINNACLE, 10 CM,  6FR INTRODUCER, 6FR DIA, 2.5 CM DIALATOR

## (undated) DEVICE — RETRIEVAL SYSTEM, MONARCH, 10MM DISP ENDOSCOPIC

## (undated) DEVICE — HOLSTER, JET SAFETY

## (undated) DEVICE — APPLIER,  LIGACLIP, ENDO ROTATE, ROTATING, 10MM 20M/L, DISP

## (undated) DEVICE — TROCAR SYSTEM, BALLOON, KII GELPORT, 12 X 100MM

## (undated) DEVICE — TROCAR, KII OPTICAL BLADELESS 5MM Z THREAD 100MM LNGTH

## (undated) DEVICE — INFLATION KIT, ADVANTAGE, ENCORE 26 (1/BOX)

## (undated) DEVICE — TUBE, SALEM SUMP, 16 FR X 48IN, ENFIT

## (undated) DEVICE — SUTURE, PDS, 0, 18 IN, LIGATING LOOP, VIOLET

## (undated) DEVICE — ELECTRODE, QUICK-COMBO, REDI PACK

## (undated) DEVICE — INTRODUCER SYSTEM, PRELUDE SNAP, SPLITTABLE, HEMOSTATIC, 6FR

## (undated) DEVICE — GUIDEWIRE, MANDRIL, COPE, W/NITINOL, 0.018 IN X 60 CM

## (undated) DEVICE — CATHETER, ANGIO, IMPULSE, FL4, 5 FR X 100 CM

## (undated) DEVICE — TROCAR, OPTICAL, BLADELESS, 12MM, THREADED, 100MM LENGTH

## (undated) DEVICE — INTRODUCER KIT, HEMOSTASIS, VALVE/SIDEPORT, W/GUIDEWIRE, 0.035 IN, 8.5 FR, 10 CM, LF

## (undated) DEVICE — ANTIFOG, SOLUTION, FOG-OUT

## (undated) DEVICE — DRAPE, SHEET, ENDOSCOPY, GENERAL, FENESTRATED, ARMBOARD COVER, 98 X 123.5 IN, DISPOSABLE, LF, STERILE

## (undated) DEVICE — GUIDEWIRE, INQWIRE, 3MM J, .035, 260

## (undated) DEVICE — CATHETER, DIAGNOSTIC, 4 FR-JR 4

## (undated) DEVICE — DRESSING, IV TRANS, TEGADERM, 3-1/2 X 4-1/2 IN W/SECURAL

## (undated) DEVICE — SUTURE, VICRYL PLUS, 0, 27IN, UR-6, VIOLET, BRAIDED

## (undated) DEVICE — MANIFOLD, 4 PORT NEPTUNE STANDARD